# Patient Record
Sex: MALE | Race: WHITE | NOT HISPANIC OR LATINO | Employment: OTHER | URBAN - METROPOLITAN AREA
[De-identification: names, ages, dates, MRNs, and addresses within clinical notes are randomized per-mention and may not be internally consistent; named-entity substitution may affect disease eponyms.]

---

## 2017-01-24 DIAGNOSIS — E34.9 TESTOSTERONE DEFICIENCY: Primary | ICD-10-CM

## 2017-01-24 RX ORDER — TESTOSTERONE CYPIONATE 200 MG/ML
50 INJECTION, SOLUTION INTRAMUSCULAR
Qty: 3 ML | Refills: 3 | OUTPATIENT
Start: 2017-01-24 | End: 2017-02-02

## 2017-01-24 RX ORDER — NAPROXEN 250 MG/1
250 TABLET ORAL DAILY
Qty: 90 TABLET | Refills: 3 | Status: SHIPPED | OUTPATIENT
Start: 2017-01-24 | End: 2017-02-02

## 2017-01-24 RX ORDER — AMLODIPINE BESYLATE 10 MG/1
10 TABLET ORAL DAILY
Qty: 90 TABLET | Refills: 3 | Status: SHIPPED | OUTPATIENT
Start: 2017-01-24 | End: 2017-02-02

## 2017-01-24 RX ORDER — ALLOPURINOL 100 MG/1
100 TABLET ORAL DAILY
Qty: 90 TABLET | Refills: 3 | Status: SHIPPED | OUTPATIENT
Start: 2017-01-24 | End: 2017-02-02

## 2017-01-24 RX ORDER — TESTOSTERONE CYPIONATE 200 MG/ML
50 INJECTION, SOLUTION INTRAMUSCULAR
Qty: 3 ML | Refills: 3 | OUTPATIENT
Start: 2017-01-24 | End: 2017-01-24 | Stop reason: SDUPTHER

## 2017-01-24 RX ORDER — ATENOLOL AND CHLORTHALIDONE TABLET 50; 25 MG/1; MG/1
1 TABLET ORAL DAILY
Qty: 90 TABLET | Refills: 3 | Status: SHIPPED | OUTPATIENT
Start: 2017-01-24 | End: 2017-02-02

## 2017-01-24 NOTE — TELEPHONE ENCOUNTER
----- Message from Trista Pollack MA sent at 1/24/2017  2:02 PM EST -----  Dr Lai Pt    Pt calling for refills:    Atenolol  Amlodipine  Allopurinol  Naproxen  Breo Testosterone    [facility]

## 2017-02-02 ENCOUNTER — HOSPITAL ENCOUNTER (OUTPATIENT)
Dept: GENERAL RADIOLOGY | Facility: HOSPITAL | Age: 82
Discharge: HOME | End: 2017-02-02
Admitting: ORTHOPAEDIC SURGERY

## 2017-02-02 ENCOUNTER — APPOINTMENT (OUTPATIENT)
Dept: PREADMISSION TESTING | Facility: HOSPITAL | Age: 82
End: 2017-02-02

## 2017-02-02 VITALS
HEIGHT: 70 IN | OXYGEN SATURATION: 94 % | TEMPERATURE: 97 F | RESPIRATION RATE: 16 BRPM | SYSTOLIC BLOOD PRESSURE: 130 MMHG | WEIGHT: 224 LBS | DIASTOLIC BLOOD PRESSURE: 85 MMHG | BODY MASS INDEX: 32.07 KG/M2 | HEART RATE: 61 BPM

## 2017-02-02 LAB
ABO GROUP BLD: NORMAL
ANION GAP SERPL CALCULATED.3IONS-SCNC: 12.6 MMOL/L
BILIRUB UR QL STRIP: NEGATIVE
BLD GP AB SCN SERPL QL: NEGATIVE
BUN BLD-MCNC: 20 MG/DL (ref 8–23)
BUN/CREAT SERPL: 16.8 (ref 7–25)
CALCIUM SPEC-SCNC: 9.8 MG/DL (ref 8.6–10.5)
CHLORIDE SERPL-SCNC: 100 MMOL/L (ref 98–107)
CLARITY UR: CLEAR
CO2 SERPL-SCNC: 27.4 MMOL/L (ref 22–29)
COLOR UR: YELLOW
CREAT BLD-MCNC: 1.19 MG/DL (ref 0.76–1.27)
DEPRECATED RDW RBC AUTO: 47.1 FL (ref 37–54)
ERYTHROCYTE [DISTWIDTH] IN BLOOD BY AUTOMATED COUNT: 13.7 % (ref 11.5–14.5)
GFR SERPL CREATININE-BSD FRML MDRD: 59 ML/MIN/1.73
GLUCOSE BLD-MCNC: 111 MG/DL (ref 65–99)
GLUCOSE UR STRIP-MCNC: NEGATIVE MG/DL
HCT VFR BLD AUTO: 48.8 % (ref 40.4–52.2)
HGB BLD-MCNC: 16.1 G/DL (ref 13.7–17.6)
HGB UR QL STRIP.AUTO: NEGATIVE
KETONES UR QL STRIP: NEGATIVE
LEUKOCYTE ESTERASE UR QL STRIP.AUTO: NEGATIVE
MCH RBC QN AUTO: 31.1 PG (ref 27–32.7)
MCHC RBC AUTO-ENTMCNC: 33 G/DL (ref 32.6–36.4)
MCV RBC AUTO: 94.2 FL (ref 79.8–96.2)
NITRITE UR QL STRIP: NEGATIVE
PH UR STRIP.AUTO: 7.5 [PH] (ref 5–8)
PLATELET # BLD AUTO: 224 10*3/MM3 (ref 140–500)
PMV BLD AUTO: 9.8 FL (ref 6–12)
POTASSIUM BLD-SCNC: 4 MMOL/L (ref 3.5–5.2)
PROT UR QL STRIP: ABNORMAL
RBC # BLD AUTO: 5.18 10*6/MM3 (ref 4.6–6)
RH BLD: NEGATIVE
SODIUM BLD-SCNC: 140 MMOL/L (ref 136–145)
SP GR UR STRIP: 1.02 (ref 1–1.03)
UROBILINOGEN UR QL STRIP: ABNORMAL
WBC NRBC COR # BLD: 5.12 10*3/MM3 (ref 4.5–10.7)

## 2017-02-02 PROCEDURE — 81003 URINALYSIS AUTO W/O SCOPE: CPT | Performed by: ORTHOPAEDIC SURGERY

## 2017-02-02 PROCEDURE — 85027 COMPLETE CBC AUTOMATED: CPT | Performed by: ORTHOPAEDIC SURGERY

## 2017-02-02 PROCEDURE — 36415 COLL VENOUS BLD VENIPUNCTURE: CPT

## 2017-02-02 PROCEDURE — 86901 BLOOD TYPING SEROLOGIC RH(D): CPT

## 2017-02-02 PROCEDURE — 73030 X-RAY EXAM OF SHOULDER: CPT

## 2017-02-02 PROCEDURE — 86900 BLOOD TYPING SEROLOGIC ABO: CPT

## 2017-02-02 PROCEDURE — 86850 RBC ANTIBODY SCREEN: CPT

## 2017-02-02 PROCEDURE — 80048 BASIC METABOLIC PNL TOTAL CA: CPT | Performed by: ORTHOPAEDIC SURGERY

## 2017-02-02 RX ORDER — ALLOPURINOL 100 MG/1
100 TABLET ORAL DAILY
COMMUNITY
End: 2017-10-02 | Stop reason: SDUPTHER

## 2017-02-02 RX ORDER — ATENOLOL AND CHLORTHALIDONE TABLET 50; 25 MG/1; MG/1
1 TABLET ORAL EVERY MORNING
COMMUNITY
End: 2017-06-14 | Stop reason: SDUPTHER

## 2017-02-02 RX ORDER — NAPROXEN 250 MG/1
250 TABLET ORAL DAILY
COMMUNITY
End: 2017-10-02 | Stop reason: SDUPTHER

## 2017-02-02 RX ORDER — CHLORHEXIDINE GLUCONATE 500 MG/1
CLOTH TOPICAL
COMMUNITY
End: 2017-02-17 | Stop reason: HOSPADM

## 2017-02-02 RX ORDER — AMLODIPINE BESYLATE 10 MG/1
10 TABLET ORAL EVERY EVENING
COMMUNITY
End: 2017-06-14 | Stop reason: SDUPTHER

## 2017-02-02 NOTE — DISCHARGE INSTRUCTIONS
Take the following medications the morning of surgery with a small sip of water.    AMLODIPINE.    TIME OF ARRIVAL WILL BE CALLED TO YOU THE DAY BEFORE SURGERY    General Instructions:  • Do not eat or drink after midnight: includes water, mints, or gum. You may brush your teeth.  • Do not smoke, chew tobacco, or drink alcohol.  • Bring medications in original bottles, any inhalers and if applicable your C-PAP/ BI-PAP machine.  • Bring any papers given to you in the doctor’s office.  • Wear clean comfortable clothes and socks.  • Do not wear contact lenses or make-up.  Bring a case for your glasses if applicable.   • Bring crutches or walker if applicable.  • Leave all other valuables and jewelry at home.    If you were given a blood bank ID arm band remember to bring it with you the day of surgery.    Preventing a Surgical Site Infection:  Shower on the morning of surgery using a fresh bar of anti-bacterial soap (such as Dial) and clean washcloth.  Dry with a clean towel and dress in clean clothing.  For 2 to 3 days before surgery, avoid shaving with a razor near where you will have surgery because the razor can irritate skin and make it easier to develop an infection  Ask your surgeon if you will be receiving antibiotics prior to surgery  Make sure you, your family, and all healthcare providers clean their hands with soap and water or an alcohol based hand  before caring for you or your wound  If at all possible, quit smoking as many days before surgery as you can.    Day of surgery:  Upon arrival, a Pre-op nurse and Anesthesiologist will review your health history, obtain vital signs, and answer questions you may have.  The only belongings needed at this time will be your home medications and if applicable your C-PAP/BI-PAP machine.  If you are staying overnight your family can leave the rest of your belongings in the car and bring them to your room later.  A Pre-op nurse will start an IV and you may  receive medication in preparation for surgery, including something to help you relax.  Your family will be able to see you in the Pre-op area.  While you are in surgery your family should notify the waiting room  if they leave the waiting room area and provide a contact phone number.    Please be aware that surgery does come with discomfort.  We want to make every effort to control your discomfort so please discuss any uncontrolled symptoms with your nurse.   Your doctor will most likely have prescribed pain medications.      If you are going home after surgery you will receive individualized written care instructions before being discharged.  A responsible adult must drive you to and from the hospital on the day of your surgery and stay with you for 24 hours.    If you are staying overnight following surgery, you will be transported to your hospital room following the recovery period.  [facility] has all private rooms.    If you have any questions please call Pre-Admission Testing at [phone].  Deductibles and co-payments are collected on the day of service. Please be prepared to pay the required co-pay, deductible or deposit on the day of service as defined by your plan.    2% CHLORAHEXIDINE GLUCONATE* CLOTH  Preparing or “prepping” skin before surgery can reduce the risk of infection at the surgical site. To make the process easier, [facility] has chosen disposable cloths moistened with a rinse-free, 2% Chlorhexidine Gluconate (CHG) antiseptic solution. The steps below outline the prepping process and should be carefully followed.        Use the prep cloth on the area that is circled in the diagram             Directions Night before Surgery  1) Shower using a fresh bar of anti-bacterial soap (such as Dial) and clean washcloth.  Use a clean towel to completely dry your skin.  2) Do not use any lotions, oils or creams on your skin.  3) Open the package and remove 1 cloth,  wipe your skin for 30 seconds in a circular motion.  Allow to dry for 3 minutes.  4) Repeat #3 with second cloth.  5) Do not touch your eyes, ears, or mouth with the prep cloth.  6) Allow the wet prep solution to air dry.  7) Discard the prep cloth and wash your hands with soap and water.   8) Dress in clean bed clothes and sleep on fresh clean bed sheets.   9) You may experience some temporary itching after the prep.    Directions Day of Surgery  1) Repeat steps 1,2,3,4,5,6,7, and 9.   2) Dress in clean clothes before coming to the hospital.    BACTROBAN NASAL OINTMENT  There are many germs normally in your nose. Bactroban is an ointment that will help reduce these germs. Please follow these instructions for Bactroban use:    ____Two days before surgery in the evening Date________    ____The day before surgery in the morning  Date________    ____The day before surgery in the evening              Date________    ____The day of surgery in the morning    Date________    **Squirt ½ package of Bactroban Ointment onto a cotton applicator and apply to inside of 1st nostril.  Squirt the remaining Bactroban and apply to the inside of the other nostril.    PERIDEX- ORAL:  Use only if your surgeon has ordered  Use the night before and morning of surgery - Swish, gargle, and spit - do not swallow.

## 2017-02-15 ENCOUNTER — ANESTHESIA EVENT (OUTPATIENT)
Dept: PERIOP | Facility: HOSPITAL | Age: 82
End: 2017-02-15

## 2017-02-15 NOTE — ANESTHESIA PREPROCEDURE EVALUATION
Anesthesia Evaluation     Patient summary reviewed and Nursing notes reviewed   NPO Status: > 8 hours   Airway   Mallampati: III  TM distance: >3 FB  Neck ROM: limited  possible difficult intubation  Dental - normal exam   (+) poor dentation        Pulmonary - negative pulmonary ROS and normal exam   Cardiovascular - normal exam    ECG reviewed    (+) hypertension, past MI  >12 months, CAD,       Neuro/Psych- negative ROS  GI/Hepatic/Renal/Endo - negative ROS     Musculoskeletal (-) negative ROS    Abdominal  - normal exam   Substance History - negative use     OB/GYN negative ob/gyn ROS         Other                            Anesthesia Plan    ASA 3     general   (EKG: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   GA with Right ISB for POPC PSR)  intravenous induction   Anesthetic plan and risks discussed with patient.    Plan discussed with CRNA.

## 2017-02-16 ENCOUNTER — ANESTHESIA (OUTPATIENT)
Dept: PERIOP | Facility: HOSPITAL | Age: 82
End: 2017-02-16

## 2017-02-16 ENCOUNTER — APPOINTMENT (OUTPATIENT)
Dept: GENERAL RADIOLOGY | Facility: HOSPITAL | Age: 82
End: 2017-02-16

## 2017-02-16 PROBLEM — M19.011 OSTEOARTHRITIS OF RIGHT SHOULDER: Status: ACTIVE | Noted: 2017-02-16

## 2017-02-16 PROCEDURE — 25010000002 ROPIVACAINE PER 1 MG: Performed by: ANESTHESIOLOGY

## 2017-02-16 PROCEDURE — 25010000002 PHENYLEPHRINE PER 1 ML: Performed by: NURSE ANESTHETIST, CERTIFIED REGISTERED

## 2017-02-16 PROCEDURE — 73020 X-RAY EXAM OF SHOULDER: CPT

## 2017-02-16 PROCEDURE — 25010000002 PROPOFOL 10 MG/ML EMULSION: Performed by: NURSE ANESTHETIST, CERTIFIED REGISTERED

## 2017-02-16 PROCEDURE — 25010000002 ONDANSETRON PER 1 MG: Performed by: NURSE ANESTHETIST, CERTIFIED REGISTERED

## 2017-02-16 PROCEDURE — 25010000002 DEXAMETHASONE PER 1 MG: Performed by: ANESTHESIOLOGY

## 2017-02-16 PROCEDURE — 25010000002 NEOSTIGMINE 0.5 MG/ML SOLUTION: Performed by: NURSE ANESTHETIST, CERTIFIED REGISTERED

## 2017-02-16 PROCEDURE — 25010000003 CEFAZOLIN IN DEXTROSE 2-4 GM/100ML-% SOLUTION: Performed by: ORTHOPAEDIC SURGERY

## 2017-02-16 RX ORDER — DEXAMETHASONE SODIUM PHOSPHATE 4 MG/ML
INJECTION, SOLUTION INTRA-ARTICULAR; INTRALESIONAL; INTRAMUSCULAR; INTRAVENOUS; SOFT TISSUE AS NEEDED
Status: DISCONTINUED | OUTPATIENT
Start: 2017-02-16 | End: 2017-02-16 | Stop reason: SURG

## 2017-02-16 RX ORDER — ONDANSETRON 2 MG/ML
INJECTION INTRAMUSCULAR; INTRAVENOUS AS NEEDED
Status: DISCONTINUED | OUTPATIENT
Start: 2017-02-16 | End: 2017-02-16 | Stop reason: SURG

## 2017-02-16 RX ORDER — ROPIVACAINE HYDROCHLORIDE 5 MG/ML
INJECTION, SOLUTION EPIDURAL; INFILTRATION; PERINEURAL AS NEEDED
Status: DISCONTINUED | OUTPATIENT
Start: 2017-02-16 | End: 2017-02-16 | Stop reason: SURG

## 2017-02-16 RX ORDER — PROPOFOL 10 MG/ML
VIAL (ML) INTRAVENOUS AS NEEDED
Status: DISCONTINUED | OUTPATIENT
Start: 2017-02-16 | End: 2017-02-16 | Stop reason: SURG

## 2017-02-16 RX ORDER — ROCURONIUM BROMIDE 10 MG/ML
INJECTION, SOLUTION INTRAVENOUS AS NEEDED
Status: DISCONTINUED | OUTPATIENT
Start: 2017-02-16 | End: 2017-02-16 | Stop reason: SURG

## 2017-02-16 RX ORDER — GLYCOPYRROLATE 0.2 MG/ML
INJECTION INTRAMUSCULAR; INTRAVENOUS AS NEEDED
Status: DISCONTINUED | OUTPATIENT
Start: 2017-02-16 | End: 2017-02-16 | Stop reason: SURG

## 2017-02-16 RX ORDER — LIDOCAINE HYDROCHLORIDE 20 MG/ML
INJECTION, SOLUTION INFILTRATION; PERINEURAL AS NEEDED
Status: DISCONTINUED | OUTPATIENT
Start: 2017-02-16 | End: 2017-02-16 | Stop reason: SURG

## 2017-02-16 RX ADMIN — PHENYLEPHRINE HYDROCHLORIDE 100 MCG: 10 INJECTION INTRAVENOUS at 10:12

## 2017-02-16 RX ADMIN — ONDANSETRON 4 MG: 2 INJECTION INTRAMUSCULAR; INTRAVENOUS at 09:00

## 2017-02-16 RX ADMIN — NEOSTIGMINE METHYLSULFATE 1 MG: 5 INJECTION, SOLUTION INTRAMUSCULAR; INTRAVENOUS; SUBCUTANEOUS at 10:35

## 2017-02-16 RX ADMIN — EPHEDRINE SULFATE 15 MG: 50 INJECTION INTRAMUSCULAR; INTRAVENOUS; SUBCUTANEOUS at 09:07

## 2017-02-16 RX ADMIN — CEFAZOLIN SODIUM 2 G: 2 INJECTION, SOLUTION INTRAVENOUS at 08:52

## 2017-02-16 RX ADMIN — DEXAMETHASONE SODIUM PHOSPHATE 4 MG: 4 INJECTION, SOLUTION INTRAMUSCULAR; INTRAVENOUS at 08:13

## 2017-02-16 RX ADMIN — PROPOFOL 260 MG: 10 INJECTION, EMULSION INTRAVENOUS at 08:52

## 2017-02-16 RX ADMIN — GLYCOPYRROLATE 0.2 MG: 0.2 INJECTION INTRAMUSCULAR; INTRAVENOUS at 09:07

## 2017-02-16 RX ADMIN — ROPIVACAINE HYDROCHLORIDE 20 ML: 5 INJECTION, SOLUTION EPIDURAL; INFILTRATION; PERINEURAL at 08:13

## 2017-02-16 RX ADMIN — LIDOCAINE HYDROCHLORIDE 40 MG: 20 INJECTION, SOLUTION INFILTRATION; PERINEURAL at 08:52

## 2017-02-16 RX ADMIN — EPHEDRINE SULFATE 10 MG: 50 INJECTION INTRAMUSCULAR; INTRAVENOUS; SUBCUTANEOUS at 09:03

## 2017-02-16 RX ADMIN — GLYCOPYRROLATE 0.2 MG: 0.2 INJECTION INTRAMUSCULAR; INTRAVENOUS at 10:35

## 2017-02-16 RX ADMIN — PHENYLEPHRINE HYDROCHLORIDE 50 MCG: 10 INJECTION INTRAVENOUS at 10:28

## 2017-02-16 RX ADMIN — PHENYLEPHRINE HYDROCHLORIDE 100 MCG: 10 INJECTION INTRAVENOUS at 10:33

## 2017-02-16 RX ADMIN — SODIUM CHLORIDE, POTASSIUM CHLORIDE, SODIUM LACTATE AND CALCIUM CHLORIDE: 600; 310; 30; 20 INJECTION, SOLUTION INTRAVENOUS at 10:33

## 2017-02-16 RX ADMIN — ROCURONIUM BROMIDE 40 MG: 10 INJECTION INTRAVENOUS at 08:53

## 2017-02-16 NOTE — ANESTHESIA PROCEDURE NOTES
Peripheral Block    Patient location during procedure: holding area  Start time: 2/16/2017 8:04 AM  Stop time: 2/16/2017 8:14 AM  Reason for block: at surgeon's request and post-op pain management  Performed by  Anesthesiologist: RAFA AKERS  Preanesthetic Checklist  Completed: patient identified, site marked, surgical consent, pre-op evaluation, timeout performed, IV checked, risks and benefits discussed and monitors and equipment checked  Peripheral Block Prep:  Sterile barriers:cap, gloves, mask and sterile barriers  Prep: ChloraPrep  Patient monitoring: blood pressure monitoring, continuous pulse oximetry and EKG  Peripheral Procedure  Sedation:yes  Guidance:ultrasound guided  Images:still images obtained  Laterality:rightBlock Type:interscalene  Injection Technique:single-shotNeedle Type:echogenic  Needle Gauge:22 G  ULTRASOUND INTERPRETATION.  Using ultrasound guidance a 22 G gauge needle was placed in close proximity to the brachial plexus nerve, at which point, under ultrasound guidance anesthetic was injected in the area of the nerve and spread of the anesthesia was seen on ultrasound in close proximity thereto.  There were no abnormalities seen on ultrasound; a digital image was taken; and the patient tolerated the procedure with no complications.   Medications  Local Injected:ropivacaine 0.5% without epinephrine Local Amount Injected:30mL  Post Assessment  Patient Tolerance:comfortable throughout block  Complications:no  Additional Notes  rop 20cc / dex 4mg

## 2017-02-16 NOTE — ANESTHESIA POSTPROCEDURE EVALUATION
Patient: Guero Garay    Procedure Summary     Date Anesthesia Start Anesthesia Stop Room / Location    02/16/17 0846 [id number]  [facility]  /  [facility]       Procedure Diagnosis Surgeon Provider    RIGHT TOTAL SHOULDER ARTHROPLASTY (Right Shoulder) No diagnosis on file. MD Alfred Abel MD          Anesthesia Type: general  Last vitals  BP 99/60 (02/16/17 1200)    Temp      Pulse 75 (02/16/17 1200)   Resp 16 (02/16/17 1200)    SpO2 94 % (02/16/17 1200)      Post Anesthesia Care and Evaluation    Patient location during evaluation: PACU  Patient participation: complete - patient participated  Level of consciousness: awake and alert  Pain management: adequate  Airway patency: patent  Anesthetic complications: No anesthetic complications    Cardiovascular status: acceptable  Respiratory status: acceptable  Hydration status: acceptable

## 2017-04-17 ENCOUNTER — TELEPHONE (OUTPATIENT)
Dept: INTERNAL MEDICINE | Facility: CLINIC | Age: 82
End: 2017-04-17

## 2017-04-17 ENCOUNTER — OFFICE VISIT (OUTPATIENT)
Dept: INTERNAL MEDICINE | Facility: CLINIC | Age: 82
End: 2017-04-17

## 2017-04-17 VITALS
SYSTOLIC BLOOD PRESSURE: 130 MMHG | OXYGEN SATURATION: 94 % | HEIGHT: 70 IN | HEART RATE: 59 BPM | DIASTOLIC BLOOD PRESSURE: 98 MMHG | WEIGHT: 230 LBS | BODY MASS INDEX: 32.93 KG/M2

## 2017-04-17 DIAGNOSIS — I10 ESSENTIAL HYPERTENSION: Primary | ICD-10-CM

## 2017-04-17 DIAGNOSIS — E78.2 MIXED HYPERLIPIDEMIA: ICD-10-CM

## 2017-04-17 DIAGNOSIS — R07.9 CHEST PAIN, UNSPECIFIED TYPE: ICD-10-CM

## 2017-04-17 PROCEDURE — 93000 ELECTROCARDIOGRAM COMPLETE: CPT | Performed by: INTERNAL MEDICINE

## 2017-04-17 PROCEDURE — 99214 OFFICE O/P EST MOD 30 MIN: CPT | Performed by: INTERNAL MEDICINE

## 2017-04-17 NOTE — TELEPHONE ENCOUNTER
----- Message from Celsa Ramirez sent at 4/17/2017 10:21 AM EDT -----  Contact: pt - Dr Lai's pt - RE: return call  Pt calling and would like a return call regarding some strange happening to pt - teeth hurting at night, running out of air, etc. Could you please call pt to discuss? Does pt need to make appt to be seen? Please advise? Thanks      Pt # [phone]

## 2017-04-17 NOTE — PROGRESS NOTES
Subjective   Guero Garay is a 82 y.o. male.     Breathing Problem   He complains of difficulty breathing and shortness of breath (Has noticed increasing SOB for past couple months Used to be able to walk a mile W/O difficulty  Can only walk 1/4 mile now). Associated symptoms include chest pain (With SOB will notice pain in his jaw both sides Will subside after a few minutes ).   Jaw Pain   Associated symptoms include arthralgias ( Had Rt shoulder repair & has done well Still doing his exercises) and chest pain (With SOB will notice pain in his jaw both sides Will subside after a few minutes ).        The following portions of the patient's history were reviewed and updated as appropriate: allergies, current medications, past family history, past medical history, past social history, past surgical history and problem list.    Review of Systems   Constitutional:        Here for some new problems   HENT: Negative.    Respiratory: Positive for shortness of breath (Has noticed increasing SOB for past couple months Used to be able to walk a mile W/O difficulty  Can only walk 1/4 mile now).    Cardiovascular: Positive for chest pain (With SOB will notice pain in his jaw both sides Will subside after a few minutes ). Negative for palpitations.   Gastrointestinal: Negative.    Genitourinary: Negative.    Musculoskeletal: Positive for arthralgias ( Had Rt shoulder repair & has done well Still doing his exercises).       Objective   Physical Exam   Constitutional: He is oriented to person, place, and time. He appears well-developed.   HENT:   Head: Normocephalic.   Eyes: EOM are normal.   Neck: Neck supple.   Cardiovascular: Normal rate, regular rhythm and normal heart sounds.    Repeat 110/84   Pulmonary/Chest: Effort normal and breath sounds normal.   Musculoskeletal: Normal range of motion.   Neurological: He is alert and oriented to person, place, and time.   Vitals reviewed.      Assessment/Plan   Guero was seen today  for breathing problem and jaw pain.    Diagnoses and all orders for this visit:    Essential hypertension    Mixed hyperlipidemia    Chest pain, unspecified type        ECG 12 Lead  Date/Time: 4/17/2017 3:21 PM  Performed by: JAMES CARMONA  Authorized by: JAMES CARMONA   Rhythm: sinus rhythm  Conduction: conduction normal  ST Depression: V2, V3, V4 and V5  Clinical impression: abnormal ECG  Comments: Inferior MI Twave changes are new

## 2017-04-26 ENCOUNTER — LAB (OUTPATIENT)
Dept: LAB | Facility: HOSPITAL | Age: 82
End: 2017-04-26
Attending: INTERNAL MEDICINE

## 2017-04-26 ENCOUNTER — HOSPITAL ENCOUNTER (OUTPATIENT)
Dept: CARDIOLOGY | Facility: HOSPITAL | Age: 82
Discharge: HOME | End: 2017-04-26
Attending: INTERNAL MEDICINE

## 2017-04-26 ENCOUNTER — TRANSCRIBE ORDERS (OUTPATIENT)
Dept: ADMINISTRATIVE | Facility: HOSPITAL | Age: 82
End: 2017-04-26

## 2017-04-26 ENCOUNTER — HOSPITAL ENCOUNTER (OUTPATIENT)
Dept: CARDIOLOGY | Facility: HOSPITAL | Age: 82
Discharge: HOME | End: 2017-04-26
Attending: INTERNAL MEDICINE | Admitting: INTERNAL MEDICINE

## 2017-04-26 VITALS
DIASTOLIC BLOOD PRESSURE: 84 MMHG | SYSTOLIC BLOOD PRESSURE: 114 MMHG | BODY MASS INDEX: 32.93 KG/M2 | HEART RATE: 64 BPM | WEIGHT: 230 LBS | HEIGHT: 70 IN

## 2017-04-26 DIAGNOSIS — Z01.810 PRE-OPERATIVE CARDIOVASCULAR EXAMINATION: Primary | ICD-10-CM

## 2017-04-26 DIAGNOSIS — Z13.6 SCREENING FOR ISCHEMIC HEART DISEASE: ICD-10-CM

## 2017-04-26 DIAGNOSIS — Z01.810 PRE-OPERATIVE CARDIOVASCULAR EXAMINATION: ICD-10-CM

## 2017-04-26 DIAGNOSIS — R07.9 CHEST PAIN, UNSPECIFIED TYPE: ICD-10-CM

## 2017-04-26 LAB
ANION GAP SERPL CALCULATED.3IONS-SCNC: 15.7 MMOL/L
ASCENDING AORTA: 4.2 CM
BASOPHILS # BLD AUTO: 0.04 10*3/MM3 (ref 0–0.2)
BASOPHILS NFR BLD AUTO: 0.8 % (ref 0–1.5)
BH CV ECHO MEAS - ACS: 1.2 CM
BH CV ECHO MEAS - AO MAX PG (FULL): 20.3 MMHG
BH CV ECHO MEAS - AO MAX PG: 23 MMHG
BH CV ECHO MEAS - AO MEAN PG (FULL): 12.8 MMHG
BH CV ECHO MEAS - AO MEAN PG: 14.4 MMHG
BH CV ECHO MEAS - AO ROOT AREA (BSA CORRECTED): 1.9
BH CV ECHO MEAS - AO ROOT AREA: 10.5 CM^2
BH CV ECHO MEAS - AO ROOT DIAM: 3.7 CM
BH CV ECHO MEAS - AO V2 MAX: 240 CM/SEC
BH CV ECHO MEAS - AO V2 MEAN: 181.6 CM/SEC
BH CV ECHO MEAS - AO V2 VTI: 53.7 CM
BH CV ECHO MEAS - AVA(I,A): 1.1 CM^2
BH CV ECHO MEAS - AVA(I,D): 1.1 CM^2
BH CV ECHO MEAS - AVA(V,A): 1 CM^2
BH CV ECHO MEAS - AVA(V,D): 1 CM^2
BH CV ECHO MEAS - BSA(HAYCOCK): 2.3 M^2
BH CV ECHO MEAS - BSA: 2.2 M^2
BH CV ECHO MEAS - BZI_BMI: 33 KILOGRAMS/M^2
BH CV ECHO MEAS - BZI_METRIC_HEIGHT: 177.8 CM
BH CV ECHO MEAS - BZI_METRIC_WEIGHT: 104.3 KG
BH CV ECHO MEAS - CONTRAST EF (2CH): 55.2 ML/M^2
BH CV ECHO MEAS - CONTRAST EF 4CH: 54.3 ML/M^2
BH CV ECHO MEAS - EDV(CUBED): 148.5 ML
BH CV ECHO MEAS - EDV(MOD-SP2): 116 ML
BH CV ECHO MEAS - EDV(MOD-SP4): 116 ML
BH CV ECHO MEAS - EDV(TEICH): 135.1 ML
BH CV ECHO MEAS - EF(CUBED): 55.8 %
BH CV ECHO MEAS - EF(MOD-SP2): 55.2 %
BH CV ECHO MEAS - EF(MOD-SP4): 54.3 %
BH CV ECHO MEAS - EF(TEICH): 47.2 %
BH CV ECHO MEAS - ESV(CUBED): 65.6 ML
BH CV ECHO MEAS - ESV(MOD-SP2): 52 ML
BH CV ECHO MEAS - ESV(MOD-SP4): 53 ML
BH CV ECHO MEAS - ESV(TEICH): 71.3 ML
BH CV ECHO MEAS - FS: 23.9 %
BH CV ECHO MEAS - IVS/LVPW: 0.92
BH CV ECHO MEAS - IVSD: 1.1 CM
BH CV ECHO MEAS - LAT PEAK E' VEL: 9 CM/SEC
BH CV ECHO MEAS - LV DIASTOLIC VOL/BSA (35-75): 52.4 ML/M^2
BH CV ECHO MEAS - LV MASS(C)D: 232.3 GRAMS
BH CV ECHO MEAS - LV MASS(C)DI: 104.9 GRAMS/M^2
BH CV ECHO MEAS - LV MAX PG: 2.7 MMHG
BH CV ECHO MEAS - LV MEAN PG: 1.6 MMHG
BH CV ECHO MEAS - LV SYSTOLIC VOL/BSA (12-30): 23.9 ML/M^2
BH CV ECHO MEAS - LV V1 MAX: 82.5 CM/SEC
BH CV ECHO MEAS - LV V1 MEAN: 61.1 CM/SEC
BH CV ECHO MEAS - LV V1 VTI: 20.2 CM
BH CV ECHO MEAS - LVIDD: 5.3 CM
BH CV ECHO MEAS - LVIDS: 4 CM
BH CV ECHO MEAS - LVLD AP2: 7.4 CM
BH CV ECHO MEAS - LVLD AP4: 7.5 CM
BH CV ECHO MEAS - LVLS AP2: 7 CM
BH CV ECHO MEAS - LVLS AP4: 7 CM
BH CV ECHO MEAS - LVOT AREA (M): 3.1 CM^2
BH CV ECHO MEAS - LVOT AREA: 3 CM^2
BH CV ECHO MEAS - LVOT DIAM: 2 CM
BH CV ECHO MEAS - LVPWD: 1.2 CM
BH CV ECHO MEAS - MED PEAK E' VEL: 15 CM/SEC
BH CV ECHO MEAS - MR MAX PG: 73.5 MMHG
BH CV ECHO MEAS - MR MAX VEL: 428.6 CM/SEC
BH CV ECHO MEAS - MV A DUR: 0.11 SEC
BH CV ECHO MEAS - MV A MAX VEL: 108.2 CM/SEC
BH CV ECHO MEAS - MV DEC SLOPE: 240.3 CM/SEC^2
BH CV ECHO MEAS - MV DEC TIME: 0.3 SEC
BH CV ECHO MEAS - MV E MAX VEL: 69.8 CM/SEC
BH CV ECHO MEAS - MV E/A: 0.65
BH CV ECHO MEAS - MV MAX PG: 4.9 MMHG
BH CV ECHO MEAS - MV MEAN PG: 1.5 MMHG
BH CV ECHO MEAS - MV P1/2T MAX VEL: 72.3 CM/SEC
BH CV ECHO MEAS - MV P1/2T: 88.1 MSEC
BH CV ECHO MEAS - MV V2 MAX: 110.8 CM/SEC
BH CV ECHO MEAS - MV V2 MEAN: 56.3 CM/SEC
BH CV ECHO MEAS - MV V2 VTI: 36.1 CM
BH CV ECHO MEAS - MVA P1/2T LCG: 3 CM^2
BH CV ECHO MEAS - MVA(P1/2T): 2.5 CM^2
BH CV ECHO MEAS - MVA(VTI): 1.7 CM^2
BH CV ECHO MEAS - PA ACC TIME: 0.14 SEC
BH CV ECHO MEAS - PA MAX PG (FULL): 0.92 MMHG
BH CV ECHO MEAS - PA MAX PG: 2 MMHG
BH CV ECHO MEAS - PA PR(ACCEL): 17.2 MMHG
BH CV ECHO MEAS - PA V2 MAX: 70.6 CM/SEC
BH CV ECHO MEAS - PULM A REVS DUR: 0.1 SEC
BH CV ECHO MEAS - PULM A REVS VEL: 25.3 CM/SEC
BH CV ECHO MEAS - PULM DIAS VEL: 40.8 CM/SEC
BH CV ECHO MEAS - PULM S/D: 0.8
BH CV ECHO MEAS - PULM SYS VEL: 32.8 CM/SEC
BH CV ECHO MEAS - PVA(V,A): 2.4 CM^2
BH CV ECHO MEAS - PVA(V,D): 2.4 CM^2
BH CV ECHO MEAS - QP/QS: 0.74
BH CV ECHO MEAS - RAP SYSTOLE: 3 MMHG
BH CV ECHO MEAS - RV MAX PG: 1.1 MMHG
BH CV ECHO MEAS - RV MEAN PG: 0.64 MMHG
BH CV ECHO MEAS - RV V1 MAX: 51.9 CM/SEC
BH CV ECHO MEAS - RV V1 MEAN: 38 CM/SEC
BH CV ECHO MEAS - RV V1 VTI: 13.9 CM
BH CV ECHO MEAS - RVOT AREA: 3.3 CM^2
BH CV ECHO MEAS - RVOT DIAM: 2 CM
BH CV ECHO MEAS - RVSP: 16.2 MMHG
BH CV ECHO MEAS - SI(AO): 255.8 ML/M^2
BH CV ECHO MEAS - SI(CUBED): 37.4 ML/M^2
BH CV ECHO MEAS - SI(LVOT): 27.8 ML/M^2
BH CV ECHO MEAS - SI(MOD-SP2): 28.9 ML/M^2
BH CV ECHO MEAS - SI(MOD-SP4): 28.4 ML/M^2
BH CV ECHO MEAS - SI(TEICH): 28.8 ML/M^2
BH CV ECHO MEAS - SUP REN AO DIAM: 2.2 CM
BH CV ECHO MEAS - SV(AO): 566.6 ML
BH CV ECHO MEAS - SV(CUBED): 82.9 ML
BH CV ECHO MEAS - SV(LVOT): 61.7 ML
BH CV ECHO MEAS - SV(MOD-SP2): 64 ML
BH CV ECHO MEAS - SV(MOD-SP4): 63 ML
BH CV ECHO MEAS - SV(RVOT): 45.7 ML
BH CV ECHO MEAS - SV(TEICH): 63.7 ML
BH CV ECHO MEAS - TAPSE (>1.6): 1.9 CM2
BH CV ECHO MEAS - TR MAX VEL: 181.3 CM/SEC
BH CV NUCLEAR PRIOR STUDY: 3
BH CV STRESS BP STAGE 1: NORMAL
BH CV STRESS DURATION MIN STAGE 1: 3
BH CV STRESS DURATION MIN STAGE 2: 1
BH CV STRESS DURATION SEC STAGE 1: 0
BH CV STRESS DURATION SEC STAGE 2: 30
BH CV STRESS GRADE STAGE 1: 10
BH CV STRESS GRADE STAGE 2: 12
BH CV STRESS HR STAGE 1: 66
BH CV STRESS HR STAGE 2: 73
BH CV STRESS METS STAGE 1: 5
BH CV STRESS METS STAGE 2: 7.5
BH CV STRESS O2 STAGE 2: 96
BH CV STRESS PROTOCOL 1: NORMAL
BH CV STRESS PROTOCOL 2 BP STAGE 1: NORMAL
BH CV STRESS PROTOCOL 2 COMMENTS STAGE 1: NORMAL
BH CV STRESS PROTOCOL 2 DOSE REGADENOSON STAGE 1: 0.4
BH CV STRESS PROTOCOL 2 DURATION MIN STAGE 1: 0
BH CV STRESS PROTOCOL 2 DURATION SEC STAGE 1: 15
BH CV STRESS PROTOCOL 2 HR STAGE 1: 73
BH CV STRESS PROTOCOL 2 STAGE 1: 1
BH CV STRESS PROTOCOL 2: NORMAL
BH CV STRESS RECOVERY BP: NORMAL MMHG
BH CV STRESS RECOVERY HR: 68 BPM
BH CV STRESS SPEED STAGE 1: 1.7
BH CV STRESS SPEED STAGE 2: 2.5
BH CV STRESS STAGE 1: 1
BH CV STRESS STAGE 2: 2
BH CV XLRA - RV BASE: 133.2 CM
BH CV XLRA - TDI S': 13 CM/SEC
BUN BLD-MCNC: 24 MG/DL (ref 8–23)
BUN/CREAT SERPL: 22.2 (ref 7–25)
CALCIUM SPEC-SCNC: 9.6 MG/DL (ref 8.6–10.5)
CHLORIDE SERPL-SCNC: 99 MMOL/L (ref 98–107)
CO2 SERPL-SCNC: 25.3 MMOL/L (ref 22–29)
CREAT BLD-MCNC: 1.08 MG/DL (ref 0.76–1.27)
DEPRECATED RDW RBC AUTO: 46 FL (ref 37–54)
E/E' RATIO: 12
EOSINOPHIL # BLD AUTO: 0.1 10*3/MM3 (ref 0–0.7)
EOSINOPHIL NFR BLD AUTO: 1.9 % (ref 0.3–6.2)
ERYTHROCYTE [DISTWIDTH] IN BLOOD BY AUTOMATED COUNT: 13.4 % (ref 11.5–14.5)
GFR SERPL CREATININE-BSD FRML MDRD: 66 ML/MIN/1.73
GLUCOSE BLD-MCNC: 93 MG/DL (ref 65–99)
HCT VFR BLD AUTO: 47.3 % (ref 40.4–52.2)
HGB BLD-MCNC: 15.3 G/DL (ref 13.7–17.6)
IMM GRANULOCYTES # BLD: 0 10*3/MM3 (ref 0–0.03)
IMM GRANULOCYTES NFR BLD: 0 % (ref 0–0.5)
LEFT ATRIUM VOLUME INDEX: 26 ML/M2
LV EF NUC BP: 56 %
LYMPHOCYTES # BLD AUTO: 1.17 10*3/MM3 (ref 0.9–4.8)
LYMPHOCYTES NFR BLD AUTO: 22.6 % (ref 19.6–45.3)
MAXIMAL PREDICTED HEART RATE: 139 BPM
MCH RBC QN AUTO: 30.2 PG (ref 27–32.7)
MCHC RBC AUTO-ENTMCNC: 32.3 G/DL (ref 32.6–36.4)
MCV RBC AUTO: 93.5 FL (ref 79.8–96.2)
MONOCYTES # BLD AUTO: 0.47 10*3/MM3 (ref 0.2–1.2)
MONOCYTES NFR BLD AUTO: 9.1 % (ref 5–12)
NEUTROPHILS # BLD AUTO: 3.4 10*3/MM3 (ref 1.9–8.1)
NEUTROPHILS NFR BLD AUTO: 65.6 % (ref 42.7–76)
PERCENT MAX PREDICTED HR: 52.52 %
PLATELET # BLD AUTO: 185 10*3/MM3 (ref 140–500)
PMV BLD AUTO: 10.3 FL (ref 6–12)
POTASSIUM BLD-SCNC: 3.6 MMOL/L (ref 3.5–5.2)
RBC # BLD AUTO: 5.06 10*6/MM3 (ref 4.6–6)
SINUS: 3.4 CM
SODIUM BLD-SCNC: 140 MMOL/L (ref 136–145)
STJ: 2.6 CM
STRESS BASELINE BP: NORMAL MMHG
STRESS BASELINE HR: 56 BPM
STRESS PERCENT HR: 62 %
STRESS POST EXERCISE DUR SEC: 15 SEC
STRESS POST O2 SAT PEAK: 100 %
STRESS POST PEAK BP: NORMAL MMHG
STRESS POST PEAK HR: 73 BPM
STRESS TARGET HR: 118 BPM
WBC NRBC COR # BLD: 5.18 10*3/MM3 (ref 4.5–10.7)

## 2017-04-26 PROCEDURE — 80048 BASIC METABOLIC PNL TOTAL CA: CPT

## 2017-04-26 PROCEDURE — 78452 HT MUSCLE IMAGE SPECT MULT: CPT

## 2017-04-26 PROCEDURE — 93306 TTE W/DOPPLER COMPLETE: CPT | Performed by: INTERNAL MEDICINE

## 2017-04-26 PROCEDURE — 93017 CV STRESS TEST TRACING ONLY: CPT

## 2017-04-26 PROCEDURE — 85025 COMPLETE CBC W/AUTO DIFF WBC: CPT

## 2017-04-26 PROCEDURE — 25010000002 REGADENOSON 0.4 MG/5ML SOLUTION: Performed by: INTERNAL MEDICINE

## 2017-04-26 PROCEDURE — 36415 COLL VENOUS BLD VENIPUNCTURE: CPT

## 2017-04-26 PROCEDURE — A9502 TC99M TETROFOSMIN: HCPCS | Performed by: INTERNAL MEDICINE

## 2017-04-26 PROCEDURE — 93306 TTE W/DOPPLER COMPLETE: CPT

## 2017-04-26 PROCEDURE — 93016 CV STRESS TEST SUPVJ ONLY: CPT | Performed by: INTERNAL MEDICINE

## 2017-04-26 PROCEDURE — 78452 HT MUSCLE IMAGE SPECT MULT: CPT | Performed by: INTERNAL MEDICINE

## 2017-04-26 PROCEDURE — 0 TECHNETIUM TETROFOSMIN KIT: Performed by: INTERNAL MEDICINE

## 2017-04-26 PROCEDURE — 93018 CV STRESS TEST I&R ONLY: CPT | Performed by: INTERNAL MEDICINE

## 2017-04-26 RX ADMIN — TETROFOSMIN 1 DOSE: 1.38 INJECTION, POWDER, LYOPHILIZED, FOR SOLUTION INTRAVENOUS at 08:57

## 2017-04-26 RX ADMIN — TETROFOSMIN 1 DOSE: 1.38 INJECTION, POWDER, LYOPHILIZED, FOR SOLUTION INTRAVENOUS at 08:34

## 2017-04-26 RX ADMIN — REGADENOSON 0.4 MG: 0.08 INJECTION, SOLUTION INTRAVENOUS at 08:57

## 2017-04-27 ENCOUNTER — HOSPITAL ENCOUNTER (OUTPATIENT)
Facility: HOSPITAL | Age: 82
Discharge: HOME | End: 2017-04-28
Attending: INTERNAL MEDICINE | Admitting: INTERNAL MEDICINE

## 2017-04-27 DIAGNOSIS — I25.110 CORONARY ARTERY DISEASE INVOLVING NATIVE CORONARY ARTERY OF NATIVE HEART WITH UNSTABLE ANGINA PECTORIS (HCC): ICD-10-CM

## 2017-04-27 LAB
ACT BLD: 173 SECONDS (ref 82–152)
ACT BLD: 209 SECONDS (ref 82–152)

## 2017-04-27 PROCEDURE — 0 IOPAMIDOL PER 1 ML: Performed by: INTERNAL MEDICINE

## 2017-04-27 PROCEDURE — 85347 COAGULATION TIME ACTIVATED: CPT

## 2017-04-27 PROCEDURE — C1725 CATH, TRANSLUMIN NON-LASER: HCPCS | Performed by: INTERNAL MEDICINE

## 2017-04-27 PROCEDURE — C1887 CATHETER, GUIDING: HCPCS | Performed by: INTERNAL MEDICINE

## 2017-04-27 PROCEDURE — C1769 GUIDE WIRE: HCPCS | Performed by: INTERNAL MEDICINE

## 2017-04-27 PROCEDURE — C1894 INTRO/SHEATH, NON-LASER: HCPCS | Performed by: INTERNAL MEDICINE

## 2017-04-27 PROCEDURE — G0378 HOSPITAL OBSERVATION PER HR: HCPCS

## 2017-04-27 PROCEDURE — 93458 L HRT ARTERY/VENTRICLE ANGIO: CPT | Performed by: INTERNAL MEDICINE

## 2017-04-27 PROCEDURE — 93010 ELECTROCARDIOGRAM REPORT: CPT | Performed by: INTERNAL MEDICINE

## 2017-04-27 PROCEDURE — 99152 MOD SED SAME PHYS/QHP 5/>YRS: CPT | Performed by: INTERNAL MEDICINE

## 2017-04-27 PROCEDURE — C1874 STENT, COATED/COV W/DEL SYS: HCPCS | Performed by: INTERNAL MEDICINE

## 2017-04-27 PROCEDURE — C9600 PERC DRUG-EL COR STENT SING: HCPCS | Performed by: INTERNAL MEDICINE

## 2017-04-27 PROCEDURE — 25010000002 HEPARIN (PORCINE) PER 1000 UNITS: Performed by: INTERNAL MEDICINE

## 2017-04-27 PROCEDURE — 25010000002 FENTANYL CITRATE (PF) 100 MCG/2ML SOLUTION: Performed by: INTERNAL MEDICINE

## 2017-04-27 PROCEDURE — 25010000002 MIDAZOLAM PER 1 MG: Performed by: INTERNAL MEDICINE

## 2017-04-27 PROCEDURE — 93005 ELECTROCARDIOGRAM TRACING: CPT | Performed by: INTERNAL MEDICINE

## 2017-04-27 PROCEDURE — 99153 MOD SED SAME PHYS/QHP EA: CPT | Performed by: INTERNAL MEDICINE

## 2017-04-27 PROCEDURE — 92928 PRQ TCAT PLMT NTRAC ST 1 LES: CPT | Performed by: INTERNAL MEDICINE

## 2017-04-27 DEVICE — XIENCE ALPINE EVEROLIMUS ELUTING CORONARY STENT SYSTEM 4.00 MM X 15 MM / RAPID-EXCHANGE
Type: IMPLANTABLE DEVICE | Status: FUNCTIONAL
Brand: XIENCE ALPINE

## 2017-04-27 DEVICE — XIENCE ALPINE EVEROLIMUS ELUTING CORONARY STENT SYSTEM 4.00 MM X 28 MM / RAPID-EXCHANGE
Type: IMPLANTABLE DEVICE | Status: FUNCTIONAL
Brand: XIENCE ALPINE

## 2017-04-27 RX ORDER — ONDANSETRON 2 MG/ML
4 INJECTION INTRAMUSCULAR; INTRAVENOUS EVERY 6 HOURS PRN
Status: DISCONTINUED | OUTPATIENT
Start: 2017-04-27 | End: 2017-04-28 | Stop reason: HOSPADM

## 2017-04-27 RX ORDER — ACETAMINOPHEN 325 MG/1
650 TABLET ORAL EVERY 4 HOURS PRN
Status: DISCONTINUED | OUTPATIENT
Start: 2017-04-27 | End: 2017-04-28 | Stop reason: HOSPADM

## 2017-04-27 RX ORDER — AMLODIPINE BESYLATE 10 MG/1
10 TABLET ORAL EVERY EVENING
Status: DISCONTINUED | OUTPATIENT
Start: 2017-04-27 | End: 2017-04-28 | Stop reason: HOSPADM

## 2017-04-27 RX ORDER — MIDAZOLAM HYDROCHLORIDE 1 MG/ML
INJECTION INTRAMUSCULAR; INTRAVENOUS AS NEEDED
Status: DISCONTINUED | OUTPATIENT
Start: 2017-04-27 | End: 2017-04-27 | Stop reason: HOSPADM

## 2017-04-27 RX ORDER — NAPROXEN 250 MG/1
250 TABLET ORAL 2 TIMES DAILY PRN
Status: DISCONTINUED | OUTPATIENT
Start: 2017-04-27 | End: 2017-04-28 | Stop reason: HOSPADM

## 2017-04-27 RX ORDER — ATENOLOL AND CHLORTHALIDONE TABLET 50; 25 MG/1; MG/1
1 TABLET ORAL EVERY MORNING
Status: DISCONTINUED | OUTPATIENT
Start: 2017-04-28 | End: 2017-04-28 | Stop reason: HOSPADM

## 2017-04-27 RX ORDER — CLOPIDOGREL BISULFATE 75 MG/1
TABLET ORAL AS NEEDED
Status: DISCONTINUED | OUTPATIENT
Start: 2017-04-27 | End: 2017-04-27 | Stop reason: HOSPADM

## 2017-04-27 RX ORDER — ASPIRIN 325 MG
TABLET ORAL AS NEEDED
Status: DISCONTINUED | OUTPATIENT
Start: 2017-04-27 | End: 2017-04-27 | Stop reason: HOSPADM

## 2017-04-27 RX ORDER — ROSUVASTATIN CALCIUM 20 MG/1
20 TABLET, COATED ORAL NIGHTLY
Status: DISCONTINUED | OUTPATIENT
Start: 2017-04-27 | End: 2017-04-28 | Stop reason: HOSPADM

## 2017-04-27 RX ORDER — LIDOCAINE HYDROCHLORIDE 10 MG/ML
0.1 INJECTION, SOLUTION EPIDURAL; INFILTRATION; INTRACAUDAL; PERINEURAL ONCE AS NEEDED
Status: DISCONTINUED | OUTPATIENT
Start: 2017-04-27 | End: 2017-04-27 | Stop reason: HOSPADM

## 2017-04-27 RX ORDER — HYDROCODONE BITARTRATE AND ACETAMINOPHEN 5; 325 MG/1; MG/1
1 TABLET ORAL EVERY 4 HOURS PRN
Status: DISCONTINUED | OUTPATIENT
Start: 2017-04-27 | End: 2017-04-28 | Stop reason: HOSPADM

## 2017-04-27 RX ORDER — LIDOCAINE HYDROCHLORIDE 20 MG/ML
INJECTION, SOLUTION INFILTRATION; PERINEURAL AS NEEDED
Status: DISCONTINUED | OUTPATIENT
Start: 2017-04-27 | End: 2017-04-27 | Stop reason: HOSPADM

## 2017-04-27 RX ORDER — CLOPIDOGREL BISULFATE 75 MG/1
75 TABLET ORAL DAILY
Status: DISCONTINUED | OUTPATIENT
Start: 2017-04-28 | End: 2017-04-28 | Stop reason: HOSPADM

## 2017-04-27 RX ORDER — ATORVASTATIN CALCIUM 40 MG/1
40 TABLET, FILM COATED ORAL NIGHTLY
Status: DISCONTINUED | OUTPATIENT
Start: 2017-04-27 | End: 2017-04-27

## 2017-04-27 RX ORDER — SODIUM CHLORIDE 9 MG/ML
125 INJECTION, SOLUTION INTRAVENOUS CONTINUOUS
Status: ACTIVE | OUTPATIENT
Start: 2017-04-27 | End: 2017-04-28

## 2017-04-27 RX ORDER — SODIUM CHLORIDE 9 MG/ML
125 INJECTION, SOLUTION INTRAVENOUS CONTINUOUS
Status: DISCONTINUED | OUTPATIENT
Start: 2017-04-27 | End: 2017-04-28 | Stop reason: HOSPADM

## 2017-04-27 RX ORDER — ONDANSETRON 4 MG/1
4 TABLET, ORALLY DISINTEGRATING ORAL EVERY 6 HOURS PRN
Status: DISCONTINUED | OUTPATIENT
Start: 2017-04-27 | End: 2017-04-28 | Stop reason: HOSPADM

## 2017-04-27 RX ORDER — ASPIRIN 81 MG/1
81 TABLET, CHEWABLE ORAL DAILY
Status: DISCONTINUED | OUTPATIENT
Start: 2017-04-27 | End: 2017-04-28 | Stop reason: HOSPADM

## 2017-04-27 RX ORDER — FENTANYL CITRATE 50 UG/ML
INJECTION, SOLUTION INTRAMUSCULAR; INTRAVENOUS AS NEEDED
Status: DISCONTINUED | OUTPATIENT
Start: 2017-04-27 | End: 2017-04-27 | Stop reason: HOSPADM

## 2017-04-27 RX ORDER — ALLOPURINOL 100 MG/1
100 TABLET ORAL DAILY
Status: DISCONTINUED | OUTPATIENT
Start: 2017-04-27 | End: 2017-04-28 | Stop reason: HOSPADM

## 2017-04-27 RX ORDER — ONDANSETRON 4 MG/1
4 TABLET, FILM COATED ORAL EVERY 6 HOURS PRN
Status: DISCONTINUED | OUTPATIENT
Start: 2017-04-27 | End: 2017-04-28 | Stop reason: HOSPADM

## 2017-04-27 RX ORDER — HEPARIN SODIUM 1000 [USP'U]/ML
INJECTION, SOLUTION INTRAVENOUS; SUBCUTANEOUS AS NEEDED
Status: DISCONTINUED | OUTPATIENT
Start: 2017-04-27 | End: 2017-04-27 | Stop reason: HOSPADM

## 2017-04-27 RX ORDER — SODIUM CHLORIDE 0.9 % (FLUSH) 0.9 %
1-10 SYRINGE (ML) INJECTION AS NEEDED
Status: DISCONTINUED | OUTPATIENT
Start: 2017-04-27 | End: 2017-04-27 | Stop reason: HOSPADM

## 2017-04-27 RX ADMIN — AMLODIPINE BESYLATE 10 MG: 10 TABLET ORAL at 22:48

## 2017-04-27 RX ADMIN — SODIUM CHLORIDE 125 ML/HR: 9 INJECTION, SOLUTION INTRAVENOUS at 10:42

## 2017-04-27 RX ADMIN — SODIUM CHLORIDE 125 ML/HR: 9 INJECTION, SOLUTION INTRAVENOUS at 14:55

## 2017-04-28 VITALS
HEIGHT: 70 IN | DIASTOLIC BLOOD PRESSURE: 81 MMHG | OXYGEN SATURATION: 93 % | WEIGHT: 229.06 LBS | RESPIRATION RATE: 20 BRPM | TEMPERATURE: 97.6 F | BODY MASS INDEX: 32.79 KG/M2 | SYSTOLIC BLOOD PRESSURE: 129 MMHG | HEART RATE: 68 BPM

## 2017-04-28 LAB
ACT BLD: 265 SECONDS (ref 82–152)
ACT BLD: 301 SECONDS (ref 82–152)
ANION GAP SERPL CALCULATED.3IONS-SCNC: 13.8 MMOL/L
BUN BLD-MCNC: 15 MG/DL (ref 8–23)
BUN/CREAT SERPL: 14.7 (ref 7–25)
CALCIUM SPEC-SCNC: 8.7 MG/DL (ref 8.6–10.5)
CHLORIDE SERPL-SCNC: 101 MMOL/L (ref 98–107)
CHOLEST SERPL-MCNC: 165 MG/DL (ref 0–200)
CO2 SERPL-SCNC: 23.2 MMOL/L (ref 22–29)
CREAT BLD-MCNC: 1.02 MG/DL (ref 0.76–1.27)
DEPRECATED RDW RBC AUTO: 45.6 FL (ref 37–54)
ERYTHROCYTE [DISTWIDTH] IN BLOOD BY AUTOMATED COUNT: 13.3 % (ref 11.5–14.5)
GFR SERPL CREATININE-BSD FRML MDRD: 70 ML/MIN/1.73
GLUCOSE BLD-MCNC: 99 MG/DL (ref 65–99)
HBA1C MFR BLD: 5.71 % (ref 4.8–5.6)
HCT VFR BLD AUTO: 44.7 % (ref 40.4–52.2)
HDLC SERPL-MCNC: 30 MG/DL (ref 40–60)
HGB BLD-MCNC: 14.6 G/DL (ref 13.7–17.6)
LDLC SERPL CALC-MCNC: 103 MG/DL (ref 0–100)
LDLC/HDLC SERPL: 3.43 {RATIO}
MCH RBC QN AUTO: 30.7 PG (ref 27–32.7)
MCHC RBC AUTO-ENTMCNC: 32.7 G/DL (ref 32.6–36.4)
MCV RBC AUTO: 94.1 FL (ref 79.8–96.2)
PLATELET # BLD AUTO: 156 10*3/MM3 (ref 140–500)
PMV BLD AUTO: 10.4 FL (ref 6–12)
POTASSIUM BLD-SCNC: 3.3 MMOL/L (ref 3.5–5.2)
RBC # BLD AUTO: 4.75 10*6/MM3 (ref 4.6–6)
SODIUM BLD-SCNC: 138 MMOL/L (ref 136–145)
TRIGL SERPL-MCNC: 161 MG/DL (ref 0–150)
VLDLC SERPL-MCNC: 32.2 MG/DL (ref 5–40)
WBC NRBC COR # BLD: 6.22 10*3/MM3 (ref 4.5–10.7)

## 2017-04-28 PROCEDURE — 80061 LIPID PANEL: CPT | Performed by: INTERNAL MEDICINE

## 2017-04-28 PROCEDURE — 85027 COMPLETE CBC AUTOMATED: CPT | Performed by: INTERNAL MEDICINE

## 2017-04-28 PROCEDURE — 93010 ELECTROCARDIOGRAM REPORT: CPT | Performed by: INTERNAL MEDICINE

## 2017-04-28 PROCEDURE — G0378 HOSPITAL OBSERVATION PER HR: HCPCS

## 2017-04-28 PROCEDURE — 99217 PR OBSERVATION CARE DISCHARGE MANAGEMENT: CPT | Performed by: INTERNAL MEDICINE

## 2017-04-28 PROCEDURE — 93005 ELECTROCARDIOGRAM TRACING: CPT | Performed by: INTERNAL MEDICINE

## 2017-04-28 PROCEDURE — 83036 HEMOGLOBIN GLYCOSYLATED A1C: CPT | Performed by: INTERNAL MEDICINE

## 2017-04-28 PROCEDURE — 80048 BASIC METABOLIC PNL TOTAL CA: CPT | Performed by: INTERNAL MEDICINE

## 2017-04-28 RX ORDER — POTASSIUM CHLORIDE 750 MG/1
20 CAPSULE, EXTENDED RELEASE ORAL DAILY
Qty: 30 CAPSULE | Refills: 6 | Status: SHIPPED | OUTPATIENT
Start: 2017-04-28 | End: 2017-06-14 | Stop reason: SDUPTHER

## 2017-04-28 RX ORDER — ROSUVASTATIN CALCIUM 20 MG/1
20 TABLET, COATED ORAL NIGHTLY
Qty: 30 TABLET | Refills: 6 | Status: SHIPPED | OUTPATIENT
Start: 2017-04-28 | End: 2017-06-12 | Stop reason: SDUPTHER

## 2017-04-28 RX ORDER — CLOPIDOGREL BISULFATE 75 MG/1
75 TABLET ORAL DAILY
Qty: 30 TABLET | Refills: 6 | Status: SHIPPED | OUTPATIENT
Start: 2017-04-28 | End: 2017-06-14 | Stop reason: SDUPTHER

## 2017-04-28 RX ORDER — POTASSIUM CHLORIDE 750 MG/1
20 CAPSULE, EXTENDED RELEASE ORAL DAILY
Status: DISCONTINUED | OUTPATIENT
Start: 2017-04-28 | End: 2017-04-28 | Stop reason: HOSPADM

## 2017-05-04 ENCOUNTER — OFFICE VISIT (OUTPATIENT)
Dept: CARDIOLOGY | Facility: CLINIC | Age: 82
End: 2017-05-04

## 2017-05-04 VITALS
BODY MASS INDEX: 31.67 KG/M2 | HEART RATE: 60 BPM | WEIGHT: 221.2 LBS | SYSTOLIC BLOOD PRESSURE: 120 MMHG | DIASTOLIC BLOOD PRESSURE: 78 MMHG | HEIGHT: 70 IN

## 2017-05-04 DIAGNOSIS — E78.49 OTHER HYPERLIPIDEMIA: ICD-10-CM

## 2017-05-04 DIAGNOSIS — I10 ESSENTIAL HYPERTENSION: ICD-10-CM

## 2017-05-04 DIAGNOSIS — I25.110 CORONARY ARTERY DISEASE INVOLVING NATIVE CORONARY ARTERY OF NATIVE HEART WITH UNSTABLE ANGINA PECTORIS (HCC): Primary | ICD-10-CM

## 2017-05-04 PROCEDURE — 99214 OFFICE O/P EST MOD 30 MIN: CPT | Performed by: NURSE PRACTITIONER

## 2017-05-04 PROCEDURE — 93000 ELECTROCARDIOGRAM COMPLETE: CPT | Performed by: NURSE PRACTITIONER

## 2017-05-10 ENCOUNTER — TELEPHONE (OUTPATIENT)
Dept: CARDIOLOGY | Facility: CLINIC | Age: 82
End: 2017-05-10

## 2017-05-10 DIAGNOSIS — I21.4 NSTEMI (NON-ST ELEVATED MYOCARDIAL INFARCTION) (HCC): Primary | ICD-10-CM

## 2017-05-15 ENCOUNTER — HOSPITAL ENCOUNTER (OUTPATIENT)
Dept: CARDIOLOGY | Facility: HOSPITAL | Age: 82
Discharge: HOME | End: 2017-05-15
Attending: INTERNAL MEDICINE | Admitting: INTERNAL MEDICINE

## 2017-05-15 VITALS
BODY MASS INDEX: 31.64 KG/M2 | HEART RATE: 61 BPM | OXYGEN SATURATION: 96 % | SYSTOLIC BLOOD PRESSURE: 129 MMHG | WEIGHT: 221 LBS | HEIGHT: 70 IN | RESPIRATION RATE: 18 BRPM | DIASTOLIC BLOOD PRESSURE: 78 MMHG

## 2017-05-15 LAB
BH CV STRESS BP STAGE 1: NORMAL
BH CV STRESS BP STAGE 2: NORMAL
BH CV STRESS DURATION MIN STAGE 1: 3
BH CV STRESS DURATION MIN STAGE 2: 3
BH CV STRESS DURATION SEC STAGE 1: 0
BH CV STRESS DURATION SEC STAGE 2: 0
BH CV STRESS GRADE STAGE 1: 0
BH CV STRESS GRADE STAGE 2: 5
BH CV STRESS HR STAGE 1: 66
BH CV STRESS HR STAGE 2: 69
BH CV STRESS METS STAGE 1: 2.3
BH CV STRESS METS STAGE 2: 3.5
BH CV STRESS PROTOCOL 1: NORMAL
BH CV STRESS RECOVERY BP: NORMAL MMHG
BH CV STRESS RECOVERY HR: 61 BPM
BH CV STRESS SPEED STAGE 1: 1.7
BH CV STRESS SPEED STAGE 2: 1.7
BH CV STRESS STAGE 1: 1
BH CV STRESS STAGE 2: 2
MAXIMAL PREDICTED HEART RATE: 139 BPM
PERCENT MAX PREDICTED HR: 53.24 %
STRESS BASELINE BP: NORMAL MMHG
STRESS BASELINE HR: 61 BPM
STRESS O2 SAT REST: 96 %
STRESS PERCENT HR: 63 %
STRESS POST ESTIMATED WORKLOAD: 3.5 METS
STRESS POST EXERCISE DUR MIN: 6 MIN
STRESS POST EXERCISE DUR SEC: 0 SEC
STRESS POST PEAK BP: NORMAL MMHG
STRESS POST PEAK HR: 74 BPM
STRESS TARGET HR: 118 BPM

## 2017-05-15 PROCEDURE — 93017 CV STRESS TEST TRACING ONLY: CPT

## 2017-05-15 PROCEDURE — 93016 CV STRESS TEST SUPVJ ONLY: CPT | Performed by: INTERNAL MEDICINE

## 2017-05-15 PROCEDURE — 93018 CV STRESS TEST I&R ONLY: CPT | Performed by: INTERNAL MEDICINE

## 2017-05-19 ENCOUNTER — APPOINTMENT (OUTPATIENT)
Dept: CARDIAC REHAB | Facility: HOSPITAL | Age: 82
End: 2017-05-19

## 2017-05-22 ENCOUNTER — TREATMENT (OUTPATIENT)
Dept: CARDIAC REHAB | Facility: HOSPITAL | Age: 82
End: 2017-05-22

## 2017-05-22 DIAGNOSIS — Z95.5 S/P CORONARY ARTERY STENT PLACEMENT: Primary | ICD-10-CM

## 2017-05-22 PROCEDURE — 93798 PHYS/QHP OP CAR RHAB W/ECG: CPT

## 2017-05-22 PROCEDURE — 93797 PHYS/QHP OP CAR RHAB WO ECG: CPT

## 2017-05-24 ENCOUNTER — TREATMENT (OUTPATIENT)
Dept: CARDIAC REHAB | Facility: HOSPITAL | Age: 82
End: 2017-05-24

## 2017-05-24 DIAGNOSIS — Z95.5 S/P CORONARY ARTERY STENT PLACEMENT: Primary | ICD-10-CM

## 2017-05-24 PROCEDURE — 93798 PHYS/QHP OP CAR RHAB W/ECG: CPT

## 2017-05-26 ENCOUNTER — TREATMENT (OUTPATIENT)
Dept: CARDIAC REHAB | Facility: HOSPITAL | Age: 82
End: 2017-05-26

## 2017-05-26 DIAGNOSIS — Z95.5 S/P CORONARY ARTERY STENT PLACEMENT: Primary | ICD-10-CM

## 2017-05-26 PROCEDURE — 93798 PHYS/QHP OP CAR RHAB W/ECG: CPT

## 2017-05-31 ENCOUNTER — TREATMENT (OUTPATIENT)
Dept: CARDIAC REHAB | Facility: HOSPITAL | Age: 82
End: 2017-05-31

## 2017-05-31 DIAGNOSIS — Z95.5 S/P CORONARY ARTERY STENT PLACEMENT: Primary | ICD-10-CM

## 2017-05-31 PROCEDURE — 93798 PHYS/QHP OP CAR RHAB W/ECG: CPT

## 2017-06-02 ENCOUNTER — TREATMENT (OUTPATIENT)
Dept: CARDIAC REHAB | Facility: HOSPITAL | Age: 82
End: 2017-06-02

## 2017-06-02 DIAGNOSIS — Z95.5 S/P CORONARY ARTERY STENT PLACEMENT: Primary | ICD-10-CM

## 2017-06-02 PROCEDURE — 93798 PHYS/QHP OP CAR RHAB W/ECG: CPT

## 2017-06-05 ENCOUNTER — TREATMENT (OUTPATIENT)
Dept: CARDIAC REHAB | Facility: HOSPITAL | Age: 82
End: 2017-06-05

## 2017-06-05 DIAGNOSIS — Z95.5 S/P CORONARY ARTERY STENT PLACEMENT: Primary | ICD-10-CM

## 2017-06-05 PROCEDURE — 93798 PHYS/QHP OP CAR RHAB W/ECG: CPT

## 2017-06-10 ENCOUNTER — HOSPITAL ENCOUNTER (EMERGENCY)
Facility: HOSPITAL | Age: 82
Discharge: HOME | End: 2017-06-10
Attending: EMERGENCY MEDICINE | Admitting: EMERGENCY MEDICINE

## 2017-06-10 VITALS
BODY MASS INDEX: 32.78 KG/M2 | WEIGHT: 229 LBS | HEART RATE: 56 BPM | HEIGHT: 70 IN | SYSTOLIC BLOOD PRESSURE: 151 MMHG | OXYGEN SATURATION: 98 % | DIASTOLIC BLOOD PRESSURE: 87 MMHG | RESPIRATION RATE: 16 BRPM | TEMPERATURE: 98 F
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
## 2017-06-10 PROCEDURE — 99282 EMERGENCY DEPT VISIT SF MDM: CPT | Performed by: EMERGENCY MEDICINE

## 2017-06-10 PROCEDURE — 99283 EMERGENCY DEPT VISIT LOW MDM: CPT

## 2017-06-10 PROCEDURE — 86618 LYME DISEASE ANTIBODY: CPT | Performed by: EMERGENCY MEDICINE

## 2017-06-10 RX ORDER — DOXYCYCLINE HYCLATE 100 MG/1
100 TABLET, DELAYED RELEASE ORAL 2 TIMES DAILY
Qty: 20 TABLET | Refills: 0 | Status: SHIPPED | OUTPATIENT
Start: 2017-06-10 | End: 2017-06-12

## 2017-06-10 NOTE — ED PROVIDER NOTES
Subjective   History of Present Illness  History of Present Illness    Chief complaint: Reddened area associated with a tick bite    Location: Left heel    Quality/Severity:  Mild redness, mild pain    Timing/Onset/Duration: Gradual onset since removing a tick on Tuesday    Modifying Factors: Nothing makes it better or worse    Associated Symptoms: Headache.  No fever or chills.  No other complaints.    Narrative: His 82-year-old white male removed a tick from his left heel on Tuesday while fishing at "[employer].  The patient presents complaining of a reddened pustule on his left medial heel.  The patient denies any fever or chills.  There is no headache.  There is no other complaints.    PCP: Librado Lai      Review of Systems   Constitutional: Negative for chills and fever.   Skin:        Reddened area left heel        Medication List      ASK your doctor about these medications          allopurinol 100 MG tablet   Commonly known as:  ZYLOPRIM       amLODIPine 10 MG tablet   Commonly known as:  NORVASC       atenolol-chlorthalidone 50-25 MG per tablet   Commonly known as:  TENORETIC       BREO ELLIPTA 100-25 MCG/INH aerosol powder    Generic drug:  Fluticasone Furoate-Vilanterol       clopidogrel 75 MG tablet   Commonly known as:  PLAVIX   Take 1 tablet by mouth Daily.       MULTIVITAMIN PO       naproxen 250 MG tablet   Commonly known as:  NAPROSYN       NON FORMULARY       potassium chloride 10 MEQ CR capsule   Commonly known as:  MICRO-K   Take 2 capsules by mouth Daily.       rosuvastatin 20 MG tablet   Commonly known as:  CRESTOR   Take 1 tablet by mouth Every Night.       Testosterone Cypionate 200 MG/ML kit           Past Medical History:   Diagnosis Date   • Arthralgia    • CAD (coronary artery disease), native coronary artery    • Chest pain, unspecified    • Essential hypertension    • Gout    • History of prostate cancer    • Jaw pain     both sides   • Mixed hyperlipidemia    • Osteoarthrosis    •  Shoulder pain    • SOB (shortness of breath)    • Tinnitus        No Known Allergies    Past Surgical History:   Procedure Laterality Date   • CARDIAC CATHETERIZATION N/A 4/27/2017    Procedure: Coronary angiography;  Surgeon: Marvel Brito MD;  Location:  [facility];  Service:    • CARDIAC CATHETERIZATION N/A 4/27/2017    Procedure: Left Heart Cath;  Surgeon: Marvel Brito MD;  Location:  [facility];  Service:    • CARDIAC CATHETERIZATION N/A 4/27/2017    Procedure: Stent GIN coronary;  Surgeon: Marvel Brito MD;  [facility];  Service:    • COLONOSCOPY  2013   • INGUINAL HERNIA REPAIR Right    • NECK SURGERY      SPURS   • UT RECONSTR TOTAL SHOULDER IMPLANT Right 2/16/2017    Procedure: RIGHT TOTAL SHOULDER ARTHROPLASTY;  Surgeon: Marquez Galvan MD;  Location:  [facility];  Service: Orthopedics   • PROSTATECTOMY     • REPLACEMENT TOTAL KNEE Left    • TONSILLECTOMY     • TOTAL SHOULDER REPLACEMENT Bilateral        Family History   Problem Relation Age of Onset   • Cancer Mother      lung   • Cancer Father      lung       Social History     Social History   • Marital status: Unknown     Spouse name: N/A   • Number of children: N/A   • Years of education: N/A     Social History Main Topics   • Smoking status: Never Smoker   • Smokeless tobacco: Never Used   • Alcohol use 1.2 - 1.8 oz/week     2 - 3 Cans of beer per week      Comment: DAILY   • Drug use: No   • Sexual activity: Defer     Other Topics Concern   • None     Social History Narrative           Objective   Physical Exam   Constitutional: He appears well-developed and well-nourished. No distress.   ED Triage Vitals:  Temp: 98 °F (36.7 °C) (06/10/17 0926)  Heart Rate: 56 (06/10/17 0926)  Resp: 16 (06/10/17 0926)  BP: 151/87 (06/10/17 0926)  SpO2: 98 % (06/10/17 0926)  Temp src: n/a  Heart Rate Source: Monitor (06/10/17 0926)  Patient Position: Lying (06/10/17 0926)  BP Location: Right  arm (06/10/17 0926)  FiO2 (%): n/a    The patient's vitals were reviewed by me.  Unless otherwise noted they are within normal limits.  The patient is hypertensive with a blood pressure 151/87.  The patient has a history of hypertension.     Skin: Skin is warm and dry.   There is a small, 3 mm pustule surrounded by purpura noted on the medial aspect of the left heel.  The capillary refill is less than 2 seconds.  The sensation is intact.  There is a normal range of motion noted.  There is no joint laxity noted.    There is no other purpura or petechia.   Nursing note and vitals reviewed.      Procedures         ED Course  ED Course      10:55 AM, 06/10/17:  Patient's diagnosis of tick bite was discussed with him.  The area of purpura was highlighted by a skin pen.  The patient will be placed on an antibiotic.  Blood will be drawn for tickborne pathogens.  The patient should follow-up with Dr. Lai on Monday.  He has been encouraged to return to emergency department if there is increased area of purple on his heel, headache, fever, chills, worse in any way at all.  All the patient's questions were answered he will be discharged in good condition.            MDM  No orders to display     Labs Reviewed - No data to display  No results found.    Final diagnoses:   None         ED Medications:  Medications - No data to display    New Medications:     Medication List      ASK your doctor about these medications          allopurinol 100 MG tablet   Commonly known as:  ZYLOPRIM       amLODIPine 10 MG tablet   Commonly known as:  NORVASC       atenolol-chlorthalidone 50-25 MG per tablet   Commonly known as:  TENORETIC       BREO ELLIPTA 100-25 MCG/INH aerosol powder    Generic drug:  Fluticasone Furoate-Vilanterol       clopidogrel 75 MG tablet   Commonly known as:  PLAVIX   Take 1 tablet by mouth Daily.       MULTIVITAMIN PO       naproxen 250 MG tablet   Commonly known as:  NAPROSYN       NON FORMULARY       potassium  chloride 10 MEQ CR capsule   Commonly known as:  MICRO-K   Take 2 capsules by mouth Daily.       rosuvastatin 20 MG tablet   Commonly known as:  CRESTOR   Take 1 tablet by mouth Every Night.       Testosterone Cypionate 200 MG/ML kit           Stopped Medications:     Medication List      ASK your doctor about these medications          allopurinol 100 MG tablet   Commonly known as:  ZYLOPRIM       amLODIPine 10 MG tablet   Commonly known as:  NORVASC       atenolol-chlorthalidone 50-25 MG per tablet   Commonly known as:  TENORETIC       BREO ELLIPTA 100-25 MCG/INH aerosol powder    Generic drug:  Fluticasone Furoate-Vilanterol       clopidogrel 75 MG tablet   Commonly known as:  PLAVIX   Take 1 tablet by mouth Daily.       MULTIVITAMIN PO       naproxen 250 MG tablet   Commonly known as:  NAPROSYN       NON FORMULARY       potassium chloride 10 MEQ CR capsule   Commonly known as:  MICRO-K   Take 2 capsules by mouth Daily.       rosuvastatin 20 MG tablet   Commonly known as:  CRESTOR   Take 1 tablet by mouth Every Night.       Testosterone Cypionate 200 MG/ML kit             Final diagnoses:   Tick bite, initial encounter            Javier Gonzalez MD  06/10/17 [id number]

## 2017-06-10 NOTE — DISCHARGE INSTRUCTIONS
Follow-up with Dr. Lai on Monday.  Return to emergency department if you have increased discoloration, fever, chills, headaches, worse in any way at all.

## 2017-06-12 ENCOUNTER — TREATMENT (OUTPATIENT)
Dept: CARDIAC REHAB | Facility: HOSPITAL | Age: 82
End: 2017-06-12

## 2017-06-12 ENCOUNTER — OFFICE VISIT (OUTPATIENT)
Dept: INTERNAL MEDICINE | Facility: CLINIC | Age: 82
End: 2017-06-12

## 2017-06-12 VITALS
DIASTOLIC BLOOD PRESSURE: 60 MMHG | HEART RATE: 78 BPM | OXYGEN SATURATION: 92 % | HEIGHT: 70 IN | SYSTOLIC BLOOD PRESSURE: 100 MMHG | WEIGHT: 224 LBS | BODY MASS INDEX: 32.07 KG/M2

## 2017-06-12 DIAGNOSIS — Z95.5 S/P CORONARY ARTERY STENT PLACEMENT: Primary | ICD-10-CM
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
## 2017-06-12 PROCEDURE — 99213 OFFICE O/P EST LOW 20 MIN: CPT | Performed by: INTERNAL MEDICINE

## 2017-06-12 PROCEDURE — 93798 PHYS/QHP OP CAR RHAB W/ECG: CPT

## 2017-06-12 RX ORDER — DOXYCYCLINE HYCLATE 100 MG
100 TABLET ORAL 2 TIMES DAILY
Qty: 10 TABLET | Refills: 0 | Status: SHIPPED | OUTPATIENT
Start: 2017-06-12 | End: 2017-06-17

## 2017-06-12 NOTE — PROGRESS NOTES
Subjective   Guero Garay is a 82 y.o. male.     Insect Bite   This is a new problem. The current episode started in the past 7 days. Pertinent negatives include no chest pain.        The following portions of the patient's history were reviewed and updated as appropriate: allergies, current medications, past family history, past medical history, past social history, past surgical history and problem list.    Review of Systems   Constitutional:        Doing well   HENT: Negative.    Respiratory: Negative.    Cardiovascular: Negative for chest pain and palpitations.   Gastrointestinal: Negative.    Genitourinary: Negative.    Skin:        Insect bite       Objective   Physical Exam   Cardiovascular: Normal rate and regular rhythm.    Repeat 100/70   Pulmonary/Chest: Effort normal and breath sounds normal.   Skin:   Has 1.5 CM round lesion medial aspect Rt heel        Assessment/Plan   Guero was seen today for insect bite.    Diagnoses and all orders for this visit:    Insect bite, initial encounter  -     doxycycline (VIBRAMYICN) 100 MG tablet; Take 1 tablet by mouth 2 (Two) Times a Day for 5 days.

## 2017-06-13 LAB
B BURGDOR IGG+IGM SER-ACNC: <0.91 ISR (ref 0–0.9)
B BURGDOR IGM SER-ACNC: <0.8 INDEX (ref 0–0.79)
R RICKETTSI IGG SER QL IA: ABNORMAL
REF LAB TEST METHOD: NORMAL
RICK SF IGG TITR SER IF: POSITIVE {TITER}

## 2017-06-13 RX ORDER — ROSUVASTATIN CALCIUM 20 MG/1
TABLET, COATED ORAL
Qty: 90 TABLET | Refills: 2 | Status: SHIPPED | OUTPATIENT
Start: 2017-06-13 | End: 2017-06-14 | Stop reason: SDUPTHER

## 2017-06-14 ENCOUNTER — HOSPITAL ENCOUNTER (EMERGENCY)
Facility: HOSPITAL | Age: 82
Discharge: OTHER INSTITUTION | End: 2017-06-14
Attending: EMERGENCY MEDICINE | Admitting: EMERGENCY MEDICINE

## 2017-06-14 ENCOUNTER — APPOINTMENT (OUTPATIENT)
Dept: GENERAL RADIOLOGY | Facility: HOSPITAL | Age: 82
End: 2017-06-14

## 2017-06-14 ENCOUNTER — OFFICE VISIT (OUTPATIENT)
Dept: CARDIOLOGY | Facility: CLINIC | Age: 82
End: 2017-06-14

## 2017-06-14 ENCOUNTER — HOSPITAL ENCOUNTER (INPATIENT)
Facility: HOSPITAL | Age: 82
LOS: 2 days | Discharge: HOME | End: 2017-06-16
Attending: INTERNAL MEDICINE | Admitting: INTERNAL MEDICINE

## 2017-06-14 VITALS
DIASTOLIC BLOOD PRESSURE: 90 MMHG | RESPIRATION RATE: 14 BRPM | HEART RATE: 65 BPM | BODY MASS INDEX: 31.78 KG/M2 | HEIGHT: 70 IN | WEIGHT: 222 LBS | SYSTOLIC BLOOD PRESSURE: 147 MMHG | OXYGEN SATURATION: 98 % | TEMPERATURE: 98.7 F

## 2017-06-14 VITALS
RESPIRATION RATE: 20 BRPM | HEIGHT: 70 IN | HEART RATE: 64 BPM | BODY MASS INDEX: 31.5 KG/M2 | WEIGHT: 220 LBS | SYSTOLIC BLOOD PRESSURE: 116 MMHG | DIASTOLIC BLOOD PRESSURE: 72 MMHG

## 2017-06-14 DIAGNOSIS — I10 ESSENTIAL HYPERTENSION: ICD-10-CM

## 2017-06-14 DIAGNOSIS — E78.49 OTHER HYPERLIPIDEMIA: ICD-10-CM

## 2017-06-14 DIAGNOSIS — R55 SYNCOPE, UNSPECIFIED SYNCOPE TYPE: Primary | ICD-10-CM

## 2017-06-14 DIAGNOSIS — I25.10 CORONARY ARTERY DISEASE INVOLVING NATIVE CORONARY ARTERY OF NATIVE HEART WITHOUT ANGINA PECTORIS: Primary | ICD-10-CM

## 2017-06-14 DIAGNOSIS — R42 DIZZINESS: ICD-10-CM

## 2017-06-14 DIAGNOSIS — R00.1 BRADYCARDIA: ICD-10-CM

## 2017-06-14 LAB
ALBUMIN SERPL-MCNC: 4.2 G/DL (ref 3.5–5.2)
ALBUMIN/GLOB SERPL: 1.4 G/DL
ALP SERPL-CCNC: 73 U/L (ref 40–129)
ALT SERPL W P-5'-P-CCNC: 22 U/L (ref 5–41)
ANION GAP SERPL CALCULATED.3IONS-SCNC: 12.8 MMOL/L
ANION GAP SERPL CALCULATED.3IONS-SCNC: 15.8 MMOL/L
APTT PPP: 31.7 SECONDS (ref 24.3–38.1)
AST SERPL-CCNC: 27 U/L (ref 5–40)
BASOPHILS # BLD AUTO: 0.03 10*3/MM3 (ref 0–0.2)
BASOPHILS NFR BLD AUTO: 0.4 % (ref 0–2)
BILIRUB SERPL-MCNC: 0.6 MG/DL (ref 0.2–1.2)
BUN BLD-MCNC: 24 MG/DL (ref 8–23)
BUN BLD-MCNC: 30 MG/DL (ref 8–23)
BUN/CREAT SERPL: 23.8 (ref 7–25)
BUN/CREAT SERPL: 29.4 (ref 7–25)
CALCIUM SPEC-SCNC: 9.2 MG/DL (ref 8.6–10.5)
CALCIUM SPEC-SCNC: 9.3 MG/DL (ref 8.8–10.5)
CHLORIDE SERPL-SCNC: 98 MMOL/L (ref 98–107)
CHLORIDE SERPL-SCNC: 99 MMOL/L (ref 98–107)
CO2 SERPL-SCNC: 24.2 MMOL/L (ref 22–29)
CO2 SERPL-SCNC: 25.2 MMOL/L (ref 22–29)
CREAT BLD-MCNC: 1.01 MG/DL (ref 0.76–1.27)
CREAT BLD-MCNC: 1.02 MG/DL (ref 0.76–1.27)
DEPRECATED RDW RBC AUTO: 44 FL (ref 37–54)
DEPRECATED RDW RBC AUTO: 46.9 FL (ref 37–54)
EOSINOPHIL # BLD AUTO: 0.04 10*3/MM3 (ref 0.1–0.3)
EOSINOPHIL NFR BLD AUTO: 0.5 % (ref 0–4)
ERYTHROCYTE [DISTWIDTH] IN BLOOD BY AUTOMATED COUNT: 13.8 % (ref 11.5–14.5)
ERYTHROCYTE [DISTWIDTH] IN BLOOD BY AUTOMATED COUNT: 13.8 % (ref 11.5–14.5)
GFR SERPL CREATININE-BSD FRML MDRD: 70 ML/MIN/1.73
GFR SERPL CREATININE-BSD FRML MDRD: 71 ML/MIN/1.73
GLOBULIN UR ELPH-MCNC: 3.1 GM/DL
GLUCOSE BLD-MCNC: 105 MG/DL (ref 65–99)
GLUCOSE BLD-MCNC: 99 MG/DL (ref 65–99)
HCT VFR BLD AUTO: 44.6 % (ref 40.4–52.2)
HCT VFR BLD AUTO: 44.8 % (ref 42–52)
HGB BLD-MCNC: 14.8 G/DL (ref 13.7–17.6)
HGB BLD-MCNC: 15.1 G/DL (ref 14–18)
IMM GRANULOCYTES # BLD: 0.01 10*3/MM3 (ref 0–0.03)
IMM GRANULOCYTES NFR BLD: 0.1 % (ref 0–0.5)
INR PPP: 1.05 (ref 0.9–1.1)
LYMPHOCYTES # BLD AUTO: 1.2 10*3/MM3 (ref 0.6–4.8)
LYMPHOCYTES NFR BLD AUTO: 15.5 % (ref 20–45)
MAGNESIUM SERPL-MCNC: 2.2 MG/DL (ref 1.7–2.5)
MCH RBC QN AUTO: 29.8 PG (ref 27–31)
MCH RBC QN AUTO: 31 PG (ref 27–32.7)
MCHC RBC AUTO-ENTMCNC: 33.2 G/DL (ref 32.6–36.4)
MCHC RBC AUTO-ENTMCNC: 33.7 G/DL (ref 31–37)
MCV RBC AUTO: 88.5 FL (ref 80–94)
MCV RBC AUTO: 93.3 FL (ref 79.8–96.2)
MONOCYTES # BLD AUTO: 1.02 10*3/MM3 (ref 0–1)
MONOCYTES NFR BLD AUTO: 13.2 % (ref 3–8)
NEUTROPHILS # BLD AUTO: 5.44 10*3/MM3 (ref 1.5–8.3)
NEUTROPHILS NFR BLD AUTO: 70.3 % (ref 45–70)
NRBC BLD MANUAL-RTO: 0 /100 WBC (ref 0–0)
PLATELET # BLD AUTO: 150 10*3/MM3 (ref 140–500)
PLATELET # BLD AUTO: 195 10*3/MM3 (ref 140–500)
PMV BLD AUTO: 10 FL (ref 6–12)
PMV BLD AUTO: 9.8 FL (ref 7.4–10.4)
POTASSIUM BLD-SCNC: 3.9 MMOL/L (ref 3.5–5.2)
POTASSIUM BLD-SCNC: 4 MMOL/L (ref 3.5–5.2)
PROT SERPL-MCNC: 7.3 G/DL (ref 6–8.5)
PROTHROMBIN TIME: 13.7 SECONDS (ref 12.1–15)
RBC # BLD AUTO: 4.78 10*6/MM3 (ref 4.6–6)
RBC # BLD AUTO: 5.06 10*6/MM3 (ref 4.7–6.1)
SODIUM BLD-SCNC: 136 MMOL/L (ref 136–145)
SODIUM BLD-SCNC: 139 MMOL/L (ref 136–145)
TROPONIN T SERPL-MCNC: <0.01 NG/ML (ref 0–0.03)
WBC NRBC COR # BLD: 6.63 10*3/MM3 (ref 4.5–10.7)
WBC NRBC COR # BLD: 7.74 10*3/MM3 (ref 4.8–10.8)

## 2017-06-14 PROCEDURE — 93000 ELECTROCARDIOGRAM COMPLETE: CPT | Performed by: INTERNAL MEDICINE

## 2017-06-14 PROCEDURE — 96360 HYDRATION IV INFUSION INIT: CPT

## 2017-06-14 PROCEDURE — 99284 EMERGENCY DEPT VISIT MOD MDM: CPT

## 2017-06-14 PROCEDURE — 93010 ELECTROCARDIOGRAM REPORT: CPT | Performed by: INTERNAL MEDICINE

## 2017-06-14 PROCEDURE — 99214 OFFICE O/P EST MOD 30 MIN: CPT | Performed by: INTERNAL MEDICINE

## 2017-06-14 PROCEDURE — 85730 THROMBOPLASTIN TIME PARTIAL: CPT | Performed by: EMERGENCY MEDICINE

## 2017-06-14 PROCEDURE — 80053 COMPREHEN METABOLIC PANEL: CPT | Performed by: EMERGENCY MEDICINE

## 2017-06-14 PROCEDURE — 93005 ELECTROCARDIOGRAM TRACING: CPT | Performed by: EMERGENCY MEDICINE

## 2017-06-14 PROCEDURE — 85610 PROTHROMBIN TIME: CPT | Performed by: EMERGENCY MEDICINE

## 2017-06-14 PROCEDURE — 84484 ASSAY OF TROPONIN QUANT: CPT | Performed by: EMERGENCY MEDICINE

## 2017-06-14 PROCEDURE — 83735 ASSAY OF MAGNESIUM: CPT | Performed by: EMERGENCY MEDICINE

## 2017-06-14 PROCEDURE — 71010 HC CHEST PA OR AP: CPT

## 2017-06-14 PROCEDURE — 85025 COMPLETE CBC W/AUTO DIFF WBC: CPT | Performed by: EMERGENCY MEDICINE

## 2017-06-14 PROCEDURE — 94640 AIRWAY INHALATION TREATMENT: CPT

## 2017-06-14 PROCEDURE — 85027 COMPLETE CBC AUTOMATED: CPT | Performed by: INTERNAL MEDICINE

## 2017-06-14 PROCEDURE — 99285 EMERGENCY DEPT VISIT HI MDM: CPT | Performed by: EMERGENCY MEDICINE

## 2017-06-14 RX ORDER — POTASSIUM CHLORIDE 750 MG/1
20 CAPSULE, EXTENDED RELEASE ORAL DAILY
Status: DISCONTINUED | OUTPATIENT
Start: 2017-06-14 | End: 2017-06-15

## 2017-06-14 RX ORDER — AMLODIPINE BESYLATE 10 MG/1
10 TABLET ORAL NIGHTLY
Status: DISCONTINUED | OUTPATIENT
Start: 2017-06-14 | End: 2017-06-16 | Stop reason: HOSPADM

## 2017-06-14 RX ORDER — ATENOLOL AND CHLORTHALIDONE TABLET 50; 25 MG/1; MG/1
1 TABLET ORAL EVERY MORNING
Qty: 30 TABLET | Refills: 6 | Status: SHIPPED | OUTPATIENT
Start: 2017-06-14 | End: 2017-06-14 | Stop reason: SDUPTHER

## 2017-06-14 RX ORDER — MULTIPLE VITAMINS W/ MINERALS TAB 9MG-400MCG
1 TAB ORAL DAILY
Status: DISCONTINUED | OUTPATIENT
Start: 2017-06-14 | End: 2017-06-16 | Stop reason: HOSPADM

## 2017-06-14 RX ORDER — POTASSIUM CHLORIDE 750 MG/1
10 CAPSULE, EXTENDED RELEASE ORAL 2 TIMES DAILY
Qty: 60 CAPSULE | Refills: 6 | Status: SHIPPED | OUTPATIENT
Start: 2017-06-14 | End: 2017-06-14 | Stop reason: SDUPTHER

## 2017-06-14 RX ORDER — POTASSIUM CHLORIDE 750 MG/1
CAPSULE, EXTENDED RELEASE ORAL
Qty: 180 CAPSULE | Refills: 3 | Status: SHIPPED | OUTPATIENT
Start: 2017-06-14 | End: 2017-06-16 | Stop reason: HOSPADM

## 2017-06-14 RX ORDER — ROSUVASTATIN CALCIUM 20 MG/1
20 TABLET, COATED ORAL DAILY
Qty: 90 TABLET | Refills: 3 | Status: SHIPPED | OUTPATIENT
Start: 2017-06-14 | End: 2017-10-02 | Stop reason: SDUPTHER

## 2017-06-14 RX ORDER — ONDANSETRON 2 MG/ML
4 INJECTION INTRAMUSCULAR; INTRAVENOUS EVERY 6 HOURS PRN
Status: DISCONTINUED | OUTPATIENT
Start: 2017-06-14 | End: 2017-06-16 | Stop reason: HOSPADM

## 2017-06-14 RX ORDER — ROSUVASTATIN CALCIUM 20 MG/1
20 TABLET, COATED ORAL DAILY
Qty: 30 TABLET | Refills: 3 | Status: SHIPPED | OUTPATIENT
Start: 2017-06-14 | End: 2017-06-14 | Stop reason: SDUPTHER

## 2017-06-14 RX ORDER — NITROGLYCERIN 0.4 MG/1
0.4 TABLET SUBLINGUAL
Status: DISCONTINUED | OUTPATIENT
Start: 2017-06-14 | End: 2017-06-16 | Stop reason: HOSPADM

## 2017-06-14 RX ORDER — ATENOLOL AND CHLORTHALIDONE TABLET 50; 25 MG/1; MG/1
1 TABLET ORAL DAILY
Status: DISCONTINUED | OUTPATIENT
Start: 2017-06-14 | End: 2017-06-14

## 2017-06-14 RX ORDER — CHOLECALCIFEROL (VITAMIN D3) 125 MCG
5 CAPSULE ORAL NIGHTLY PRN
COMMUNITY

## 2017-06-14 RX ORDER — ALLOPURINOL 100 MG/1
100 TABLET ORAL DAILY
Status: DISCONTINUED | OUTPATIENT
Start: 2017-06-15 | End: 2017-06-16 | Stop reason: HOSPADM

## 2017-06-14 RX ORDER — CLOPIDOGREL BISULFATE 75 MG/1
75 TABLET ORAL DAILY
Qty: 90 TABLET | Refills: 3 | Status: SHIPPED | OUTPATIENT
Start: 2017-06-14 | End: 2017-10-02 | Stop reason: SDUPTHER

## 2017-06-14 RX ORDER — ROSUVASTATIN CALCIUM 20 MG/1
20 TABLET, COATED ORAL DAILY
Status: DISCONTINUED | OUTPATIENT
Start: 2017-06-14 | End: 2017-06-16 | Stop reason: HOSPADM

## 2017-06-14 RX ORDER — SODIUM CHLORIDE 0.9 % (FLUSH) 0.9 %
10 SYRINGE (ML) INJECTION AS NEEDED
Status: DISCONTINUED | OUTPATIENT
Start: 2017-06-14 | End: 2017-06-14 | Stop reason: HOSPADM

## 2017-06-14 RX ORDER — ACETAMINOPHEN 325 MG/1
650 TABLET ORAL EVERY 4 HOURS PRN
Status: DISCONTINUED | OUTPATIENT
Start: 2017-06-14 | End: 2017-06-16 | Stop reason: HOSPADM

## 2017-06-14 RX ORDER — ASPIRIN 81 MG/1
81 TABLET, CHEWABLE ORAL DAILY
COMMUNITY
End: 2021-12-09

## 2017-06-14 RX ORDER — POTASSIUM CHLORIDE 750 MG/1
10 CAPSULE, EXTENDED RELEASE ORAL 2 TIMES DAILY
Qty: 180 CAPSULE | Refills: 3 | Status: SHIPPED | OUTPATIENT
Start: 2017-06-14 | End: 2017-10-02 | Stop reason: SDUPTHER

## 2017-06-14 RX ORDER — ASPIRIN 81 MG/1
81 TABLET, CHEWABLE ORAL DAILY
Status: DISCONTINUED | OUTPATIENT
Start: 2017-06-14 | End: 2017-06-16 | Stop reason: HOSPADM

## 2017-06-14 RX ORDER — POTASSIUM CHLORIDE 750 MG/1
10 CAPSULE, EXTENDED RELEASE ORAL 2 TIMES DAILY
Status: DISCONTINUED | OUTPATIENT
Start: 2017-06-14 | End: 2017-06-16 | Stop reason: HOSPADM

## 2017-06-14 RX ORDER — CHOLECALCIFEROL (VITAMIN D3) 125 MCG
5 CAPSULE ORAL NIGHTLY
Status: DISCONTINUED | OUTPATIENT
Start: 2017-06-14 | End: 2017-06-16 | Stop reason: HOSPADM

## 2017-06-14 RX ORDER — SODIUM CHLORIDE 0.9 % (FLUSH) 0.9 %
1-10 SYRINGE (ML) INJECTION AS NEEDED
Status: DISCONTINUED | OUTPATIENT
Start: 2017-06-14 | End: 2017-06-16 | Stop reason: HOSPADM

## 2017-06-14 RX ORDER — LORAZEPAM 1 MG/1
1 TABLET ORAL EVERY 6 HOURS PRN
Status: DISCONTINUED | OUTPATIENT
Start: 2017-06-14 | End: 2017-06-16 | Stop reason: HOSPADM

## 2017-06-14 RX ORDER — CLOPIDOGREL BISULFATE 75 MG/1
75 TABLET ORAL DAILY
Qty: 30 TABLET | Refills: 6 | Status: SHIPPED | OUTPATIENT
Start: 2017-06-14 | End: 2017-06-14 | Stop reason: SDUPTHER

## 2017-06-14 RX ORDER — CLOPIDOGREL BISULFATE 75 MG/1
75 TABLET ORAL DAILY
Status: DISCONTINUED | OUTPATIENT
Start: 2017-06-14 | End: 2017-06-16 | Stop reason: HOSPADM

## 2017-06-14 RX ORDER — NAPROXEN 250 MG/1
250 TABLET ORAL DAILY
Status: DISCONTINUED | OUTPATIENT
Start: 2017-06-14 | End: 2017-06-16 | Stop reason: HOSPADM

## 2017-06-14 RX ORDER — AMLODIPINE BESYLATE 10 MG/1
10 TABLET ORAL EVERY EVENING
Qty: 90 TABLET | Refills: 3 | Status: SHIPPED | OUTPATIENT
Start: 2017-06-14 | End: 2017-12-14 | Stop reason: SDUPTHER

## 2017-06-14 RX ORDER — ATENOLOL AND CHLORTHALIDONE TABLET 50; 25 MG/1; MG/1
1 TABLET ORAL EVERY MORNING
Qty: 90 TABLET | Refills: 3 | Status: SHIPPED | OUTPATIENT
Start: 2017-06-14 | End: 2017-12-14 | Stop reason: SDUPTHER

## 2017-06-14 RX ORDER — AMLODIPINE BESYLATE 10 MG/1
10 TABLET ORAL EVERY EVENING
Qty: 30 TABLET | Refills: 3 | Status: SHIPPED | OUTPATIENT
Start: 2017-06-14 | End: 2017-06-14 | Stop reason: SDUPTHER

## 2017-06-14 RX ORDER — BUDESONIDE AND FORMOTEROL FUMARATE DIHYDRATE 80; 4.5 UG/1; UG/1
2 AEROSOL RESPIRATORY (INHALATION)
Status: DISCONTINUED | OUTPATIENT
Start: 2017-06-14 | End: 2017-06-16 | Stop reason: HOSPADM

## 2017-06-14 RX ORDER — DOXYCYCLINE HYCLATE 100 MG/1
100 CAPSULE ORAL EVERY 12 HOURS SCHEDULED
Status: DISCONTINUED | OUTPATIENT
Start: 2017-06-14 | End: 2017-06-16 | Stop reason: HOSPADM

## 2017-06-14 RX ADMIN — NAPROXEN 250 MG: 250 TABLET ORAL at 20:51

## 2017-06-14 RX ADMIN — DOXYCYCLINE HYCLATE 100 MG: 100 CAPSULE, GELATIN COATED ORAL at 20:51

## 2017-06-14 RX ADMIN — POTASSIUM CHLORIDE 10 MEQ: 750 CAPSULE, EXTENDED RELEASE ORAL at 20:51

## 2017-06-14 RX ADMIN — AMLODIPINE BESYLATE 10 MG: 10 TABLET ORAL at 20:51

## 2017-06-14 RX ADMIN — Medication 5 MG: at 20:51

## 2017-06-14 RX ADMIN — SODIUM CHLORIDE 1000 ML: 9 INJECTION, SOLUTION INTRAVENOUS at 11:41

## 2017-06-14 RX ADMIN — BUDESONIDE AND FORMOTEROL FUMARATE DIHYDRATE 2 PUFF: 80; 4.5 AEROSOL RESPIRATORY (INHALATION) at 21:16

## 2017-06-14 NOTE — ED PROVIDER NOTES
"Subjective   History of Present Illness  History of Present Illness    Chief complaint: Syncope    Location: None    Quality/Severity:  Moderate to severe    Timing/Duration: Worsening over the past couple days    Modifying Factors: None    Narrative: This patient presents for evaluation of multiple syncopal episodes.  Apparently in recent days he has had repeated episodes of syncope or near syncopal symptoms.  This morning he actually saw his cardiologist and began wearing a Holter monitor for further investigation.  Shortly after that he went to [place] to do some shopping and unfortunately he had a syncopal episode there.  He collapsed \"gracefully\" to the ground and did not get hurt but did lose consciousness for a brief period of time.  The patient says that before these episodes occur, he feels very nauseated and weak with some dizziness.  He denies any chest pain.  He denies any shortness of breathing.  He denies any headache or vision changes.  He denies any abdominal pain.  He is currently taking doxycycline because a recent diagnosis for ag Mountain spotted fever.  There are no other recent medication changes, however.    Associated Symptoms: Nausea    Review of Systems   Constitutional: Positive for fatigue. Negative for activity change, appetite change, chills and fever.   HENT: Negative.    Eyes: Negative for pain and visual disturbance.   Respiratory: Negative for cough and shortness of breath.    Cardiovascular: Negative for chest pain.   Gastrointestinal: Positive for nausea. Negative for abdominal pain, blood in stool and vomiting.   Genitourinary: Negative for dysuria, flank pain and frequency.   Musculoskeletal: Negative for back pain.   Skin: Positive for rash. Negative for color change.   Neurological: Positive for dizziness, syncope and light-headedness. Negative for seizures, speech difficulty and headaches.   Psychiatric/Behavioral: Negative for agitation and confusion.   All other systems " reviewed and are negative.      Past Medical History:   Diagnosis Date   • Arthralgia    • CAD (coronary artery disease), native coronary artery    • Chest pain, unspecified    • Essential hypertension    • Gout    • History of prostate cancer    • Jaw pain     both sides   • Mixed hyperlipidemia    • Osteoarthrosis    • Shoulder pain    • SOB (shortness of breath)    • Tinnitus        No Known Allergies    Past Surgical History:   Procedure Laterality Date   • CARDIAC CATHETERIZATION N/A 4/27/2017    Procedure: Coronary angiography;  Surgeon: Marvel Brito MD;  Location: [facility] CATH INVASIVE LOCATION;  Service:    • CARDIAC CATHETERIZATION N/A 4/27/2017    Procedure: Left Heart Cath;  Surgeon: Marvel Brito MD;  Location: [facility] CATH INVASIVE LOCATION;  Service:    • CARDIAC CATHETERIZATION N/A 4/27/2017    Procedure: Stent GIN coronary;  Surgeon: Marvel Brito MD;  Location: [facility] CATH INVASIVE LOCATION;  Service:    • COLONOSCOPY  2013   • INGUINAL HERNIA REPAIR Right    • NECK SURGERY      SPURS   • OR RECONSTR TOTAL SHOULDER IMPLANT Right 2/16/2017    Procedure: RIGHT TOTAL SHOULDER ARTHROPLASTY;  Surgeon: Marquez Galvan MD;  Location: [facility] OR [facility];  Service: Orthopedics   • PROSTATECTOMY     • REPLACEMENT TOTAL KNEE Left    • TONSILLECTOMY     • TOTAL SHOULDER REPLACEMENT Bilateral        Family History   Problem Relation Age of Onset   • Cancer Mother      lung   • Cancer Father      lung       Social History     Social History   • Marital status: Unknown     Spouse name: N/A   • Number of children: N/A   • Years of education: N/A     Social History Main Topics   • Smoking status: Never Smoker   • Smokeless tobacco: Never Used   • Alcohol use 1.2 - 1.8 oz/week     2 - 3 Cans of beer per week      Comment: DAILY   • Drug use: No   • Sexual activity: Defer     Other Topics Concern   • None     Social History Narrative       ED Triage Vitals   Temp Heart Rate Resp BP SpO2   06/14/17 1126 06/14/17 1122  06/14/17 1122 06/14/17 1122 06/14/17 1122   98.7 °F (37.1 °C) 63 14 140/95 93 %      Temp src Heart Rate Source Patient Position BP Location FiO2 (%)   06/14/17 1122 06/14/17 1122 06/14/17 1122 06/14/17 1122 --   Oral Monitor Lying Right arm          Objective   Physical Exam   Constitutional: He is oriented to person, place, and time. He appears well-developed and well-nourished. No distress.   Calm, pleasant, smiling   HENT:   Head: Normocephalic and atraumatic.   Eyes: EOM are normal. Pupils are equal, round, and reactive to light. Right eye exhibits no discharge. Left eye exhibits no discharge.   Neck: Normal range of motion. Neck supple. No tracheal deviation present.   Cardiovascular: Normal rate, regular rhythm, normal heart sounds and intact distal pulses.  Exam reveals no gallop and no friction rub.    No murmur heard.  At the time of my exam, HR is regular in the 60's.   Pulmonary/Chest: Effort normal. No respiratory distress. He has no wheezes. He has no rales. He exhibits no tenderness.   Abdominal: Soft. He exhibits no mass. There is no tenderness. There is no rebound and no guarding. No hernia.   Musculoskeletal: Normal range of motion. He exhibits no edema, tenderness or deformity.   Neurological: He is alert and oriented to person, place, and time. No cranial nerve deficit. He exhibits normal muscle tone.   Skin: Skin is warm and dry. Rash noted. He is not diaphoretic. No erythema. No pallor.   Mild rash on the lower extremities, particularly at the medial aspect of the ankle where there is a purplish raised nontender macule   Psychiatric: He has a normal mood and affect. His behavior is normal. Judgment and thought content normal.   Nursing note and vitals reviewed.    EKG           EKG time/Interp time: 1122/1124  Rhythm/Rate: Junctional escape bradycardia, 33 bpm  P waves and AL: P waves not detected  QRS, axis: 98 ms, normal axis   ST and T waves: No acute ischemic changes evident      Independently interpreted by me contemporaneously with treatment    Results for orders placed or performed during the hospital encounter of 06/14/17   Comprehensive Metabolic Panel   Result Value Ref Range    Glucose 99 65 - 99 mg/dL    BUN 30 (H) 8 - 23 mg/dL    Creatinine 1.02 0.76 - 1.27 mg/dL    Sodium 139 136 - 145 mmol/L    Potassium 3.9 3.5 - 5.2 mmol/L    Chloride 99 98 - 107 mmol/L    CO2 24.2 22.0 - 29.0 mmol/L    Calcium 9.3 8.8 - 10.5 mg/dL    Total Protein 7.3 6.0 - 8.5 g/dL    Albumin 4.20 3.50 - 5.20 g/dL    ALT (SGPT) 22 5 - 41 U/L    AST (SGOT) 27 5 - 40 U/L    Alkaline Phosphatase 73 40 - 129 U/L    Total Bilirubin 0.6 0.2 - 1.2 mg/dL    eGFR Non African Amer 70 >60 mL/min/1.73    Globulin 3.1 gm/dL    A/G Ratio 1.4 g/dL    BUN/Creatinine Ratio 29.4 (H) 7.0 - 25.0    Anion Gap 15.8 mmol/L   Protime-INR   Result Value Ref Range    Protime 13.7 12.1 - 15.0 Seconds    INR 1.05 0.90 - 1.10   aPTT   Result Value Ref Range    PTT 31.7 24.3 - 38.1 seconds   Troponin   Result Value Ref Range    Troponin T <0.010 0.000 - 0.030 ng/mL   Magnesium   Result Value Ref Range    Magnesium 2.2 1.7 - 2.5 mg/dL   CBC Auto Differential   Result Value Ref Range    WBC 7.74 4.80 - 10.80 10*3/mm3    RBC 5.06 4.70 - 6.10 10*6/mm3    Hemoglobin 15.1 14.0 - 18.0 g/dL    Hematocrit 44.8 42.0 - 52.0 %    MCV 88.5 80.0 - 94.0 fL    MCH 29.8 27.0 - 31.0 pg    MCHC 33.7 31.0 - 37.0 g/dL    RDW 13.8 11.5 - 14.5 %    RDW-SD 44.0 37.0 - 54.0 fl    MPV 9.8 7.4 - 10.4 fL    Platelets 195 140 - 500 10*3/mm3    Neutrophil % 70.3 (H) 45.0 - 70.0 %    Lymphocyte % 15.5 (L) 20.0 - 45.0 %    Monocyte % 13.2 (H) 3.0 - 8.0 %    Eosinophil % 0.5 0.0 - 4.0 %    Basophil % 0.4 0.0 - 2.0 %    Immature Grans % 0.1 0.0 - 0.5 %    Neutrophils, Absolute 5.44 1.50 - 8.30 10*3/mm3    Lymphocytes, Absolute 1.20 0.60 - 4.80 10*3/mm3    Monocytes, Absolute 1.02 (H) 0.00 - 1.00 10*3/mm3    Eosinophils, Absolute 0.04 (L) 0.10 - 0.30 10*3/mm3     Basophils, Absolute 0.03 0.00 - 0.20 10*3/mm3    Immature Grans, Absolute 0.01 0.00 - 0.03 10*3/mm3    nRBC 0.0 0.0 - 0.0 /100 WBC         Procedures         ED Course  ED Course   Comment By Time   I have reviewed the EKG and labs.  Labs look reassuring right now.  Patient is known to have a recent ag Mountain spotted fever diagnosis.  I wonder if that is causing his symptomatic bradycardia episodes here.  We were fortunate in the fact that we were able to capture one of his bradycardia moments on an EKG.  He resolved spontaneously without the need for any medication so far.  Will now planned to transfer him to [facility] for further cardiac care after speaking to Dr. Galan.  I explained the plan of care to the patient and his wife and they are in agreement with everything as outlined. Simone Banerjee MD 06/14 1256                  MDM  Number of Diagnoses or Management Options  Bradycardia:   Syncope, unspecified syncope type:      Amount and/or Complexity of Data Reviewed  Clinical lab tests: reviewed and ordered  Decide to obtain previous medical records or to obtain history from someone other than the patient: yes  Obtain history from someone other than the patient: yes (Patient's wife contributed to history)  Review and summarize past medical records: yes  Discuss the patient with other providers: yes (Dr. Galan)  Independent visualization of images, tracings, or specimens: yes    Risk of Complications, Morbidity, and/or Mortality  Presenting problems: high  Diagnostic procedures: moderate  Management options: high        Final diagnoses:   Syncope, unspecified syncope type   Bradycardia            Simone Banerjee MD  06/14/17 1304

## 2017-06-14 NOTE — PROGRESS NOTES
Date of Office Visit: 17  Encounter Provider: Avery Galan MD  Place of Service: [facility] CARDIOLOGY  Patient Name: Guero Garay  :1935      Chief Complaint   Patient presents with   • Coronary Artery Disease     History of Present Illness  HPI Comments: Mr Garay is a pleasant 81 yo gentleman with a history of aortic stenosis, hypertension, hyperlipidemia, and coronary artery disease.  He presented to our office in 2017 with progressive angina.  He then had a perfusion stress test that showed a large area of anterior and septal ischemia echocardiogram at that time showed mild aortic stenosis he then underwent cardiac catheterization 2017 he was found have preserved left ventricular ejection fraction severe disease of the mid left anterior descending and right coronary artery.  Had a 4.0 x 15 mm drug-eluting stent placed in the left anterior descending and a 4.0 x 28 mm drug-eluting stent dilated to 4.5 mm in the right coronary artery.  He was hypokalemic on admission started on potassium, he was also started on on rosuvastatin.  He now comes in for follow-up.  From a heart standpoint he's had no further chest pain or pressure.  No real shortness of breath.  [place] became near syncopal.  Since then he's had these intermittent episodes of dizziness that occur 3-4 times a day.  But his wife also noted this spot on his left inner ankle it turned out it was a tic he suddenly developed a rash to that he went to the urgent care and unfortunately his titers for Adriel Mountain spotted fever heart positive those for Lyme disease are negative.  He is continued have episodes of dizziness but no syncope.  No orthopnea or PND.  No palpitations.    Coronary Artery Disease   Symptoms include dizziness. Pertinent negatives include no muscle weakness or weight gain. There is  no history of past myocardial infarction.         Past Medical History:   Diagnosis Date   • Arthralgia    • CAD (coronary artery disease), native coronary artery    • Chest pain, unspecified    • Essential hypertension    • Gout    • History of prostate cancer    • Jaw pain     both sides   • Mixed hyperlipidemia    • Osteoarthrosis    • Shoulder pain    • SOB (shortness of breath)    • Tinnitus          Past Surgical History:   Procedure Laterality Date   • CARDIAC CATHETERIZATION N/A 4/27/2017    Procedure: Coronary angiography;  Surgeon: Marvel Brito MD;  Location: [facility] CATH INVASIVE LOCATION;  Service:    • CARDIAC CATHETERIZATION N/A 4/27/2017    Procedure: Left Heart Cath;  Surgeon: Marvel Brito MD;  Location: [facility] CATH INVASIVE LOCATION;  Service:    • CARDIAC CATHETERIZATION N/A 4/27/2017    Procedure: Stent GIN coronary;  Surgeon: Marvel Brito MD;  Location: [facility] CATH INVASIVE LOCATION;  Service:    • COLONOSCOPY  2013   • INGUINAL HERNIA REPAIR Right    • NECK SURGERY      SPURS   • MO RECONSTR TOTAL SHOULDER IMPLANT Right 2/16/2017    Procedure: RIGHT TOTAL SHOULDER ARTHROPLASTY;  Surgeon: Marquez Galvan MD;  Location: [facility] OR [facility];  Service: Orthopedics   • PROSTATECTOMY     • REPLACEMENT TOTAL KNEE Left    • TONSILLECTOMY     • TOTAL SHOULDER REPLACEMENT Bilateral          Current Outpatient Prescriptions on File Prior to Visit   Medication Sig Dispense Refill   • allopurinol (ZYLOPRIM) 100 MG tablet Take 100 mg by mouth Daily.     • amLODIPine (NORVASC) 10 MG tablet Take 10 mg by mouth Every Evening.     • atenolol-chlorthalidone (TENORETIC) 50-25 MG per tablet Take 1 tablet by mouth Every Morning.     • clopidogrel (PLAVIX) 75 MG tablet Take 1 tablet by mouth Daily. 30 tablet 6   • doxycycline (VIBRAMYICN) 100 MG tablet Take 1 tablet by mouth 2 (Two) Times a Day for 5 days. 10 tablet 0   • Fluticasone Furoate-Vilanterol (BREO ELLIPTA) 100-25 MCG/INH aerosol powder  Inhale 1 puff  Every Morning.     • Multiple Vitamins-Minerals (MULTIVITAMIN PO) Take 1 tablet/day by mouth Daily.     • naproxen (NAPROSYN) 250 MG tablet Take 250 mg by mouth Daily. TO STOP 1 WEEK BEFORE SURGERY      • NON FORMULARY Apply 1 tablet to teeth. OTC Cosco allergy medication     • potassium chloride (MICRO-K) 10 MEQ CR capsule Take 2 capsules by mouth Daily. (Patient taking differently: Take 20 mEq by mouth 2 (Two) Times a Day.) 30 capsule 6   • rosuvastatin (CRESTOR) 20 MG tablet TAKE 1 TABLET BY MOUTH AT BEDTIME 90 tablet 2   • Testosterone Cypionate 200 MG/ML kit Inject 1 tablet/day into the shoulder, thigh, or buttocks 1 (One) Time Per Week. SUNDAY       No current facility-administered medications on file prior to visit.          Social History     Social History   • Marital status: Unknown     Spouse name: N/A   • Number of children: N/A   • Years of education: N/A     Occupational History   • Not on file.     Social History Main Topics   • Smoking status: Never Smoker   • Smokeless tobacco: Never Used   • Alcohol use 1.2 - 1.8 oz/week     2 - 3 Cans of beer per week      Comment: DAILY   • Drug use: No   • Sexual activity: Defer     Other Topics Concern   • Not on file     Social History Narrative       Family History   Problem Relation Age of Onset   • Cancer Mother      lung   • Cancer Father      lung         Review of Systems   Constitution: Negative for decreased appetite, diaphoresis, fever, weakness, malaise/fatigue, weight gain and weight loss.   HENT: Negative for congestion, headaches, hearing loss, nosebleeds and tinnitus.    Eyes: Negative for blurred vision, double vision, vision loss in left eye, vision loss in right eye and visual disturbance.   Cardiovascular:        As noted in HPI   Respiratory:        As noted HPI   Endocrine: Negative for cold intolerance and heat intolerance.   Hematologic/Lymphatic: Negative for bleeding problem. Does not bruise/bleed easily.   Skin: Negative for color  "change, flushing, itching and rash.   Musculoskeletal: Negative for arthritis, back pain, joint pain, joint swelling, muscle weakness and myalgias.   Gastrointestinal: Negative for bloating, abdominal pain, constipation, diarrhea, dysphagia, heartburn, hematemesis, hematochezia, melena, nausea and vomiting.   Genitourinary: Negative for bladder incontinence, dysuria, frequency, nocturia and urgency.   Neurological: Positive for dizziness and light-headedness. Negative for focal weakness, loss of balance, numbness, paresthesias and vertigo.   Psychiatric/Behavioral: Negative for depression, memory loss and substance abuse.       Procedures      ECG 12 Lead  Date/Time: 6/14/2017 9:43 AM  Performed by: MARCELL VELARDE  Authorized by: MARCELL VELARDE   Comparison: compared with previous ECG   Similar to previous ECG  Rhythm: sinus rhythm  Rate: normal  Conduction: 1st degree  QRS axis: normal                 Objective:    /72 (BP Location: Left arm)  Pulse 64  Resp 20  Ht 70\" (177.8 cm)  Wt 220 lb (99.8 kg)  BMI 31.57 kg/m2       Physical Exam  Physical Exam   Constitutional: He is oriented to person, place, and time. He appears well-developed and well-nourished. No distress.   HENT:   Head: Normocephalic.   Eyes: Conjunctivae are normal. Pupils are equal, round, and reactive to light. No scleral icterus.   Neck: Normal carotid pulses, no hepatojugular reflux and no JVD present. Carotid bruit is not present. No tracheal deviation, no edema and no erythema present. No thyromegaly present.   Cardiovascular: Normal rate, regular rhythm, S1 normal, S2 normal and intact distal pulses.   No extrasystoles are present. PMI is not displaced.  Exam reveals no gallop, no distant heart sounds and no friction rub.    Murmur heard.   Systolic murmur is present with a grade of 2/6  at the upper right sternal border  Pulses:       Carotid pulses are 2+ on the right side, and 2+ on the left side.       Radial pulses are " 2+ on the right side, and 2+ on the left side.        Femoral pulses are 2+ on the right side, and 2+ on the left side.       Dorsalis pedis pulses are 2+ on the right side, and 2+ on the left side.        Posterior tibial pulses are 2+ on the right side, and 2+ on the left side.   Pulmonary/Chest: Effort normal and breath sounds normal. No respiratory distress. He has no decreased breath sounds. He has no wheezes. He has no rhonchi. He has no rales. He exhibits no tenderness.   Abdominal: Soft. Bowel sounds are normal. He exhibits no distension and no mass. There is no hepatosplenomegaly. There is no tenderness. There is no rebound and no guarding.   Musculoskeletal: He exhibits no edema, tenderness or deformity.   Neurological: He is alert and oriented to person, place, and time.   Skin: Skin is warm and dry. No rash noted. He is not diaphoretic. No cyanosis or erythema. No pallor. Nails show no clubbing.   Circular rash around site of tick bite left inner ankle.   Psychiatric: He has a normal mood and affect. His speech is normal and behavior is normal. Judgment and thought content normal.           Assessment:   1.  82-year-old gentleman with history of severe coronary artery disease preserved left ventricular systolic function.  Status post drug-eluting stents placed to the right coronary artery and the left anterior descending.  Coronary Artery Disease  Assessment  • The patient has no angina  • There is a new diagnosis of stable angina in the past 12 months  • The patient is having symptoms consistent with unstable angina     Plan  • Lifestyle modifications discussed include adhering to a heart healthy diet, avoidance of tobacco products, maintenance of a healthy weight, medication compliance, regular exercise and regular monitoring of cholesterol and blood pressure    Subjective - Objective  • There has been a previous stent procedure using GIN  • Current antiplatelet therapy includes aspirin 81 mg and  clopidogrel 75 mg  • The patient qualifies for cardiac rehabilitation, and has been referred to cardiac rehab      He seems to be doing well from the standpoint we'll continue the same see me in follow-up in 6 months.    2.  Hyperlipidemia now on target dose rosuvastatin continue the same.  3.  Hypertension blood pressure adequately controlled if anything on the low side.  4.  History of prostate cancer.  5.  Dizziness certainly concerned he could be bradycardic he gets these daily he's toward 24-hour Holter monitor.  If that's unremarkable at could be due to his blood pressure may need to cut the amlodipine in half.  I do not believe it's related to this tick borne disease.  6.  Probable Adriel Mountain spotted fever.  On doxycycline he is to touch his with his PCP.          Plan:

## 2017-06-15 LAB — RPR SER QL: NORMAL

## 2017-06-15 PROCEDURE — C1785 PMKR, DUAL, RATE-RESP: HCPCS | Performed by: INTERNAL MEDICINE

## 2017-06-15 PROCEDURE — 99233 SBSQ HOSP IP/OBS HIGH 50: CPT | Performed by: INTERNAL MEDICINE

## 2017-06-15 PROCEDURE — 94799 UNLISTED PULMONARY SVC/PX: CPT

## 2017-06-15 PROCEDURE — 99223 1ST HOSP IP/OBS HIGH 75: CPT | Performed by: INTERNAL MEDICINE

## 2017-06-15 PROCEDURE — 02HK3JZ INSERTION OF PACEMAKER LEAD INTO RIGHT VENTRICLE, PERCUTANEOUS APPROACH: ICD-10-PCS | Performed by: INTERNAL MEDICINE

## 2017-06-15 PROCEDURE — 25010000002 MIDAZOLAM PER 1 MG: Performed by: INTERNAL MEDICINE

## 2017-06-15 PROCEDURE — 87040 BLOOD CULTURE FOR BACTERIA: CPT | Performed by: INTERNAL MEDICINE

## 2017-06-15 PROCEDURE — 25010000002 FENTANYL CITRATE (PF) 100 MCG/2ML SOLUTION: Performed by: INTERNAL MEDICINE

## 2017-06-15 PROCEDURE — 33208 INSRT HEART PM ATRIAL & VENT: CPT | Performed by: INTERNAL MEDICINE

## 2017-06-15 PROCEDURE — 86592 SYPHILIS TEST NON-TREP QUAL: CPT | Performed by: INTERNAL MEDICINE

## 2017-06-15 PROCEDURE — C1898 LEAD, PMKR, OTHER THAN TRANS: HCPCS | Performed by: INTERNAL MEDICINE

## 2017-06-15 PROCEDURE — 87798 DETECT AGENT NOS DNA AMP: CPT | Performed by: INTERNAL MEDICINE

## 2017-06-15 PROCEDURE — C1898 LEAD, PMKR, OTHER THAN TRANS: HCPCS

## 2017-06-15 PROCEDURE — 0JH606Z INSERTION OF PACEMAKER, DUAL CHAMBER INTO CHEST SUBCUTANEOUS TISSUE AND FASCIA, OPEN APPROACH: ICD-10-PCS | Performed by: INTERNAL MEDICINE

## 2017-06-15 PROCEDURE — 02H63JZ INSERTION OF PACEMAKER LEAD INTO RIGHT ATRIUM, PERCUTANEOUS APPROACH: ICD-10-PCS | Performed by: INTERNAL MEDICINE

## 2017-06-15 DEVICE — IMPLANTABLE DEVICE: Type: IMPLANTABLE DEVICE | Status: FUNCTIONAL

## 2017-06-15 DEVICE — GEN PM ADVISA SURESCAN DR MRI: Type: IMPLANTABLE DEVICE | Status: FUNCTIONAL

## 2017-06-15 DEVICE — LD PM CAPSURE SENSE VENT 58CM 407458: Type: IMPLANTABLE DEVICE | Status: FUNCTIONAL

## 2017-06-15 RX ORDER — SODIUM CHLORIDE 0.9 % (FLUSH) 0.9 %
1-10 SYRINGE (ML) INJECTION AS NEEDED
Status: DISCONTINUED | OUTPATIENT
Start: 2017-06-15 | End: 2017-06-16 | Stop reason: HOSPADM

## 2017-06-15 RX ORDER — SODIUM CHLORIDE 9 MG/ML
INJECTION, SOLUTION INTRAVENOUS CONTINUOUS PRN
Status: DISCONTINUED | OUTPATIENT
Start: 2017-06-15 | End: 2017-06-15 | Stop reason: HOSPADM

## 2017-06-15 RX ORDER — LIDOCAINE HYDROCHLORIDE 10 MG/ML
INJECTION, SOLUTION INFILTRATION; PERINEURAL AS NEEDED
Status: DISCONTINUED | OUTPATIENT
Start: 2017-06-15 | End: 2017-06-15 | Stop reason: HOSPADM

## 2017-06-15 RX ORDER — FENTANYL CITRATE 50 UG/ML
INJECTION, SOLUTION INTRAMUSCULAR; INTRAVENOUS AS NEEDED
Status: DISCONTINUED | OUTPATIENT
Start: 2017-06-15 | End: 2017-06-15 | Stop reason: HOSPADM

## 2017-06-15 RX ORDER — OXYCODONE HYDROCHLORIDE AND ACETAMINOPHEN 5; 325 MG/1; MG/1
1 TABLET ORAL EVERY 4 HOURS PRN
Status: DISCONTINUED | OUTPATIENT
Start: 2017-06-15 | End: 2017-06-16 | Stop reason: HOSPADM

## 2017-06-15 RX ORDER — MIDAZOLAM HYDROCHLORIDE 1 MG/ML
INJECTION INTRAMUSCULAR; INTRAVENOUS AS NEEDED
Status: DISCONTINUED | OUTPATIENT
Start: 2017-06-15 | End: 2017-06-15 | Stop reason: HOSPADM

## 2017-06-15 RX ADMIN — POTASSIUM CHLORIDE 10 MEQ: 750 CAPSULE, EXTENDED RELEASE ORAL at 09:34

## 2017-06-15 RX ADMIN — BUDESONIDE AND FORMOTEROL FUMARATE DIHYDRATE 2 PUFF: 80; 4.5 AEROSOL RESPIRATORY (INHALATION) at 20:55

## 2017-06-15 RX ADMIN — DOXYCYCLINE HYCLATE 100 MG: 100 CAPSULE, GELATIN COATED ORAL at 09:34

## 2017-06-15 RX ADMIN — NAPROXEN 250 MG: 250 TABLET ORAL at 09:35

## 2017-06-15 RX ADMIN — BUDESONIDE AND FORMOTEROL FUMARATE DIHYDRATE 2 PUFF: 80; 4.5 AEROSOL RESPIRATORY (INHALATION) at 07:43

## 2017-06-15 RX ADMIN — ALLOPURINOL 100 MG: 100 TABLET ORAL at 09:34

## 2017-06-15 RX ADMIN — MULTIPLE VITAMINS W/ MINERALS TAB 1 TABLET: TAB at 09:35

## 2017-06-15 RX ADMIN — AMLODIPINE BESYLATE 10 MG: 10 TABLET ORAL at 21:19

## 2017-06-15 RX ADMIN — ROSUVASTATIN CALCIUM 20 MG: 20 TABLET, FILM COATED ORAL at 09:34

## 2017-06-15 RX ADMIN — DOXYCYCLINE HYCLATE 100 MG: 100 CAPSULE, GELATIN COATED ORAL at 21:19

## 2017-06-15 RX ADMIN — Medication 5 MG: at 21:19

## 2017-06-15 RX ADMIN — CLOPIDOGREL 75 MG: 75 TABLET, FILM COATED ORAL at 09:34

## 2017-06-15 RX ADMIN — POTASSIUM CHLORIDE 10 MEQ: 750 CAPSULE, EXTENDED RELEASE ORAL at 18:21

## 2017-06-15 RX ADMIN — ASPIRIN 81 MG: 81 TABLET, CHEWABLE ORAL at 09:34

## 2017-06-16 ENCOUNTER — TELEPHONE (OUTPATIENT)
Dept: CARDIOLOGY | Facility: CLINIC | Age: 82
End: 2017-06-16

## 2017-06-16 VITALS
HEART RATE: 73 BPM | DIASTOLIC BLOOD PRESSURE: 88 MMHG | TEMPERATURE: 98 F | SYSTOLIC BLOOD PRESSURE: 107 MMHG | WEIGHT: 220 LBS | HEIGHT: 70 IN | RESPIRATION RATE: 18 BRPM | OXYGEN SATURATION: 98 % | BODY MASS INDEX: 31.5 KG/M2

## 2017-06-16 LAB
ALBUMIN SERPL-MCNC: 3.9 G/DL (ref 3.5–5.2)
ALBUMIN/GLOB SERPL: 1.5 G/DL
ALP SERPL-CCNC: 65 U/L (ref 39–117)
ALT SERPL W P-5'-P-CCNC: 19 U/L (ref 1–41)
ANION GAP SERPL CALCULATED.3IONS-SCNC: 12.5 MMOL/L
AST SERPL-CCNC: 20 U/L (ref 1–40)
BILIRUB SERPL-MCNC: 0.4 MG/DL (ref 0.1–1.2)
BUN BLD-MCNC: 27 MG/DL (ref 8–23)
BUN/CREAT SERPL: 22.3 (ref 7–25)
CALCIUM SPEC-SCNC: 9.4 MG/DL (ref 8.6–10.5)
CHLORIDE SERPL-SCNC: 101 MMOL/L (ref 98–107)
CO2 SERPL-SCNC: 26.5 MMOL/L (ref 22–29)
CREAT BLD-MCNC: 1.21 MG/DL (ref 0.76–1.27)
DEPRECATED RDW RBC AUTO: 47.9 FL (ref 37–54)
ERYTHROCYTE [DISTWIDTH] IN BLOOD BY AUTOMATED COUNT: 14 % (ref 11.5–14.5)
GFR SERPL CREATININE-BSD FRML MDRD: 58 ML/MIN/1.73
GLOBULIN UR ELPH-MCNC: 2.6 GM/DL
GLUCOSE BLD-MCNC: 107 MG/DL (ref 65–99)
HCT VFR BLD AUTO: 45.1 % (ref 40.4–52.2)
HGB BLD-MCNC: 14.6 G/DL (ref 13.7–17.6)
INR PPP: 1.04 (ref 0.9–1.1)
MCH RBC QN AUTO: 30.6 PG (ref 27–32.7)
MCHC RBC AUTO-ENTMCNC: 32.4 G/DL (ref 32.6–36.4)
MCV RBC AUTO: 94.5 FL (ref 79.8–96.2)
PLATELET # BLD AUTO: 143 10*3/MM3 (ref 140–500)
PMV BLD AUTO: 10.3 FL (ref 6–12)
POTASSIUM BLD-SCNC: 3.6 MMOL/L (ref 3.5–5.2)
PROT SERPL-MCNC: 6.5 G/DL (ref 6–8.5)
PROTHROMBIN TIME: 13.2 SECONDS (ref 11.7–14.2)
RBC # BLD AUTO: 4.77 10*6/MM3 (ref 4.6–6)
SODIUM BLD-SCNC: 140 MMOL/L (ref 136–145)
WBC NRBC COR # BLD: 6.45 10*3/MM3 (ref 4.5–10.7)

## 2017-06-16 PROCEDURE — 99238 HOSP IP/OBS DSCHRG MGMT 30/<: CPT | Performed by: INTERNAL MEDICINE

## 2017-06-16 PROCEDURE — 80053 COMPREHEN METABOLIC PANEL: CPT | Performed by: INTERNAL MEDICINE

## 2017-06-16 PROCEDURE — 85610 PROTHROMBIN TIME: CPT | Performed by: INTERNAL MEDICINE

## 2017-06-16 PROCEDURE — 99232 SBSQ HOSP IP/OBS MODERATE 35: CPT | Performed by: INTERNAL MEDICINE

## 2017-06-16 PROCEDURE — 85027 COMPLETE CBC AUTOMATED: CPT | Performed by: INTERNAL MEDICINE

## 2017-06-16 RX ADMIN — ASPIRIN 81 MG: 81 TABLET, CHEWABLE ORAL at 08:52

## 2017-06-16 RX ADMIN — POTASSIUM CHLORIDE 10 MEQ: 750 CAPSULE, EXTENDED RELEASE ORAL at 08:52

## 2017-06-16 RX ADMIN — MULTIPLE VITAMINS W/ MINERALS TAB 1 TABLET: TAB at 08:52

## 2017-06-16 RX ADMIN — BUDESONIDE AND FORMOTEROL FUMARATE DIHYDRATE 2 PUFF: 80; 4.5 AEROSOL RESPIRATORY (INHALATION) at 07:54

## 2017-06-16 RX ADMIN — ALLOPURINOL 100 MG: 100 TABLET ORAL at 08:52

## 2017-06-16 RX ADMIN — NAPROXEN 250 MG: 250 TABLET ORAL at 08:52

## 2017-06-16 RX ADMIN — ROSUVASTATIN CALCIUM 20 MG: 20 TABLET, FILM COATED ORAL at 08:52

## 2017-06-16 RX ADMIN — CLOPIDOGREL 75 MG: 75 TABLET, FILM COATED ORAL at 08:52

## 2017-06-16 RX ADMIN — DOXYCYCLINE HYCLATE 100 MG: 100 CAPSULE, GELATIN COATED ORAL at 08:52

## 2017-06-16 NOTE — TELEPHONE ENCOUNTER
Left message for patient.  Dr. Valladares would like him to wait until his pacer follow up appt before starting cardiac rehab. / KIANNA

## 2017-06-16 NOTE — TELEPHONE ENCOUNTER
MI out of office.  Patient called to inquire rather he should start cardiac rehab on Monday 6/19.  He had his pacemaker implanted yesterday and was informed of restrictions with his left arm for 4 -6 weeks.  Patient states he will be going to [place] 7/6/17 thru 7/23/17.  He asks if he should wait and start rehab when he returns.  Please advise. / KIANNA    Patient's phone number is [phone]

## 2017-06-18 LAB — E CHAFFEENSIS DNA BLD QL NAA+PROBE: NEGATIVE

## 2017-06-19 LAB
R RICKETTSI IGG SER QL IA: ABNORMAL
R RICKETTSI IGM TITR SER: 0.36 INDEX (ref 0–0.89)
RICK SF IGG TITR SER IF: POSITIVE {TITER}

## 2017-06-19 NOTE — TELEPHONE ENCOUNTER
Patient called.  I informed him he has a incision check on Friday at 11:00 with the pacemaker dept.  Patient states he has some questions.  He asks if he is supposed to cover the area with Plastic when showers?  He states after going for a walk his heart beat became very irregular.  HR was 90.  He went for a walk today and his BP was 98/74 and half an hour later it was 106/69.  Usually runs 121/80.  Patient states his is light headed.  Please contact patient with answers to his questions.  Thank you/ KIANNA

## 2017-06-20 LAB
BACTERIA SPEC AEROBE CULT: NORMAL
BACTERIA SPEC AEROBE CULT: NORMAL

## 2017-06-20 NOTE — TELEPHONE ENCOUNTER
Pt called and I answered his questions about incision care and to keep open to air.   He states there had been drainage, but none now.  I instructed him on s/s infection and to call if he had any or if there was any further drainage.  He asked about hr and bp and I let him know that he can still have a rapid and/or irregular hr with a pacemaker.  He states he is driving and that he had not been told to wait.  I told him it is usually ok to drive 2 weeks after implant but that he should at least wait until he has his pacer checked as scheduled on 6/23/17 due to the fact that he has been dizzy at times.  I told him if he has further dizzy spells or drainage to call us, so we can move his apt up.  He verbalizes understanding.

## 2017-06-22 RX ORDER — CLOPIDOGREL BISULFATE 75 MG/1
TABLET ORAL
Qty: 90 TABLET | Refills: 6 | OUTPATIENT
Start: 2017-06-22

## 2017-06-23 ENCOUNTER — CLINICAL SUPPORT NO REQUIREMENTS (OUTPATIENT)
Dept: CARDIOLOGY | Facility: CLINIC | Age: 82
End: 2017-06-23

## 2017-06-23 DIAGNOSIS — R00.1 BRADYCARDIA, UNSPECIFIED: Primary | ICD-10-CM

## 2017-06-23 DIAGNOSIS — R55 SYNCOPE, UNSPECIFIED SYNCOPE TYPE: ICD-10-CM

## 2017-06-23 PROCEDURE — 93280 PM DEVICE PROGR EVAL DUAL: CPT | Performed by: INTERNAL MEDICINE

## 2017-06-28 ENCOUNTER — TREATMENT (OUTPATIENT)
Dept: CARDIAC REHAB | Facility: HOSPITAL | Age: 82
End: 2017-06-28

## 2017-06-28 DIAGNOSIS — Z95.5 S/P CORONARY ARTERY STENT PLACEMENT: Primary | ICD-10-CM

## 2017-06-28 PROCEDURE — 93798 PHYS/QHP OP CAR RHAB W/ECG: CPT

## 2017-06-30 ENCOUNTER — TREATMENT (OUTPATIENT)
Dept: CARDIAC REHAB | Facility: HOSPITAL | Age: 82
End: 2017-06-30

## 2017-06-30 DIAGNOSIS — Z95.5 S/P CORONARY ARTERY STENT PLACEMENT: Primary | ICD-10-CM

## 2017-06-30 PROCEDURE — 93798 PHYS/QHP OP CAR RHAB W/ECG: CPT

## 2017-07-25 ENCOUNTER — CLINICAL SUPPORT NO REQUIREMENTS (OUTPATIENT)
Dept: CARDIOLOGY | Facility: CLINIC | Age: 82
End: 2017-07-25

## 2017-07-25 ENCOUNTER — OFFICE VISIT (OUTPATIENT)
Dept: CARDIOLOGY | Facility: CLINIC | Age: 82
End: 2017-07-25

## 2017-07-25 VITALS
DIASTOLIC BLOOD PRESSURE: 72 MMHG | SYSTOLIC BLOOD PRESSURE: 118 MMHG | WEIGHT: 222 LBS | BODY MASS INDEX: 33.65 KG/M2 | RESPIRATION RATE: 20 BRPM | HEIGHT: 68 IN | HEART RATE: 63 BPM

## 2017-07-25 DIAGNOSIS — R00.1 BRADYCARDIA, UNSPECIFIED: ICD-10-CM

## 2017-07-25 DIAGNOSIS — E78.49 OTHER HYPERLIPIDEMIA: ICD-10-CM

## 2017-07-25 DIAGNOSIS — R00.1 BRADYCARDIA: Primary | ICD-10-CM

## 2017-07-25 DIAGNOSIS — Z95.0 PACEMAKER: ICD-10-CM

## 2017-07-25 DIAGNOSIS — I25.10 CORONARY ARTERY DISEASE INVOLVING NATIVE CORONARY ARTERY OF NATIVE HEART WITHOUT ANGINA PECTORIS: Primary | ICD-10-CM

## 2017-07-25 DIAGNOSIS — I10 ESSENTIAL HYPERTENSION: ICD-10-CM

## 2017-07-25 PROCEDURE — 93000 ELECTROCARDIOGRAM COMPLETE: CPT | Performed by: INTERNAL MEDICINE

## 2017-07-25 PROCEDURE — 99213 OFFICE O/P EST LOW 20 MIN: CPT | Performed by: INTERNAL MEDICINE

## 2017-07-25 NOTE — PROGRESS NOTES
Date of Office Visit: 17  Encounter Provider: Avery Galan MD  Place of Service: [facility] CARDIOLOGY  Patient Name: Guero Garay  :1935      Chief Complaint   Patient presents with   • Coronary Artery Disease     History of Present Illness  HPI Comments: Mr Garay is a pleasant 83 yo gentleman with a history of aortic stenosis, hypertension, hyperlipidemia, and coronary artery disease.  He presented to our office in 2017 with progressive angina.  He then had a perfusion stress test that showed a large area of anterior and septal ischemia echocardiogram at that time showed mild aortic stenosis he then underwent cardiac catheterization 2017 he was found have preserved left ventricular ejection fraction severe disease of the mid left anterior descending and right coronary artery.  Had a 4.0 x 15 mm drug-eluting stent placed in the left anterior descending and a 4.0 x 28 mm drug-eluting stent dilated to 4.5 mm in the right coronary artery.  He was hypokalemic on admission started on potassium, he was also started on on rosuvastatin.    He then presented in 2017 with dizziness and syncope and bradycardia.  He ended up having a permanent pacemaker implanted.  He also had a tick bite but was negative for [place] spotted fever.  He now comes in for follow-up he's been doing much better.  He's had no further dizziness or lightheadedness no near syncope or syncope.  No chest pain or pressure.  He exercises in cardiac rehabilitation.  When he was in [place] he would walk at least a mile a day.  He denies orthopnea or PND overall he feels like he's doing very well.    Coronary Artery Disease   Pertinent negatives include no dizziness, muscle weakness or weight gain. There is no history of past myocardial infarction.         Past Medical History:   Diagnosis Date   • Arthralgia    • CAD (coronary artery disease), native coronary artery    • Chest  pain, unspecified    • Essential hypertension    • Gout    • History of prostate cancer    • Jaw pain     both sides   • Mixed hyperlipidemia    • Osteoarthrosis    • Shoulder pain    • SOB (shortness of breath)    • Tinnitus          Past Surgical History:   Procedure Laterality Date   • CARDIAC CATHETERIZATION N/A 4/27/2017    Procedure: Coronary angiography;  Surgeon: Marvel Brito MD;  Location: [facility] CATH INVASIVE LOCATION;  Service:    • CARDIAC CATHETERIZATION N/A 4/27/2017    Procedure: Left Heart Cath;  Surgeon: Marvel Brito MD;  Location[facility] C[facility] INVASIVE LOCATION;  Service:    • CARDIAC CATHETERIZATION N/A 4/27/2017    Procedure: Stent GIN coronary;  Surgeon: Marvel Brito MD;  Location: [facility] CATH INVASIVE LOCATION;  Service:    • CARDIAC ELECTROPHYSIOLOGY PROCEDURE N/A 6/15/2017    Procedure: Pacemaker DC new   [employer];  Surgeon: Gustavo Valladares MD;  Location: [facility] CATH INVASIVE LOCATION;  Service:    • COLONOSCOPY  2013   • INGUINAL HERNIA REPAIR Right    • NECK SURGERY      SPURS   • [place] RECONSTR TOTAL SHOULDER IMPLANT Right 2/16/2017    Procedure: RIGHT TOTAL SHOULDER ARTHROPLASTY;  Surgeon: Marquez Galvan MD;  Location: [facility] OR [facility];  Service: Orthopedics   • PROSTATECTOMY     • REPLACEMENT TOTAL KNEE Left    • TONSILLECTOMY     • TOTAL SHOULDER REPLACEMENT Bilateral          Current Outpatient Prescriptions on File Prior to Visit   Medication Sig Dispense Refill   • allopurinol (ZYLOPRIM) 100 MG tablet Take 100 mg by mouth Daily.     • amLODIPine (NORVASC) 10 MG tablet Take 1 tablet by mouth Every Evening. 90 tablet 3   • aspirin 81 MG chewable tablet Chew 81 mg Daily.     • atenolol-chlorthalidone (TENORETIC) 50-25 MG per tablet Take 1 tablet by mouth Every Morning. 90 tablet 3   • clopidogrel (PLAVIX) 75 MG tablet Take 1 tablet by mouth Daily. 90 tablet 3   • Fluticasone Furoate-Vilanterol (BREO ELLIPTA) 100-25 MCG/INH aerosol powder  Inhale 1 puff Every Morning.     •  melatonin 5 MG tablet tablet Take 5 mg by mouth Every Night.     • Multiple Vitamins-Minerals (MULTIVITAMIN PO) Take 1 tablet/day by mouth Daily.     • naproxen (NAPROSYN) 250 MG tablet Take 250 mg by mouth Daily. TO STOP 1 WEEK BEFORE SURGERY      • potassium chloride (MICRO-K) 10 MEQ CR capsule Take 1 capsule by mouth 2 (Two) Times a Day. 180 capsule 3   • rosuvastatin (CRESTOR) 20 MG tablet Take 1 tablet by mouth Daily. 90 tablet 3     No current facility-administered medications on file prior to visit.          Social History     Social History   • Marital status: Unknown     Spouse name: N/A   • Number of children: N/A   • Years of education: N/A     Occupational History   • Not on file.     Social History Main Topics   • Smoking status: Never Smoker   • Smokeless tobacco: Never Used      Comment: caffeine - 1 every 2-3 days   • Alcohol use 12.6 oz/week     21 Cans of beer per week      Comment: 2-3 daily   • Drug use: No   • Sexual activity: Defer     Other Topics Concern   • Not on file     Social History Narrative       Family History   Problem Relation Age of Onset   • Cancer Mother      lung   • Cancer Father      lung         Review of Systems   Constitution: Negative for decreased appetite, diaphoresis, fever, weakness, malaise/fatigue, weight gain and weight loss.   HENT: Negative for congestion, headaches, hearing loss, nosebleeds and tinnitus.    Eyes: Negative for blurred vision, double vision, vision loss in left eye, vision loss in right eye and visual disturbance.   Cardiovascular:        As noted in HPI   Respiratory:        As noted HPI   Endocrine: Negative for cold intolerance and heat intolerance.   Hematologic/Lymphatic: Negative for bleeding problem. Does not bruise/bleed easily.   Skin: Negative for color change, flushing, itching and rash.   Musculoskeletal: Negative for arthritis, back pain, joint pain, joint swelling, muscle weakness and myalgias.   Gastrointestinal: Negative for  "bloating, abdominal pain, constipation, diarrhea, dysphagia, heartburn, hematemesis, hematochezia, melena, nausea and vomiting.   Genitourinary: Positive for frequency. Negative for bladder incontinence, dysuria, nocturia and urgency.   Neurological: Negative for dizziness, focal weakness, light-headedness, loss of balance, numbness, paresthesias and vertigo.   Psychiatric/Behavioral: Negative for depression, memory loss and substance abuse.       Procedures      ECG 12 Lead  Date/Time: 7/25/2017 10:17 AM  Performed by: MARCELL VELARDE  Authorized by: MARCELL VELARDE   Comparison: compared with previous ECG   Similar to previous ECG  Rhythm: sinus rhythm  Rate: normal  QRS axis: left  Clinical impression: low voltage  Clinical impression comment: Diffuse T wave flattening                 Objective:    /72  Pulse 63  Resp 20  Ht 68\" (172.7 cm)  Wt 222 lb (101 kg)  BMI 33.75 kg/m2       Physical Exam  Physical Exam   Constitutional: He is oriented to person, place, and time. He appears well-developed and well-nourished. No distress.   HENT:   Head: Normocephalic.   Eyes: Conjunctivae are normal. Pupils are equal, round, and reactive to light. No scleral icterus.   Neck: Normal carotid pulses, no hepatojugular reflux and no JVD present. Carotid bruit is not present. No tracheal deviation, no edema and no erythema present. No thyromegaly present.   Cardiovascular: Normal rate, regular rhythm, S1 normal, S2 normal and intact distal pulses.   No extrasystoles are present. PMI is not displaced.  Exam reveals no gallop, no distant heart sounds and no friction rub.    Murmur heard.   Systolic murmur is present with a grade of 2/6  at the upper right sternal border  Pulses:       Carotid pulses are 2+ on the right side, and 2+ on the left side.       Radial pulses are 2+ on the right side, and 2+ on the left side.        Femoral pulses are 2+ on the right side, and 2+ on the left side.       Dorsalis pedis " pulses are 2+ on the right side, and 2+ on the left side.        Posterior tibial pulses are 2+ on the right side, and 2+ on the left side.   Pulmonary/Chest: Effort normal and breath sounds normal. No respiratory distress. He has no decreased breath sounds. He has no wheezes. He has no rhonchi. He has no rales. He exhibits no tenderness.   Abdominal: Soft. Bowel sounds are normal. He exhibits no distension and no mass. There is no hepatosplenomegaly. There is no tenderness. There is no rebound and no guarding.   Musculoskeletal: He exhibits no edema, tenderness or deformity.   Neurological: He is alert and oriented to person, place, and time.   Skin: Skin is warm and dry. No rash noted. He is not diaphoretic. No cyanosis or erythema. No pallor. Nails show no clubbing.   Psychiatric: He has a normal mood and affect. His speech is normal and behavior is normal. Judgment and thought content normal.           Assessment:   1.  82-year-old gentleman with history of severe coronary artery disease preserved left ventricular systolic function.  Status post drug-eluting stents placed to the right coronary artery and the left anterior descending.  Coronary Artery Disease  Assessment  • The patient has no angina  • There is a new diagnosis of stable angina in the past 12 months  • The patient is having symptoms consistent with unstable angina     Plan  • Lifestyle modifications discussed include adhering to a heart healthy diet, avoidance of tobacco products, maintenance of a healthy weight, medication compliance, regular exercise and regular monitoring of cholesterol and blood pressure    Subjective - Objective  • There has been a previous stent procedure using GIN  • Current antiplatelet therapy includes aspirin 81 mg and clopidogrel 75 mg  • The patient qualifies for cardiac rehabilitation, and has been referred to cardiac rehab      He seems to be doing well from the standpoint we'll continue the same see me in follow-up  in 4 months.     2.  Hyperlipidemia now on target dose rosuvastatin continue the same.  3.  Hypertension blood pressure adequately controlled if anything on the low side.  4.  History of prostate cancer.  5.   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         Plan:

## 2017-07-26 ENCOUNTER — TREATMENT (OUTPATIENT)
Dept: CARDIAC REHAB | Facility: HOSPITAL | Age: 82
End: 2017-07-26

## 2017-07-26 DIAGNOSIS — Z95.5 S/P CORONARY ARTERY STENT PLACEMENT: Primary | ICD-10-CM

## 2017-07-26 PROCEDURE — 93798 PHYS/QHP OP CAR RHAB W/ECG: CPT

## 2017-07-28 ENCOUNTER — TREATMENT (OUTPATIENT)
Dept: CARDIAC REHAB | Facility: HOSPITAL | Age: 82
End: 2017-07-28

## 2017-07-28 DIAGNOSIS — Z95.5 S/P CORONARY ARTERY STENT PLACEMENT: Primary | ICD-10-CM

## 2017-07-28 PROCEDURE — 93798 PHYS/QHP OP CAR RHAB W/ECG: CPT

## 2017-07-31 ENCOUNTER — TREATMENT (OUTPATIENT)
Dept: CARDIAC REHAB | Facility: HOSPITAL | Age: 82
End: 2017-07-31

## 2017-07-31 DIAGNOSIS — Z95.5 S/P CORONARY ARTERY STENT PLACEMENT: Primary | ICD-10-CM

## 2017-07-31 PROCEDURE — 93798 PHYS/QHP OP CAR RHAB W/ECG: CPT

## 2017-08-02 ENCOUNTER — OFFICE VISIT (OUTPATIENT)
Dept: CARDIAC REHAB | Facility: HOSPITAL | Age: 82
End: 2017-08-02

## 2017-08-02 ENCOUNTER — TREATMENT (OUTPATIENT)
Dept: CARDIAC REHAB | Facility: HOSPITAL | Age: 82
End: 2017-08-02

## 2017-08-02 DIAGNOSIS — Z95.5 S/P CORONARY ARTERY STENT PLACEMENT: Primary | ICD-10-CM

## 2017-08-02 PROCEDURE — 93797 PHYS/QHP OP CAR RHAB WO ECG: CPT

## 2017-08-02 PROCEDURE — 93798 PHYS/QHP OP CAR RHAB W/ECG: CPT

## 2017-08-04 ENCOUNTER — TREATMENT (OUTPATIENT)
Dept: CARDIAC REHAB | Facility: HOSPITAL | Age: 82
End: 2017-08-04

## 2017-08-04 DIAGNOSIS — Z95.5 S/P CORONARY ARTERY STENT PLACEMENT: Primary | ICD-10-CM

## 2017-08-04 PROCEDURE — 93798 PHYS/QHP OP CAR RHAB W/ECG: CPT

## 2017-08-07 ENCOUNTER — TREATMENT (OUTPATIENT)
Dept: CARDIAC REHAB | Facility: HOSPITAL | Age: 82
End: 2017-08-07

## 2017-08-07 DIAGNOSIS — Z95.5 S/P CORONARY ARTERY STENT PLACEMENT: Primary | ICD-10-CM

## 2017-08-07 PROCEDURE — 93798 PHYS/QHP OP CAR RHAB W/ECG: CPT

## 2017-08-09 ENCOUNTER — TREATMENT (OUTPATIENT)
Dept: CARDIAC REHAB | Facility: HOSPITAL | Age: 82
End: 2017-08-09

## 2017-08-09 DIAGNOSIS — Z95.5 S/P CORONARY ARTERY STENT PLACEMENT: Primary | ICD-10-CM

## 2017-08-09 PROCEDURE — 93798 PHYS/QHP OP CAR RHAB W/ECG: CPT

## 2017-08-11 ENCOUNTER — TREATMENT (OUTPATIENT)
Dept: CARDIAC REHAB | Facility: HOSPITAL | Age: 82
End: 2017-08-11

## 2017-08-11 DIAGNOSIS — Z95.5 S/P CORONARY ARTERY STENT PLACEMENT: Primary | ICD-10-CM

## 2017-08-11 PROCEDURE — 93798 PHYS/QHP OP CAR RHAB W/ECG: CPT

## 2017-08-14 ENCOUNTER — TREATMENT (OUTPATIENT)
Dept: CARDIAC REHAB | Facility: HOSPITAL | Age: 82
End: 2017-08-14

## 2017-08-14 DIAGNOSIS — Z95.5 S/P CORONARY ARTERY STENT PLACEMENT: Primary | ICD-10-CM

## 2017-08-14 PROCEDURE — 93798 PHYS/QHP OP CAR RHAB W/ECG: CPT

## 2017-08-16 ENCOUNTER — TREATMENT (OUTPATIENT)
Dept: CARDIAC REHAB | Facility: HOSPITAL | Age: 82
End: 2017-08-16

## 2017-08-16 DIAGNOSIS — Z95.5 S/P CORONARY ARTERY STENT PLACEMENT: Primary | ICD-10-CM

## 2017-08-16 PROCEDURE — 93798 PHYS/QHP OP CAR RHAB W/ECG: CPT

## 2017-08-18 ENCOUNTER — TREATMENT (OUTPATIENT)
Dept: CARDIAC REHAB | Facility: HOSPITAL | Age: 82
End: 2017-08-18

## 2017-08-18 DIAGNOSIS — Z95.5 S/P CORONARY ARTERY STENT PLACEMENT: Primary | ICD-10-CM

## 2017-08-18 PROCEDURE — 93798 PHYS/QHP OP CAR RHAB W/ECG: CPT

## 2017-08-21 ENCOUNTER — TREATMENT (OUTPATIENT)
Dept: CARDIAC REHAB | Facility: HOSPITAL | Age: 82
End: 2017-08-21

## 2017-08-21 DIAGNOSIS — Z95.5 S/P CORONARY ARTERY STENT PLACEMENT: Primary | ICD-10-CM

## 2017-08-21 PROCEDURE — 93798 PHYS/QHP OP CAR RHAB W/ECG: CPT

## 2017-08-23 ENCOUNTER — TREATMENT (OUTPATIENT)
Dept: CARDIAC REHAB | Facility: HOSPITAL | Age: 82
End: 2017-08-23

## 2017-08-23 DIAGNOSIS — Z95.5 S/P CORONARY ARTERY STENT PLACEMENT: Primary | ICD-10-CM

## 2017-08-23 PROCEDURE — 93798 PHYS/QHP OP CAR RHAB W/ECG: CPT

## 2017-08-25 ENCOUNTER — TREATMENT (OUTPATIENT)
Dept: CARDIAC REHAB | Facility: HOSPITAL | Age: 82
End: 2017-08-25

## 2017-08-25 DIAGNOSIS — Z95.5 S/P CORONARY ARTERY STENT PLACEMENT: Primary | ICD-10-CM

## 2017-08-25 PROCEDURE — 93798 PHYS/QHP OP CAR RHAB W/ECG: CPT

## 2017-08-28 ENCOUNTER — TREATMENT (OUTPATIENT)
Dept: CARDIAC REHAB | Facility: HOSPITAL | Age: 82
End: 2017-08-28

## 2017-08-28 DIAGNOSIS — Z95.5 S/P CORONARY ARTERY STENT PLACEMENT: Primary | ICD-10-CM

## 2017-08-28 PROCEDURE — 93798 PHYS/QHP OP CAR RHAB W/ECG: CPT

## 2017-08-30 ENCOUNTER — TREATMENT (OUTPATIENT)
Dept: CARDIAC REHAB | Facility: HOSPITAL | Age: 82
End: 2017-08-30

## 2017-08-30 DIAGNOSIS — Z95.5 S/P CORONARY ARTERY STENT PLACEMENT: Primary | ICD-10-CM

## 2017-08-30 PROCEDURE — 93798 PHYS/QHP OP CAR RHAB W/ECG: CPT

## 2017-09-01 ENCOUNTER — TREATMENT (OUTPATIENT)
Dept: CARDIAC REHAB | Facility: HOSPITAL | Age: 82
End: 2017-09-01

## 2017-09-01 DIAGNOSIS — Z95.5 S/P CORONARY ARTERY STENT PLACEMENT: Primary | ICD-10-CM

## 2017-09-01 PROCEDURE — 93798 PHYS/QHP OP CAR RHAB W/ECG: CPT

## 2017-09-06 ENCOUNTER — OFFICE VISIT (OUTPATIENT)
Dept: CARDIAC REHAB | Facility: HOSPITAL | Age: 82
End: 2017-09-06

## 2017-09-06 ENCOUNTER — TREATMENT (OUTPATIENT)
Dept: CARDIAC REHAB | Facility: HOSPITAL | Age: 82
End: 2017-09-06

## 2017-09-06 DIAGNOSIS — Z95.5 S/P CORONARY ARTERY STENT PLACEMENT: Primary | ICD-10-CM

## 2017-09-06 PROCEDURE — 93798 PHYS/QHP OP CAR RHAB W/ECG: CPT

## 2017-09-06 PROCEDURE — 93797 PHYS/QHP OP CAR RHAB WO ECG: CPT

## 2017-09-08 ENCOUNTER — APPOINTMENT (OUTPATIENT)
Dept: CARDIAC REHAB | Facility: HOSPITAL | Age: 82
End: 2017-09-08

## 2017-09-11 ENCOUNTER — APPOINTMENT (OUTPATIENT)
Dept: CARDIAC REHAB | Facility: HOSPITAL | Age: 82
End: 2017-09-11

## 2017-09-13 ENCOUNTER — APPOINTMENT (OUTPATIENT)
Dept: CARDIAC REHAB | Facility: HOSPITAL | Age: 82
End: 2017-09-13

## 2017-09-15 ENCOUNTER — APPOINTMENT (OUTPATIENT)
Dept: CARDIAC REHAB | Facility: HOSPITAL | Age: 82
End: 2017-09-15

## 2017-09-18 ENCOUNTER — APPOINTMENT (OUTPATIENT)
Dept: CARDIAC REHAB | Facility: HOSPITAL | Age: 82
End: 2017-09-18

## 2017-09-20 ENCOUNTER — APPOINTMENT (OUTPATIENT)
Dept: CARDIAC REHAB | Facility: HOSPITAL | Age: 82
End: 2017-09-20

## 2017-09-22 ENCOUNTER — APPOINTMENT (OUTPATIENT)
Dept: CARDIAC REHAB | Facility: HOSPITAL | Age: 82
End: 2017-09-22

## 2017-10-02 ENCOUNTER — OFFICE VISIT (OUTPATIENT)
Dept: INTERNAL MEDICINE | Facility: CLINIC | Age: 82
End: 2017-10-02

## 2017-10-02 VITALS
BODY MASS INDEX: 33.65 KG/M2 | OXYGEN SATURATION: 95 % | SYSTOLIC BLOOD PRESSURE: 140 MMHG | DIASTOLIC BLOOD PRESSURE: 80 MMHG | HEIGHT: 68 IN | WEIGHT: 222 LBS | HEART RATE: 64 BPM

## 2017-10-02 DIAGNOSIS — E78.2 MIXED HYPERLIPIDEMIA: ICD-10-CM

## 2017-10-02 DIAGNOSIS — I25.110 CORONARY ARTERY DISEASE INVOLVING NATIVE CORONARY ARTERY OF NATIVE HEART WITH UNSTABLE ANGINA PECTORIS (HCC): ICD-10-CM

## 2017-10-02 DIAGNOSIS — Z85.46 HISTORY OF PROSTATE CANCER: ICD-10-CM

## 2017-10-02 DIAGNOSIS — Z00.00 ANNUAL PHYSICAL EXAM: Primary | ICD-10-CM

## 2017-10-02 DIAGNOSIS — M15.9 PRIMARY OSTEOARTHRITIS INVOLVING MULTIPLE JOINTS: ICD-10-CM

## 2017-10-02 DIAGNOSIS — M10.00 IDIOPATHIC GOUT, UNSPECIFIED CHRONICITY, UNSPECIFIED SITE: ICD-10-CM

## 2017-10-02 DIAGNOSIS — I10 ESSENTIAL HYPERTENSION: ICD-10-CM

## 2017-10-02 LAB
ALBUMIN SERPL-MCNC: 4.6 G/DL (ref 3.5–5.2)
ALBUMIN/GLOB SERPL: 2.1 G/DL
ALP SERPL-CCNC: 67 U/L (ref 39–117)
ALT SERPL-CCNC: 22 U/L (ref 1–41)
AST SERPL-CCNC: 29 U/L (ref 1–40)
BASOPHILS # BLD AUTO: 0.03 10*3/MM3 (ref 0–0.2)
BASOPHILS NFR BLD AUTO: 0.7 % (ref 0–1.5)
BILIRUB SERPL-MCNC: 0.5 MG/DL (ref 0.1–1.2)
BUN SERPL-MCNC: 23 MG/DL (ref 8–23)
BUN/CREAT SERPL: 20.7 (ref 7–25)
CALCIUM SERPL-MCNC: 9.7 MG/DL (ref 8.6–10.5)
CHLORIDE SERPL-SCNC: 101 MMOL/L (ref 98–107)
CHOLEST SERPL-MCNC: 109 MG/DL (ref 0–200)
CO2 SERPL-SCNC: 27.5 MMOL/L (ref 22–29)
CREAT SERPL-MCNC: 1.11 MG/DL (ref 0.76–1.27)
EOSINOPHIL # BLD AUTO: 0.18 10*3/MM3 (ref 0–0.7)
EOSINOPHIL # BLD AUTO: 4 % (ref 0.3–6.2)
ERYTHROCYTE [DISTWIDTH] IN BLOOD BY AUTOMATED COUNT: 14.2 % (ref 11.5–14.5)
GLOBULIN SER CALC-MCNC: 2.2 GM/DL
GLUCOSE SERPL-MCNC: 99 MG/DL (ref 65–99)
HCT VFR BLD AUTO: 49.7 % (ref 40.4–52.2)
HDLC SERPL-MCNC: 43 MG/DL (ref 40–60)
HGB BLD-MCNC: 15.7 G/DL (ref 13.7–17.6)
IMM GRANULOCYTES # BLD: 0.02 10*3/MM3 (ref 0–0.03)
IMM GRANULOCYTES NFR BLD: 0.4 % (ref 0–0.5)
LDLC SERPL CALC-MCNC: 52 MG/DL (ref 0–100)
LYMPHOCYTES # BLD AUTO: 0.96 10*3/MM3 (ref 0.9–4.8)
LYMPHOCYTES NFR BLD AUTO: 21.1 % (ref 19.6–45.3)
MCH RBC QN AUTO: 30.4 PG (ref 27–32.7)
MCHC RBC AUTO-ENTMCNC: 31.6 G/DL (ref 32.6–36.4)
MCV RBC AUTO: 96.1 FL (ref 79.8–96.2)
MONOCYTES # BLD AUTO: 0.76 10*3/MM3 (ref 0.2–1.2)
MONOCYTES NFR BLD AUTO: 16.7 % (ref 5–12)
NEUTROPHILS # BLD AUTO: 2.6 10*3/MM3 (ref 1.9–8.1)
NEUTROPHILS NFR BLD AUTO: 57.1 % (ref 42.7–76)
PLATELET # BLD AUTO: 174 10*3/MM3 (ref 140–500)
POTASSIUM SERPL-SCNC: 4.1 MMOL/L (ref 3.5–5.2)
PROT SERPL-MCNC: 6.8 G/DL (ref 6–8.5)
RBC # BLD AUTO: 5.17 10*6/MM3 (ref 4.6–6)
SODIUM SERPL-SCNC: 144 MMOL/L (ref 136–145)
TRIGL SERPL-MCNC: 69 MG/DL (ref 0–150)
VLDLC SERPL-MCNC: 13.8 MG/DL (ref 5–40)
WBC # BLD AUTO: 4.55 10*3/MM3 (ref 4.5–10.7)

## 2017-10-02 PROCEDURE — G0439 PPPS, SUBSEQ VISIT: HCPCS | Performed by: INTERNAL MEDICINE

## 2017-10-02 PROCEDURE — 99214 OFFICE O/P EST MOD 30 MIN: CPT | Performed by: INTERNAL MEDICINE

## 2017-10-02 RX ORDER — ALLOPURINOL 100 MG/1
100 TABLET ORAL DAILY
Qty: 90 TABLET | Refills: 3 | Status: SHIPPED | OUTPATIENT
Start: 2017-10-02 | End: 2018-09-21 | Stop reason: SDUPTHER

## 2017-10-02 RX ORDER — ROSUVASTATIN CALCIUM 20 MG/1
20 TABLET, COATED ORAL DAILY
Qty: 90 TABLET | Refills: 3 | Status: SHIPPED | OUTPATIENT
Start: 2017-10-02 | End: 2018-10-09 | Stop reason: SDUPTHER

## 2017-10-02 RX ORDER — POTASSIUM CHLORIDE 750 MG/1
10 CAPSULE, EXTENDED RELEASE ORAL 2 TIMES DAILY
Qty: 180 CAPSULE | Refills: 3 | Status: SHIPPED | OUTPATIENT
Start: 2017-10-02 | End: 2017-12-14 | Stop reason: SDUPTHER

## 2017-10-02 RX ORDER — INFLUENZA A VIRUS A/MICHIGAN/45/2015 X-275 (H1N1) ANTIGEN (FORMALDEHYDE INACTIVATED), INFLUENZA A VIRUS A/SINGAPORE/INFIMH-16-0019/2016 IVR-186 (H3N2) ANTIGEN (FORMALDEHYDE INACTIVATED), AND INFLUENZA B VIRUS B/MARYLAND/15/2016 BX-69A (A B/COLORADO/6/2017-LIKE VIRUS) ANTIGEN (FORMALDEHYDE INACTIVATED) 60; 60; 60 UG/.5ML; UG/.5ML; UG/.5ML
INJECTION, SUSPENSION INTRAMUSCULAR
Refills: 0 | COMMUNITY
Start: 2017-09-05 | End: 2017-10-02

## 2017-10-02 RX ORDER — NAPROXEN 250 MG/1
250 TABLET ORAL DAILY
Qty: 90 TABLET | Refills: 3 | Status: SHIPPED | OUTPATIENT
Start: 2017-10-02 | End: 2018-07-31 | Stop reason: HOSPADM

## 2017-10-02 RX ORDER — CLOPIDOGREL BISULFATE 75 MG/1
75 TABLET ORAL DAILY
Qty: 90 TABLET | Refills: 3 | Status: SHIPPED | OUTPATIENT
Start: 2017-10-02 | End: 2017-12-14 | Stop reason: SDUPTHER

## 2017-10-02 NOTE — PROGRESS NOTES
Subjective   Guero Garay is a 82 y.o. male.     Hypertension   This is a chronic problem. The current episode started more than 1 year ago. Associated symptoms include shortness of breath (Taking Breo).        The following portions of the patient's history were reviewed and updated as appropriate: allergies, current medications, past family history, past medical history, past social history, past surgical history and problem list.    Review of Systems   Constitutional:        Doing fairly well   HENT: Positive for hearing loss.    Eyes: Positive for visual disturbance (Wears glasses).   Respiratory: Positive for shortness of breath (Taking Breo).    Cardiovascular:        Sees Dr Galan Has had2 stents & a pacermaker earlier this summer Is doing well    Gastrointestinal: Negative.    Endocrine: Negative.    Genitourinary: Negative.         Incontinence post prostatectomy    Musculoskeletal:        Rt Shoulder surgery Dr Galvan   Neurological: Negative.        Objective   Physical Exam   Constitutional: He is oriented to person, place, and time. He appears well-developed.   HENT:   Head: Normocephalic.   Right Ear: External ear normal.   Left Ear: External ear normal.   Mouth/Throat: Oropharynx is clear and moist.   Eyes: Conjunctivae and EOM are normal. Pupils are equal, round, and reactive to light.   Neck: Normal range of motion. Neck supple.   Cardiovascular: Normal rate, regular rhythm and intact distal pulses.    Repeat 120/80 2/6 Syst M URSB Pacer under L clavicle   Pulmonary/Chest: Effort normal and breath sounds normal.   Abdominal: Soft. Bowel sounds are normal.   Genitourinary: Rectum normal and penis normal.   Musculoskeletal: Normal range of motion.   Rt shoulder scar   Neurological: He is alert and oriented to person, place, and time.   Skin: Skin is warm and dry.   Psychiatric: He has a normal mood and affect. His behavior is normal.   Vitals reviewed.      Assessment/Plan   Guero was seen today  for awv.    Diagnoses and all orders for this visit:    Annual physical exam    Coronary artery disease involving native coronary artery of native heart with unstable angina pectoris  -     clopidogrel (PLAVIX) 75 MG tablet; Take 1 tablet by mouth Daily.    Essential hypertension  -     potassium chloride (MICRO-K) 10 MEQ CR capsule; Take 1 capsule by mouth 2 (Two) Times a Day.  -     CBC Auto Differential; Future  -     Comprehensive Metabolic Panel; Future  -     CBC Auto Differential  -     Comprehensive Metabolic Panel    Mixed hyperlipidemia  -     rosuvastatin (CRESTOR) 20 MG tablet; Take 1 tablet by mouth Daily.  -     Lipid Panel; Future  -     Lipid Panel    Primary osteoarthritis involving multiple joints  -     naproxen (NAPROSYN) 250 MG tablet; Take 1 tablet by mouth Daily. TO STOP 1 WEEK BEFORE SURGERY    History of prostate cancer    Idiopathic gout, unspecified chronicity, unspecified site  -     allopurinol (ZYLOPRIM) 100 MG tablet; Take 1 tablet by mouth Daily.

## 2017-10-02 NOTE — PROGRESS NOTES
QUICK REFERENCE INFORMATION:  The ABCs of the Annual Wellness Visit    Subsequent Medicare Wellness Visit    HEALTH RISK ASSESSMENT    1935    Recent Hospitalizations:  No hospitalization(s) within the last year..        Current Medical Providers:  Patient Care Team:  Librado Lai MD as PCP - General (Internal Medicine)        Smoking Status:  History   Smoking Status   • Never Smoker   Smokeless Tobacco   • Never Used     Comment: caffeine - 1 every 2-3 days       Alcohol Consumption:  History   Alcohol Use   • 12.6 oz/week   • 21 Cans of beer per week     Comment: 2-3 daily       Depression Screen:   PHQ-2/PHQ-9 Depression Screening 10/2/2017   Little interest or pleasure in doing things 0   Feeling down, depressed, or hopeless 0   Trouble falling or staying asleep, or sleeping too much 2   Feeling tired or having little energy 1   Poor appetite or overeating 0   Feeling bad about yourself - or that you are a failure or have let yourself or your family down 0   Trouble concentrating on things, such as reading the newspaper or watching television 0   Moving or speaking so slowly that other people could have noticed. Or the opposite - being so fidgety or restless that you have been moving around a lot more than usual 0   Thoughts that you would be better off dead, or of hurting yourself in some way 0   Total Score 3   Some recent data might be hidden       Health Habits and Functional and Cognitive Screening:  Functional & Cognitive Status 10/2/2017   Do you have difficulty preparing food and eating? No   Do you have difficulty bathing yourself? No   Do you have difficulty getting dressed? No   Do you have difficulty using the toilet? No   Do you have difficulty moving around from place to place? No   In the past year have you fallen or experienced a near fall? No   Do you need help using the phone?  No   Are you deaf or do you have serious difficulty hearing?  Yes   Do you need help with transportation? No    Do you need help shopping? No   Do you need help preparing meals?  No   Do you need help with housework?  No   Do you need help with laundry? No   Do you need help taking your medications? No   Do you need help managing money? No   Some recent data might be hidden       Health Habits  Current Diet: Well Balanced Diet  Dental Exam: Up to date  Eye Exam: Up to date  Exercise (times per week): 2 times per week  Current Exercise Activities Include: Walking      Does the patient have evidence of cognitive impairment? No    Aspirin use counseling: Taking ASA appropriately as indicated      Recent Lab Results:  CMP:  Lab Results   Component Value Date    BUN 27 (H) 06/16/2017    CREATININE 1.21 06/16/2017    EGFRIFNONA 58 (L) 06/16/2017    BCR 22.3 06/16/2017     06/16/2017    K 3.6 06/16/2017    CO2 26.5 06/16/2017    CALCIUM 9.4 06/16/2017    ALBUMIN 3.90 06/16/2017    LABIL2 1.5 06/16/2017    BILITOT 0.4 06/16/2017    ALKPHOS 65 06/16/2017    AST 20 06/16/2017    ALT 19 06/16/2017     Lipid Panel:  Lab Results   Component Value Date    CHOL 165 04/28/2017    TRIG 161 (H) 04/28/2017    HDL 30 (L) 04/28/2017    VLDL 32.2 04/28/2017    LDLCALC 103 (H) 04/28/2017    LDLHDL 3.43 04/28/2017     HbA1c:  Lab Results   Component Value Date    HGBA1C 5.71 (H) 04/28/2017       Visual Acuity:  No exam data present    Age-appropriate Screening Schedule:  Refer to the list below for future screening recommendations based on patient's age, sex and/or medical conditions. Orders for these recommended tests are listed in the plan section. The patient has been provided with a written plan.    Health Maintenance   Topic Date Due   • TDAP/TD VACCINES (1 - Tdap) 02/10/1954   • PNEUMOCOCCAL VACCINES (65+ LOW/MEDIUM RISK) (1 of 2 - PCV13) 02/10/2001   • INFLUENZA VACCINE  08/01/2017   • LIPID PANEL  04/28/2018   • ZOSTER VACCINE  Completed        Subjective   History of Present Illness    Guero Garay is a 82 y.o. male who presents  for an Subsequent Wellness Visit.    The following portions of the patient's history were reviewed and updated as appropriate: allergies, current medications, past family history, past medical history, past social history, past surgical history and problem list.    Outpatient Medications Prior to Visit   Medication Sig Dispense Refill   • allopurinol (ZYLOPRIM) 100 MG tablet Take 100 mg by mouth Daily.     • amLODIPine (NORVASC) 10 MG tablet Take 1 tablet by mouth Every Evening. 90 tablet 3   • aspirin 81 MG chewable tablet Chew 81 mg Daily.     • atenolol-chlorthalidone (TENORETIC) 50-25 MG per tablet Take 1 tablet by mouth Every Morning. 90 tablet 3   • clopidogrel (PLAVIX) 75 MG tablet Take 1 tablet by mouth Daily. 90 tablet 3   • Fluticasone Furoate-Vilanterol (BREO ELLIPTA) 100-25 MCG/INH aerosol powder  Inhale 1 puff Every Morning.     • melatonin 5 MG tablet tablet Take 5 mg by mouth Every Night.     • Multiple Vitamins-Minerals (MULTIVITAMIN PO) Take 1 tablet/day by mouth Daily.     • naproxen (NAPROSYN) 250 MG tablet Take 250 mg by mouth Daily. TO STOP 1 WEEK BEFORE SURGERY      • potassium chloride (MICRO-K) 10 MEQ CR capsule Take 1 capsule by mouth 2 (Two) Times a Day. 180 capsule 3   • rosuvastatin (CRESTOR) 20 MG tablet Take 1 tablet by mouth Daily. 90 tablet 3     No facility-administered medications prior to visit.        Patient Active Problem List   Diagnosis   • Gout   • Hypertension   • Hyperlipidemia   • Osteoarthrosis   • History of prostate cancer   • Tinnitus   • Benign neoplasm of colon   • Osteoarthritis of right shoulder   • Coronary artery disease involving native coronary artery of native heart with unstable angina pectoris   • Syncopal episodes       Advance Care Planning:  has an advance directive - a copy HAS NOT been provided    Identification of Risk Factors:  Risk factors include: cardiovascular risk.    Review of Systems    Compared to one year ago, the patient feels his physical  "health is the same.  Compared to one year ago, the patient feels his mental health is the same.    Objective     Physical Exam    Vitals:    10/02/17 0807   BP: 140/80   BP Location: Left arm   Pulse: 64   SpO2: 95%   Weight: 222 lb (101 kg)   Height: 68\" (172.7 cm)   PainSc:   2   PainLoc: Knee       Body mass index is 33.75 kg/(m^2).  Discussed the patient's BMI with him. The BMI is in the acceptable range.    Assessment/Plan   Patient Self-Management and Personalized Health Advice  The patient has been provided with information about: diet and preventive services including:   · Advance directive.    Visit Diagnoses:  No diagnosis found.    No orders of the defined types were placed in this encounter.      Outpatient Encounter Prescriptions as of 10/2/2017   Medication Sig Dispense Refill   • allopurinol (ZYLOPRIM) 100 MG tablet Take 100 mg by mouth Daily.     • amLODIPine (NORVASC) 10 MG tablet Take 1 tablet by mouth Every Evening. 90 tablet 3   • aspirin 81 MG chewable tablet Chew 81 mg Daily.     • atenolol-chlorthalidone (TENORETIC) 50-25 MG per tablet Take 1 tablet by mouth Every Morning. 90 tablet 3   • clopidogrel (PLAVIX) 75 MG tablet Take 1 tablet by mouth Daily. 90 tablet 3   • Fluticasone Furoate-Vilanterol (BREO ELLIPTA) 100-25 MCG/INH aerosol powder  Inhale 1 puff Every Morning.     • melatonin 5 MG tablet tablet Take 5 mg by mouth Every Night.     • Multiple Vitamins-Minerals (MULTIVITAMIN PO) Take 1 tablet/day by mouth Daily.     • naproxen (NAPROSYN) 250 MG tablet Take 250 mg by mouth Daily. TO STOP 1 WEEK BEFORE SURGERY      • potassium chloride (MICRO-K) 10 MEQ CR capsule Take 1 capsule by mouth 2 (Two) Times a Day. 180 capsule 3   • rosuvastatin (CRESTOR) 20 MG tablet Take 1 tablet by mouth Daily. 90 tablet 3   • [DISCONTINUED] FLUZONE HIGH-DOSE 0.5 ML suspension prefilled syringe injection ADM 0.5ML IM UTD  0     No facility-administered encounter medications on file as of 10/2/2017.  "       Reviewed use of high risk medication in the elderly: not applicable  Reviewed for potential of harmful drug interactions in the elderly: not applicable    Follow Up:  No Follow-up on file.     An After Visit Summary and PPPS with all of these plans were given to the patient.

## 2017-10-03 ENCOUNTER — TELEPHONE (OUTPATIENT)
Dept: CARDIOLOGY | Facility: CLINIC | Age: 82
End: 2017-10-03

## 2017-10-03 ENCOUNTER — CLINICAL SUPPORT NO REQUIREMENTS (OUTPATIENT)
Dept: CARDIOLOGY | Facility: CLINIC | Age: 82
End: 2017-10-03

## 2017-10-03 DIAGNOSIS — R00.1 BRADYCARDIA: Primary | ICD-10-CM

## 2017-10-03 DIAGNOSIS — R55 SYNCOPE AND COLLAPSE: ICD-10-CM

## 2017-10-03 PROCEDURE — 93280 PM DEVICE PROGR EVAL DUAL: CPT | Performed by: INTERNAL MEDICINE

## 2017-10-03 NOTE — TELEPHONE ENCOUNTER
Dr Galan,     We saw Mr Garay for his 90d pacemaker check today. Device testing WNL. I wanted to let you know about an AF episode that was recorded. This was true AF from 8/27/17 at 0530am lasting 3min/39 sec with avg Vrate of 62bpm. No other AF episodes. [place] is <0.1%. He did not have any symptoms. No ventricular episodes. Just wanted to let you now about the AF since this is the first time we have recorded any and  I could not find AF in his hx. He verifies he is taking ASA and Plavix. He is sched to see you in Dec.  ~ Jyoti

## 2017-10-08 ENCOUNTER — CLINICAL SUPPORT NO REQUIREMENTS (OUTPATIENT)
Dept: CARDIOLOGY | Facility: CLINIC | Age: 82
End: 2017-10-08

## 2017-10-08 DIAGNOSIS — R00.1 BRADYCARDIA: Primary | ICD-10-CM

## 2017-10-08 DIAGNOSIS — R55 SYNCOPE AND COLLAPSE: ICD-10-CM

## 2017-12-14 ENCOUNTER — OFFICE VISIT (OUTPATIENT)
Dept: CARDIOLOGY | Facility: CLINIC | Age: 82
End: 2017-12-14

## 2017-12-14 VITALS
DIASTOLIC BLOOD PRESSURE: 74 MMHG | SYSTOLIC BLOOD PRESSURE: 124 MMHG | HEIGHT: 68 IN | HEART RATE: 64 BPM | BODY MASS INDEX: 33.65 KG/M2 | WEIGHT: 222 LBS

## 2017-12-14 DIAGNOSIS — I25.10 CORONARY ARTERY DISEASE INVOLVING NATIVE CORONARY ARTERY OF NATIVE HEART WITHOUT ANGINA PECTORIS: ICD-10-CM

## 2017-12-14 DIAGNOSIS — E78.2 MIXED HYPERLIPIDEMIA: Primary | ICD-10-CM

## 2017-12-14 DIAGNOSIS — I10 ESSENTIAL HYPERTENSION: ICD-10-CM

## 2017-12-14 PROCEDURE — 99214 OFFICE O/P EST MOD 30 MIN: CPT | Performed by: INTERNAL MEDICINE

## 2017-12-14 PROCEDURE — 93000 ELECTROCARDIOGRAM COMPLETE: CPT | Performed by: INTERNAL MEDICINE

## 2017-12-14 RX ORDER — AMLODIPINE BESYLATE 10 MG/1
10 TABLET ORAL EVERY EVENING
Qty: 90 TABLET | Refills: 3 | Status: SHIPPED | OUTPATIENT
Start: 2017-12-14 | End: 2018-07-31 | Stop reason: HOSPADM

## 2017-12-14 RX ORDER — ATENOLOL AND CHLORTHALIDONE TABLET 50; 25 MG/1; MG/1
1 TABLET ORAL EVERY MORNING
Qty: 90 TABLET | Refills: 3 | Status: SHIPPED | OUTPATIENT
Start: 2017-12-14 | End: 2018-08-06

## 2017-12-14 RX ORDER — POTASSIUM CHLORIDE 750 MG/1
10 CAPSULE, EXTENDED RELEASE ORAL 2 TIMES DAILY
Qty: 180 CAPSULE | Refills: 3 | Status: SHIPPED | OUTPATIENT
Start: 2017-12-14 | End: 2019-01-26 | Stop reason: SDUPTHER

## 2017-12-14 RX ORDER — CLOPIDOGREL BISULFATE 75 MG/1
75 TABLET ORAL DAILY
Qty: 90 TABLET | Refills: 3 | Status: SHIPPED | OUTPATIENT
Start: 2017-12-14 | End: 2018-07-26

## 2017-12-14 NOTE — PROGRESS NOTES
Date of Office Visit: 17  Encounter Provider: Avery Galan MD  Place of Service: [facility] CARDIOLOGY  Patient Name: Guero Garay  :1935      Chief Complaint   Patient presents with   • Coronary Artery Disease     History of Present Illness  HPI Comments: Mr Garay is a pleasant 83 yo gentleman with a history of aortic stenosis, hypertension, hyperlipidemia, and coronary artery disease.  He presented to our office in 2017 with progressive angina.  He then had a perfusion stress test that showed a large area of anterior and septal ischemia echocardiogram at that time showed mild aortic stenosis he then underwent cardiac catheterization 2017 he was found have preserved left ventricular ejection fraction severe disease of the mid left anterior descending and right coronary artery.  Had a 4.0 x 15 mm drug-eluting stent placed in the left anterior descending and a 4.0 x 28 mm drug-eluting stent dilated to 4.5 mm in the right coronary artery.  He was hypokalemic on admission started on potassium, he was also started on on rosuvastatin.    He then presented in 2017 with dizziness and syncope and bradycardia.  He ended up having a permanent pacemaker implanted.  He also had a tick bite but was negative for Adriel Mountain spotted fever.  He now comes in for follow-up. The patient denies chest pain, pressure and heaviness. No shortness of breath, othopnea or PND. No palpitations, near syncope or syncope. No stroke type symptoms like paralysis, paresthesia, abrupt vision loss and dysarthria. No bleeding like blood in the stool or dark stools.   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  Overall he feels like he's doing great.  He does get occasional darker stools but is seeing his PCP and is probably need another colonoscopy.    Coronary Artery Disease   Pertinent negatives include no dizziness, muscle weakness or  weight gain. There is no history of past myocardial infarction.         Past Medical History:   Diagnosis Date   • Arthralgia    • CAD (coronary artery disease), native coronary artery    • Chest pain, unspecified    • Essential hypertension    • Gout    • History of prostate cancer    • Jaw pain     both sides   • Mixed hyperlipidemia    • Osteoarthrosis    • Shoulder pain    • SOB (shortness of breath)    • Tinnitus          Past Surgical History:   Procedure Laterality Date   • CARDIAC CATHETERIZATION N/A 4/27/2017    Procedure: Coronary angiography;  Surgeon: Marvel Brito MD;  Location: [facility] CATH INVASIVE LOCATION;  Service:    • CARDIAC CATHETERIZATION N/A 4/27/2017    Procedure: Left Heart Cath;  Surgeon: Marvel Brito MD;  Location: [facility] CATH INVASIVE LOCATION;  Service:    • CARDIAC CATHETERIZATION N/A 4/27/2017    Procedure: Stent GIN coronary;  Surgeon: Marvel Brito MD;  Location: [facility] CATH INVASIVE LOCATION;  Service:    • CARDIAC ELECTROPHYSIOLOGY PROCEDURE N/A 6/15/2017    Procedure: Pacemaker DC new   MEDTRONIC;  Surgeon: Gustavo Valladares MD;  Location: [facility] CATH INVASIVE LOCATION;  Service:    • COLONOSCOPY  2013   • INGUINAL HERNIA REPAIR Right    • NECK SURGERY      SPURS   • ME RECONSTR TOTAL SHOULDER IMPLANT Right 2/16/2017    Procedure: RIGHT TOTAL SHOULDER ARTHROPLASTY;  Surgeon: Marquez Galvan MD;  [place] [facility] [place] [facility] [place]: [place]opedics   • PROSTATECTOMY     • REPLACEMENT TOTAL KNEE Left    • TONSILLECTOMY     • TOTAL SHOULDER REPLACEMENT Bilateral          Current Outpatient Prescriptions on File Prior to Visit   Medication Sig Dispense Refill   • allopurinol (ZYLOPRIM) 100 MG tablet Take 1 tablet by mouth Daily. 90 tablet 3   • aspirin 81 MG chewable tablet Chew 81 mg Daily.     • Fluticasone Furoate-Vilanterol (BREO ELLIPTA) 100-25 MCG/INH aerosol powder  Inhale 1 puff Every Morning.     • melatonin 5 MG tablet tablet Take 5 mg by mouth Every Night.     • Multiple  Vitamins-Minerals (MULTIVITAMIN PO) Take 1 tablet/day by mouth Daily.     • naproxen (NAPROSYN) 250 MG tablet Take 1 tablet by mouth Daily. TO STOP 1 WEEK BEFORE SURGERY 90 tablet 3   • rosuvastatin (CRESTOR) 20 MG tablet Take 1 tablet by mouth Daily. 90 tablet 3   • [DISCONTINUED] amLODIPine (NORVASC) 10 MG tablet Take 1 tablet by mouth Every Evening. 90 tablet 3   • [DISCONTINUED] atenolol-chlorthalidone (TENORETIC) 50-25 MG per tablet Take 1 tablet by mouth Every Morning. 90 tablet 3   • [DISCONTINUED] clopidogrel (PLAVIX) 75 MG tablet Take 1 tablet by mouth Daily. 90 tablet 3   • [DISCONTINUED] potassium chloride (MICRO-K) 10 MEQ CR capsule Take 1 capsule by mouth 2 (Two) Times a Day. 180 capsule 3     No current facility-administered medications on file prior to visit.          Social History     Social History   • Marital status: Unknown     Spouse name: N/A   • Number of children: N/A   • Years of education: N/A     Occupational History   • Not on file.     Social History Main Topics   • Smoking status: Never Smoker   • Smokeless tobacco: Never Used      Comment: caffeine - 1 every 2-3 days   • Alcohol use 12.6 oz/week     21 Cans of beer per week      Comment: 2-3 daily   • Drug use: No   • Sexual activity: Defer     Other Topics Concern   • Not on file     Social History Narrative       Family History   Problem Relation Age of Onset   • Cancer Mother      lung   • Cancer Father      lung         Review of Systems   Constitution: Negative for decreased appetite, diaphoresis, fever, weakness, malaise/fatigue, weight gain and weight loss.   HENT: Positive for hearing loss. Negative for congestion, nosebleeds and tinnitus.    Eyes: Negative for blurred vision, double vision, vision loss in left eye, vision loss in right eye and visual disturbance.   Cardiovascular:        As noted in HPI   Respiratory:        As noted HPI   Endocrine: Negative for cold intolerance and heat intolerance.   Hematologic/Lymphatic:  "Negative for bleeding problem. Does not bruise/bleed easily.   Skin: Negative for color change, flushing, itching and rash.   Musculoskeletal: Positive for myalgias. Negative for arthritis, back pain, joint pain, joint swelling and muscle weakness.   Gastrointestinal: Negative for bloating, abdominal pain, constipation, diarrhea, dysphagia, heartburn, hematemesis, hematochezia, melena, nausea and vomiting.   Genitourinary: Negative for bladder incontinence, dysuria, frequency, nocturia and urgency.   Neurological: Negative for dizziness, focal weakness, headaches, light-headedness, loss of balance, numbness, paresthesias and vertigo.   Psychiatric/Behavioral: Negative for depression, memory loss and substance abuse.       Procedures      ECG 12 Lead  Date/Time: 12/14/2017 12:27 PM  Performed by: MARCELL VELARDE  Authorized by: MARCELL VELARDE   Comparison: compared with previous ECG   Similar to previous ECG  Rhythm: sinus rhythm  Rhythm comments: one paced beat.  Rate: normal  Conduction: LAFB and 1st degree  QRS axis: left                 Objective:    /74 (BP Location: Left arm)  Pulse 64  Ht 172.7 cm (68\")  Wt 101 kg (222 lb)  BMI 33.75 kg/m2       Physical Exam  Physical Exam   Constitutional: He is oriented to person, place, and time. He appears well-developed and well-nourished. No distress.   HENT:   Head: Normocephalic.   Eyes: Conjunctivae are normal. Pupils are equal, round, and reactive to light. No scleral icterus.   Neck: Normal carotid pulses, no hepatojugular reflux and no JVD present. Carotid bruit is not present. No tracheal deviation, no edema and no erythema present. No thyromegaly present.   Cardiovascular: Normal rate, regular rhythm, S1 normal, S2 normal and intact distal pulses.   No extrasystoles are present. PMI is not displaced.  Exam reveals no gallop, no distant heart sounds and no friction rub.    Murmur heard.   Systolic murmur is present with a grade of 2/6  at the upper " right sternal border  Pulses:       Carotid pulses are 2+ on the right side, and 2+ on the left side.       Radial pulses are 2+ on the right side, and 2+ on the left side.        Femoral pulses are 2+ on the right side, and 2+ on the left side.       Dorsalis pedis pulses are 2+ on the right side, and 2+ on the left side.        Posterior tibial pulses are 2+ on the right side, and 2+ on the left side.   Pulmonary/Chest: Effort normal and breath sounds normal. No respiratory distress. He has no decreased breath sounds. He has no wheezes. He has no rhonchi. He has no rales. He exhibits no tenderness.   Abdominal: Soft. Bowel sounds are normal. He exhibits no distension and no mass. There is no hepatosplenomegaly. There is no tenderness. There is no rebound and no guarding.   Musculoskeletal: He exhibits no edema, tenderness or deformity.   Neurological: He is alert and oriented to person, place, and time.   Skin: Skin is warm and dry. No rash noted. He is not diaphoretic. No cyanosis or erythema. No pallor. Nails show no clubbing.   Psychiatric: He has a normal mood and affect. His speech is normal and behavior is normal. Judgment and thought content normal.           Assessment:   1.  82-year-old gentleman with history of severe coronary artery disease preserved left ventricular systolic function.  Status post drug-eluting stents placed to the right coronary artery and the left anterior descending.  Coronary Artery Disease  Assessment  • The patient has no angina  • There is a new diagnosis of stable angina in the past 12 months  • The patient is having symptoms consistent with unstable angina     Plan  • Lifestyle modifications discussed include adhering to a heart healthy diet, avoidance of tobacco products, maintenance of a healthy weight, medication compliance, regular exercise and regular monitoring of cholesterol and blood pressure    Subjective - Objective  • There has been a previous stent procedure using  GIN  • Current antiplatelet therapy includes aspirin 81 mg and clopidogrel 75 mg  • The patient qualifies for cardiac rehabilitation, and has been referred to cardiac rehab      He seems to be doing well from the standpoint we'll continue the same see me in follow-up in one year.      2.  Hyperlipidemia now on target dose rosuvastatin continue the same.  3.  Hypertension blood pressure adequately controlled if anything on the low side.  4.  History of prostate cancer.  5.   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          Plan:

## 2018-01-05 ENCOUNTER — CLINICAL SUPPORT NO REQUIREMENTS (OUTPATIENT)
Dept: CARDIOLOGY | Facility: CLINIC | Age: 83
End: 2018-01-05

## 2018-01-05 DIAGNOSIS — R55 SYNCOPE AND COLLAPSE: Primary | ICD-10-CM

## 2018-01-05 DIAGNOSIS — R00.1 BRADYCARDIA: ICD-10-CM

## 2018-01-05 PROCEDURE — 93294 REM INTERROG EVL PM/LDLS PM: CPT | Performed by: INTERNAL MEDICINE

## 2018-01-05 PROCEDURE — 93296 REM INTERROG EVL PM/IDS: CPT | Performed by: INTERNAL MEDICINE

## 2018-04-24 ENCOUNTER — CLINICAL SUPPORT NO REQUIREMENTS (OUTPATIENT)
Dept: CARDIOLOGY | Facility: CLINIC | Age: 83
End: 2018-04-24

## 2018-04-24 DIAGNOSIS — R00.1 BRADYCARDIA: Primary | ICD-10-CM

## 2018-04-24 PROCEDURE — 93280 PM DEVICE PROGR EVAL DUAL: CPT | Performed by: INTERNAL MEDICINE

## 2018-06-20 ENCOUNTER — TELEPHONE (OUTPATIENT)
Dept: GASTROENTEROLOGY | Facility: CLINIC | Age: 83
End: 2018-06-20

## 2018-06-26 ENCOUNTER — PREP FOR SURGERY (OUTPATIENT)
Dept: OTHER | Facility: HOSPITAL | Age: 83
End: 2018-06-26

## 2018-06-26 DIAGNOSIS — Z12.11 SCREENING FOR COLON CANCER: Primary | ICD-10-CM

## 2018-06-27 ENCOUNTER — HOSPITAL ENCOUNTER (OUTPATIENT)
Facility: HOSPITAL | Age: 83
Setting detail: HOSPITAL OUTPATIENT SURGERY
End: 2018-06-27
Attending: INTERNAL MEDICINE | Admitting: INTERNAL MEDICINE

## 2018-06-27 ENCOUNTER — TELEPHONE (OUTPATIENT)
Dept: GASTROENTEROLOGY | Facility: CLINIC | Age: 83
End: 2018-06-27

## 2018-06-27 ENCOUNTER — TELEPHONE (OUTPATIENT)
Dept: CARDIOLOGY | Facility: CLINIC | Age: 83
End: 2018-06-27

## 2018-06-27 PROBLEM — Z12.11 SCREENING FOR COLON CANCER: Status: ACTIVE | Noted: 2018-06-27

## 2018-06-27 NOTE — TELEPHONE ENCOUNTER
Pt ntb scheduled for a colonoscopy w/Dr Kwadwo Powers. He is requesting the pt hold his Plavix 7 ASA x3 days. Please advise/william [place]/scheduling [phone]  Fax# [phone]      lov 1214/17, next ov-not scheduled

## 2018-07-23 ENCOUNTER — TELEPHONE (OUTPATIENT)
Dept: GASTROENTEROLOGY | Facility: CLINIC | Age: 83
End: 2018-07-23

## 2018-07-23 NOTE — TELEPHONE ENCOUNTER
COLONOSCOPY SCHEDULED FOR 08/01/2018 AND HAVE NOT RECEIVED PREP INSTRUCTIONS.  EXPLAINED WAS MAILED 06/27/2018, VERIFY ADDRESS, WHICH IS CORRECT.  SHE WANTS THEM EMAIL:  [email or web address].

## 2018-07-24 ENCOUNTER — TELEPHONE (OUTPATIENT)
Dept: CARDIOLOGY | Facility: CLINIC | Age: 83
End: 2018-07-24

## 2018-07-24 ENCOUNTER — CLINICAL SUPPORT NO REQUIREMENTS (OUTPATIENT)
Dept: CARDIOLOGY | Facility: CLINIC | Age: 83
End: 2018-07-24

## 2018-07-24 DIAGNOSIS — R00.1 BRADYCARDIA: Primary | ICD-10-CM

## 2018-07-24 PROCEDURE — 93294 REM INTERROG EVL PM/LDLS PM: CPT | Performed by: INTERNAL MEDICINE

## 2018-07-24 PROCEDURE — 93296 REM INTERROG EVL PM/IDS: CPT | Performed by: INTERNAL MEDICINE

## 2018-07-24 NOTE — TELEPHONE ENCOUNTER
Remote pacemaker check received. Presenting strip shows that patient is in Afib. Per graph appears that it started about 10 days ago. Current AF burden reported as 10.6%. This appears to be new for him.

## 2018-07-26 ENCOUNTER — OFFICE VISIT (OUTPATIENT)
Dept: CARDIOLOGY | Facility: CLINIC | Age: 83
End: 2018-07-26

## 2018-07-26 VITALS
WEIGHT: 225 LBS | HEART RATE: 75 BPM | DIASTOLIC BLOOD PRESSURE: 82 MMHG | BODY MASS INDEX: 34.1 KG/M2 | SYSTOLIC BLOOD PRESSURE: 124 MMHG | HEIGHT: 68 IN

## 2018-07-26 DIAGNOSIS — R00.1 BRADYCARDIA: ICD-10-CM

## 2018-07-26 DIAGNOSIS — I25.110 CORONARY ARTERY DISEASE INVOLVING NATIVE CORONARY ARTERY OF NATIVE HEART WITH UNSTABLE ANGINA PECTORIS (HCC): Primary | ICD-10-CM

## 2018-07-26 DIAGNOSIS — E78.2 MIXED HYPERLIPIDEMIA: ICD-10-CM

## 2018-07-26 DIAGNOSIS — R94.31 ABNORMAL ECG: ICD-10-CM

## 2018-07-26 DIAGNOSIS — Z95.0 PACEMAKER: ICD-10-CM

## 2018-07-26 PROCEDURE — 99214 OFFICE O/P EST MOD 30 MIN: CPT | Performed by: INTERNAL MEDICINE

## 2018-07-26 PROCEDURE — 93000 ELECTROCARDIOGRAM COMPLETE: CPT | Performed by: INTERNAL MEDICINE

## 2018-07-26 NOTE — PROGRESS NOTES
Date of Office Visit: 18  Encounter Provider: Avery Galan MD  Place of Service: [facility] CARDIOLOGY  Patient Name: Guero Garay  :1935  Referral Provider:No ref. provider found      No chief complaint on file.    History of Present Illness  Mr Garay is a pleasant 82 yo gentleman with a history of aortic stenosis, hypertension, hyperlipidemia, and coronary artery disease.  He presented to our office in 2017 with progressive angina.  He then had a perfusion stress test that showed a large area of anterior and septal ischemia echocardiogram at that time showed mild aortic stenosis he then underwent cardiac catheterization 2017 he was found have preserved left ventricular ejection fraction severe disease of the mid left anterior descending and right coronary artery.  Had a 4.0 x 15 mm drug-eluting stent placed in the left anterior descending and a 4.0 x 28 mm drug-eluting stent dilated to 4.5 mm in the right coronary artery.  He was hypokalemic on admission started on potassium, he was also started on on rosuvastatin.    He then presented in 2017 with dizziness and syncope and bradycardia.  He ended up having a permanent pacemaker implanted.  He also had a tick bite but was negative for Adirel Mountain spotted fever.  He now comes in for follow-up.  He's coming in early because on his pacemaker interrogation he was found to have episodes of atrial fibrillation fairly prolonged and now occupying 10.6% burden.  He does note that he's been more short of breath and now getting out of breath with any kind of activity before was more extreme activity but he denies palpitations, near-syncope or syncope denies orthopnea or PND.  Denies any ortega chest pain or pressure.  No paralysis or paresthesias no abrupt loss of vision.  No blood in his stool or black tarry stool.      Coronary Artery Disease   Pertinent negatives include no dizziness, muscle weakness  or weight gain. There is no history of past myocardial infarction.         Past Medical History:   Diagnosis Date   • Arthralgia    • CAD (coronary artery disease), native coronary artery    • Chest pain, unspecified    • Essential hypertension    • Gout    • History of prostate cancer    • Jaw pain     both sides   • Mixed hyperlipidemia    • Osteoarthrosis    • Shoulder pain    • SOB (shortness of breath)    • Tinnitus          Past Surgical History:   Procedure Laterality Date   • CARDIAC CATHETERIZATION N/A 4/27/2017    Procedure: Coronary angiography;  Surgeon: Marvel Brito MD;  Location: [facility] CATH INVASIVE LOCATION;  Service:    • CARDIAC CATHETERIZATION N/A 4/27/2017    Procedure: Left Heart Cath;  Surgeon: Marvel Brito MD;  Location: [facility] CATH INVASIVE LOCATION;  Service:    • CARDIAC CATHETERIZATION N/A 4/27/2017    Procedure: Stent GIN coronary;  Surgeon: Marvel Brito MD;  Location: [facility] CATH INVASIVE LOCATION;  Service:    • CARDIAC ELECTROPHYSIOLOGY PROCEDURE N/A 6/15/2017    Procedure: Pacemaker DC new   [employer];  Surgeon: Gustavo Valladares MD;  Location: [facility] CATH INVASIVE LOCATION;  Service:    • COLONOSCOPY  2013   • INGUINAL HERNIA REPAIR Right    • NECK SURGERY      SPURS   • CO RECONSTR TOTAL SHOULDER IMPLANT Right 2/16/2017    Procedure: RIGHT TOTAL SHOULDER ARTHROPLASTY;  Surgeon: Marquez Galvan MD;  [place] [facility] [place] [facility] [place]  Service: Orthopedics   • PROSTATECTOMY     • REPLACEMENT TOTAL KNEE Left    • TONSILLECTOMY     • TOTAL SHOULDER REPLACEMENT Bilateral          Current Outpatient Prescriptions on File Prior to Visit   Medication Sig Dispense Refill   • allopurinol (ZYLOPRIM) 100 MG tablet Take 1 tablet by mouth Daily. 90 tablet 3   • amLODIPine (NORVASC) 10 MG tablet Take 1 tablet by mouth Every Evening. 90 tablet 3   • aspirin 81 MG chewable tablet Chew 81 mg Daily.     • atenolol-chlorthalidone (TENORETIC) 50-25 MG per tablet Take 1 tablet by mouth Every Morning. 90  tablet 3   • clopidogrel (PLAVIX) 75 MG tablet Take 1 tablet by mouth Daily. 90 tablet 3   • Fluticasone Furoate-Vilanterol (BREO ELLIPTA) 100-25 MCG/INH aerosol powder  Inhale 1 puff Every Morning.     • melatonin 5 MG tablet tablet Take 5 mg by mouth Every Night.     • Multiple Vitamins-Minerals (MULTIVITAMIN PO) Take 1 tablet/day by mouth Daily.     • naproxen (NAPROSYN) 250 MG tablet Take 1 tablet by mouth Daily. TO STOP 1 WEEK BEFORE SURGERY 90 tablet 3   • potassium chloride (MICRO-K) 10 MEQ CR capsule Take 1 capsule by mouth 2 (Two) Times a Day. 180 capsule 3   • rosuvastatin (CRESTOR) 20 MG tablet Take 1 tablet by mouth Daily. 90 tablet 3     No current facility-administered medications on file prior to visit.          Social History     Social History   • Marital status: Unknown     Spouse name: N/A   • Number of children: N/A   • Years of education: N/A     Occupational History   • Not on file.     Social History Main Topics   • Smoking status: Never Smoker   • Smokeless tobacco: Never Used      Comment: caffeine - 1 every 2-3 days   • Alcohol use 12.6 oz/week     21 Cans of beer per week      Comment: 2-3 daily   • Drug use: No   • Sexual activity: Defer     Other Topics Concern   • Not on file     Social History Narrative   • No narrative on file       Family History   Problem Relation Age of Onset   • Cancer Mother         lung   • Cancer Father         lung   • Colon cancer Neg Hx    • Colon polyps Neg Hx          Review of Systems   Constitution: Negative for decreased appetite, diaphoresis, fever, weakness, malaise/fatigue, weight gain and weight loss.   HENT: Negative for congestion, hearing loss, nosebleeds and tinnitus.    Eyes: Negative for blurred vision, double vision, vision loss in left eye, vision loss in right eye and visual disturbance.   Cardiovascular:        As noted in HPI   Respiratory:        As noted HPI   Endocrine: Negative for cold intolerance and heat intolerance.    Hematologic/Lymphatic: Negative for bleeding problem. Does not bruise/bleed easily.   Skin: Negative for color change, flushing, itching and rash.   Musculoskeletal: Negative for arthritis, back pain, joint pain, joint swelling, muscle weakness and myalgias.   Gastrointestinal: Negative for bloating, abdominal pain, constipation, diarrhea, dysphagia, heartburn, hematemesis, hematochezia, melena, nausea and vomiting.   Genitourinary: Positive for frequency and hematuria. Negative for bladder incontinence, dysuria, nocturia and urgency.   Neurological: Negative for dizziness, focal weakness, headaches, light-headedness, loss of balance, numbness, paresthesias and vertigo.   Psychiatric/Behavioral: Negative for depression, memory loss and substance abuse.       Procedures      ECG 12 Lead  Date/Time: 7/26/2018 8:46 AM  Performed by: MARCELL VELARDE  Authorized by: MARCELL VELARDE   Comparison: compared with previous ECG   Comparison to previous ECG: T wave inversion is new  Rhythm: sinus rhythm  Rate: normal  Conduction: 1st degree  T depression: II, III, aVF, V2, V3, V4, V5 and V6  QRS axis: left                  Objective:    There were no vitals taken for this visit.       Physical Exam  Physical Exam   Constitutional: He is oriented to person, place, and time. He appears well-developed and well-nourished. No distress.   HENT:   Head: Normocephalic.   Eyes: Pupils are equal, round, and reactive to light. Conjunctivae are normal. No scleral icterus.   Neck: Normal carotid pulses, no hepatojugular reflux and no JVD present. Carotid bruit is not present. No tracheal deviation, no edema and no erythema present. No thyromegaly present.   Cardiovascular: Normal rate, regular rhythm, S1 normal, S2 normal and intact distal pulses.   No extrasystoles are present. PMI is not displaced.  Exam reveals no gallop, no distant heart sounds and no friction rub.    Murmur heard.   Systolic murmur is present with a grade of 2/6   at the upper right sternal border  Pulses:       Carotid pulses are 2+ on the right side, and 2+ on the left side.       Radial pulses are 2+ on the right side, and 2+ on the left side.        Femoral pulses are 2+ on the right side, and 2+ on the left side.       Dorsalis pedis pulses are 2+ on the right side, and 2+ on the left side.        Posterior tibial pulses are 2+ on the right side, and 2+ on the left side.   Pulmonary/Chest: Effort normal and breath sounds normal. No respiratory distress. He has no decreased breath sounds. He has no wheezes. He has no rhonchi. He has no rales. He exhibits no tenderness.   Abdominal: Soft. Bowel sounds are normal. He exhibits no distension and no mass. There is no hepatosplenomegaly. There is no tenderness. There is no rebound and no guarding.   Musculoskeletal: He exhibits no edema, tenderness or deformity.   Neurological: He is alert and oriented to person, place, and time.   Skin: Skin is warm and dry. No rash noted. He is not diaphoretic. No cyanosis or erythema. No pallor. Nails show no clubbing.   Psychiatric: He has a normal mood and affect. His speech is normal and behavior is normal. Judgment and thought content normal.           Assessment:   1. 83-year-old gentleman with history of severe coronary artery disease preserved left ventricular systolic function.  Status post drug-eluting stents placed to the right coronary artery and the left anterior descending.  Coronary Artery Disease  Assessment  • The patient has no angina  • There is a new diagnosis of stable angina in the past 12 months  • The patient is having symptoms consistent with unstable angina     Plan  • Lifestyle modifications discussed include adhering to a heart healthy diet, avoidance of tobacco products, maintenance of a healthy weight, medication compliance, regular exercise and regular monitoring of cholesterol and blood pressure    Subjective - Objective  • There has been a previous stent  procedure using GIN  • Current antiplatelet therapy includes aspirin 81 mg and clopidogrel 75 mg  • The patient qualifies for cardiac rehabilitation, and has been referred to cardiac rehab      Very concerned about this dyspnea that it could be an anginal equivalent in addition he has fairly marked ECG changes he's to return for perfusion stress test as a one-day non-walking protocol non-walking because of atrial fibrillation and pacemaker.  Make recommendations pending that.      2.  Hyperlipidemia now on target dose rosuvastatin continue the same.  3.  Hypertension blood pressure adequately controlled if anything on the low side.  4.  History of prostate cancer.  5.   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   6.  Paroxysmal atrial fibrillation, asymptomatic, noted on his pacemaker.  But a large percent of his rhythm is in atrial fibrillation.  Atrial Fibrillation and Atrial Flutter  Assessment  • The patient has paroxysmal atrial fibrillation  • This is non-valvular in etiology  • The patient's CHADS2-VASc score is 4  • A [id number] score of 2 or more is considered a high risk for a thromboembolic event  • Aspirin prescribed    Plan  • Attempt to maintain sinus rhythm  • Add apixaban for antithrombotic therapy  • Continue beta blocker for rhythm control  We are starting him on eliquis.  They clopidogrel continue the aspirin.  Is possible that his dyspnea could be related to this and may need to consider further therapy if the stress test is unremarkable.  He'll see her nurse practitioner in one month will see me at his regular appointment in December.             Plan:

## 2018-07-27 ENCOUNTER — HOSPITAL ENCOUNTER (INPATIENT)
Facility: HOSPITAL | Age: 83
LOS: 4 days | Discharge: HOME | End: 2018-07-31
Attending: EMERGENCY MEDICINE | Admitting: INTERNAL MEDICINE

## 2018-07-27 ENCOUNTER — APPOINTMENT (OUTPATIENT)
Dept: CT IMAGING | Facility: HOSPITAL | Age: 83
End: 2018-07-27

## 2018-07-27 ENCOUNTER — APPOINTMENT (OUTPATIENT)
Dept: GENERAL RADIOLOGY | Facility: HOSPITAL | Age: 83
End: 2018-07-27

## 2018-07-27 DIAGNOSIS — R06.83 SNORING: ICD-10-CM

## 2018-07-27 DIAGNOSIS — G47.10 HYPERSOMNOLENCE: ICD-10-CM

## 2018-07-27 DIAGNOSIS — G47.33 OSA (OBSTRUCTIVE SLEEP APNEA): ICD-10-CM

## 2018-07-27 DIAGNOSIS — A41.9 SEPSIS, DUE TO UNSPECIFIED ORGANISM: ICD-10-CM

## 2018-07-27 DIAGNOSIS — N17.9 ACUTE KIDNEY INJURY (HCC): ICD-10-CM

## 2018-07-27 DIAGNOSIS — N10 PYELONEPHRITIS, ACUTE: Primary | ICD-10-CM

## 2018-07-27 LAB
ALBUMIN SERPL-MCNC: 3.5 G/DL (ref 3.5–5.2)
ALBUMIN/GLOB SERPL: 1.1 G/DL
ALP SERPL-CCNC: 79 U/L (ref 39–117)
ALT SERPL W P-5'-P-CCNC: 16 U/L (ref 1–41)
ANION GAP SERPL CALCULATED.3IONS-SCNC: 13.1 MMOL/L
ANION GAP SERPL CALCULATED.3IONS-SCNC: 17.8 MMOL/L
AST SERPL-CCNC: 28 U/L (ref 1–40)
BACTERIA UR QL AUTO: ABNORMAL /HPF
BASOPHILS # BLD AUTO: 0.01 10*3/MM3 (ref 0–0.2)
BASOPHILS NFR BLD AUTO: 0.1 % (ref 0–1.5)
BILIRUB SERPL-MCNC: 1.6 MG/DL (ref 0.1–1.2)
BILIRUB UR QL STRIP: NEGATIVE
BUN BLD-MCNC: 35 MG/DL (ref 8–23)
BUN BLD-MCNC: 37 MG/DL (ref 8–23)
BUN/CREAT SERPL: 16.7 (ref 7–25)
BUN/CREAT SERPL: 18.6 (ref 7–25)
CALCIUM SPEC-SCNC: 8.2 MG/DL (ref 8.6–10.5)
CALCIUM SPEC-SCNC: 9 MG/DL (ref 8.6–10.5)
CHLORIDE SERPL-SCNC: 101 MMOL/L (ref 98–107)
CHLORIDE SERPL-SCNC: 102 MMOL/L (ref 98–107)
CLARITY UR: ABNORMAL
CO2 SERPL-SCNC: 21.2 MMOL/L (ref 22–29)
CO2 SERPL-SCNC: 24.9 MMOL/L (ref 22–29)
COLOR UR: ABNORMAL
CREAT BLD-MCNC: 1.99 MG/DL (ref 0.76–1.27)
CREAT BLD-MCNC: 2.09 MG/DL (ref 0.76–1.27)
D-LACTATE SERPL-SCNC: 1.8 MMOL/L (ref 0.5–2)
D-LACTATE SERPL-SCNC: 2.4 MMOL/L (ref 0.5–2)
D-LACTATE SERPL-SCNC: 3.1 MMOL/L (ref 0.5–2)
DEPRECATED RDW RBC AUTO: 50.7 FL (ref 37–54)
EOSINOPHIL # BLD AUTO: 0 10*3/MM3 (ref 0–0.7)
EOSINOPHIL NFR BLD AUTO: 0 % (ref 0.3–6.2)
ERYTHROCYTE [DISTWIDTH] IN BLOOD BY AUTOMATED COUNT: 14.4 % (ref 11.5–14.5)
GFR SERPL CREATININE-BSD FRML MDRD: 31 ML/MIN/1.73
GFR SERPL CREATININE-BSD FRML MDRD: 32 ML/MIN/1.73
GLOBULIN UR ELPH-MCNC: 3.1 GM/DL
GLUCOSE BLD-MCNC: 94 MG/DL (ref 65–99)
GLUCOSE BLD-MCNC: 94 MG/DL (ref 65–99)
GLUCOSE BLDC GLUCOMTR-MCNC: 124 MG/DL (ref 70–130)
GLUCOSE BLDC GLUCOMTR-MCNC: 92 MG/DL (ref 70–130)
GLUCOSE UR STRIP-MCNC: NEGATIVE MG/DL
HCT VFR BLD AUTO: 48.7 % (ref 40.4–52.2)
HGB BLD-MCNC: 15.5 G/DL (ref 13.7–17.6)
HGB UR QL STRIP.AUTO: ABNORMAL
HOLD SPECIMEN: NORMAL
HYALINE CASTS UR QL AUTO: ABNORMAL /LPF
IMM GRANULOCYTES # BLD: 0.05 10*3/MM3 (ref 0–0.03)
IMM GRANULOCYTES NFR BLD: 0.5 % (ref 0–0.5)
INR PPP: 1.52 (ref 0.9–1.1)
KETONES UR QL STRIP: ABNORMAL
LEUKOCYTE ESTERASE UR QL STRIP.AUTO: ABNORMAL
LYMPHOCYTES # BLD AUTO: 0.38 10*3/MM3 (ref 0.9–4.8)
LYMPHOCYTES NFR BLD AUTO: 3.9 % (ref 19.6–45.3)
MCH RBC QN AUTO: 30.5 PG (ref 27–32.7)
MCHC RBC AUTO-ENTMCNC: 31.8 G/DL (ref 32.6–36.4)
MCV RBC AUTO: 95.9 FL (ref 79.8–96.2)
MONOCYTES # BLD AUTO: 1 10*3/MM3 (ref 0.2–1.2)
MONOCYTES NFR BLD AUTO: 10.4 % (ref 5–12)
NEUTROPHILS # BLD AUTO: 8.2 10*3/MM3 (ref 1.9–8.1)
NEUTROPHILS NFR BLD AUTO: 85.1 % (ref 42.7–76)
NITRITE UR QL STRIP: POSITIVE
PH UR STRIP.AUTO: <=5 [PH] (ref 5–8)
PLATELET # BLD AUTO: 117 10*3/MM3 (ref 140–500)
PMV BLD AUTO: 10.2 FL (ref 6–12)
POTASSIUM BLD-SCNC: 3.6 MMOL/L (ref 3.5–5.2)
POTASSIUM BLD-SCNC: 4.1 MMOL/L (ref 3.5–5.2)
PROT SERPL-MCNC: 6.6 G/DL (ref 6–8.5)
PROT UR QL STRIP: ABNORMAL
PROTHROMBIN TIME: 18 SECONDS (ref 11.7–14.2)
RBC # BLD AUTO: 5.08 10*6/MM3 (ref 4.6–6)
RBC # UR: ABNORMAL /HPF
REF LAB TEST METHOD: ABNORMAL
SODIUM BLD-SCNC: 140 MMOL/L (ref 136–145)
SODIUM BLD-SCNC: 140 MMOL/L (ref 136–145)
SP GR UR STRIP: 1.03 (ref 1–1.03)
SQUAMOUS #/AREA URNS HPF: ABNORMAL /HPF
TROPONIN T SERPL-MCNC: <0.01 NG/ML (ref 0–0.03)
UROBILINOGEN UR QL STRIP: ABNORMAL
WBC NRBC COR # BLD: 9.64 10*3/MM3 (ref 4.5–10.7)
WBC UR QL AUTO: ABNORMAL /HPF

## 2018-07-27 PROCEDURE — 71046 X-RAY EXAM CHEST 2 VIEWS: CPT

## 2018-07-27 PROCEDURE — P9612 CATHETERIZE FOR URINE SPEC: HCPCS

## 2018-07-27 PROCEDURE — 85025 COMPLETE CBC W/AUTO DIFF WBC: CPT | Performed by: EMERGENCY MEDICINE

## 2018-07-27 PROCEDURE — 93010 ELECTROCARDIOGRAM REPORT: CPT | Performed by: INTERNAL MEDICINE

## 2018-07-27 PROCEDURE — 80053 COMPREHEN METABOLIC PANEL: CPT | Performed by: EMERGENCY MEDICINE

## 2018-07-27 PROCEDURE — 85610 PROTHROMBIN TIME: CPT | Performed by: EMERGENCY MEDICINE

## 2018-07-27 PROCEDURE — 87040 BLOOD CULTURE FOR BACTERIA: CPT | Performed by: EMERGENCY MEDICINE

## 2018-07-27 PROCEDURE — 84484 ASSAY OF TROPONIN QUANT: CPT | Performed by: EMERGENCY MEDICINE

## 2018-07-27 PROCEDURE — 83605 ASSAY OF LACTIC ACID: CPT | Performed by: INTERNAL MEDICINE

## 2018-07-27 PROCEDURE — 83605 ASSAY OF LACTIC ACID: CPT | Performed by: EMERGENCY MEDICINE

## 2018-07-27 PROCEDURE — 93005 ELECTROCARDIOGRAM TRACING: CPT | Performed by: EMERGENCY MEDICINE

## 2018-07-27 PROCEDURE — 82962 GLUCOSE BLOOD TEST: CPT

## 2018-07-27 PROCEDURE — 25010000003 CEFTRIAXONE PER 250 MG: Performed by: EMERGENCY MEDICINE

## 2018-07-27 PROCEDURE — 99285 EMERGENCY DEPT VISIT HI MDM: CPT

## 2018-07-27 PROCEDURE — 81001 URINALYSIS AUTO W/SCOPE: CPT | Performed by: EMERGENCY MEDICINE

## 2018-07-27 PROCEDURE — 93005 ELECTROCARDIOGRAM TRACING: CPT | Performed by: INTERNAL MEDICINE

## 2018-07-27 PROCEDURE — 74176 CT ABD & PELVIS W/O CONTRAST: CPT

## 2018-07-27 RX ORDER — CEFTRIAXONE SODIUM 2 G/50ML
2 INJECTION, SOLUTION INTRAVENOUS ONCE
Status: COMPLETED | OUTPATIENT
Start: 2018-07-27 | End: 2018-07-27

## 2018-07-27 RX ORDER — ACETAMINOPHEN 500 MG
1000 TABLET ORAL ONCE
Status: COMPLETED | OUTPATIENT
Start: 2018-07-27 | End: 2018-07-27

## 2018-07-27 RX ORDER — ZOLPIDEM TARTRATE 5 MG/1
TABLET ORAL
Qty: 2 TABLET | Refills: 0 | Status: SHIPPED | OUTPATIENT
Start: 2018-07-27 | End: 2018-07-31 | Stop reason: HOSPADM

## 2018-07-27 RX ORDER — BUDESONIDE AND FORMOTEROL FUMARATE DIHYDRATE 160; 4.5 UG/1; UG/1
2 AEROSOL RESPIRATORY (INHALATION)
Status: DISCONTINUED | OUTPATIENT
Start: 2018-07-27 | End: 2018-07-31 | Stop reason: HOSPADM

## 2018-07-27 RX ORDER — LEVOFLOXACIN 500 MG/1
500 TABLET, FILM COATED ORAL DAILY
COMMUNITY
Start: 2018-07-26 | End: 2018-07-31 | Stop reason: HOSPADM

## 2018-07-27 RX ORDER — CLOPIDOGREL BISULFATE 75 MG/1
75 TABLET ORAL DAILY
COMMUNITY
End: 2018-07-31 | Stop reason: HOSPADM

## 2018-07-27 RX ORDER — SODIUM CHLORIDE 9 MG/ML
100 INJECTION, SOLUTION INTRAVENOUS CONTINUOUS
Status: DISCONTINUED | OUTPATIENT
Start: 2018-07-27 | End: 2018-07-29

## 2018-07-27 RX ORDER — ACETAMINOPHEN 325 MG/1
650 TABLET ORAL EVERY 6 HOURS PRN
Status: DISCONTINUED | OUTPATIENT
Start: 2018-07-27 | End: 2018-07-31 | Stop reason: HOSPADM

## 2018-07-27 RX ORDER — CEFTRIAXONE SODIUM 2 G/50ML
2 INJECTION, SOLUTION INTRAVENOUS EVERY 24 HOURS
Status: DISCONTINUED | OUTPATIENT
Start: 2018-07-28 | End: 2018-07-31 | Stop reason: HOSPADM

## 2018-07-27 RX ORDER — SODIUM CHLORIDE 0.9 % (FLUSH) 0.9 %
10 SYRINGE (ML) INJECTION AS NEEDED
Status: DISCONTINUED | OUTPATIENT
Start: 2018-07-27 | End: 2018-07-31 | Stop reason: HOSPADM

## 2018-07-27 RX ADMIN — SODIUM CHLORIDE 1000 ML: 9 INJECTION, SOLUTION INTRAVENOUS at 10:31

## 2018-07-27 RX ADMIN — ACETAMINOPHEN 650 MG: 325 TABLET, FILM COATED ORAL at 23:39

## 2018-07-27 RX ADMIN — ACETAMINOPHEN 1000 MG: 500 TABLET, FILM COATED ORAL at 08:50

## 2018-07-27 RX ADMIN — SODIUM CHLORIDE 1000 ML: 9 INJECTION, SOLUTION INTRAVENOUS at 08:42

## 2018-07-27 RX ADMIN — SODIUM CHLORIDE 190 ML: 9 INJECTION, SOLUTION INTRAVENOUS at 10:42

## 2018-07-27 RX ADMIN — SODIUM CHLORIDE 150 ML/HR: 9 INJECTION, SOLUTION INTRAVENOUS at 13:22

## 2018-07-27 RX ADMIN — SODIUM CHLORIDE 150 ML/HR: 9 INJECTION, SOLUTION INTRAVENOUS at 19:29

## 2018-07-27 RX ADMIN — CEFTRIAXONE SODIUM 2 G: 2 INJECTION, SOLUTION INTRAVENOUS at 10:41

## 2018-07-28 LAB
ANION GAP SERPL CALCULATED.3IONS-SCNC: 14.2 MMOL/L
BUN BLD-MCNC: 35 MG/DL (ref 8–23)
BUN/CREAT SERPL: 18.1 (ref 7–25)
CALCIUM SPEC-SCNC: 7.9 MG/DL (ref 8.6–10.5)
CHLORIDE SERPL-SCNC: 102 MMOL/L (ref 98–107)
CO2 SERPL-SCNC: 22.8 MMOL/L (ref 22–29)
CREAT BLD-MCNC: 1.93 MG/DL (ref 0.76–1.27)
DEPRECATED RDW RBC AUTO: 51.4 FL (ref 37–54)
ERYTHROCYTE [DISTWIDTH] IN BLOOD BY AUTOMATED COUNT: 14.6 % (ref 11.5–14.5)
GFR SERPL CREATININE-BSD FRML MDRD: 33 ML/MIN/1.73
GLUCOSE BLD-MCNC: 101 MG/DL (ref 65–99)
GLUCOSE BLDC GLUCOMTR-MCNC: 105 MG/DL (ref 70–130)
GLUCOSE BLDC GLUCOMTR-MCNC: 108 MG/DL (ref 70–130)
GLUCOSE BLDC GLUCOMTR-MCNC: 124 MG/DL (ref 70–130)
HCT VFR BLD AUTO: 44.5 % (ref 40.4–52.2)
HGB BLD-MCNC: 13.8 G/DL (ref 13.7–17.6)
MAGNESIUM SERPL-MCNC: 2.2 MG/DL (ref 1.6–2.4)
MCH RBC QN AUTO: 29.7 PG (ref 27–32.7)
MCHC RBC AUTO-ENTMCNC: 31 G/DL (ref 32.6–36.4)
MCV RBC AUTO: 95.9 FL (ref 79.8–96.2)
PLATELET # BLD AUTO: 86 10*3/MM3 (ref 140–500)
PMV BLD AUTO: 10.8 FL (ref 6–12)
POTASSIUM BLD-SCNC: 3.3 MMOL/L (ref 3.5–5.2)
RBC # BLD AUTO: 4.64 10*6/MM3 (ref 4.6–6)
SODIUM BLD-SCNC: 139 MMOL/L (ref 136–145)
WBC NRBC COR # BLD: 5.59 10*3/MM3 (ref 4.5–10.7)

## 2018-07-28 PROCEDURE — 94640 AIRWAY INHALATION TREATMENT: CPT

## 2018-07-28 PROCEDURE — 82962 GLUCOSE BLOOD TEST: CPT

## 2018-07-28 PROCEDURE — 94799 UNLISTED PULMONARY SVC/PX: CPT

## 2018-07-28 PROCEDURE — 25010000003 CEFTRIAXONE PER 250 MG: Performed by: INTERNAL MEDICINE

## 2018-07-28 PROCEDURE — 85027 COMPLETE CBC AUTOMATED: CPT | Performed by: INTERNAL MEDICINE

## 2018-07-28 PROCEDURE — 93010 ELECTROCARDIOGRAM REPORT: CPT | Performed by: INTERNAL MEDICINE

## 2018-07-28 PROCEDURE — 80048 BASIC METABOLIC PNL TOTAL CA: CPT | Performed by: INTERNAL MEDICINE

## 2018-07-28 PROCEDURE — 93005 ELECTROCARDIOGRAM TRACING: CPT | Performed by: INTERNAL MEDICINE

## 2018-07-28 PROCEDURE — 83735 ASSAY OF MAGNESIUM: CPT | Performed by: INTERNAL MEDICINE

## 2018-07-28 PROCEDURE — 99222 1ST HOSP IP/OBS MODERATE 55: CPT | Performed by: INTERNAL MEDICINE

## 2018-07-28 RX ORDER — ECHINACEA PURPUREA EXTRACT 125 MG
1 TABLET ORAL AS NEEDED
Status: DISCONTINUED | OUTPATIENT
Start: 2018-07-28 | End: 2018-07-31 | Stop reason: HOSPADM

## 2018-07-28 RX ORDER — ROSUVASTATIN CALCIUM 20 MG/1
20 TABLET, COATED ORAL NIGHTLY
Status: DISCONTINUED | OUTPATIENT
Start: 2018-07-28 | End: 2018-07-31 | Stop reason: HOSPADM

## 2018-07-28 RX ORDER — ATENOLOL 25 MG/1
25 TABLET ORAL
Status: DISCONTINUED | OUTPATIENT
Start: 2018-07-28 | End: 2018-07-29

## 2018-07-28 RX ORDER — POTASSIUM CHLORIDE 750 MG/1
40 CAPSULE, EXTENDED RELEASE ORAL ONCE
Status: COMPLETED | OUTPATIENT
Start: 2018-07-28 | End: 2018-07-28

## 2018-07-28 RX ADMIN — ATENOLOL 25 MG: 25 TABLET ORAL at 10:06

## 2018-07-28 RX ADMIN — SODIUM CHLORIDE 100 ML/HR: 9 INJECTION, SOLUTION INTRAVENOUS at 08:42

## 2018-07-28 RX ADMIN — BUDESONIDE AND FORMOTEROL FUMARATE DIHYDRATE 2 PUFF: 160; 4.5 AEROSOL RESPIRATORY (INHALATION) at 08:00

## 2018-07-28 RX ADMIN — SODIUM CHLORIDE 150 ML/HR: 9 INJECTION, SOLUTION INTRAVENOUS at 01:39

## 2018-07-28 RX ADMIN — BUDESONIDE AND FORMOTEROL FUMARATE DIHYDRATE 2 PUFF: 160; 4.5 AEROSOL RESPIRATORY (INHALATION) at 20:06

## 2018-07-28 RX ADMIN — ROSUVASTATIN CALCIUM 20 MG: 20 TABLET, FILM COATED ORAL at 23:05

## 2018-07-28 RX ADMIN — POTASSIUM CHLORIDE 40 MEQ: 750 CAPSULE, EXTENDED RELEASE ORAL at 08:43

## 2018-07-28 RX ADMIN — CEFTRIAXONE SODIUM 2 G: 2 INJECTION, SOLUTION INTRAVENOUS at 10:06

## 2018-07-29 LAB
ANION GAP SERPL CALCULATED.3IONS-SCNC: 13.7 MMOL/L
BUN BLD-MCNC: 24 MG/DL (ref 8–23)
BUN/CREAT SERPL: 17.1 (ref 7–25)
CALCIUM SPEC-SCNC: 8.3 MG/DL (ref 8.6–10.5)
CHLORIDE SERPL-SCNC: 101 MMOL/L (ref 98–107)
CO2 SERPL-SCNC: 21.3 MMOL/L (ref 22–29)
CREAT BLD-MCNC: 1.4 MG/DL (ref 0.76–1.27)
DEPRECATED RDW RBC AUTO: 48.7 FL (ref 37–54)
ERYTHROCYTE [DISTWIDTH] IN BLOOD BY AUTOMATED COUNT: 14.4 % (ref 11.5–14.5)
GFR SERPL CREATININE-BSD FRML MDRD: 49 ML/MIN/1.73
GLUCOSE BLD-MCNC: 105 MG/DL (ref 65–99)
HCT VFR BLD AUTO: 42.7 % (ref 40.4–52.2)
HGB BLD-MCNC: 14 G/DL (ref 13.7–17.6)
MCH RBC QN AUTO: 30 PG (ref 27–32.7)
MCHC RBC AUTO-ENTMCNC: 32.8 G/DL (ref 32.6–36.4)
MCV RBC AUTO: 91.6 FL (ref 79.8–96.2)
PLATELET # BLD AUTO: 84 10*3/MM3 (ref 140–500)
PMV BLD AUTO: 10.3 FL (ref 6–12)
POTASSIUM BLD-SCNC: 3.5 MMOL/L (ref 3.5–5.2)
RBC # BLD AUTO: 4.66 10*6/MM3 (ref 4.6–6)
SODIUM BLD-SCNC: 136 MMOL/L (ref 136–145)
WBC NRBC COR # BLD: 4.38 10*3/MM3 (ref 4.5–10.7)

## 2018-07-29 PROCEDURE — 94799 UNLISTED PULMONARY SVC/PX: CPT

## 2018-07-29 PROCEDURE — 99233 SBSQ HOSP IP/OBS HIGH 50: CPT | Performed by: INTERNAL MEDICINE

## 2018-07-29 PROCEDURE — 85027 COMPLETE CBC AUTOMATED: CPT | Performed by: INTERNAL MEDICINE

## 2018-07-29 PROCEDURE — 25010000003 CEFTRIAXONE PER 250 MG: Performed by: INTERNAL MEDICINE

## 2018-07-29 PROCEDURE — 80048 BASIC METABOLIC PNL TOTAL CA: CPT | Performed by: INTERNAL MEDICINE

## 2018-07-29 RX ORDER — ALLOPURINOL 100 MG/1
100 TABLET ORAL DAILY
Status: DISCONTINUED | OUTPATIENT
Start: 2018-07-29 | End: 2018-07-31 | Stop reason: HOSPADM

## 2018-07-29 RX ORDER — CLOPIDOGREL BISULFATE 75 MG/1
75 TABLET ORAL DAILY
Status: DISCONTINUED | OUTPATIENT
Start: 2018-07-29 | End: 2018-07-30

## 2018-07-29 RX ORDER — ASPIRIN 81 MG/1
81 TABLET, CHEWABLE ORAL DAILY
Status: DISCONTINUED | OUTPATIENT
Start: 2018-07-29 | End: 2018-07-31 | Stop reason: HOSPADM

## 2018-07-29 RX ORDER — ATENOLOL AND CHLORTHALIDONE 50; 25 MG/1; MG/1
TABLET ORAL
Status: DISCONTINUED | OUTPATIENT
Start: 2018-07-29 | End: 2018-07-31 | Stop reason: HOSPADM

## 2018-07-29 RX ADMIN — ALLOPURINOL 100 MG: 100 TABLET ORAL at 17:07

## 2018-07-29 RX ADMIN — ROSUVASTATIN CALCIUM 20 MG: 20 TABLET, FILM COATED ORAL at 21:34

## 2018-07-29 RX ADMIN — CLOPIDOGREL 75 MG: 75 TABLET, FILM COATED ORAL at 10:59

## 2018-07-29 RX ADMIN — CEFTRIAXONE SODIUM 2 G: 2 INJECTION, SOLUTION INTRAVENOUS at 09:12

## 2018-07-29 RX ADMIN — ATENOLOL 25 MG: 25 TABLET ORAL at 09:12

## 2018-07-29 RX ADMIN — BUDESONIDE AND FORMOTEROL FUMARATE DIHYDRATE 2 PUFF: 160; 4.5 AEROSOL RESPIRATORY (INHALATION) at 19:36

## 2018-07-29 RX ADMIN — APIXABAN 5 MG: 5 TABLET, FILM COATED ORAL at 10:59

## 2018-07-29 RX ADMIN — SODIUM CHLORIDE 100 ML/HR: 9 INJECTION, SOLUTION INTRAVENOUS at 06:28

## 2018-07-29 RX ADMIN — APIXABAN 5 MG: 5 TABLET, FILM COATED ORAL at 21:34

## 2018-07-29 RX ADMIN — BUDESONIDE AND FORMOTEROL FUMARATE DIHYDRATE 2 PUFF: 160; 4.5 AEROSOL RESPIRATORY (INHALATION) at 07:42

## 2018-07-29 RX ADMIN — ASPIRIN 81 MG: 81 TABLET, CHEWABLE ORAL at 17:07

## 2018-07-30 ENCOUNTER — TELEPHONE (OUTPATIENT)
Dept: GASTROENTEROLOGY | Facility: CLINIC | Age: 83
End: 2018-07-30

## 2018-07-30 LAB
ANION GAP SERPL CALCULATED.3IONS-SCNC: 15.6 MMOL/L
BUN BLD-MCNC: 21 MG/DL (ref 8–23)
BUN/CREAT SERPL: 16.2 (ref 7–25)
CALCIUM SPEC-SCNC: 8.6 MG/DL (ref 8.6–10.5)
CHLORIDE SERPL-SCNC: 96 MMOL/L (ref 98–107)
CO2 SERPL-SCNC: 24.4 MMOL/L (ref 22–29)
CREAT BLD-MCNC: 1.3 MG/DL (ref 0.76–1.27)
DEPRECATED RDW RBC AUTO: 47.3 FL (ref 37–54)
ERYTHROCYTE [DISTWIDTH] IN BLOOD BY AUTOMATED COUNT: 14 % (ref 11.5–14.5)
GFR SERPL CREATININE-BSD FRML MDRD: 53 ML/MIN/1.73
GLUCOSE BLD-MCNC: 102 MG/DL (ref 65–99)
HCT VFR BLD AUTO: 44 % (ref 40.4–52.2)
HGB BLD-MCNC: 14.1 G/DL (ref 13.7–17.6)
MCH RBC QN AUTO: 29.6 PG (ref 27–32.7)
MCHC RBC AUTO-ENTMCNC: 32 G/DL (ref 32.6–36.4)
MCV RBC AUTO: 92.2 FL (ref 79.8–96.2)
PLATELET # BLD AUTO: 96 10*3/MM3 (ref 140–500)
PMV BLD AUTO: 10.8 FL (ref 6–12)
POTASSIUM BLD-SCNC: 3.1 MMOL/L (ref 3.5–5.2)
RBC # BLD AUTO: 4.77 10*6/MM3 (ref 4.6–6)
SODIUM BLD-SCNC: 136 MMOL/L (ref 136–145)
WBC NRBC COR # BLD: 4.74 10*3/MM3 (ref 4.5–10.7)

## 2018-07-30 PROCEDURE — 94799 UNLISTED PULMONARY SVC/PX: CPT

## 2018-07-30 PROCEDURE — 25010000003 CEFTRIAXONE PER 250 MG: Performed by: INTERNAL MEDICINE

## 2018-07-30 PROCEDURE — 99232 SBSQ HOSP IP/OBS MODERATE 35: CPT | Performed by: INTERNAL MEDICINE

## 2018-07-30 PROCEDURE — 85027 COMPLETE CBC AUTOMATED: CPT | Performed by: INTERNAL MEDICINE

## 2018-07-30 PROCEDURE — 80048 BASIC METABOLIC PNL TOTAL CA: CPT | Performed by: INTERNAL MEDICINE

## 2018-07-30 RX ORDER — POTASSIUM CHLORIDE 750 MG/1
40 CAPSULE, EXTENDED RELEASE ORAL ONCE
Status: COMPLETED | OUTPATIENT
Start: 2018-07-31 | End: 2018-07-31

## 2018-07-30 RX ADMIN — ASPIRIN 81 MG: 81 TABLET, CHEWABLE ORAL at 09:13

## 2018-07-30 RX ADMIN — CHLORTHALIDONE: 25 TABLET ORAL at 09:13

## 2018-07-30 RX ADMIN — BUDESONIDE AND FORMOTEROL FUMARATE DIHYDRATE 2 PUFF: 160; 4.5 AEROSOL RESPIRATORY (INHALATION) at 06:39

## 2018-07-30 RX ADMIN — ALLOPURINOL 100 MG: 100 TABLET ORAL at 09:13

## 2018-07-30 RX ADMIN — APIXABAN 5 MG: 5 TABLET, FILM COATED ORAL at 09:13

## 2018-07-30 RX ADMIN — CEFTRIAXONE SODIUM 2 G: 2 INJECTION, SOLUTION INTRAVENOUS at 10:56

## 2018-07-30 RX ADMIN — APIXABAN 5 MG: 5 TABLET, FILM COATED ORAL at 21:50

## 2018-07-30 RX ADMIN — ROSUVASTATIN CALCIUM 20 MG: 20 TABLET, FILM COATED ORAL at 21:50

## 2018-07-30 RX ADMIN — BUDESONIDE AND FORMOTEROL FUMARATE DIHYDRATE 2 PUFF: 160; 4.5 AEROSOL RESPIRATORY (INHALATION) at 20:33

## 2018-07-30 NOTE — TELEPHONE ENCOUNTER
LEFT MESSAGE ON MY VOICE MAIL ON 07/27/2018.  NEEDS TO CANCEL COLONOSCOPY, HAS BEEN ADMITTED INTO HOSPITAL, ICU, FOR UTI.  CANCEL PROCEDURE.  NOTIFY DAIN AT [place].

## 2018-07-31 ENCOUNTER — EPISODE CHANGES (OUTPATIENT)
Dept: CASE MANAGEMENT | Facility: OTHER | Age: 83
End: 2018-07-31

## 2018-07-31 VITALS
DIASTOLIC BLOOD PRESSURE: 74 MMHG | BODY MASS INDEX: 32.13 KG/M2 | HEIGHT: 70 IN | HEART RATE: 80 BPM | RESPIRATION RATE: 16 BRPM | OXYGEN SATURATION: 93 % | TEMPERATURE: 97.4 F | WEIGHT: 224.43 LBS | SYSTOLIC BLOOD PRESSURE: 105 MMHG

## 2018-07-31 LAB — POTASSIUM BLD-SCNC: 3.6 MMOL/L (ref 3.5–5.2)

## 2018-07-31 PROCEDURE — 25010000003 CEFTRIAXONE PER 250 MG: Performed by: INTERNAL MEDICINE

## 2018-07-31 PROCEDURE — 99232 SBSQ HOSP IP/OBS MODERATE 35: CPT | Performed by: INTERNAL MEDICINE

## 2018-07-31 PROCEDURE — 84132 ASSAY OF SERUM POTASSIUM: CPT | Performed by: NURSE PRACTITIONER

## 2018-07-31 PROCEDURE — 94799 UNLISTED PULMONARY SVC/PX: CPT

## 2018-07-31 RX ORDER — SULFAMETHOXAZOLE AND TRIMETHOPRIM 800; 160 MG/1; MG/1
1 TABLET ORAL 2 TIMES DAILY
Qty: 28 TABLET | Refills: 0 | Status: SHIPPED | OUTPATIENT
Start: 2018-07-31 | End: 2018-08-06

## 2018-07-31 RX ADMIN — APIXABAN 5 MG: 5 TABLET, FILM COATED ORAL at 08:55

## 2018-07-31 RX ADMIN — ALLOPURINOL 100 MG: 100 TABLET ORAL at 08:55

## 2018-07-31 RX ADMIN — BUDESONIDE AND FORMOTEROL FUMARATE DIHYDRATE 2 PUFF: 160; 4.5 AEROSOL RESPIRATORY (INHALATION) at 07:34

## 2018-07-31 RX ADMIN — ASPIRIN 81 MG: 81 TABLET, CHEWABLE ORAL at 08:55

## 2018-07-31 RX ADMIN — POTASSIUM CHLORIDE 40 MEQ: 750 CAPSULE, EXTENDED RELEASE ORAL at 00:03

## 2018-07-31 RX ADMIN — CEFTRIAXONE SODIUM 2 G: 2 INJECTION, SOLUTION INTRAVENOUS at 10:09

## 2018-08-01 ENCOUNTER — READMISSION MANAGEMENT (OUTPATIENT)
Dept: CALL CENTER | Facility: HOSPITAL | Age: 83
End: 2018-08-01

## 2018-08-01 LAB
BACTERIA SPEC AEROBE CULT: NORMAL
BACTERIA SPEC AEROBE CULT: NORMAL

## 2018-08-01 NOTE — OUTREACH NOTE
Prep Survey      Responses   Facility patient discharged from?  [place]   Is patient eligible?  Yes   Discharge diagnosis   septic shock and acute pyelonephritis with acute kidney injury gross hematuria    Does the patient have one of the following disease processes/diagnoses(primary or secondary)?  Sepsis   Does the patient have Home health ordered?  No   Is there a DME ordered?  No   Prep survey completed?  Yes          Teena Gardiner RN

## 2018-08-01 NOTE — PROGRESS NOTES
Case Management Discharge Note    Final Note: Discharged home with spouse.     Destination     No service has been selected for the patient.      Durable Medical Equipment     No service has been selected for the patient.      Dialysis/Infusion     No service has been selected for the patient.      Home Medical Care     No service has been selected for the patient.      Social Care     No service has been selected for the patient.        Other:  (private auto)    Final Discharge Disposition Code: 01 - home or self-care

## 2018-08-02 ENCOUNTER — READMISSION MANAGEMENT (OUTPATIENT)
Dept: CALL CENTER | Facility: HOSPITAL | Age: 83
End: 2018-08-02

## 2018-08-02 NOTE — OUTREACH NOTE
Sepsis Week 1 Survey      Responses   Facility patient discharged from?  [facility]   Does the patient have one of the following disease processes/diagnoses(primary or secondary)?  Sepsis   Is there a successful TCM telephone encounter documented?  No   Week 1 attempt successful?  Yes   Call start time  1508   Call end time  1515   Discharge diagnosis   septic shock and acute pyelonephritis with acute kidney injury gross hematuria    Meds reviewed with patient/caregiver?  Yes   Is the patient having any side effects they believe may be caused by any medication additions or changes?  No   Does the patient have all medications related to this admission filled (includes all antibiotics, inhalers, nebulizers,steroids,etc.)  Yes   Is the patient taking all medications as directed (includes completed medication regime)?  Yes   Has home health visited the patient within 72 hours of discharge?  N/A   Psychosocial issues?  No   Comments  Doing well. No issues.   Did the patient receive a copy of their discharge instructions?  Yes   Nursing interventions  Reviewed instructions with patient   What is the patient's perception of their health status since discharge?  Improving   Nursing interventions  Nurse provided patient education   Is the patient/caregiver able to teach back Sepsis?  S - Shivering,fever or very cold   Nursing interventions  Nurse provided patient education   Is the patient/caregiver able to teach back signs and symptoms of worsening condition:  Fever   Is the patient/caregiver able to teach back the hierarchy of who to call/visit for symptoms/problems? PCP, Specialist, Home health nurse, Urgent Care, ED, 911  Yes   Additional teach back comments  Spouse states that she knows how to recognize if he is getting sick again.   Week 1 call completed?  Yes          Rashawn Pack RN

## 2018-08-06 ENCOUNTER — TELEPHONE (OUTPATIENT)
Dept: INTERNAL MEDICINE | Facility: CLINIC | Age: 83
End: 2018-08-06

## 2018-08-06 ENCOUNTER — TELEPHONE (OUTPATIENT)
Dept: CARDIOLOGY | Facility: CLINIC | Age: 83
End: 2018-08-06

## 2018-08-06 ENCOUNTER — OFFICE VISIT (OUTPATIENT)
Dept: INTERNAL MEDICINE | Facility: CLINIC | Age: 83
End: 2018-08-06

## 2018-08-06 VITALS
HEIGHT: 68 IN | OXYGEN SATURATION: 98 % | HEART RATE: 70 BPM | WEIGHT: 215 LBS | SYSTOLIC BLOOD PRESSURE: 100 MMHG | BODY MASS INDEX: 32.58 KG/M2 | DIASTOLIC BLOOD PRESSURE: 74 MMHG

## 2018-08-06 DIAGNOSIS — A41.9 SEPSIS ASSOCIATED HYPOTENSION (HCC): ICD-10-CM

## 2018-08-06 DIAGNOSIS — R53.1 WEAKNESS: Primary | ICD-10-CM

## 2018-08-06 DIAGNOSIS — I95.9 SEPSIS ASSOCIATED HYPOTENSION (HCC): ICD-10-CM

## 2018-08-06 DIAGNOSIS — I95.0 IDIOPATHIC HYPOTENSION: ICD-10-CM

## 2018-08-06 DIAGNOSIS — I25.110 CORONARY ARTERY DISEASE INVOLVING NATIVE CORONARY ARTERY OF NATIVE HEART WITH UNSTABLE ANGINA PECTORIS (HCC): ICD-10-CM

## 2018-08-06 DIAGNOSIS — R82.90 ABNORMAL URINE FINDINGS: ICD-10-CM

## 2018-08-06 LAB
ALBUMIN SERPL-MCNC: 4.1 G/DL (ref 3.5–5.2)
ALBUMIN/GLOB SERPL: 1.4 G/DL
ALP SERPL-CCNC: 599 U/L (ref 39–117)
ALT SERPL-CCNC: 279 U/L (ref 1–41)
AST SERPL-CCNC: 172 U/L (ref 1–40)
BACTERIA UR QL AUTO: ABNORMAL /HPF
BASOPHILS # BLD AUTO: 0.04 10*3/MM3 (ref 0–0.2)
BASOPHILS NFR BLD AUTO: 0.6 % (ref 0–1.5)
BILIRUB SERPL-MCNC: 0.5 MG/DL (ref 0.1–1.2)
BILIRUB UR QL STRIP: NEGATIVE
BUN SERPL-MCNC: 26 MG/DL (ref 8–23)
BUN/CREAT SERPL: 13.6 (ref 7–25)
CALCIUM SERPL-MCNC: 9.2 MG/DL (ref 8.6–10.5)
CHLORIDE SERPL-SCNC: 97 MMOL/L (ref 98–107)
CLARITY UR: ABNORMAL
CO2 SERPL-SCNC: 24.6 MMOL/L (ref 22–29)
COLOR UR: ABNORMAL
CREAT SERPL-MCNC: 1.91 MG/DL (ref 0.76–1.27)
DEPRECATED RDW RBC AUTO: 51 FL (ref 37–54)
EOSINOPHIL # BLD AUTO: 0.36 10*3/MM3 (ref 0–0.7)
EOSINOPHIL NFR BLD AUTO: 5.1 % (ref 0.3–6.2)
ERYTHROCYTE [DISTWIDTH] IN BLOOD BY AUTOMATED COUNT: 15 % (ref 11.5–14.5)
GLOBULIN SER CALC-MCNC: 3 GM/DL
GLUCOSE SERPL-MCNC: 90 MG/DL (ref 65–99)
GLUCOSE UR STRIP-MCNC: NEGATIVE MG/DL
HCT VFR BLD AUTO: 45.9 % (ref 40.4–52.2)
HGB BLD-MCNC: 14.8 G/DL (ref 13.7–17.6)
HGB UR QL STRIP.AUTO: ABNORMAL
HYALINE CASTS UR QL AUTO: ABNORMAL /LPF
IMM GRANULOCYTES # BLD: 0.05 10*3/MM3 (ref 0–0.03)
IMM GRANULOCYTES NFR BLD: 0.7 % (ref 0–0.5)
KETONES UR QL STRIP: NEGATIVE
LEUKOCYTE ESTERASE UR QL STRIP.AUTO: ABNORMAL
LYMPHOCYTES # BLD AUTO: 1.27 10*3/MM3 (ref 0.9–4.8)
LYMPHOCYTES NFR BLD AUTO: 18.1 % (ref 19.6–45.3)
MCH RBC QN AUTO: 29.9 PG (ref 27–32.7)
MCHC RBC AUTO-ENTMCNC: 32.2 G/DL (ref 32.6–36.4)
MCV RBC AUTO: 92.7 FL (ref 79.8–96.2)
MONOCYTES # BLD AUTO: 0.37 10*3/MM3 (ref 0.2–1.2)
MONOCYTES NFR BLD AUTO: 5.3 % (ref 5–12)
MUCOUS THREADS URNS QL MICRO: ABNORMAL /HPF
NEUTROPHILS # BLD AUTO: 4.98 10*3/MM3 (ref 1.9–8.1)
NEUTROPHILS NFR BLD AUTO: 70.9 % (ref 42.7–76)
NITRITE UR QL STRIP: NEGATIVE
NRBC BLD MANUAL-RTO: 0 /100 WBC (ref 0–0)
PH UR STRIP.AUTO: 6.5 [PH] (ref 5–8)
PLATELET # BLD AUTO: 307 10*3/MM3 (ref 140–500)
PMV BLD AUTO: 9.8 FL (ref 6–12)
POTASSIUM SERPL-SCNC: 5 MMOL/L (ref 3.5–5.2)
PROT SERPL-MCNC: 7.1 G/DL (ref 6–8.5)
PROT UR QL STRIP: NEGATIVE
RBC # BLD AUTO: 4.95 10*6/MM3 (ref 4.6–6)
RBC # UR: ABNORMAL /HPF
REF LAB TEST METHOD: ABNORMAL
SODIUM SERPL-SCNC: 137 MMOL/L (ref 136–145)
SP GR UR STRIP: 1.02 (ref 1–1.03)
SQUAMOUS #/AREA URNS HPF: ABNORMAL /HPF
UROBILINOGEN UR QL STRIP: ABNORMAL
WBC NRBC COR # BLD: 7.02 10*3/MM3 (ref 4.5–10.7)
WBC UR QL AUTO: ABNORMAL /HPF

## 2018-08-06 PROCEDURE — 85025 COMPLETE CBC W/AUTO DIFF WBC: CPT | Performed by: INTERNAL MEDICINE

## 2018-08-06 PROCEDURE — 99214 OFFICE O/P EST MOD 30 MIN: CPT | Performed by: INTERNAL MEDICINE

## 2018-08-06 PROCEDURE — 81001 URINALYSIS AUTO W/SCOPE: CPT | Performed by: INTERNAL MEDICINE

## 2018-08-06 NOTE — TELEPHONE ENCOUNTER
----- Message from Jami Love sent at 8/6/2018  9:01 AM EDT -----  Contact: Patient  Patient would like a call back from Dr. Lai regarding his legs being weak. Please advise    Patient:[phone]    Pharmacy:[facility] [address] [place] [address] 53 AT [facility] [address] - [phone]  - [phone] FX

## 2018-08-06 NOTE — TELEPHONE ENCOUNTER
[phone]    Pt called stating he was at his PCP due to his legs feeling really wobbly and weak.  Pt states they are running blood work and a UA to make sure his prior infection is gone.  His PCP does not think he can do a treadmill tomorrow, due to the weakness.  Can you advise?    Thanks, [facility] RMA

## 2018-08-06 NOTE — PROGRESS NOTES
Subjective   Guero Garay is a 83 y.o. male.     Weakness - Generalized   This is a new problem. The current episode started in the past 7 days. Associated symptoms include fatigue. Pertinent negatives include no chest pain.        The following portions of the patient's history were reviewed and updated as appropriate: allergies, current medications, past family history, past medical history, past social history, past surgical history and problem list.    Review of Systems   Constitutional: Positive for fatigue.        [facility]  Last week Since coming home has been weaker & unsteady on his feet Hands & feet feel numb Has difficulty walking    HENT: Negative.    Eyes: Negative.    Respiratory: Positive for shortness of breath (All the timeWorse W/ activity).    Cardiovascular: Negative for chest pain and palpitations.        Is in atrial fibrillation Has pacer in place Sees Dr Galan   Gastrointestinal: Positive for constipation (Had dark stool today).   Genitourinary:        Was in hospital last week W/ UTI & sepsis       Objective   Physical Exam   Constitutional: He is oriented to person, place, and time. He appears well-developed.   HENT:   Head: Normocephalic.   Eyes: EOM are normal.   Neck: Neck supple.   Cardiovascular: Normal rate, regular rhythm and normal heart sounds.    Repeat 100/70 seated 80/50 erect   Pulmonary/Chest: Effort normal and breath sounds normal.   Musculoskeletal: Normal range of motion.   Neurological: He is alert and oriented to person, place, and time.       Assessment/Plan   Guero was seen today for extremity weakness.    Diagnoses and all orders for this visit:    Weakness    Idiopathic hypotension    Sepsis associated hypotension (CMS/HCC)    Coronary artery disease involving native coronary artery of native heart with unstable angina pectoris (CMS/HCC)        Stop Tenoretic Call & cancel stresstest for tomorrow

## 2018-08-07 ENCOUNTER — APPOINTMENT (OUTPATIENT)
Dept: CARDIOLOGY | Facility: HOSPITAL | Age: 83
End: 2018-08-07
Attending: INTERNAL MEDICINE

## 2018-08-07 DIAGNOSIS — R79.89 ELEVATED LFTS: Primary | ICD-10-CM

## 2018-08-08 ENCOUNTER — TELEPHONE (OUTPATIENT)
Dept: CARDIOLOGY | Facility: CLINIC | Age: 83
End: 2018-08-08

## 2018-08-08 LAB
BACTERIA UR CULT: NORMAL
Lab: NO GROWTH

## 2018-08-08 NOTE — TELEPHONE ENCOUNTER
[phone]    Pt called stating his BP is all over the place.  He states his BP on 8/7/18 was 84/69 P 93 and today 94/57 P122.  Pt is c/o some SOA, lightheaded and tingling in toes and fingers.    Pt denies CP, face, neck or jaw pain, fatigue or swelling.      He is taking all meds as rx'd on his list, please advise.      [facility]

## 2018-08-08 NOTE — TELEPHONE ENCOUNTER
He is not on any BP meds needs to make sure he is hydratd and compression stockings may help.CHRIS

## 2018-08-09 ENCOUNTER — READMISSION MANAGEMENT (OUTPATIENT)
Dept: CALL CENTER | Facility: HOSPITAL | Age: 83
End: 2018-08-09

## 2018-08-09 NOTE — OUTREACH NOTE
"Sepsis Week 1 Survey      Responses   Facility patient discharged from?  [place]   Does the patient have one of the following disease processes/diagnoses(primary or secondary)?  Sepsis   Call start time  1054   Call end time  1113   Discharge diagnosis   septic shock and acute pyelonephritis with acute kidney injury gross hematuria    Meds reviewed with patient/caregiver?  Yes   Is the patient having any side effects they believe may be caused by any medication additions or changes?  No   Does the patient have all medications related to this admission filled (includes all antibiotics, inhalers, nebulizers,steroids,etc.)  Yes   Is the patient taking all medications as directed (includes completed medication regime)?  Yes   Medication comments  PCP stopped Tenoretic. Pt is also off of ABX.   Does the patient have a primary care provider?   Yes   Does the patient have an appointment with their PCP within 7 days of discharge?  Yes   Has the patient kept scheduled appointments due by today?  Yes   Has home health visited the patient within 72 hours of discharge?  N/A   Psychosocial issues?  No   Comments  He complains that since yesterday his HR goes up into the 125s after his Tenoretic was DCd. SBP fluctuates between 90s-110s. Complains that he is still weak,  states that his feet are \"like Jello.\" Waiting for a call back from cardiology. He doesnt attribute his sx to infection,  thinks it is due to afib.   Did the patient receive a copy of their discharge instructions?  Yes   Nursing interventions  Reviewed instructions with patient   What is the patient's perception of their health status since discharge?  Improving   Nursing interventions  Nurse provided patient education, Advised patient to call provider   Is the patient/caregiver able to teach back Sepsis?  S - Shivering,fever or very cold   Nursing interventions  Nurse provided patient education, Advised patient to seek immediate care, Advised patient to call " provider   Is patient/caregiver able to teach back steps to recovery at home?  Rest and regain strength, Set small, achievable goals for return to baseline health, Eat a balanced diet, Exercise as tolerated   Is the patient/caregiver able to teach back signs and symptoms of worsening condition:  Fever, Hyperthermia, Rapid heart rate (>90)   Is the patient/caregiver able to teach back the hierarchy of who to call/visit for symptoms/problems? PCP, Specialist, Home health nurse, Urgent Care, ED, 911  Yes   Additional teach back comments  Waiting on callback from [facility]. Advised pt to consider going to [facility] if his HR is in the 125 range and he is having significant weakness. He states that he is very close to going and will go if anything gets worse.          Rsahawn Pack RN

## 2018-08-09 NOTE — TELEPHONE ENCOUNTER
Pt called back stating his lasts 4 BPs have been as follows    86/65   P 125  98/69   P 120  100/69   P 133    Pt is concerned and would like to know what to do. He states he gets dizzy upon standing and a tinge of CP.  Please advise.      [facility] RMA

## 2018-08-10 ENCOUNTER — HOSPITAL ENCOUNTER (INPATIENT)
Facility: HOSPITAL | Age: 83
LOS: 5 days | Discharge: HOME | End: 2018-08-15
Attending: EMERGENCY MEDICINE | Admitting: INTERNAL MEDICINE

## 2018-08-10 ENCOUNTER — OFFICE VISIT (OUTPATIENT)
Dept: CARDIOLOGY | Facility: CLINIC | Age: 83
End: 2018-08-10

## 2018-08-10 ENCOUNTER — HOSPITAL ENCOUNTER (OUTPATIENT)
Dept: CARDIOLOGY | Facility: HOSPITAL | Age: 83
Discharge: HOME | End: 2018-08-10
Admitting: INTERNAL MEDICINE

## 2018-08-10 ENCOUNTER — APPOINTMENT (OUTPATIENT)
Dept: CT IMAGING | Facility: HOSPITAL | Age: 83
End: 2018-08-10

## 2018-08-10 ENCOUNTER — TELEPHONE (OUTPATIENT)
Dept: INTERNAL MEDICINE | Facility: CLINIC | Age: 83
End: 2018-08-10

## 2018-08-10 ENCOUNTER — HOSPITAL ENCOUNTER (OUTPATIENT)
Dept: CARDIOLOGY | Facility: HOSPITAL | Age: 83
Discharge: HOME | End: 2018-08-10
Attending: INTERNAL MEDICINE

## 2018-08-10 ENCOUNTER — APPOINTMENT (OUTPATIENT)
Dept: GENERAL RADIOLOGY | Facility: HOSPITAL | Age: 83
End: 2018-08-10

## 2018-08-10 VITALS
SYSTOLIC BLOOD PRESSURE: 84 MMHG | DIASTOLIC BLOOD PRESSURE: 60 MMHG | HEART RATE: 100 BPM | HEIGHT: 68 IN | WEIGHT: 215 LBS | BODY MASS INDEX: 32.58 KG/M2

## 2018-08-10 VITALS
SYSTOLIC BLOOD PRESSURE: 84 MMHG | WEIGHT: 215 LBS | HEART RATE: 101 BPM | BODY MASS INDEX: 32.58 KG/M2 | HEIGHT: 68 IN | DIASTOLIC BLOOD PRESSURE: 60 MMHG

## 2018-08-10 DIAGNOSIS — I48.91 ATRIAL FIBRILLATION WITH RVR (HCC): ICD-10-CM

## 2018-08-10 DIAGNOSIS — Z74.09 DECREASED FUNCTIONAL MOBILITY AND ENDURANCE: ICD-10-CM

## 2018-08-10 DIAGNOSIS — R06.02 SHORTNESS OF BREATH: ICD-10-CM

## 2018-08-10 DIAGNOSIS — N17.9 AKI (ACUTE KIDNEY INJURY) (HCC): Primary | ICD-10-CM

## 2018-08-10 DIAGNOSIS — I48.0 PAROXYSMAL ATRIAL FIBRILLATION (HCC): ICD-10-CM

## 2018-08-10 DIAGNOSIS — I10 ESSENTIAL HYPERTENSION: ICD-10-CM

## 2018-08-10 DIAGNOSIS — I25.110 CORONARY ARTERY DISEASE INVOLVING NATIVE CORONARY ARTERY OF NATIVE HEART WITH UNSTABLE ANGINA PECTORIS (HCC): Primary | ICD-10-CM

## 2018-08-10 DIAGNOSIS — Z95.0 PACEMAKER: ICD-10-CM

## 2018-08-10 DIAGNOSIS — E78.2 MIXED HYPERLIPIDEMIA: ICD-10-CM

## 2018-08-10 DIAGNOSIS — R33.9 URINARY RETENTION: ICD-10-CM

## 2018-08-10 DIAGNOSIS — I25.110 CORONARY ARTERY DISEASE INVOLVING NATIVE CORONARY ARTERY OF NATIVE HEART WITH UNSTABLE ANGINA PECTORIS (HCC): ICD-10-CM

## 2018-08-10 PROBLEM — J45.30 MILD PERSISTENT ASTHMA WITHOUT COMPLICATION: Status: ACTIVE | Noted: 2018-08-10

## 2018-08-10 PROBLEM — R79.89 ELEVATED D-DIMER: Status: ACTIVE | Noted: 2018-08-10

## 2018-08-10 PROBLEM — E86.0 DEHYDRATION: Status: ACTIVE | Noted: 2018-08-10

## 2018-08-10 PROBLEM — R19.5 HEME POSITIVE STOOL: Status: ACTIVE | Noted: 2018-08-10

## 2018-08-10 PROBLEM — R06.09 DYSPNEA ON EXERTION: Status: ACTIVE | Noted: 2018-08-10

## 2018-08-10 PROBLEM — N18.9 ACUTE KIDNEY INJURY SUPERIMPOSED ON CHRONIC KIDNEY DISEASE (HCC): Status: ACTIVE | Noted: 2018-08-10

## 2018-08-10 PROBLEM — Z78.9 ALCOHOL USE: Status: ACTIVE | Noted: 2018-08-10

## 2018-08-10 PROBLEM — R53.1 GENERALIZED WEAKNESS: Status: ACTIVE | Noted: 2018-08-10

## 2018-08-10 PROBLEM — F10.90 ALCOHOL USE: Status: ACTIVE | Noted: 2018-08-10

## 2018-08-10 PROBLEM — R79.89 ELEVATED LFTS: Status: ACTIVE | Noted: 2018-08-10

## 2018-08-10 PROBLEM — E87.5 HYPERKALEMIA: Status: ACTIVE | Noted: 2018-08-10

## 2018-08-10 LAB
ABO GROUP BLD: NORMAL
ALBUMIN SERPL-MCNC: 3.5 G/DL (ref 3.5–5.2)
ALBUMIN/GLOB SERPL: 0.9 G/DL
ALP SERPL-CCNC: 478 U/L (ref 39–117)
ALT SERPL W P-5'-P-CCNC: 104 U/L (ref 1–41)
ANION GAP SERPL CALCULATED.3IONS-SCNC: 18.4 MMOL/L
ASCENDING AORTA: 3.1 CM
AST SERPL-CCNC: 48 U/L (ref 1–40)
BASOPHILS # BLD AUTO: 0.04 10*3/MM3 (ref 0–0.2)
BASOPHILS NFR BLD AUTO: 0.7 % (ref 0–1.5)
BH CV ECHO MEAS - ACS: 1.3 CM
BH CV ECHO MEAS - AO MAX PG (FULL): 17.6 MMHG
BH CV ECHO MEAS - AO MAX PG: 20.6 MMHG
BH CV ECHO MEAS - AO MEAN PG (FULL): 12 MMHG
BH CV ECHO MEAS - AO MEAN PG: 14 MMHG
BH CV ECHO MEAS - AO ROOT AREA (BSA CORRECTED): 1.7
BH CV ECHO MEAS - AO ROOT AREA: 10.3 CM^2
BH CV ECHO MEAS - AO ROOT DIAM: 3.6 CM
BH CV ECHO MEAS - AO V2 MAX: 227 CM/SEC
BH CV ECHO MEAS - AO V2 MEAN: 178.1 CM/SEC
BH CV ECHO MEAS - AO V2 VTI: 37.6 CM
BH CV ECHO MEAS - AVA(I,A): 1.3 CM^2
BH CV ECHO MEAS - AVA(I,D): 1.3 CM^2
BH CV ECHO MEAS - AVA(V,A): 1.2 CM^2
BH CV ECHO MEAS - AVA(V,D): 1.2 CM^2
BH CV ECHO MEAS - BSA(HAYCOCK): 2.2 M^2
BH CV ECHO MEAS - BSA: 2.1 M^2
BH CV ECHO MEAS - BZI_BMI: 32.7 KILOGRAMS/M^2
BH CV ECHO MEAS - BZI_METRIC_HEIGHT: 172.7 CM
BH CV ECHO MEAS - BZI_METRIC_WEIGHT: 97.5 KG
BH CV ECHO MEAS - EDV(MOD-SP2): 51 ML
BH CV ECHO MEAS - EDV(MOD-SP4): 37 ML
BH CV ECHO MEAS - EDV(TEICH): 155.1 ML
BH CV ECHO MEAS - EF(CUBED): 65 %
BH CV ECHO MEAS - EF(MOD-BP): 55 %
BH CV ECHO MEAS - EF(TEICH): 55.8 %
BH CV ECHO MEAS - ESV(MOD-SP2): 24 ML
BH CV ECHO MEAS - ESV(MOD-SP4): 15 ML
BH CV ECHO MEAS - ESV(TEICH): 68.5 ML
BH CV ECHO MEAS - FS: 29.5 %
BH CV ECHO MEAS - IVS/LVPW: 1.2
BH CV ECHO MEAS - IVSD: 1.4 CM
BH CV ECHO MEAS - LAT PEAK E' VEL: 11 CM/SEC
BH CV ECHO MEAS - LV DIASTOLIC VOL/BSA (35-75): 17.6 ML/M^2
BH CV ECHO MEAS - LV MASS(C)D: 312.2 GRAMS
BH CV ECHO MEAS - LV MASS(C)DI: 148.1 GRAMS/M^2
BH CV ECHO MEAS - LV MAX PG: 3 MMHG
BH CV ECHO MEAS - LV MEAN PG: 2 MMHG
BH CV ECHO MEAS - LV SYSTOLIC VOL/BSA (12-30): 7.1 ML/M^2
BH CV ECHO MEAS - LV V1 MAX: 86.8 CM/SEC
BH CV ECHO MEAS - LV V1 MEAN: 66.7 CM/SEC
BH CV ECHO MEAS - LV V1 VTI: 15.3 CM
BH CV ECHO MEAS - LVIDD: 5.6 CM
BH CV ECHO MEAS - LVIDS: 4 CM
BH CV ECHO MEAS - LVLD AP2: 5.7 CM
BH CV ECHO MEAS - LVLD AP4: 6 CM
BH CV ECHO MEAS - LVLS AP2: 5.3 CM
BH CV ECHO MEAS - LVLS AP4: 5.2 CM
BH CV ECHO MEAS - LVOT AREA (M): 3.1 CM^2
BH CV ECHO MEAS - LVOT AREA: 3.2 CM^2
BH CV ECHO MEAS - LVOT DIAM: 2 CM
BH CV ECHO MEAS - LVPWD: 1.2 CM
BH CV ECHO MEAS - MED PEAK E' VEL: 8 CM/SEC
BH CV ECHO MEAS - MV A DUR: 0.15 SEC
BH CV ECHO MEAS - MV A MAX VEL: 100.6 CM/SEC
BH CV ECHO MEAS - MV DEC SLOPE: 362.5 CM/SEC^2
BH CV ECHO MEAS - MV DEC TIME: 0.18 SEC
BH CV ECHO MEAS - MV E MAX VEL: 62.9 CM/SEC
BH CV ECHO MEAS - MV E/A: 0.62
BH CV ECHO MEAS - MV MAX PG: 4.2 MMHG
BH CV ECHO MEAS - MV MEAN PG: 1.9 MMHG
BH CV ECHO MEAS - MV P1/2T MAX VEL: 65.6 CM/SEC
BH CV ECHO MEAS - MV P1/2T: 53 MSEC
BH CV ECHO MEAS - MV V2 MAX: 102.1 CM/SEC
BH CV ECHO MEAS - MV V2 MEAN: 61.7 CM/SEC
BH CV ECHO MEAS - MV V2 VTI: 16.3 CM
BH CV ECHO MEAS - MVA P1/2T LCG: 3.4 CM^2
BH CV ECHO MEAS - MVA(P1/2T): 4.1 CM^2
BH CV ECHO MEAS - MVA(VTI): 3 CM^2
BH CV ECHO MEAS - PA MAX PG (FULL): 0.85 MMHG
BH CV ECHO MEAS - PA MAX PG: 2.8 MMHG
BH CV ECHO MEAS - PA V2 MAX: 84.2 CM/SEC
BH CV ECHO MEAS - PULM A REVS DUR: 0.12 SEC
BH CV ECHO MEAS - PULM A REVS VEL: 34.8 CM/SEC
BH CV ECHO MEAS - PULM DIAS VEL: 30 CM/SEC
BH CV ECHO MEAS - PULM S/D: 2.3
BH CV ECHO MEAS - PULM SYS VEL: 69 CM/SEC
BH CV ECHO MEAS - RAP SYSTOLE: 3 MMHG
BH CV ECHO MEAS - RV MAX PG: 2 MMHG
BH CV ECHO MEAS - RV MEAN PG: 1.2 MMHG
BH CV ECHO MEAS - RV V1 MAX: 70.3 CM/SEC
BH CV ECHO MEAS - RV V1 MEAN: 49.1 CM/SEC
BH CV ECHO MEAS - RV V1 VTI: 10.4 CM
BH CV ECHO MEAS - RVSP: 18.8 MMHG
BH CV ECHO MEAS - SI(AO): 183.5 ML/M^2
BH CV ECHO MEAS - SI(CUBED): 54.8 ML/M^2
BH CV ECHO MEAS - SI(LVOT): 23.2 ML/M^2
BH CV ECHO MEAS - SI(MOD-SP2): 12.8 ML/M^2
BH CV ECHO MEAS - SI(MOD-SP4): 10.4 ML/M^2
BH CV ECHO MEAS - SI(TEICH): 41.1 ML/M^2
BH CV ECHO MEAS - SUP REN AO DIAM: 2.3 CM
BH CV ECHO MEAS - SV(AO): 386.8 ML
BH CV ECHO MEAS - SV(CUBED): 115.5 ML
BH CV ECHO MEAS - SV(LVOT): 49 ML
BH CV ECHO MEAS - SV(MOD-SP2): 27 ML
BH CV ECHO MEAS - SV(MOD-SP4): 22 ML
BH CV ECHO MEAS - SV(TEICH): 86.6 ML
BH CV ECHO MEAS - TR MAX VEL: 198.7 CM/SEC
BH CV ECHO MEASUREMENTS AVERAGE E/E' RATIO: 6.62
BILIRUB SERPL-MCNC: 0.5 MG/DL (ref 0.1–1.2)
BILIRUB UR QL STRIP: NEGATIVE
BLD GP AB SCN SERPL QL: NEGATIVE
BUN BLD-MCNC: 29 MG/DL (ref 8–23)
BUN/CREAT SERPL: 13.9 (ref 7–25)
CALCIUM SPEC-SCNC: 9.8 MG/DL (ref 8.6–10.5)
CHLORIDE SERPL-SCNC: 99 MMOL/L (ref 98–107)
CLARITY UR: CLEAR
CO2 SERPL-SCNC: 19.6 MMOL/L (ref 22–29)
COLOR UR: YELLOW
CREAT BLD-MCNC: 2.08 MG/DL (ref 0.76–1.27)
D DIMER PPP FEU-MCNC: 1.13 MCGFEU/ML (ref 0–0.49)
D-LACTATE SERPL-SCNC: 2 MMOL/L (ref 0.5–2)
D-LACTATE SERPL-SCNC: 2.2 MMOL/L (ref 0.5–2)
DEPRECATED RDW RBC AUTO: 49.7 FL (ref 37–54)
EOSINOPHIL # BLD AUTO: 0.23 10*3/MM3 (ref 0–0.7)
EOSINOPHIL NFR BLD AUTO: 4.2 % (ref 0.3–6.2)
ERYTHROCYTE [DISTWIDTH] IN BLOOD BY AUTOMATED COUNT: 14.7 % (ref 11.5–14.5)
ETHANOL BLD-MCNC: <10 MG/DL (ref 0–10)
ETHANOL UR QL: <0.01 %
GFR SERPL CREATININE-BSD FRML MDRD: 31 ML/MIN/1.73
GLOBULIN UR ELPH-MCNC: 4 GM/DL
GLUCOSE BLD-MCNC: 93 MG/DL (ref 65–99)
GLUCOSE UR STRIP-MCNC: NEGATIVE MG/DL
HCT VFR BLD AUTO: 48.2 % (ref 40.4–52.2)
HGB BLD-MCNC: 15.2 G/DL (ref 13.7–17.6)
HGB UR QL STRIP.AUTO: NEGATIVE
HOLD SPECIMEN: NORMAL
IMM GRANULOCYTES # BLD: 0 10*3/MM3 (ref 0–0.03)
IMM GRANULOCYTES NFR BLD: 0 % (ref 0–0.5)
INR PPP: 1.29 (ref 0.9–1.1)
KETONES UR QL STRIP: NEGATIVE
LEFT ATRIUM VOLUME INDEX: 22 ML/M2
LEUKOCYTE ESTERASE UR QL STRIP.AUTO: NEGATIVE
LYMPHOCYTES # BLD AUTO: 1.07 10*3/MM3 (ref 0.9–4.8)
LYMPHOCYTES NFR BLD AUTO: 19.3 % (ref 19.6–45.3)
MCH RBC QN AUTO: 29.5 PG (ref 27–32.7)
MCHC RBC AUTO-ENTMCNC: 31.5 G/DL (ref 32.6–36.4)
MCV RBC AUTO: 93.6 FL (ref 79.8–96.2)
MONOCYTES # BLD AUTO: 0.4 10*3/MM3 (ref 0.2–1.2)
MONOCYTES NFR BLD AUTO: 7.2 % (ref 5–12)
NEUTROPHILS # BLD AUTO: 3.8 10*3/MM3 (ref 1.9–8.1)
NEUTROPHILS NFR BLD AUTO: 68.6 % (ref 42.7–76)
NITRITE UR QL STRIP: NEGATIVE
NT-PROBNP SERPL-MCNC: 1113 PG/ML (ref 0–1800)
PH UR STRIP.AUTO: 6 [PH] (ref 5–8)
PLATELET # BLD AUTO: 261 10*3/MM3 (ref 140–500)
PMV BLD AUTO: 9.6 FL (ref 6–12)
POTASSIUM BLD-SCNC: 5.3 MMOL/L (ref 3.5–5.2)
PROT SERPL-MCNC: 7.5 G/DL (ref 6–8.5)
PROT UR QL STRIP: NEGATIVE
PROTHROMBIN TIME: 15.9 SECONDS (ref 11.7–14.2)
RBC # BLD AUTO: 5.15 10*6/MM3 (ref 4.6–6)
RH BLD: NEGATIVE
SINUS: 2.9 CM
SODIUM BLD-SCNC: 137 MMOL/L (ref 136–145)
SP GR UR STRIP: 1.02 (ref 1–1.03)
STJ: 3.3 CM
T&S EXPIRATION DATE: NORMAL
TROPONIN T SERPL-MCNC: <0.01 NG/ML (ref 0–0.03)
TROPONIN T SERPL-MCNC: <0.01 NG/ML (ref 0–0.03)
TSH SERPL DL<=0.05 MIU/L-ACNC: 1.63 MIU/ML (ref 0.27–4.2)
UROBILINOGEN UR QL STRIP: NORMAL
WBC NRBC COR # BLD: 5.54 10*3/MM3 (ref 4.5–10.7)

## 2018-08-10 PROCEDURE — 86901 BLOOD TYPING SEROLOGIC RH(D): CPT | Performed by: EMERGENCY MEDICINE

## 2018-08-10 PROCEDURE — 74176 CT ABD & PELVIS W/O CONTRAST: CPT

## 2018-08-10 PROCEDURE — 84443 ASSAY THYROID STIM HORMONE: CPT | Performed by: INTERNAL MEDICINE

## 2018-08-10 PROCEDURE — 51702 INSERT TEMP BLADDER CATH: CPT

## 2018-08-10 PROCEDURE — 93005 ELECTROCARDIOGRAM TRACING: CPT | Performed by: EMERGENCY MEDICINE

## 2018-08-10 PROCEDURE — 84484 ASSAY OF TROPONIN QUANT: CPT | Performed by: EMERGENCY MEDICINE

## 2018-08-10 PROCEDURE — 85610 PROTHROMBIN TIME: CPT | Performed by: EMERGENCY MEDICINE

## 2018-08-10 PROCEDURE — 36415 COLL VENOUS BLD VENIPUNCTURE: CPT | Performed by: INTERNAL MEDICINE

## 2018-08-10 PROCEDURE — 25010000002 THIAMINE PER 100 MG: Performed by: INTERNAL MEDICINE

## 2018-08-10 PROCEDURE — 81003 URINALYSIS AUTO W/O SCOPE: CPT | Performed by: EMERGENCY MEDICINE

## 2018-08-10 PROCEDURE — 85018 HEMOGLOBIN: CPT | Performed by: INTERNAL MEDICINE

## 2018-08-10 PROCEDURE — 85014 HEMATOCRIT: CPT | Performed by: INTERNAL MEDICINE

## 2018-08-10 PROCEDURE — 84484 ASSAY OF TROPONIN QUANT: CPT | Performed by: INTERNAL MEDICINE

## 2018-08-10 PROCEDURE — 86900 BLOOD TYPING SEROLOGIC ABO: CPT | Performed by: EMERGENCY MEDICINE

## 2018-08-10 PROCEDURE — 86850 RBC ANTIBODY SCREEN: CPT | Performed by: EMERGENCY MEDICINE

## 2018-08-10 PROCEDURE — 80307 DRUG TEST PRSMV CHEM ANLYZR: CPT | Performed by: EMERGENCY MEDICINE

## 2018-08-10 PROCEDURE — 85379 FIBRIN DEGRADATION QUANT: CPT | Performed by: EMERGENCY MEDICINE

## 2018-08-10 PROCEDURE — 93306 TTE W/DOPPLER COMPLETE: CPT | Performed by: INTERNAL MEDICINE

## 2018-08-10 PROCEDURE — 83605 ASSAY OF LACTIC ACID: CPT | Performed by: EMERGENCY MEDICINE

## 2018-08-10 PROCEDURE — 83880 ASSAY OF NATRIURETIC PEPTIDE: CPT | Performed by: INTERNAL MEDICINE

## 2018-08-10 PROCEDURE — 93306 TTE W/DOPPLER COMPLETE: CPT

## 2018-08-10 PROCEDURE — 93010 ELECTROCARDIOGRAM REPORT: CPT | Performed by: INTERNAL MEDICINE

## 2018-08-10 PROCEDURE — 82533 TOTAL CORTISOL: CPT | Performed by: INTERNAL MEDICINE

## 2018-08-10 PROCEDURE — 99285 EMERGENCY DEPT VISIT HI MDM: CPT

## 2018-08-10 PROCEDURE — 71046 X-RAY EXAM CHEST 2 VIEWS: CPT

## 2018-08-10 PROCEDURE — 36415 COLL VENOUS BLD VENIPUNCTURE: CPT

## 2018-08-10 PROCEDURE — 80053 COMPREHEN METABOLIC PANEL: CPT | Performed by: EMERGENCY MEDICINE

## 2018-08-10 PROCEDURE — 94640 AIRWAY INHALATION TREATMENT: CPT

## 2018-08-10 PROCEDURE — 99214 OFFICE O/P EST MOD 30 MIN: CPT | Performed by: INTERNAL MEDICINE

## 2018-08-10 PROCEDURE — 94799 UNLISTED PULMONARY SVC/PX: CPT

## 2018-08-10 PROCEDURE — 85025 COMPLETE CBC W/AUTO DIFF WBC: CPT | Performed by: EMERGENCY MEDICINE

## 2018-08-10 RX ORDER — DOCUSATE SODIUM 100 MG/1
100 CAPSULE, LIQUID FILLED ORAL 2 TIMES DAILY
Status: DISCONTINUED | OUTPATIENT
Start: 2018-08-10 | End: 2018-08-15 | Stop reason: HOSPADM

## 2018-08-10 RX ORDER — ONDANSETRON 4 MG/1
4 TABLET, FILM COATED ORAL EVERY 6 HOURS PRN
Status: DISCONTINUED | OUTPATIENT
Start: 2018-08-10 | End: 2018-08-15 | Stop reason: HOSPADM

## 2018-08-10 RX ORDER — BUDESONIDE AND FORMOTEROL FUMARATE DIHYDRATE 160; 4.5 UG/1; UG/1
2 AEROSOL RESPIRATORY (INHALATION)
Status: DISCONTINUED | OUTPATIENT
Start: 2018-08-11 | End: 2018-08-15 | Stop reason: HOSPADM

## 2018-08-10 RX ORDER — CHOLECALCIFEROL (VITAMIN D3) 125 MCG
5 CAPSULE ORAL NIGHTLY PRN
Status: DISCONTINUED | OUTPATIENT
Start: 2018-08-10 | End: 2018-08-15 | Stop reason: HOSPADM

## 2018-08-10 RX ORDER — LORAZEPAM 2 MG/ML
1 INJECTION INTRAMUSCULAR
Status: DISCONTINUED | OUTPATIENT
Start: 2018-08-10 | End: 2018-08-15 | Stop reason: HOSPADM

## 2018-08-10 RX ORDER — THIAMINE MONONITRATE (VIT B1) 100 MG
100 TABLET ORAL DAILY
Status: COMPLETED | OUTPATIENT
Start: 2018-08-11 | End: 2018-08-13

## 2018-08-10 RX ORDER — ALBUTEROL SULFATE 2.5 MG/3ML
2.5 SOLUTION RESPIRATORY (INHALATION)
Status: DISCONTINUED | OUTPATIENT
Start: 2018-08-11 | End: 2018-08-15 | Stop reason: HOSPADM

## 2018-08-10 RX ORDER — CALCIUM CARBONATE 200(500)MG
2 TABLET,CHEWABLE ORAL 2 TIMES DAILY PRN
Status: DISCONTINUED | OUTPATIENT
Start: 2018-08-10 | End: 2018-08-15 | Stop reason: HOSPADM

## 2018-08-10 RX ORDER — DIPHENOXYLATE HYDROCHLORIDE AND ATROPINE SULFATE 2.5; .025 MG/1; MG/1
1 TABLET ORAL DAILY
Status: COMPLETED | OUTPATIENT
Start: 2018-08-11 | End: 2018-08-13

## 2018-08-10 RX ORDER — HEPARIN SODIUM 5000 [USP'U]/ML
5000 INJECTION, SOLUTION INTRAVENOUS; SUBCUTANEOUS EVERY 12 HOURS SCHEDULED
Status: DISCONTINUED | OUTPATIENT
Start: 2018-08-10 | End: 2018-08-10

## 2018-08-10 RX ORDER — THIAMINE MONONITRATE (VIT B1) 100 MG
100 TABLET ORAL ONCE
Status: COMPLETED | OUTPATIENT
Start: 2018-08-10 | End: 2018-08-10

## 2018-08-10 RX ORDER — SODIUM CHLORIDE 0.9 % (FLUSH) 0.9 %
10 SYRINGE (ML) INJECTION AS NEEDED
Status: DISCONTINUED | OUTPATIENT
Start: 2018-08-10 | End: 2018-08-15 | Stop reason: HOSPADM

## 2018-08-10 RX ORDER — ONDANSETRON 4 MG/1
4 TABLET, ORALLY DISINTEGRATING ORAL EVERY 6 HOURS PRN
Status: DISCONTINUED | OUTPATIENT
Start: 2018-08-10 | End: 2018-08-15 | Stop reason: HOSPADM

## 2018-08-10 RX ORDER — ROSUVASTATIN CALCIUM 20 MG/1
20 TABLET, COATED ORAL DAILY
Status: DISCONTINUED | OUTPATIENT
Start: 2018-08-11 | End: 2018-08-15 | Stop reason: HOSPADM

## 2018-08-10 RX ORDER — ASPIRIN 81 MG/1
81 TABLET, CHEWABLE ORAL DAILY
Status: DISCONTINUED | OUTPATIENT
Start: 2018-08-11 | End: 2018-08-15 | Stop reason: HOSPADM

## 2018-08-10 RX ORDER — LORAZEPAM 2 MG/ML
2 INJECTION INTRAMUSCULAR
Status: DISCONTINUED | OUTPATIENT
Start: 2018-08-10 | End: 2018-08-15 | Stop reason: HOSPADM

## 2018-08-10 RX ORDER — NITROGLYCERIN 0.4 MG/1
0.4 TABLET SUBLINGUAL
Status: DISCONTINUED | OUTPATIENT
Start: 2018-08-10 | End: 2018-08-15 | Stop reason: HOSPADM

## 2018-08-10 RX ORDER — METOPROLOL TARTRATE 5 MG/5ML
2.5 INJECTION INTRAVENOUS ONCE
Status: DISCONTINUED | OUTPATIENT
Start: 2018-08-10 | End: 2018-08-14

## 2018-08-10 RX ORDER — LORAZEPAM 1 MG/1
2 TABLET ORAL
Status: DISCONTINUED | OUTPATIENT
Start: 2018-08-10 | End: 2018-08-15 | Stop reason: HOSPADM

## 2018-08-10 RX ORDER — CRANBERRY FRUIT EXTRACT 250 MG
250 CAPSULE ORAL DAILY
COMMUNITY

## 2018-08-10 RX ORDER — ACETAMINOPHEN 325 MG/1
650 TABLET ORAL EVERY 4 HOURS PRN
Status: DISCONTINUED | OUTPATIENT
Start: 2018-08-10 | End: 2018-08-15 | Stop reason: HOSPADM

## 2018-08-10 RX ORDER — MULTIPLE VITAMINS W/ MINERALS TAB 9MG-400MCG
1 TAB ORAL DAILY
COMMUNITY

## 2018-08-10 RX ORDER — SODIUM CHLORIDE 0.9 % (FLUSH) 0.9 %
1-10 SYRINGE (ML) INJECTION AS NEEDED
Status: DISCONTINUED | OUTPATIENT
Start: 2018-08-10 | End: 2018-08-15 | Stop reason: HOSPADM

## 2018-08-10 RX ORDER — SULFAMETHOXAZOLE AND TRIMETHOPRIM 800; 160 MG/1; MG/1
1 TABLET ORAL EVERY 12 HOURS SCHEDULED
Status: DISCONTINUED | OUTPATIENT
Start: 2018-08-11 | End: 2018-08-11

## 2018-08-10 RX ORDER — FOLIC ACID 1 MG/1
1 TABLET ORAL DAILY
Status: COMPLETED | OUTPATIENT
Start: 2018-08-11 | End: 2018-08-13

## 2018-08-10 RX ORDER — ONDANSETRON 2 MG/ML
4 INJECTION INTRAMUSCULAR; INTRAVENOUS EVERY 6 HOURS PRN
Status: DISCONTINUED | OUTPATIENT
Start: 2018-08-10 | End: 2018-08-15 | Stop reason: HOSPADM

## 2018-08-10 RX ORDER — SODIUM CHLORIDE 9 MG/ML
9 INJECTION, SOLUTION INTRAVENOUS CONTINUOUS
Status: DISCONTINUED | OUTPATIENT
Start: 2018-08-10 | End: 2018-08-15 | Stop reason: HOSPADM

## 2018-08-10 RX ORDER — LORAZEPAM 1 MG/1
1 TABLET ORAL
Status: DISCONTINUED | OUTPATIENT
Start: 2018-08-10 | End: 2018-08-15 | Stop reason: HOSPADM

## 2018-08-10 RX ORDER — ALLOPURINOL 100 MG/1
100 TABLET ORAL DAILY
Status: DISCONTINUED | OUTPATIENT
Start: 2018-08-11 | End: 2018-08-15 | Stop reason: HOSPADM

## 2018-08-10 RX ORDER — SULFAMETHOXAZOLE AND TRIMETHOPRIM 800; 160 MG/1; MG/1
1 TABLET ORAL 2 TIMES DAILY
COMMUNITY
End: 2018-08-15 | Stop reason: HOSPADM

## 2018-08-10 RX ADMIN — THIAMINE HYDROCHLORIDE 100 MG: 100 INJECTION, SOLUTION INTRAMUSCULAR; INTRAVENOUS at 23:18

## 2018-08-10 RX ADMIN — SODIUM CHLORIDE 500 ML: 9 INJECTION, SOLUTION INTRAVENOUS at 16:32

## 2018-08-10 RX ADMIN — DOCUSATE SODIUM 100 MG: 100 CAPSULE, LIQUID FILLED ORAL at 22:25

## 2018-08-10 RX ADMIN — SODIUM CHLORIDE 1000 ML: 9 INJECTION, SOLUTION INTRAVENOUS at 17:41

## 2018-08-10 RX ADMIN — ALBUTEROL SULFATE 2.5 MG: 2.5 SOLUTION RESPIRATORY (INHALATION) at 23:42

## 2018-08-10 RX ADMIN — SODIUM CHLORIDE 100 ML/HR: 9 INJECTION, SOLUTION INTRAVENOUS at 22:25

## 2018-08-10 NOTE — PROGRESS NOTES
Date of Office Visit: 08/10/18  Encounter Provider: Avery Galan MD  Place of Service: [facility] CARDIOLOGY  Patient Name: Guero Garay  :1935  Referral Provider:Avery Galan MD      No chief complaint on file.    History of Present Illness  Mr Garay is a pleasant 82 yo gentleman with a history of aortic stenosis, hypertension, hyperlipidemia, and coronary artery disease.  He presented to our office in 2017 with progressive angina.  He then had a perfusion stress test that showed a large area of anterior and septal ischemia echocardiogram at that time showed mild aortic stenosis he then underwent cardiac catheterization 2017 he was found have preserved left ventricular ejection fraction severe disease of the mid left anterior descending and right coronary artery.  Had a 4.0 x 15 mm drug-eluting stent placed in the left anterior descending and a 4.0 x 28 mm drug-eluting stent dilated to 4.5 mm in the right coronary artery.  He was hypokalemic on admission started on potassium, he was also started on on rosuvastatin.    He then presented in 2017 with dizziness and syncope and bradycardia.  He ended up having a permanent pacemaker implanted.  He also had a tick bite but was negative for [place] spotted fever. He's coming in early because on his pacemaker interrogation he was found to have episodes of atrial fibrillation fairly prolonged and now occupying 10.6% burden.  He was having some shortness of breath he was scheduled for an outpatient perfusion stress test.      Coronary Artery Disease   Pertinent negatives include no dizziness, muscle weakness or weight gain. There is no history of past myocardial infarction.         Past Medical History:   Diagnosis Date   • Arthralgia    • CAD (coronary artery disease), native coronary artery    • Chest pain, unspecified    • Essential hypertension    • Gout    • History of prostate cancer    •  Jaw pain     both sides   • Mixed hyperlipidemia    • Osteoarthrosis    • Shoulder pain    • SOB (shortness of breath)    • Tinnitus          Past Surgical History:   Procedure Laterality Date   • CARDIAC CATHETERIZATION N/A 4/27/2017    Procedure: Coronary angiography;  Surgeon: Marvel Brito MD;  Location: [facility] CATH INVASIVE LOCATION;  Service:    • CARDIAC CATHETERIZATION N/A 4/27/2017    Procedure: Left Heart Cath;  Surgeon: Marvel Brito MD;  [facility];  Service:    • CARDIAC CATHETERIZATION N/A 4/27/2017    Procedure: Stent GIN coronary;  Surgeon: Marvel Brito MD;  [facility];  Service:    • CARDIAC ELECTROPHYSIOLOGY PROCEDURE N/A 6/15/2017    Procedure: Pacemaker DC new   [employer];  Surgeon: Gustavo Valladares MD;  [facility];  Service:    • COLONOSCOPY  2013   • INGUINAL HERNIA REPAIR Right    • NECK SURGERY      SPURS   • NJ RECONSTR TOTAL SHOULDER IMPLANT Right 2/16/2017    Procedure: RIGHT TOTAL SHOULDER ARTHROPLASTY;  Surgeon: Marquez Galvan MD;  Location: [facility] OR [facility];  Service: Orthopedics   • PROSTATECTOMY     • REPLACEMENT TOTAL KNEE Left    • TONSILLECTOMY     • TOTAL SHOULDER REPLACEMENT Bilateral          Current Outpatient Prescriptions on File Prior to Visit   Medication Sig Dispense Refill   • allopurinol (ZYLOPRIM) 100 MG tablet Take 1 tablet by mouth Daily. 90 tablet 3   • apixaban (ELIQUIS) 5 MG tablet tablet Take 1 tablet by mouth 2 (Two) Times a Day. 60 tablet 3   • aspirin 81 MG chewable tablet Chew 81 mg Daily.     • Fluticasone Furoate-Vilanterol (BREO ELLIPTA) 100-25 MCG/INH aerosol powder  Inhale 1 puff Every Morning.     • melatonin 5 MG tablet tablet Take 5 mg by mouth At Night As Needed.     • Multiple Vitamins-Minerals (MULTIVITAMIN PO) Take 1 tablet/day by mouth Daily.     • potassium chloride (MICRO-K) 10 MEQ CR capsule Take 1 capsule by mouth 2 (Two) Times a Day. 180 capsule 3    • rosuvastatin (CRESTOR) 20 MG tablet Take 1 tablet by mouth Daily. 90 tablet 3     No current facility-administered medications on file prior to visit.          Social History     Social History   • Marital status: Unknown     Spouse name: N/A   • Number of children: N/A   • Years of education: N/A     Occupational History   • Not on file.     Social History Main Topics   • Smoking status: Never Smoker   • Smokeless tobacco: Never Used      Comment: caffeine - 1 every 2-3 days   • Alcohol use 12.6 oz/week     21 Cans of beer per week      Comment: 2-3 beers daily   • Drug use: No   • Sexual activity: Defer     Other Topics Concern   • Not on file     Social History Narrative   • No narrative on file       Family History   Problem Relation Age of Onset   • Cancer Mother         lung   • Cancer Father         lung   • Colon cancer Neg Hx    • Colon polyps Neg Hx          Review of Systems   Constitution: Negative for decreased appetite, diaphoresis, fever, weakness, malaise/fatigue, weight gain and weight loss.   HENT: Negative for congestion, hearing loss, nosebleeds and tinnitus.    Eyes: Negative for blurred vision, double vision, vision loss in left eye, vision loss in right eye and visual disturbance.   Cardiovascular:        As noted in HPI   Respiratory:        As noted HPI   Endocrine: Negative for cold intolerance and heat intolerance.   Hematologic/Lymphatic: Negative for bleeding problem. Does not bruise/bleed easily.   Skin: Negative for color change, flushing, itching and rash.   Musculoskeletal: Negative for arthritis, back pain, joint pain, joint swelling, muscle weakness and myalgias.   Gastrointestinal: Negative for bloating, abdominal pain, constipation, diarrhea, dysphagia, heartburn, hematemesis, hematochezia, melena, nausea and vomiting.   Genitourinary: Negative for bladder incontinence, dysuria, frequency, nocturia and urgency.   Neurological: Negative for dizziness, focal weakness,  headaches, light-headedness, loss of balance, numbness, paresthesias and vertigo.   Psychiatric/Behavioral: Negative for depression, memory loss and substance abuse.       Procedures    Procedures        Objective:    There were no vitals taken for this visit.       Physical Exam  Physical Exam   Constitutional: He is oriented to person, place, and time. He appears well-developed and well-nourished. No distress.   HENT:   Head: Normocephalic.   Eyes: Pupils are equal, round, and reactive to light. Conjunctivae are normal. No scleral icterus.   Neck: Normal carotid pulses, no hepatojugular reflux and no JVD present. Carotid bruit is not present. No tracheal deviation, no edema and no erythema present. No thyromegaly present.   Cardiovascular: Normal rate, regular rhythm, S1 normal, S2 normal and intact distal pulses.   No extrasystoles are present. PMI is not displaced.  Exam reveals no gallop, no distant heart sounds and no friction rub.    Murmur heard.   Systolic murmur is present with a grade of 2/6  at the upper right sternal border  Pulses:       Carotid pulses are 2+ on the right side, and 2+ on the left side.       Radial pulses are 2+ on the right side, and 2+ on the left side.        Femoral pulses are 2+ on the right side, and 2+ on the left side.       Dorsalis pedis pulses are 2+ on the right side, and 2+ on the left side.        Posterior tibial pulses are 2+ on the right side, and 2+ on the left side.   Pulmonary/Chest: Effort normal and breath sounds normal. No respiratory distress. He has no decreased breath sounds. He has no wheezes. He has no rhonchi. He has no rales. He exhibits no tenderness.   Abdominal: Soft. Bowel sounds are normal. He exhibits no distension and no mass. There is no hepatosplenomegaly. There is no tenderness. There is no rebound and no guarding.   Musculoskeletal: He exhibits no edema, tenderness or deformity.   Neurological: He is alert and oriented to person, place, and  time.   Skin: Skin is warm and dry. No rash noted. He is not diaphoretic. No cyanosis or erythema. No pallor. Nails show no clubbing.   Psychiatric: He has a normal mood and affect. His speech is normal and behavior is normal. Judgment and thought content normal.           Assessment:   1. 83-year-old gentleman with history of severe coronary artery disease preserved left ventricular systolic function.  Status post drug-eluting stents placed to the right coronary artery and the left anterior descending.  Coronary Artery Disease  Assessment  • The patient has no angina  • There is a new diagnosis of stable angina in the past 12 months  • The patient is having symptoms consistent with unstable angina      Plan  • Lifestyle modifications discussed include adhering to a heart healthy diet, avoidance of tobacco products, maintenance of a healthy weight, medication compliance, regular exercise and regular monitoring of cholesterol and blood pressure     Subjective - Objective  • There has been a previous stent procedure using GIN  • Current antiplatelet therapy includes aspirin 81 mg and clopidogrel 75 mg  • The patient qualifies for cardiac rehabilitation, and has been referred to cardiac rehab        2.  Hyperlipidemia now on target dose rosuvastatin continue the same.  3.  Hypertension blood pressure adequately controlled if anything on the low side.  4.  History of prostate cancer.  5.   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   6.  Paroxysmal atrial fibrillation, asymptomatic, noted on his pacemaker.  But a large percent of his rhythm is in atrial fibrillation.  Atrial Fibrillation and Atrial Flutter  Assessment  • The patient has paroxysmal atrial fibrillation  • This is non-valvular in etiology  • The patient's CHADS2-VASc score is 4  • A [id number] score of 2 or more is considered a high risk for a thromboembolic event  •  Aspirin prescribed     Plan  • Attempt to maintain sinus rhythm  • Add apixaban for antithrombotic therapy   off beta blocker due to his blood pressure.    7.  This weakness fatigue low blood pressure really unclear the etiology to me don't believe it's cardiac.  His echocardiogram today shows again preserved left ventricular systolic function is mild aortic stenosis.  But of note 4 days ago his renal function was worse and he had fairly elevated liver function studies 5 times normal with a normal bilirubin.         Plan:

## 2018-08-10 NOTE — ED TRIAGE NOTES
"Pt to ED from Dr. Galan's office for abnormal labs, states had labs drawn this morning. Pt unable to recall which labs are abnormal and states \"he just wants me to get checked out.\" pt c/o SOA x2 weeks. Pt hypotensive 84/60 in office  "

## 2018-08-10 NOTE — PROGRESS NOTES
Clinical Pharmacy Services: Medication History    Guero Garay is a 83 y.o. male presenting to [facility] for   Chief Complaint   Patient presents with   • Shortness of Breath   • Abnormal Lab       He  has a past medical history of Arthralgia; CAD (coronary artery disease), native coronary artery; Chest pain, unspecified; Essential hypertension; Gout; History of prostate cancer; Jaw pain; Mixed hyperlipidemia; Osteoarthrosis; Shoulder pain; SOB (shortness of breath); and Tinnitus.    Allergies as of 08/10/2018   • (No Known Allergies)       Medication information was obtained from: Patient  Pharmacy and Phone Number: [facility] [phone]    Prior to Admission Medications     Prescriptions Last Dose Informant Patient Reported? Taking?    allopurinol (ZYLOPRIM) 100 MG tablet 8/10/2018 Self No Yes    Take 1 tablet by mouth Daily.    apixaban (ELIQUIS) 5 MG tablet tablet 8/10/2018 Self No Yes    Take 1 tablet by mouth 2 (Two) Times a Day.    aspirin 81 MG chewable tablet 8/10/2018 Self Yes Yes    Chew 81 mg Daily.    Cranberry 250 MG capsule 8/10/2018 Self Yes Yes    Take 250 mg by mouth Daily.    Fluticasone Furoate-Vilanterol (BREO ELLIPTA) 100-25 MCG/INH aerosol powder  8/10/2018 Self Yes Yes    Inhale 1 puff Every Morning.    melatonin 5 MG tablet tablet  Self Yes Yes    Take 5 mg by mouth At Night As Needed.    Multiple Vitamins-Minerals (MULTIVITAMIN WITH MINERALS) tablet tablet 8/10/2018 Self Yes Yes    Take 1 tablet by mouth Daily.    potassium chloride (MICRO-K) 10 MEQ CR capsule 8/10/2018 Self No Yes    Take 1 capsule by mouth 2 (Two) Times a Day.    rosuvastatin (CRESTOR) 20 MG tablet 8/9/2018 Self No Yes    Take 1 tablet by mouth Daily.    sulfamethoxazole-trimethoprim (BACTRIM DS,SEPTRA DS) 800-160 MG per tablet 8/10/2018 Self Yes Yes    Take 1 tablet by mouth 2 (Two) Times a Day.            Medication notes: None    This medication list is complete to the best of my knowledge  as of 8/10/2018    Please call if questions.    Rashida Darden, Medication History Technician  8/10/2018 4:46 PM

## 2018-08-10 NOTE — ED PROVIDER NOTES
EMERGENCY DEPARTMENT ENCOUNTER    CHIEF COMPLAINT  Chief Complaint: SOA  History given by: Patient  History limited by: None   Room Number: 17/17  PMD: Librado Lai MD      HPI:  Pt is a 83 y.o. male who presents complaining of worsening SOA which worsens with walking since has been on going since July 27th but recently worsened 6 days ago. Pt also notes dizziness, weakness and tingling of his lower extremities for the past 5 days, sinus drainage, dark stool for the past 5 days, and lightheadedness but denies n/v/d andfurther sx at this time. Pt is currently on antibiotics to treat a bladder infection after experiencing hematuria (Dr. Purvis, urologist), has a pacemaker, and a hx of afib.    Duration:  6 days  Onset: Gradual  Timing: Constant  Intensity/Severity: Moderate  Progression: Worsening  Associated Symptoms:  Dizziness, weakness and tingling of his lower extremities for the past 5 days, sinus drainage, dark stool for the past 5 days, and lightheadedness   Aggravating Factors: Walking  Alleviating Factors: None specified  Previous Episodes: Pt has hx of afib  Treatment before arrival: Pt is currently on antibiotics to treat a bladder infection after experiencing hematuria (Dr. Purvis, urologist)    PAST MEDICAL HISTORY  Active Ambulatory Problems     Diagnosis Date Noted   • Gout    • Hypertension    • Hyperlipidemia    • Osteoarthrosis    • History of prostate cancer    • Tinnitus    • Benign neoplasm of colon 08/21/2015   • Osteoarthritis of right shoulder 02/16/2017   • Coronary artery disease involving native coronary artery of native heart with unstable angina pectoris (CMS/[facility] [place]) 04/27/2017   • Syncopal episodes 06/14/2017   • Screening for colon cancer 06/27/2018   • Pyelonephritis, acute 07/27/2018     Resolved Ambulatory Problems     Diagnosis Date Noted   • No Resolved Ambulatory Problems     Past Medical History:   Diagnosis Date   • Arthralgia    • CAD (coronary artery disease), native  coronary artery    • Chest pain, unspecified    • Essential hypertension    • Gout    • History of prostate cancer    • Jaw pain    • Mixed hyperlipidemia    • Osteoarthrosis    • Shoulder pain    • SOB (shortness of breath)    • Tinnitus        PAST SURGICAL HISTORY  Past Surgical History:   Procedure Laterality Date   • CARDIAC CATHETERIZATION N/A 4/27/2017    Procedure: Coronary angiography;  Surgeon: Marvel Birto MD;  [place] [facility] [place];  Service:    • CARDIAC CATHETERIZATION N/A 4/27/2017    Procedure: Left Heart Cath;  Surgeon: Marvel Brito MD;  [place] [facility] [place];  Service:    • CARDIAC CATHETERIZATION N/A 4/27/2017    Procedure: Stent GIN coronary;  Surgeon: Marvel Brito MD;  [place] [facility] [place];  Service:    • CARDIAC ELECTROPHYSIOLOGY PROCEDURE N/A 6/15/2017    Procedure: Pacemaker DC new   [employer];  Surgeon: Gustavo Valladares MD;  [place] [facility] [place];  Service:    • COLONOSCOPY  2013   • INGUINAL HERNIA REPAIR Right    • NECK SURGERY      SPURS   • IL RECONSTR TOTAL SHOULDER IMPLANT Right 2/16/2017    Procedure: RIGHT TOTAL SHOULDER ARTHROPLASTY;  Surgeon: Marquez Galvan MD;  [place] [facility] [place] [facility] [place]  Service: Orthopedics   • PROSTATECTOMY     • REPLACEMENT TOTAL KNEE Left    • TONSILLECTOMY     • TOTAL SHOULDER REPLACEMENT Bilateral        FAMILY HISTORY  Family History   Problem Relation Age of Onset   • Cancer Mother         lung   • Cancer Father         lung   • Colon cancer Neg Hx    • Colon polyps Neg Hx        SOCIAL HISTORY  Social History     Social History   • Marital status: Unknown     Spouse name: N/A   • Number of children: N/A   • Years of education: N/A     Occupational History   • Not on file.     Social History Main Topics   • Smoking status: Never Smoker   • Smokeless tobacco: Never Used      Comment: caffeine - 1 every 2-3 days   • Alcohol use 12.6 oz/week     21 Cans of beer per week       Comment: 2-3 beers daily   • Drug use: No   • Sexual activity: Defer     Other Topics Concern   • Not on file     Social History Narrative   • No narrative on file       ALLERGIES  Patient has no known allergies.    REVIEW OF SYSTEMS  Review of Systems   Constitutional: Negative for activity change, appetite change and fever.   HENT: Negative for congestion and sore throat.         Sinus drainage   Eyes: Negative.    Respiratory: Positive for shortness of breath. Negative for cough.    Cardiovascular: Negative for chest pain and leg swelling.   Gastrointestinal: Negative for abdominal pain, diarrhea, nausea and vomiting.   Endocrine: Negative.    Genitourinary: Negative for decreased urine volume and dysuria.        Dark stool   Musculoskeletal: Negative for neck pain.   Skin: Negative for rash and wound.   Allergic/Immunologic: Negative.    Neurological: Positive for dizziness, weakness (lower extremities), light-headedness and numbness (lower extremities). Negative for headaches.   Hematological: Negative.    Psychiatric/Behavioral: Negative.    All other systems reviewed and are negative.      PHYSICAL EXAM  ED Triage Vitals   Temp Heart Rate Resp BP SpO2   08/10/18 1406 08/10/18 1406 08/10/18 1406 08/10/18 1438 08/10/18 1406   97.3 °F (36.3 °C) 101 20 122/71 94 %      Temp src Heart Rate Source Patient Position BP Location FiO2 (%)   08/10/18 1406 08/10/18 1406 -- 08/10/18 1633 --   Tympanic Monitor  Right arm          Physical Exam   Constitutional: He is oriented to person, place, and time. No distress.   HENT:   Head: Normocephalic and atraumatic.   Eyes: Pupils are equal, round, and reactive to light. EOM are normal.   Conjunctiva not pale   Neck: Normal range of motion. Neck supple.   Supple with no lymphadenopathy   Cardiovascular: Normal heart sounds.  An irregularly irregular rhythm present. Tachycardia present.    No murmur heard.  Pulmonary/Chest: Effort normal and breath sounds normal. No respiratory  distress.   Abdominal: Soft. There is no tenderness. There is no rebound and no guarding.   Genitourinary:   Genitourinary Comments: On rectal exam, positive tone brown stool which is heme positive.    Musculoskeletal: Normal range of motion. He exhibits no edema.   There is trace edema of the bilateral lower extremities with capillary refill of 2-3 seconds but is not pale   Neurological: He is alert and oriented to person, place, and time. He has normal sensation and normal strength.   Skin: Skin is warm and dry.   Psychiatric: Mood and affect normal.   Nursing note and vitals reviewed.      LAB RESULTS  Lab Results (last 24 hours)     Procedure Component Value Units Date/Time    BNP [[id number]]  (Normal) Collected:  08/10/18 1301    Specimen:  Blood Updated:  08/10/18 1455     proBNP 1,113.0 pg/mL     Narrative:       Among patients with dyspnea, NT-proBNP is highly sensitive for the detection of acute congestive heart failure. In addition NT-proBNP of <300 pg/ml effectively rules out acute congestive heart failure with 99% negative predictive value.    Troponin [[id number]]  (Normal) Collected:  08/10/18 1301    Specimen:  Blood Updated:  08/10/18 1455     Troponin T <0.010 ng/mL     Narrative:       Troponin T Reference Ranges:  Less than 0.03 ng/mL:    Negative for AMI  0.03 to 0.09 ng/mL:      Indeterminant for AMI  Greater than 0.09 ng/mL: Positive for AMI    Comprehensive Metabolic Panel [[id number]]  (Abnormal) Collected:  08/10/18 1301    Specimen:  Blood Updated:  08/10/18 1554     Glucose 93 mg/dL      BUN 29 (H) mg/dL      Creatinine 2.08 (H) mg/dL      Sodium 137 mmol/L      Potassium 5.3 (H) mmol/L      Chloride 99 mmol/L      CO2 19.6 (L) mmol/L      Calcium 9.8 mg/dL      Total Protein 7.5 g/dL      Albumin 3.50 g/dL      ALT (SGPT) 104 (H) U/L      AST (SGOT) 48 (H) U/L      Alkaline Phosphatase 478 (H) U/L      Total Bilirubin 0.5 mg/dL      eGFR Non African Amer 31 (L) mL/min/1.73      Globulin 4.0  gm/dL      A/G Ratio 0.9 g/dL      BUN/Creatinine Ratio 13.9     Anion Gap 18.4 mmol/L     Narrative:       The MDRD GFR formula is only valid for adults with stable renal function between ages 18 and 70.    CBC & Differential [[id number]] Collected:  08/10/18 1506    Specimen:  Blood Updated:  08/10/18 1528    Narrative:       The following orders were created for panel order CBC & Differential.  Procedure                               Abnormality         Status                     ---------                               -----------         ------                     CBC Auto Differential[[id number]]        Abnormal            Final result                 Please view results for these tests on the individual orders.    Protime-INR [[id number]]  (Abnormal) Collected:  08/10/18 1506    Specimen:  Blood Updated:  08/10/18 1536     Protime 15.9 (H) Seconds      INR 1.29 (H)    Troponin [[id number]]  (Normal) Collected:  08/10/18 1506    Specimen:  Blood Updated:  08/10/18 1547     Troponin T <0.010 ng/mL     Narrative:       Troponin T Reference Ranges:  Less than 0.03 ng/mL:    Negative for AMI  0.03 to 0.09 ng/mL:      Indeterminant for AMI  Greater than 0.09 ng/mL: Positive for AMI    Lactic Acid, Plasma [[id number]]  (Abnormal) Collected:  08/10/18 1506    Specimen:  Blood Updated:  08/10/18 1536     Lactate 2.2 (C) mmol/L     D-dimer, Quantitative [[id number]]  (Abnormal) Collected:  08/10/18 1506    Specimen:  Blood Updated:  08/10/18 1536     D-Dimer, Quantitative 1.13 (H) MCGFEU/mL     Narrative:       The Stago D-Dimer test used in conjunction with a clinical pretest probability (PTP) assessment model, has been approved by the FDA to rule out the presence of venous thromboembolism (VTE) in outpatients suspected of deep venous thrombosis (DVT) or pulmonary embolism (PE).     CBC Auto Differential [[id number]]  (Abnormal) Collected:  08/10/18 1506    Specimen:  Blood Updated:  08/10/18 1528     WBC 5.54 10*3/mm3       RBC 5.15 10*6/mm3      Hemoglobin 15.2 g/dL      Hematocrit 48.2 %      MCV 93.6 fL      MCH 29.5 pg      MCHC 31.5 (L) g/dL      RDW 14.7 (H) %      RDW-SD 49.7 fl      MPV 9.6 fL      Platelets 261 10*3/mm3      Neutrophil % 68.6 %      Lymphocyte % 19.3 (L) %      Monocyte % 7.2 %      Eosinophil % 4.2 %      Basophil % 0.7 %      Immature Grans % 0.0 %      Neutrophils, Absolute 3.80 10*3/mm3      Lymphocytes, Absolute 1.07 10*3/mm3      Monocytes, Absolute 0.40 10*3/mm3      Eosinophils, Absolute 0.23 10*3/mm3      Basophils, Absolute 0.04 10*3/mm3      Immature Grans, Absolute 0.00 10*3/mm3     Lactic Acid, Reflex Timer (This will reflex a repeat order 3-3:15 hours after ordered.) [[id number]] Collected:  08/10/18 1506    Specimen:  Blood Updated:  08/10/18 1846     Extra Tube Hold for add-ons.     Comment: Auto resulted.       Urinalysis With Culture If Indicated - Urine, Clean Catch [[id number]]  (Normal) Collected:  08/10/18 1807    Specimen:  Urine from Urine, Catheter Updated:  08/10/18 1830     Color, UA Yellow     Appearance, UA Clear     pH, UA 6.0     Specific Gravity, UA 1.018     Glucose, UA Negative     Ketones, UA Negative     Bilirubin, UA Negative     Blood, UA Negative     Protein, UA Negative     Leuk Esterase, UA Negative     Nitrite, UA Negative     Urobilinogen, UA 1.0 E.U./dL    Narrative:       Urine microscopic not indicated.          I ordered the above labs and reviewed the results    RADIOLOGY  CT Abdomen Pelvis Without Contrast         XR Chest 2 View            I ordered the above noted radiological studies. Interpreted by radiologist. Reviewed by me in PACS.     PROCEDURES  Procedures   EKG           EKG time: 1421  Rhythm/Rate: Afib 126  P waves and SD: NML  QRS, axis: Inferior Q waves and LAD  ST and T waves: NML    Interpreted Contemporaneously by me, independently viewed  Rate is new compared to prior 7/28/18    PROGRESS AND CONSULTS   1417-Ordered EKG     1427-Checked patient  and discussed plan to order medication to slow heart rate and blood work/imaging for further evaluation. Pt understands and agrees with the plan, all questions answered.    1439-Ordered IVF for hydration, CXR, blood work, and lopressor    1629-Ordered CT abd/pel    1633-Rechecked patient and discussed results of CXR and blood work which showed elevated lactate which suggests dehydration and plan to order CT abd for further evaluation. Pt understands and agrees with the plan, all questions answered.    1750-Rechecked patient and discussed plan to admit the patient. Pt understands and agrees with the plan, all questions answered.    1925 Discussed the patient's case with Dr. Carranza who agrees to admit the patient.    MEDICAL DECISION MAKING  Results were reviewed/discussed with the patient and they were also made aware of online access. Pt also made aware that some labs, such as cultures, will not be resulted during ER visit and follow up with PMD is necessary.     MDM  Number of Diagnoses or Management Options  HEENA (acute kidney injury) (CMS/[facility]):   Atrial fibrillation with RVR (CMS/[facility]):   Urinary retention:      Amount and/or Complexity of Data Reviewed  Clinical lab tests: ordered and reviewed (ProBNP=1,113, Troponin is <0.010, protime=15.9, INR=1.29, lactate=2.2, d-dimer=1.13)  Tests in the radiology section of CPT®: reviewed and ordered (CXR shows no acute findings. CT abd/pel shows right perinephric stranding which has improved since previous CT. There are no stones or bowel blockage.)  Tests in the medicine section of CPT®: ordered and reviewed (See EKG procedure note.)  Decide to obtain previous medical records or to obtain history from someone other than the patient: yes  Review and summarize past medical records: yes (Pt was sent from Dr. Galan's office for weakness, fatigue, and hypotension. Pt had echo today which showed preserved LV function. Angelia notes that 4 days ago pt had worsening renal  function and had fairly elevated LFT'S 5x greater than normal.)  Discuss the patient with other providers: yes (Dr. Gallego (radiology))           DIAGNOSIS  Final diagnoses:   HEENA (acute kidney injury) (CMS/HCC)   Urinary retention   Atrial fibrillation with RVR (CMS/HCC)       DISPOSITION  ADMISSION    Discussed treatment plan and reason for admission with pt/family and admitting physician.  Pt/family voiced understanding of the plan for admission for further testing/treatment as needed.     Latest Documented Vital Signs:  As of 7:00 PM  BP- 123/77 HR- 97 Temp- 97.3 °F (36.3 °C) (Tympanic) O2 sat- 95%    --  Documentation assistance provided by tom Cruz for Dr. Sarmiento. Information recorded by the tom.                   Della Cruz  08/10/18 1909       Jarrett Sarmiento MD  08/10/18 1932

## 2018-08-11 ENCOUNTER — APPOINTMENT (OUTPATIENT)
Dept: ULTRASOUND IMAGING | Facility: HOSPITAL | Age: 83
End: 2018-08-11

## 2018-08-11 ENCOUNTER — APPOINTMENT (OUTPATIENT)
Dept: NUCLEAR MEDICINE | Facility: HOSPITAL | Age: 83
End: 2018-08-11

## 2018-08-11 PROBLEM — E87.5 HYPERKALEMIA: Status: RESOLVED | Noted: 2018-08-10 | Resolved: 2018-08-11

## 2018-08-11 LAB
ALBUMIN SERPL-MCNC: 3 G/DL (ref 3.5–5.2)
ALP SERPL-CCNC: 405 U/L (ref 39–117)
ALT SERPL W P-5'-P-CCNC: 77 U/L (ref 1–41)
ANION GAP SERPL CALCULATED.3IONS-SCNC: 10.2 MMOL/L
AST SERPL-CCNC: 35 U/L (ref 1–40)
BASOPHILS # BLD AUTO: 0.04 10*3/MM3 (ref 0–0.2)
BASOPHILS NFR BLD AUTO: 0.8 % (ref 0–1.5)
BILIRUB CONJ SERPL-MCNC: 0.2 MG/DL (ref 0–0.3)
BILIRUB INDIRECT SERPL-MCNC: 0.3 MG/DL
BILIRUB SERPL-MCNC: 0.5 MG/DL (ref 0.1–1.2)
BUN BLD-MCNC: 22 MG/DL (ref 8–23)
BUN/CREAT SERPL: 13.5 (ref 7–25)
CALCIUM SPEC-SCNC: 9.3 MG/DL (ref 8.6–10.5)
CHLORIDE SERPL-SCNC: 104 MMOL/L (ref 98–107)
CO2 SERPL-SCNC: 21.8 MMOL/L (ref 22–29)
CORTIS SERPL-MCNC: 10.51 MCG/DL
CORTIS SERPL-MCNC: 9.24 MCG/DL
CREAT BLD-MCNC: 1.63 MG/DL (ref 0.76–1.27)
DEPRECATED RDW RBC AUTO: 50.4 FL (ref 37–54)
EOSINOPHIL # BLD AUTO: 0.22 10*3/MM3 (ref 0–0.7)
EOSINOPHIL NFR BLD AUTO: 4.7 % (ref 0.3–6.2)
ERYTHROCYTE [DISTWIDTH] IN BLOOD BY AUTOMATED COUNT: 14.8 % (ref 11.5–14.5)
GFR SERPL CREATININE-BSD FRML MDRD: 41 ML/MIN/1.73
GLUCOSE BLD-MCNC: 98 MG/DL (ref 65–99)
HCT VFR BLD AUTO: 40.9 % (ref 40.4–52.2)
HCT VFR BLD AUTO: 41.3 % (ref 40.4–52.2)
HCT VFR BLD AUTO: 42.2 % (ref 40.4–52.2)
HCT VFR BLD AUTO: 42.4 % (ref 40.4–52.2)
HGB BLD-MCNC: 13.1 G/DL (ref 13.7–17.6)
HGB BLD-MCNC: 13.1 G/DL (ref 13.7–17.6)
HGB BLD-MCNC: 13.2 G/DL (ref 13.7–17.6)
HGB BLD-MCNC: 13.3 G/DL (ref 13.7–17.6)
IMM GRANULOCYTES # BLD: 0.02 10*3/MM3 (ref 0–0.03)
IMM GRANULOCYTES NFR BLD: 0.4 % (ref 0–0.5)
LYMPHOCYTES # BLD AUTO: 0.96 10*3/MM3 (ref 0.9–4.8)
LYMPHOCYTES NFR BLD AUTO: 20.3 % (ref 19.6–45.3)
MCH RBC QN AUTO: 29.3 PG (ref 27–32.7)
MCHC RBC AUTO-ENTMCNC: 31.3 G/DL (ref 32.6–36.4)
MCV RBC AUTO: 93.8 FL (ref 79.8–96.2)
MONOCYTES # BLD AUTO: 0.46 10*3/MM3 (ref 0.2–1.2)
MONOCYTES NFR BLD AUTO: 9.7 % (ref 5–12)
NEUTROPHILS # BLD AUTO: 3.02 10*3/MM3 (ref 1.9–8.1)
NEUTROPHILS NFR BLD AUTO: 64.1 % (ref 42.7–76)
PLATELET # BLD AUTO: 243 10*3/MM3 (ref 140–500)
PMV BLD AUTO: 9.5 FL (ref 6–12)
POTASSIUM BLD-SCNC: 4.9 MMOL/L (ref 3.5–5.2)
PROT SERPL-MCNC: 6.8 G/DL (ref 6–8.5)
RBC # BLD AUTO: 4.5 10*6/MM3 (ref 4.6–6)
SODIUM BLD-SCNC: 136 MMOL/L (ref 136–145)
WBC NRBC COR # BLD: 4.72 10*3/MM3 (ref 4.5–10.7)

## 2018-08-11 PROCEDURE — 0 TECHNETIUM ALBUMIN AGGREGATED: Performed by: INTERNAL MEDICINE

## 2018-08-11 PROCEDURE — 36415 COLL VENOUS BLD VENIPUNCTURE: CPT | Performed by: INTERNAL MEDICINE

## 2018-08-11 PROCEDURE — 99222 1ST HOSP IP/OBS MODERATE 55: CPT | Performed by: INTERNAL MEDICINE

## 2018-08-11 PROCEDURE — A9540 TC99M MAA: HCPCS | Performed by: INTERNAL MEDICINE

## 2018-08-11 PROCEDURE — 82533 TOTAL CORTISOL: CPT | Performed by: INTERNAL MEDICINE

## 2018-08-11 PROCEDURE — 94799 UNLISTED PULMONARY SVC/PX: CPT

## 2018-08-11 PROCEDURE — 94640 AIRWAY INHALATION TREATMENT: CPT

## 2018-08-11 PROCEDURE — 25010000002 DIGOXIN PER 500 MCG: Performed by: NURSE PRACTITIONER

## 2018-08-11 PROCEDURE — 85014 HEMATOCRIT: CPT | Performed by: INTERNAL MEDICINE

## 2018-08-11 PROCEDURE — 76705 ECHO EXAM OF ABDOMEN: CPT

## 2018-08-11 PROCEDURE — 80076 HEPATIC FUNCTION PANEL: CPT | Performed by: INTERNAL MEDICINE

## 2018-08-11 PROCEDURE — 93005 ELECTROCARDIOGRAM TRACING: CPT | Performed by: INTERNAL MEDICINE

## 2018-08-11 PROCEDURE — A9558 XE133 XENON 10MCI: HCPCS | Performed by: INTERNAL MEDICINE

## 2018-08-11 PROCEDURE — 78582 LUNG VENTILAT&PERFUS IMAGING: CPT

## 2018-08-11 PROCEDURE — 85025 COMPLETE CBC W/AUTO DIFF WBC: CPT | Performed by: INTERNAL MEDICINE

## 2018-08-11 PROCEDURE — 93010 ELECTROCARDIOGRAM REPORT: CPT | Performed by: INTERNAL MEDICINE

## 2018-08-11 PROCEDURE — 0 XENON XE 133: Performed by: INTERNAL MEDICINE

## 2018-08-11 PROCEDURE — 85018 HEMOGLOBIN: CPT | Performed by: INTERNAL MEDICINE

## 2018-08-11 PROCEDURE — 80048 BASIC METABOLIC PNL TOTAL CA: CPT | Performed by: INTERNAL MEDICINE

## 2018-08-11 PROCEDURE — 99221 1ST HOSP IP/OBS SF/LOW 40: CPT | Performed by: INTERNAL MEDICINE

## 2018-08-11 RX ORDER — TAMSULOSIN HYDROCHLORIDE 0.4 MG/1
0.4 CAPSULE ORAL DAILY
Status: DISCONTINUED | OUTPATIENT
Start: 2018-08-11 | End: 2018-08-15 | Stop reason: HOSPADM

## 2018-08-11 RX ORDER — DIGOXIN 0.25 MG/ML
125 INJECTION INTRAMUSCULAR; INTRAVENOUS ONCE
Status: COMPLETED | OUTPATIENT
Start: 2018-08-11 | End: 2018-08-11

## 2018-08-11 RX ORDER — FAMOTIDINE 20 MG/1
20 TABLET, FILM COATED ORAL DAILY
Status: DISCONTINUED | OUTPATIENT
Start: 2018-08-11 | End: 2018-08-15 | Stop reason: HOSPADM

## 2018-08-11 RX ADMIN — ALBUTEROL SULFATE 2.5 MG: 2.5 SOLUTION RESPIRATORY (INHALATION) at 19:42

## 2018-08-11 RX ADMIN — ROSUVASTATIN CALCIUM 20 MG: 20 TABLET, FILM COATED ORAL at 08:29

## 2018-08-11 RX ADMIN — SODIUM CHLORIDE 100 ML/HR: 9 INJECTION, SOLUTION INTRAVENOUS at 08:29

## 2018-08-11 RX ADMIN — ALBUTEROL SULFATE 2.5 MG: 2.5 SOLUTION RESPIRATORY (INHALATION) at 06:54

## 2018-08-11 RX ADMIN — Medication 1 TABLET: at 08:29

## 2018-08-11 RX ADMIN — Medication 100 MG: at 08:29

## 2018-08-11 RX ADMIN — ASPIRIN 81 MG: 81 TABLET, CHEWABLE ORAL at 08:29

## 2018-08-11 RX ADMIN — DOCUSATE SODIUM 100 MG: 100 CAPSULE, LIQUID FILLED ORAL at 08:29

## 2018-08-11 RX ADMIN — SULFAMETHOXAZOLE AND TRIMETHOPRIM 160 MG: 800; 160 TABLET ORAL at 02:30

## 2018-08-11 RX ADMIN — BUDESONIDE AND FORMOTEROL FUMARATE DIHYDRATE 2 PUFF: 160; 4.5 AEROSOL RESPIRATORY (INHALATION) at 19:42

## 2018-08-11 RX ADMIN — SULFAMETHOXAZOLE AND TRIMETHOPRIM 160 MG: 800; 160 TABLET ORAL at 08:29

## 2018-08-11 RX ADMIN — ALBUTEROL SULFATE 2.5 MG: 2.5 SOLUTION RESPIRATORY (INHALATION) at 23:57

## 2018-08-11 RX ADMIN — DIGOXIN 125 MCG: 0.25 INJECTION INTRAMUSCULAR; INTRAVENOUS at 18:36

## 2018-08-11 RX ADMIN — DOCUSATE SODIUM 100 MG: 100 CAPSULE, LIQUID FILLED ORAL at 20:27

## 2018-08-11 RX ADMIN — TAMSULOSIN HYDROCHLORIDE 0.4 MG: 0.4 CAPSULE ORAL at 15:12

## 2018-08-11 RX ADMIN — FAMOTIDINE 20 MG: 20 TABLET, FILM COATED ORAL at 15:12

## 2018-08-11 RX ADMIN — FOLIC ACID 1 MG: 1 TABLET ORAL at 08:29

## 2018-08-11 RX ADMIN — XENON XE-133 5.8 MILLICURIE: 10 GAS RESPIRATORY (INHALATION) at 12:31

## 2018-08-11 RX ADMIN — Medication 1 DOSE: at 12:31

## 2018-08-11 RX ADMIN — ALLOPURINOL 100 MG: 100 TABLET ORAL at 08:29

## 2018-08-11 RX ADMIN — BUDESONIDE AND FORMOTEROL FUMARATE DIHYDRATE 2 PUFF: 160; 4.5 AEROSOL RESPIRATORY (INHALATION) at 06:54

## 2018-08-11 NOTE — NURSING NOTE
Pt HR up to 140-150's at 0735. /74, O2 92% on RA. Pt states he feels the same as he has felt the last few weeks. EKG showed Junc Tachy. Page to MD at 0805, 0840. MD returned phone call at 0920, consult in for cardiology to see. Will continue to monitor

## 2018-08-11 NOTE — PROGRESS NOTES
SALVATORE BARNARD  discussed with wife     Retention  Altered status  Change   Ct recent nondistended bladder u/a  Clear  Plan  flomax  Voiding trial once ambulatory  Status changes

## 2018-08-11 NOTE — PLAN OF CARE
Problem: Patient Care Overview  Goal: Plan of Care Review  Outcome: Ongoing (interventions implemented as appropriate)   08/11/18 7080   Coping/Psychosocial   Plan of Care Reviewed With patient;spouse   Plan of Care Review   Progress improving   OTHER   Outcome Summary Meds changed per MD. US liver and VQ scan completed today. Cardio consulted this AM for HR, will see tomorrow. IV Dig x1 for HR control. Will continue to monitor

## 2018-08-11 NOTE — CONSULTS
"[facility]  Initial Inpatient Consult Note    Referring Provider: Dr Carranza    Reason for Consultation: dark stools, prior plans for outpt colonoscopy    Subjective     History of present illness:  83-year-old man with coronary artery disease status post stents in 2017 along with aortic stenosis who was sent to the ER following a visit in his cardiologist's office for atrial fibrillation with rapid ventricular response as well as hypotension.  Gastroenterology has been consulted for a weeklong history of dark stools.  Additionally, he had been scheduled to have an outpatient colonoscopy in August with Dr. Powers which had been canceled due to recent hospitalizations.  He reports that he has been feeling poorly for about a week.  He describes this as significant weakness and feeling that his legs are like \"jelly.\"  He has not had any falls.  He does report that he has had black stools for at least the past 5-6 days.  There is not any diarrhea stools.  The stools are not painful to pass.  He has a black bowel movement about every other day.  He is not on any iron.  He denies that he is seeing any bright red blood per rectum.  He does take a daily aspirin.  He denies that his been taking any additional NSAIDs.  He denies any significant issues with abdominal pain or eating and drinking.  He has not had a recent EGD.  He says that his last colonoscopy was about 8 years ago.  He reports a prior history of polyps.  He denies a family history of colon cancer.    His hemoglobin is about 2 g lower than his baseline this morning.  He has a new HEENA.  He additionally has new findings of elevations in his alkaline phosphatase, ALT and AST which are new since the end of July.    Currently he appears mildly uncomfortable.  He says he is eaten breakfast.  His heart rate is currently in the 130s.    Past Medical History:  Past Medical History:   Diagnosis Date   • Arthralgia    • CAD (coronary artery " disease), native coronary artery    • Chest pain, unspecified    • Essential hypertension    • Gout    • History of prostate cancer    • Jaw pain     both sides   • Mixed hyperlipidemia    • Osteoarthrosis    • Shoulder pain    • SOB (shortness of breath)    • Tinnitus        Past Surgical History:  Past Surgical History:   Procedure Laterality Date   • CARDIAC CATHETERIZATION N/A 4/27/2017    Procedure: Coronary angiography;  Surgeon: Marvel Brito MD;  Location: [facility] CATH INVASIVE LOCATION;  Service:    • CARDIAC CATHETERIZATION N/A 4/27/2017    Procedure: Left Heart Cath;  Surgeon: Marvel Brito MD;  Location: [facility] CATH [facility]VASIVE LOC[facility];  Service:    • CARDIAC CATHETERIZATION N/A 4/27/2017    Procedure: Stent GIN coronary;  Surgeon: Marvel Brito MD;  Location[facility] LOC[facility];  Service:    • CARDIAC ELECTROPHYSIOLOGY PROCEDURE N/A 6/15/2017    Procedure: Pacemaker DC new   [employer];  Surgeon: Gustavo Valladares MD;  Location[facility] C[facility]ASIVE LOC[facility];  Service:    • COLONOSCOPY  2013   • INGUINAL HERNIA REPAIR Right    • NECK SURGERY      SPURS   • IN RECONSTR TOTAL SHOULDER IMPLANT Right 2/16/2017    Procedure: RIGHT TOTAL SHOULDER ARTHROPLASTY;  Surgeon: Marquez Galvan MD;  Location:  [facility];  Service: Orthopedics   • PROSTATECTOMY     • REPLACEMENT TOTAL KNEE Left    • TONSILLECTOMY     • TOTAL SHOULDER REPLACEMENT Bilateral         Social History:   Social History   Substance Use Topics   • Smoking status: Never Smoker   • Smokeless tobacco: Never Used      Comment: caffeine - 1 every 2-3 days   • Alcohol use 12.6 oz/week     21 Cans of beer per week      Comment: 2-3 beers daily        Family History:  Family History   Problem Relation Age of Onset   • Cancer Mother         lung   • Cancer Father         lung   • Colon cancer Neg Hx    • Colon polyps Neg Hx        Home Meds:  Prescriptions Prior to Admission   Medication Sig Dispense Refill Last Dose   • allopurinol  (ZYLOPRIM) 100 MG tablet Take 1 tablet by mouth Daily. 90 tablet 3 8/10/2018 at Unknown time   • apixaban (ELIQUIS) 5 MG tablet tablet Take 1 tablet by mouth 2 (Two) Times a Day. 60 tablet 3 8/10/2018 at am   • aspirin 81 MG chewable tablet Chew 81 mg Daily.   8/10/2018 at Unknown time   • Cranberry 250 MG capsule Take 250 mg by mouth Daily.   8/10/2018 at Unknown time   • Fluticasone Furoate-Vilanterol (BREO ELLIPTA) 100-25 MCG/INH aerosol powder  Inhale 1 puff Every Morning.   8/10/2018 at Unknown time   • melatonin 5 MG tablet tablet Take 5 mg by mouth At Night As Needed.   Taking   • Multiple Vitamins-Minerals (MULTIVITAMIN WITH MINERALS) tablet tablet Take 1 tablet by mouth Daily.   8/10/2018 at Unknown time   • potassium chloride (MICRO-K) 10 MEQ CR capsule Take 1 capsule by mouth 2 (Two) Times a Day. 180 capsule 3 8/10/2018 at am   • rosuvastatin (CRESTOR) 20 MG tablet Take 1 tablet by mouth Daily. 90 tablet 3 8/9/2018 at Unknown time   • sulfamethoxazole-trimethoprim (BACTRIM DS,SEPTRA DS) 800-160 MG per tablet Take 1 tablet by mouth 2 (Two) Times a Day.   8/10/2018 at am       Current Meds:     albuterol 2.5 mg Nebulization Q6H - RT   allopurinol 100 mg Oral Daily   aspirin 81 mg Oral Daily   budesonide-formoterol 2 puff Inhalation BID - RT   docusate sodium 100 mg Oral BID   vitamin B-1 100 mg Oral Daily   And      multivitamin 1 tablet Oral Daily   And      folic acid 1 mg Oral Daily   metoprolol tartrate 2.5 mg Intravenous Once   rosuvastatin 20 mg Oral Daily   sulfamethoxazole-trimethoprim 1 tablet Oral Q12H       Allergies:  No Known Allergies    Review of Systems  All systems were reviewed and negative except for:  Constitution:  positive for fatigue  Gastrointestinal: postitive for  dark stool     Objective     Vital Signs  Temp:  [97.3 °F (36.3 °C)-97.8 °F (36.6 °C)] 97.3 °F (36.3 °C)  Heart Rate:  [] 139  Resp:  [17-20] 18  BP: ()/() 104/74    Physical Exam:  Constitutional:     Alert, cooperative, appears uncomfortable, appears stated age   Eyes:            Lids and lashes normal, conjunctivae and sclerae normal, no   icterus   Ears, nose, mouth and throat:   Normal appearance of external ears and nose, no oral lesions, no thrush, oral mucosa moist   Respiratory:     Clear to auscultation, respirations regular, even and                   unlabored    Cardiovascular:    Regular rhythm and normal rate, normal S1 and S2, no            murmur, no gallop, palpable distal pulses, no lower extremity edema   Gastrointestinal:    Soft and obese, non-distended, non-tender to palpation, no guarding, no rebound tenderness, normal bowel sounds, no palpable masses or organomegaly  Rectal exam: deferred   Musculoskeletal:   Normal station, no atrophy, no tenderness to palpation, normal digits and nails   Skin:   Normal color, no bleeding, bruising, rashes or lesions   Lymphatics:   No palpable cervical or supraclavicular adenopathy   Psychiatric:  Judgement and insight: normal   Orientation to person, place and time: normal   Mood and affect: normal       Results Review:   I reviewed the patient's new clinical results.      Results from last 7 days  Lab Units 08/11/18  0804 08/11/18  0439 08/10/18  2359 08/10/18  1506 08/06/18  1435   WBC 10*3/mm3  --  4.72  --  5.54 7.02   HEMOGLOBIN g/dL 13.3* 13.2* 13.1* 15.2 14.8   HEMATOCRIT % 41.3 42.2 42.4 48.2 45.9   PLATELETS 10*3/mm3  --  243  --  261 307         Results from last 7 days  Lab Units 08/11/18  0439 08/10/18  1301 08/06/18  1435   SODIUM mmol/L 136 137 137   POTASSIUM mmol/L 4.9 5.3* 5.0   CHLORIDE mmol/L 104 99 97*   CO2 mmol/L 21.8* 19.6*  --    TOTAL CO2, ARTERIAL mmol/L  --   --  24.6   BUN mg/dL 22 29* 26*   CREATININE mg/dL 1.63* 2.08* 1.91*   CALCIUM mg/dL 9.3 9.8 9.2   BILIRUBIN mg/dL 0.5 0.5 0.5   ALK PHOS U/L 405* 478* 599*   ALT (SGPT) U/L 77* 104* 279*   AST (SGOT) U/L 35 48* 172*   GLUCOSE mg/dL 98 93  --          Results from last 7  days  Lab Units 08/10/18  1506   INR  1.29*       No results found for: LIPASE    Radiology:  Imaging Results (last 72 hours)     Procedure Component Value Units Date/Time    CT Abdomen Pelvis Without Contrast [[id number]] Collected:  08/10/18 1821     Updated:  08/10/18 1918    Narrative:       CT SCAN OF THE ABDOMEN AND PELVIS WITHOUT CONTRAST     HISTORY: Urinary tract infection. Hematuria. Sepsis.     TECHNIQUE: The CT scan was performed as an emergency procedure through  the abdomen and pelvis without contrast and compared to the recent  noncontrast CT scan of the abdomen and pelvis dated 07/27/2018.      FINDINGS: The following findings are present:  1. There has been improvement in the previous perinephric stranding  surrounding both kidneys that was greater on the right than left on the  previous exam. The findings may therefore reflect improvement in changes  of pyelonephritis. There appear to be several very small and somewhat  faint nonobstructive stones in both kidneys which are unchanged. There  are several left renal cysts also unchanged. There is no evidence of  ureteral stone or obstruction.  2. The urinary bladder has a smooth contour. No wall thickening or  intraluminal thrombus is seen by CT scan.  3. Again noted are several noncalcified nodules at both lung bases  measuring up to 8 mm on the left and 9 mm on the right and raising the  concern of metastatic disease. These are similar to the recent abdominal  CT scan. A complete chest CT scan is recommended when possible.  4. The liver, spleen, pancreas, and both adrenal glands were  unremarkable without intravenous contrast. The gallbladder shows no  gallstones or wall thickening. There is borderline aneurysmal  enlargement of the infrarenal aorta measuring 2.4 cm. There is no  retroperitoneal lymphadenopathy. The remainder of the CT scan is  unremarkable. Again noted is a left inguinal ring hernia containing  mesenteric fat.           Radiation  dose reduction techniques were utilized, including automated  exposure control and exposure modulation based on body size.     This report was finalized on 8/10/2018 7:15 PM by Dr. Yong Gallego M.D.       XR Chest 2 View [[id number]] Collected:  08/10/18 1632     Updated:  08/10/18 1918    Narrative:       TWO-VIEW CHEST     HISTORY: Shortness of breath.     The lungs are well-expanded and clear. There is mild cardiomegaly with a  pacemaker in place and there is no acute disease or change from  07/27/2018.     This report was finalized on 8/10/2018 7:14 PM by Dr. Yong Gallego M.D.             Assessment/Plan     Active Problems:    Hypertension    Acute kidney injury superimposed on chronic kidney disease (CMS/HCC)    Dyspnea on exertion    Generalized weakness    Elevated d-dimer    Hyperkalemia    Dehydration    Mild persistent asthma without complication    Heme positive stool    Elevated LFTs    Alcohol use    Urine retention      Impression  1. Dark stools: heme positive in ER  2. Elevated liver enzymes: acute, ? Related to recent antibiotics vs other  3. CAD, Afib: ? On plavix (noted to be on this per cardiology note but not listed as a home med) and was to start eliquis  4. Weakness, fatigue: ? Due to afib    Plan  Daily CBC  Monitor stools  Liver US  Recommend EGD and colonoscopy when he has been stabilized and cleared from a cardiology standpoint and off anticoagulants and plavix  Will follow    I discussed the patients findings and my recommendations with patient and nursing staff    Nancy Aguilar MD  [facility]      Dictated utilizing Dragon dictation

## 2018-08-11 NOTE — PROGRESS NOTES
Name: Guero Garay ADMIT: 8/10/2018   : 1935  PCP: Librado Lai MD    MRN: [id number] LOS: 1 days   AGE/SEX: 83 y.o. male    ROOM: [facility]/1   Subjective   Still complains of shortness of breath  Patient still has atrial fibrillation with rapid ventricular rate    Brief hospital course since admission:  A. Fib  Recent history of urinary tract infection was being treated with Bactrim and he has completed antibiotics  Heme-positive stool  History of asthma  HEENA/CKD, snoring of creatinine most likely secondary to Bactrim  Elevated liver enzymes most likely to related to antibiotics      I have reviewed past medical history, social history, family history, allergies.  No changes from admission note.      Review of Systems   Constitutional: Positive for fatigue. Negative for fever.   Respiratory: Negative for shortness of breath.    Cardiovascular: Positive for palpitations. Negative for chest pain and leg swelling.   Gastrointestinal: Negative for nausea and vomiting.          Objective   Vital Signs  Temp:  [97.3 °F (36.3 °C)-97.8 °F (36.6 °C)] 97.3 °F (36.3 °C)  Heart Rate:  [] 139  Resp:  [17-20] 18  BP: ()/() 104/74  SpO2:  [92 %-98 %] 92 %  on   ;   Device (Oxygen Therapy): room air  Body mass index is 32.9 kg/m².    Intake/Output Summary (Last 24 hours) at 18 1024  Last data filed at 18 0528   Gross per 24 hour   Intake             2590 ml   Output             1900 ml   Net              690 ml       Physical Exam   Constitutional: He is oriented to person, place, and time. He appears well-developed and well-nourished. Nasal cannula in place.   HENT:   Head: Atraumatic.   Eyes: Pupils are equal, round, and reactive to light. No scleral icterus.   Cardiovascular: Normal rate.  An irregular rhythm present.   Pulmonary/Chest: No accessory muscle usage. No respiratory distress. He has no decreased breath sounds. He has no wheezes.   Abdominal: Soft. Normal appearance and  bowel sounds are normal. He exhibits no ascites. There is no hepatosplenomegaly.   Neurological: He is alert and oriented to person, place, and time.   Skin: No laceration and no petechiae noted. No cyanosis. Nails show no clubbing.   Psychiatric: He has a normal mood and affect. His behavior is normal.   Vitals reviewed.      Results Review:      Results from last 7 days  Lab Units 08/11/18  0804 08/11/18  0439 08/10/18  2359 08/10/18  1506 08/06/18  1435   WBC 10*3/mm3  --  4.72  --  5.54 7.02   HEMOGLOBIN g/dL 13.3* 13.2* 13.1* 15.2 14.8   HEMATOCRIT % 41.3 42.2 42.4 48.2 45.9   PLATELETS 10*3/mm3  --  243  --  261 307       Results from last 7 days  Lab Units 08/11/18  0439 08/10/18  1301  08/06/18  1435   SODIUM mmol/L 136 137  --  137   POTASSIUM mmol/L 4.9 5.3*  --  5.0   CHLORIDE mmol/L 104 99  --  97*   CO2 mmol/L 21.8* 19.6*  --   --    TOTAL CO2, ARTERIAL mmol/L  --   --   --  24.6   BUN mg/dL 22 29*  --  26*   CREATININE mg/dL 1.63* 2.08*  --  1.91*   GLUCOSE mg/dL 98 93  < >  --    CALCIUM mg/dL 9.3 9.8  --  9.2   < > = values in this interval not displayed.    Results from last 7 days  Lab Units 08/10/18  1506   INR  1.29*     Hemoglobin A1C:  Lab Results   Component Value Date    HGBA1C 5.71 (H) 04/28/2017     Glucose Range:No results found for: POCGLU      albuterol 2.5 mg Nebulization Q6H - RT   allopurinol 100 mg Oral Daily   aspirin 81 mg Oral Daily   budesonide-formoterol 2 puff Inhalation BID - RT   docusate sodium 100 mg Oral BID   famotidine 20 mg Oral Daily   vitamin B-1 100 mg Oral Daily   And      multivitamin 1 tablet Oral Daily   And      folic acid 1 mg Oral Daily   metoprolol tartrate 2.5 mg Intravenous Once   rosuvastatin 20 mg Oral Daily       sodium chloride 9 mL/hr Last Rate: 100 mL/hr (08/11/18 0829)   Diet Regular  Assessment/Plan       Assessment/Plan      Active Hospital Problems    Diagnosis Date Noted   • Acute kidney injury superimposed on chronic kidney disease (CMS/HCC)  [N17.9, N18.9] 08/10/2018   • Dyspnea on exertion [R06.09] 08/10/2018   • Generalized weakness [R53.1] 08/10/2018   • Elevated d-dimer [R79.89] 08/10/2018   • Dehydration [E86.0] 08/10/2018   • Mild persistent asthma without complication [J45.30] 08/10/2018   • Heme positive stool [R19.5] 08/10/2018   • Elevated LFTs [R79.89] 08/10/2018   • Alcohol use [Z78.9] 08/10/2018   • Urine retention [R33.9] 08/10/2018   • Hypertension [I10]       Resolved Hospital Problems    Diagnosis Date Noted Date Resolved   • Hyperkalemia [E87.5] 08/10/2018 08/11/2018     Discontinue Bactrim  Reduce the IV fluids  The creatinine is on down webb trend  A. fib with rapid ventricular rate, cardiology to see him  Follow the labs      Tanner Siddiqui MD  [facility]  08/11/18

## 2018-08-11 NOTE — NURSING NOTE
Page to Dr. Shipman at 1600 and 1800 about MD rounding for a new pt consult for pt HR up into the 130-140's thru the day. APRN gave orders over the phone d/t MD completing rounding for the day, MD will see pt tomorrow. Will continue to monitor

## 2018-08-11 NOTE — PROGRESS NOTES
Discharge Planning Assessment  [facility]     Patient Name: Guero Garay  MRN: [id number]  Today's Date: 8/11/2018    Admit Date: 8/10/2018          Discharge Needs Assessment     Row Name 08/11/18 1838       Living Environment    Lives With spouse    Current Living Arrangements home/apartment/condo    Primary Care Provided by self    Provides Primary Care For no one    Family Caregiver if Needed spouse    Quality of Family Relationships helpful;involved    Able to Return to Prior Arrangements yes       Resource/Environmental Concerns    Resource/Environmental Concerns none    Transportation Concerns car, none       Transition Planning    Patient/Family Anticipates Transition to home with family    Patient/Family Anticipated Services at Transition none    Transportation Anticipated family or friend will provide       Discharge Needs Assessment    Equipment Currently Used at Home cane, quad    Equipment Needed After Discharge cane, quad            Discharge Plan     Row Name 08/11/18 1838       Plan    Plan Home with spouse, denies needs.............[facility]     Patient/Family in Agreement with Plan yes    Plan Comments Met with patient and spouse at bedside, introduced self and role. Pt. lives with wife in a two-story house with 2 steps to enter, second floor bedroom and was using a cane prior to admit. Pt. denies problems navigating stairs at home. There is a bathroom on main level of home and second floor, laundry is on main level but spouse does the laundry. Pt. has used Home Health in the past, unsure of agency. No hx with [facility]. Plans to return home with spouse upon DC and denies needs at this time. Aware CCP available and can assist should needs arise. Will follow...........[facility]         Destination     No service coordination in this encounter.      Durable Medical Equipment     No service coordination in this encounter.      Dialysis/Infusion     No service coordination in this encounter.       Home Medical Care     No service coordination in this encounter.      Social Care     No service coordination in this encounter.                Demographic Summary     Row Name 08/11/18 1836       General Information    Admission Type inpatient    Arrived From home    Preferred Language English            Functional Status     Row Name 08/11/18 1837       Functional Status    Usual Activity Tolerance good    Current Activity Tolerance fair       Functional Status, IADL    Medications independent    Meal Preparation assistive person    Housekeeping assistive person    Laundry completely dependent    Shopping assistive person       Mental Status Summary    Recent Changes in Mental Status/Cognitive Functioning no changes            Psychosocial    No documentation.           Abuse/Neglect    No documentation.           Legal    No documentation.           Substance Abuse    No documentation.           Patient Forms     Row Name 08/11/18 1841       Patient Forms    Important Message from Medicare (IMM) Delivered    Delivered to Patient    Method of delivery In person          Lizzy Mancia RN

## 2018-08-11 NOTE — CONSULTS
Date of Hospital Visit: 18  Encounter Provider: Litzy Bell, RN EXTENDER  Place of Service: [facility] CARDIOLOGY  Patient Name: Guero Garay  :1935  [id number]  Referral Provider: Philippe Carranza MD    Chief complaint:    History of Present Illness:  Mr. Garay is an 83 year old man who follows with Dr. Galan and has a history of aortic stenosis, hypertension, hyperlipidemia, and CAD s/p GIN to LAD and RCA . In 2017, he was seen in office for worsening angina and had an abnormal perfusion stress test which showed a large area of anterior and septal ischemia. Echocardiogram at that time showed mild aortic stenosis. He underwent cardiac catheterization on 18 and had GIN to LAD and RCA. He began having syncope and bradycardia in 2017 and had a dual chamber Medtronic pacemaker implanted at that time. He was last seen in office yesterday when it was noted on device interrogation that his Afib burden in 10.6% and is associated with dyspnea. He had an echocardiogram yesterday that showed EF 55% and mild aortic stenosis. In office, he reported increased weakness and BP was 84/60 with . He was then sent to ED for evaluation of weakness, recent UTI, and dark stool x 5 days.     Upon arrival to ED, /71 with ; EKG showed Afib. Labs: occult blood positive, troponin <0.010 x 2, proBNP 1113, BUN 29, Crea 2.08 (1.91), K 5.3, , Lactate 2.2, D-dimer 1.13, WBC 5.54, Hgb 15.2, Hgb 48.2, Plt 261. He was admitted for sepsis work-up. We have been asked to see Afib with HR 140s. He was previously on atenolol-chlorthalidone but this was stopped by his PCP for hypotension.     Cardiac Testing:  Echocardiogram 8/10/18  Interpretation Summary     · Left ventricular systolic function is normal. Calculated EF = 55%. Estimated EF was in agreement with the calculated EF. All left ventricular wall segments contract normally.  · The left  ventricular cavity is small.  · Left ventricular wall thickness is consistent with mild concentric hypertrophy.  · There is mild calcification of the aortic valve.  · Mild aortic valve stenosis is present.       Cardiac Catheterization 4/27/17  FINDINGS:     LEFT VENTRICULOGRAPHY: The LV pressure was 130/16 on pullback the aortic pressure was 118/64 to there was an mean gradient of 12 and not shoot an LV gram is we have a high quality echo.     CORONARY ANGIOGRAPHY:  Left main: Normal  Left anterior descending: Normal proximally 99% mid LAD, distal LAD is normal diagonals are normal  Ramus intermedius:Not present  Circumflex: Normal circumflex OM1 with some 20% disease  RCA: Is a dominant vessel.  proximally 90% proximal RCA lesion 30% mid disease     Interventional Note:  I exchanged a 6 Fr sheath in the R femoral artery over a wire and I ended up having to put a 6 Romanian 45 cm arrow flex sheath in.  9000  units of heparin was given and the ACT was 301.  Clopidogrel was given in a loading dose of 600 mg.  A 6 Romanian Voda left 3.5 guiding catheter was passed up and intubated the main coronary artery.  A BMW wire was passed into the distal LAD.  3.5 x 12 mm trek balloon was inflated at 10 at 14 orion twice and then implanted a 4.0 x 15 mm Xience drug-eluting stent at 16 orion we postdilated that with a 4.5 x 12 mm NC trek balloon at 20 orion this gave a 0% residual and MARCOS-3 flow.     We then exchanged several guiding catheters trying to get something that would fit into this right coronary artery we used an ART 3.5 and AR-1 and then finally an AL-1 fit great.  BMW wire was passed in the distal RCA we predilated this lesion with a 3.5/12 trek balloon at 14 orion we put a 4.0 x 28 mm Xience Alpine drug-eluting stent in at 16 orion I postdilated that with a 4.5/12 NC trek balloon at 20 orion twice there was 0% residual and MARCOS-3 flow at that point this case was halted           SUMMARY: Successful drug-eluting stenting and  angioplasty to the mid LAD and proximal RCA    Myocardial Perfusion Stress Test 4/26/17  Interpretation Summary     · Myocardial perfusion imaging indicates a medium-sized, moderately severe area of ischemia located in the anterior wall, apex, lateral wall and septal wall.  · Left ventricular ejection fraction is normal (Calculated EF = 56%).  · Impressions are consistent with a high risk study.     Echocardiogram 4/26/17  Interpretation Summary     · Left ventricular function is normal. Calculated EF = 54.3%. Estimated EF was in agreement with the calculated EF. Normal left ventricular cavity size noted.  · The following segments are hypokinetic: apical septal, apical inferior and apex. All other segments are normal.  · Left ventricular wall thickness is consistent with mild concentric hypertrophy  · Left ventricular diastolic dysfunction is noted (grade I a w/high LAP) consistent with impaired relaxation.  · Mild aortic valve stenosis is present.  · is moderate calcification of the aortic valve.  · Mild aortic valve stenosis is present.         Past Medical History:   Diagnosis Date   • Arthralgia    • CAD (coronary artery disease), native coronary artery    • Chest pain, unspecified    • Essential hypertension    • Gout    • History of prostate cancer    • Jaw pain     both sides   • Mixed hyperlipidemia    • Osteoarthrosis    • Shoulder pain    • SOB (shortness of breath)    • Tinnitus        Past Surgical History:   Procedure Laterality Date   • CARDIAC CATHETERIZATION N/A 4/27/2017    Procedure: Coronary angiography;  Surgeon: Marvel Brito MD;  Location: [facility] INVASIVE LOCATION;  Service:    • CARDIAC CATHETERIZATION N/A 4/27/2017    Procedure: Left Heart Cath;  Surgeon: Marvel Brito MD;  Location: [facility] INVASIVE LOCATION;  Service:    • CARDIAC CATHETERIZATION N/A 4/27/2017    Procedure: Stent GIN coronary;  Surgeon: Marvel Brito MD;  Location: [facility] INVASIVE LOCATION;  Service:     • CARDIAC ELECTROPHYSIOLOGY PROCEDURE N/A 6/15/2017    Procedure: Pacemaker DC new   MEDTRONIC;  Surgeon: Gustavo Valladares MD;  Location:  INVASIVE LOCATION;  Service:    • COLONOSCOPY  2013   • INGUINAL HERNIA REPAIR Right    • NECK SURGERY      SPURS   • NV RECONSTR TOTAL SHOULDER IMPLANT Right 2/16/2017    Procedure: RIGHT TOTAL SHOULDER ARTHROPLASTY;  Surgeon: Marquez Galvan MD;  Location: [facility] OR [facility] [place];  Service: Orthopedics   • PROSTATECTOMY     • REPLACEMENT TOTAL KNEE Left    • TONSILLECTOMY     • TOTAL SHOULDER REPLACEMENT Bilateral        Prescriptions Prior to Admission   Medication Sig Dispense Refill Last Dose   • allopurinol (ZYLOPRIM) 100 MG tablet Take 1 tablet by mouth Daily. 90 tablet 3 8/10/2018 at Unknown time   • apixaban (ELIQUIS) 5 MG tablet tablet Take 1 tablet by mouth 2 (Two) Times a Day. 60 tablet 3 8/10/2018 at am   • aspirin 81 MG chewable tablet Chew 81 mg Daily.   8/10/2018 at Unknown time   • Cranberry 250 MG capsule Take 250 mg by mouth Daily.   8/10/2018 at Unknown time   • Fluticasone Furoate-Vilanterol (BREO ELLIPTA) 100-25 MCG/INH aerosol powder  Inhale 1 puff Every Morning.   8/10/2018 at Unknown time   • melatonin 5 MG tablet tablet Take 5 mg by mouth At Night As Needed.   Taking   • Multiple Vitamins-Minerals (MULTIVITAMIN WITH MINERALS) tablet tablet Take 1 tablet by mouth Daily.   8/10/2018 at Unknown time   • potassium chloride (MICRO-K) 10 MEQ CR capsule Take 1 capsule by mouth 2 (Two) Times a Day. 180 capsule 3 8/10/2018 at am   • rosuvastatin (CRESTOR) 20 MG tablet Take 1 tablet by mouth Daily. 90 tablet 3 8/9/2018 at Unknown time   • sulfamethoxazole-trimethoprim (BACTRIM DS,SEPTRA DS) 800-160 MG per tablet Take 1 tablet by mouth 2 (Two) Times a Day.   8/10/2018 at am       Current Meds  Scheduled Meds:  albuterol 2.5 mg Nebulization Q6H - RT   allopurinol 100 mg Oral Daily   aspirin 81 mg Oral Daily   budesonide-formoterol 2 puff Inhalation BID - RT    docusate sodium 100 mg Oral BID   famotidine 20 mg Oral Daily   vitamin B-1 100 mg Oral Daily   And      multivitamin 1 tablet Oral Daily   And      folic acid 1 mg Oral Daily   metoprolol tartrate 2.5 mg Intravenous Once   rosuvastatin 20 mg Oral Daily   tamsulosin 0.4 mg Oral Daily     Continuous Infusions:  sodium chloride 9 mL/hr Last Rate: 9 mL/hr (08/11/18 1104)     PRN Meds:.•  acetaminophen  •  calcium carbonate  •  LORazepam **OR** LORazepam **OR** LORazepam **OR** LORazepam **OR** LORazepam **OR** LORazepam  •  melatonin  •  nitroglycerin  •  ondansetron **OR** ondansetron ODT **OR** ondansetron  •  sodium chloride  •  Insert peripheral IV **AND** sodium chloride    Allergies as of 08/10/2018   • (No Known Allergies)       Social History     Social History   • Marital status: Unknown     Spouse name: N/A   • Number of children: N/A   • Years of education: N/A     Occupational History   • Not on file.     Social History Main Topics   • Smoking status: Never Smoker   • Smokeless tobacco: Never Used      Comment: caffeine - 1 every 2-3 days   • Alcohol use 12.6 oz/week     21 Cans of beer per week      Comment: 2-3 beers daily   • Drug use: No   • Sexual activity: Defer     Other Topics Concern   • Not on file     Social History Narrative   • No narrative on file       Family History   Problem Relation Age of Onset   • Cancer Mother         lung   • Cancer Father         lung   • Colon cancer Neg Hx    • Colon polyps Neg Hx        REVIEW OF SYSTEMS:   ROS was performed and is negative except as outlined in HPI     REVIEW OF SYSTEMS:   CONSTITUTIONAL: No weight loss, fever, chills, weakness or fatigue.   HEENT: Eyes: No visual loss, blurred vision, double vision or yellow sclerae. Ears, Nose, Throat: No hearing loss, sneezing, congestion, runny nose or sore throat.   SKIN: No rash or itching.     RESPIRATORY: No shortness of breath, hemoptysis, cough or sputum.   GASTROINTESTINAL: No anorexia, nausea,  "vomiting or diarrhea. No abdominal pain, bright red blood per rectum or melena.  GENITOURINARY: No burning on urination, hematuria or increased frequency.  NEUROLOGICAL: No headache, dizziness, syncope, paralysis, ataxia, numbness or tingling in the extremities. No change in bowel or bladder control.   MUSCULOSKELETAL: No muscle, back pain, joint pain or stiffness.   HEMATOLOGIC: No anemia, bleeding or bruising.   LYMPHATICS: No enlarged nodes. No history of splenectomy.   PSYCHIATRIC: No history of depression, anxiety, hallucinations.   ENDOCRINOLOGIC: No reports of sweating, cold or heat intolerance. No polyuria or polydipsia.        Objective:   Temp:  [97.3 °F (36.3 °C)-97.8 °F (36.6 °C)] 97.7 °F (36.5 °C)  Heart Rate:  [] 112  Resp:  [17-18] 18  BP: (103-123)/() 123/78  Body mass index is 32.9 kg/m².  Flowsheet Rows      First Filed Value   Admission Height  172.7 cm (68\") Documented at 08/10/2018 1438   Admission Weight  98 kg (216 lb) Documented at 08/10/2018 1438        Vitals:    08/11/18 1330   BP: 123/78   Pulse: 112   Resp: 18   Temp: 97.7 °F (36.5 °C)   SpO2: 93%       Head:    Normocephalic, without obvious abnormality, atraumatic   Eyes:            Lids and lashes normal, conjunctivae and sclerae normal, no   icterus, no pallor   Ears:    Ears appear intact with no abnormalities noted   Throat:   No oral lesions, dentition good   Neck:   No adenopathy, supple, trachea midline, no thyromegaly, no   carotid bruit, no JVD   Lungs:     Breath sounds are equal and clear to auscultation    Heart:    Normal S1 and S2, RRR, No M/G/R   Abdomen:     Normal bowel sounds, no masses, no organomegaly, soft        non-tender, non-distended, no guarding   Extremities:   Moves all extremities well, no edema, no cyanosis, no redness   Pulses:   Pulses palpable and equal bilaterally.    Skin:  Psychiatric:   No bleeding, bruising or rash    Awake, alert and oriented x 3, normal mood and affect         "   Telemetry:      EKG:            I personally viewed and interpreted the patient's EKG/Telemetry data    Assessment:  Active Hospital Problems    Diagnosis Date Noted   • Acute kidney injury superimposed on chronic kidney disease (CMS/HCC) [N17.9, N18.9] 08/10/2018   • Dyspnea on exertion [R06.09] 08/10/2018   • Generalized weakness [R53.1] 08/10/2018   • Elevated d-dimer [R79.89] 08/10/2018   • Dehydration [E86.0] 08/10/2018   • Mild persistent asthma without complication [J45.30] 08/10/2018   • Heme positive stool [R19.5] 08/10/2018   • Elevated LFTs [R79.89] 08/10/2018   • Alcohol use [Z78.9] 08/10/2018   • Urine retention [R33.9] 08/10/2018   • Hypertension [I10]       Resolved Hospital Problems    Diagnosis Date Noted Date Resolved   • Hyperkalemia [E87.5] 08/10/2018 08/11/2018       Plan:  1. afib     Hr moderately controlled,   Con dig, increase loressure to 25 mg po bid  2.cad      No angina  3pacemaker        Will interogrette  4. SOB         Multifactorial   5. Anticoagulated         Off due to black stools      Maninder Shen MD  08/11/18  2:40 PM.

## 2018-08-11 NOTE — H&P
Patient Name:  Guero Garay  YOB: 1935  MRN:  [id number]  Admit Date:  8/10/2018  Patient Care Team:  Librado Lai MD as PCP - General (Internal Medicine)  Avery Galan MD as PCP - Claims Attributed  Aida Varela RN as Care Coordinator (Population Health)      Chief Complaint   Patient presents with   • Shortness of Breath   • Abnormal Lab     Subjective   Mr. Garay is a 83 y.o. male with a history of Afib, CAD s/p DESx2 4/2017, symptomatic bradycardia s/p pacemaker, and CKD that presents to [facility] complaining of progressive generalized weakness and dyspnea on exertion for about the past 2 weeks.  This has been accompanied by light-headedness and dark stools for the past 5d.  He visited Dr. Galan's office today and was noted to have low BP. On his pacemaker interrogation he was found to have an extensive Afib burden and eliquis was ordered.  His dyspnea was not thought to be of cardiac origin.       The patient was hospitalized at the end of last month for urosepsis from acute pyelonephritis.  He was discharged on a 2-week course of bactrim at that time, and is still taking this.  He denies flank pain and dysuria.  He did have hematuria with this episode but he he no longer having this.        History of Present Illness    Past Medical History:   Diagnosis Date   • Arthralgia    • CAD (coronary artery disease), native coronary artery    • Chest pain, unspecified    • Essential hypertension    • Gout    • History of prostate cancer    • Jaw pain     both sides   • Mixed hyperlipidemia    • Osteoarthrosis    • Shoulder pain    • SOB (shortness of breath)    • Tinnitus      Past Surgical History:   Procedure Laterality Date   • CARDIAC CATHETERIZATION N/A 4/27/2017    Procedure: Coronary angiography;  Surgeon: Marvel Brito MD;  Location: [facility] CATH INVASIVE LOCATION;  Service:    • CARDIAC CATHETERIZATION N/A 4/27/2017    Procedure: Left Heart Cath;   Surgeon: Marvel Brito MD;  Location:  [facility] CATH INVASIVE LOCATION;  Service:    • CARDIAC CATHETERIZATION N/A 4/27/2017    Procedure: Stent GIN coronary;  Surgeon: Marvel Brito MD;  Location:  [facility] CATH INVASIVE LOCATION;  Service:    • CARDIAC ELECTROPHYSIOLOGY PROCEDURE N/A 6/15/2017    Procedure: Pacemaker DC new   [employer];  Surgeon: Gustavo Valladares MD;  Location:  [facility] CATH INVASIVE LOCATION;  Service:    • COLONOSCOPY  2013   • INGUINAL HERNIA REPAIR Right    • NECK SURGERY      SPURS   • MI RECONSTR TOTAL SHOULDER IMPLANT Right 2/16/2017    Procedure: RIGHT TOTAL SHOULDER ARTHROPLASTY;  Surgeon: Marquez Galvan MD;  Location:  [facility];  Service: Orthopedics   • PROSTATECTOMY     • REPLACEMENT TOTAL KNEE Left    • TONSILLECTOMY     • TOTAL SHOULDER REPLACEMENT Bilateral      Family History   Problem Relation Age of Onset   • Cancer Mother         lung   • Cancer Father         lung   • Colon cancer Neg Hx    • Colon polyps Neg Hx      Social History   Substance Use Topics   • Smoking status: Never Smoker   • Smokeless tobacco: Never Used      Comment: caffeine - 1 every 2-3 days   • Alcohol use 12.6 oz/week     21 Cans of beer per week      Comment: 2-3 beers daily     Prescriptions Prior to Admission   Medication Sig Dispense Refill Last Dose   • allopurinol (ZYLOPRIM) 100 MG tablet Take 1 tablet by mouth Daily. 90 tablet 3 8/10/2018 at Unknown time   • apixaban (ELIQUIS) 5 MG tablet tablet Take 1 tablet by mouth 2 (Two) Times a Day. 60 tablet 3 8/10/2018 at am   • aspirin 81 MG chewable tablet Chew 81 mg Daily.   8/10/2018 at Unknown time   • Cranberry 250 MG capsule Take 250 mg by mouth Daily.   8/10/2018 at Unknown time   • Fluticasone Furoate-Vilanterol (BREO ELLIPTA) 100-25 MCG/INH aerosol powder  Inhale 1 puff Every Morning.   8/10/2018 at Unknown time   • melatonin 5 MG tablet tablet Take 5 mg by mouth At Night As Needed.   Taking   • Multiple Vitamins-Minerals (MULTIVITAMIN WITH  MINERALS) tablet tablet Take 1 tablet by mouth Daily.   8/10/2018 at Unknown time   • potassium chloride (MICRO-K) 10 MEQ CR capsule Take 1 capsule by mouth 2 (Two) Times a Day. 180 capsule 3 8/10/2018 at am   • rosuvastatin (CRESTOR) 20 MG tablet Take 1 tablet by mouth Daily. 90 tablet 3 8/9/2018 at Unknown time   • sulfamethoxazole-trimethoprim (BACTRIM DS,SEPTRA DS) 800-160 MG per tablet Take 1 tablet by mouth 2 (Two) Times a Day.   8/10/2018 at am     Allergies:  No Known Allergies    Review of Systems   Constitutional: Positive for fatigue. Negative for appetite change, chills and fever.   HENT: Negative for congestion, nosebleeds, postnasal drip and trouble swallowing.    Eyes: Negative for photophobia, redness and visual disturbance.   Respiratory: Positive for shortness of breath. Negative for cough, chest tightness and wheezing.    Cardiovascular: Negative for chest pain, palpitations and leg swelling.   Gastrointestinal: Positive for blood in stool. Negative for abdominal pain, constipation, diarrhea, nausea and vomiting.   Endocrine: Negative for cold intolerance and heat intolerance.   Genitourinary: Negative for difficulty urinating, dysuria, flank pain and hematuria.   Musculoskeletal: Negative for arthralgias and myalgias.   Skin: Negative for pallor and rash.   Neurological: Positive for weakness (generalized) and light-headedness. Negative for dizziness and headaches.   Hematological: Negative for adenopathy. Does not bruise/bleed easily.   Psychiatric/Behavioral: Negative for confusion and decreased concentration.        Objective    Vital Signs  Temp:  [97.3 °F (36.3 °C)-97.8 °F (36.6 °C)] 97.8 °F (36.6 °C)  Heart Rate:  [] 114  Resp:  [17-20] 18  BP: ()/(60-91) 103/76  SpO2:  [92 %-97 %] 94 %  on   ;   Device (Oxygen Therapy): room air  Body mass index is 32.9 kg/m².    Physical Exam   Constitutional: He is oriented to person, place, and time. No distress.   HENT:   Head:  Normocephalic and atraumatic.   Mouth/Throat: Mucous membranes are dry.   Eyes: Pupils are equal, round, and reactive to light. Conjunctivae and EOM are normal.   Neck: Normal range of motion. Neck supple. No JVD present.   Cardiovascular: Normal rate, regular rhythm and intact distal pulses.    Pulmonary/Chest: Effort normal and breath sounds normal.   Abdominal: Soft. Bowel sounds are normal. There is no tenderness.   Musculoskeletal: He exhibits no edema or tenderness.   Neurological: He is alert and oriented to person, place, and time.   Skin: Skin is warm and dry. He is not diaphoretic.   Psychiatric: He has a normal mood and affect. His behavior is normal.   Nursing note and vitals reviewed.      Results Review:   I reviewed the patient's new clinical results including all labs and xray's.    Lab Results (last 24 hours)     Procedure Component Value Units Date/Time    BNP [[id number]]  (Normal) Collected:  08/10/18 1301    Specimen:  Blood Updated:  08/10/18 1455     proBNP 1,113.0 pg/mL     Narrative:       Among patients with dyspnea, NT-proBNP is highly sensitive for the detection of acute congestive heart failure. In addition NT-proBNP of <300 pg/ml effectively rules out acute congestive heart failure with 99% negative predictive value.    Troponin [[id number]]  (Normal) Collected:  08/10/18 1301    Specimen:  Blood Updated:  08/10/18 1455     Troponin T <0.010 ng/mL     Narrative:       Troponin T Reference Ranges:  Less than 0.03 ng/mL:    Negative for AMI  0.03 to 0.09 ng/mL:      Indeterminant for AMI  Greater than 0.09 ng/mL: Positive for AMI    Comprehensive Metabolic Panel [[id number]]  (Abnormal) Collected:  08/10/18 1301    Specimen:  Blood Updated:  08/10/18 1554     Glucose 93 mg/dL      BUN 29 (H) mg/dL      Creatinine 2.08 (H) mg/dL      Sodium 137 mmol/L      Potassium 5.3 (H) mmol/L      Chloride 99 mmol/L      CO2 19.6 (L) mmol/L      Calcium 9.8 mg/dL      Total Protein 7.5 g/dL      Albumin  3.50 g/dL      ALT (SGPT) 104 (H) U/L      AST (SGOT) 48 (H) U/L      Alkaline Phosphatase 478 (H) U/L      Total Bilirubin 0.5 mg/dL      eGFR Non African Amer 31 (L) mL/min/1.73      Globulin 4.0 gm/dL      A/G Ratio 0.9 g/dL      BUN/Creatinine Ratio 13.9     Anion Gap 18.4 mmol/L     Narrative:       The MDRD GFR formula is only valid for adults with stable renal function between ages 18 and 70.    CBC & Differential [[id number]] Collected:  08/10/18 1506    Specimen:  Blood Updated:  08/10/18 1528    Narrative:       The following orders were created for panel order CBC & Differential.  Procedure                               Abnormality         Status                     ---------                               -----------         ------                     CBC Auto Differential[[id number]]        Abnormal            Final result                 Please view results for these tests on the individual orders.    Protime-INR [[id number]]  (Abnormal) Collected:  08/10/18 1506    Specimen:  Blood Updated:  08/10/18 1536     Protime 15.9 (H) Seconds      INR 1.29 (H)    Troponin [[id number]]  (Normal) Collected:  08/10/18 1506    Specimen:  Blood Updated:  08/10/18 1547     Troponin T <0.010 ng/mL     Narrative:       Troponin T Reference Ranges:  Less than 0.03 ng/mL:    Negative for AMI  0.03 to 0.09 ng/mL:      Indeterminant for AMI  Greater than 0.09 ng/mL: Positive for AMI    Lactic Acid, Plasma [[id number]]  (Abnormal) Collected:  08/10/18 1506    Specimen:  Blood Updated:  08/10/18 1536     Lactate 2.2 (C) mmol/L     D-dimer, Quantitative [[id number]]  (Abnormal) Collected:  08/10/18 1506    Specimen:  Blood Updated:  08/10/18 1536     D-Dimer, Quantitative 1.13 (H) MCGFEU/mL     Narrative:       The Stago D-Dimer test used in conjunction with a clinical pretest probability (PTP) assessment model, has been approved by the FDA to rule out the presence of venous thromboembolism (VTE) in outpatients suspected of deep  venous thrombosis (DVT) or pulmonary embolism (PE).     CBC Auto Differential [[id number]]  (Abnormal) Collected:  08/10/18 1506    Specimen:  Blood Updated:  08/10/18 1528     WBC 5.54 10*3/mm3      RBC 5.15 10*6/mm3      Hemoglobin 15.2 g/dL      Hematocrit 48.2 %      MCV 93.6 fL      MCH 29.5 pg      MCHC 31.5 (L) g/dL      RDW 14.7 (H) %      RDW-SD 49.7 fl      MPV 9.6 fL      Platelets 261 10*3/mm3      Neutrophil % 68.6 %      Lymphocyte % 19.3 (L) %      Monocyte % 7.2 %      Eosinophil % 4.2 %      Basophil % 0.7 %      Immature Grans % 0.0 %      Neutrophils, Absolute 3.80 10*3/mm3      Lymphocytes, Absolute 1.07 10*3/mm3      Monocytes, Absolute 0.40 10*3/mm3      Eosinophils, Absolute 0.23 10*3/mm3      Basophils, Absolute 0.04 10*3/mm3      Immature Grans, Absolute 0.00 10*3/mm3     Lactic Acid, Reflex Timer (This will reflex a repeat order 3-3:15 hours after ordered.) [[id number]] Collected:  08/10/18 1506    Specimen:  Blood Updated:  08/10/18 1846     Extra Tube Hold for add-ons.     Comment: Auto resulted.       Urinalysis With Culture If Indicated - Urine, Clean Catch [[id number]]  (Normal) Collected:  08/10/18 1807    Specimen:  Urine from Urine, Catheter Updated:  08/10/18 1830     Color, UA Yellow     Appearance, UA Clear     pH, UA 6.0     Specific Gravity, UA 1.018     Glucose, UA Negative     Ketones, UA Negative     Bilirubin, UA Negative     Blood, UA Negative     Protein, UA Negative     Leuk Esterase, UA Negative     Nitrite, UA Negative     Urobilinogen, UA 1.0 E.U./dL    Narrative:       Urine microscopic not indicated.    Lactic Acid, Reflex [[id number]]  (Normal) Collected:  08/10/18 2016    Specimen:  Blood from Arm, Right Updated:  08/10/18 2052     Lactate 2.0 mmol/L     Ethanol [[id number]] Collected:  08/10/18 2016    Specimen:  Blood from Arm, Right Updated:  08/10/18 2048     Ethanol <10 mg/dL      Ethanol % <0.010 %     TSH [[id number]]  (Normal) Collected:  08/10/18 2016     Specimen:  Blood from Arm, Right Updated:  08/10/18 2143     TSH 1.630 mIU/mL     Cortisol [[id number]] Collected:  08/10/18 2016    Specimen:  Blood from Arm, Right Updated:  08/10/18 2139          CT Abdomen Pelvis Without Contrast   Final Result      XR Chest 2 View   Final Result      NM Lung Ventilation Perfusion    (Results Pending)     Assessment/Plan   Active Hospital Problems    Diagnosis Date Noted   • Acute kidney injury superimposed on chronic kidney disease (CMS/HCC) [N17.9, N18.9] 08/10/2018   • Dyspnea on exertion [R06.09] 08/10/2018   • Generalized weakness [R53.1] 08/10/2018   • Elevated d-dimer [R79.89] 08/10/2018   • Hyperkalemia [E87.5] 08/10/2018   • Dehydration [E86.0] 08/10/2018   • Mild persistent asthma without complication [J45.30] 08/10/2018   • Heme positive stool [R19.5] 08/10/2018   • Elevated LFTs [R79.89] 08/10/2018   • Alcohol use [Z78.9] 08/10/2018   • Urine retention [R33.9] 08/10/2018   • Hypertension [I10]       Resolved Hospital Problems    Diagnosis Date Noted Date Resolved   No resolved problems to display.   Generalized Weakness/Dyspnea on exertion  - not sure of the cause at this point  - he has recently been noted to have low BP and orthostasis, will check cortisol and TSH  - give IVF as he does appear to be a little dehydrated  - has asthma and scattered wheezing but these are very mild-place on bronchodilators  - has positive d-dimer likely influenced by renal dysfunction but given his shortness of breath will check V/Q scan-he had not previously been on eliquis    Dark Stools  - heme + in ER  - he does not appear to have gross bleeding but given that he is being started on AC for afib will ask GI to evaluate-he was scheduled for a colonoscopy with Dr. Powers but he has not yet seen him  - follow Hgb for now-I do expect some decrease with hydration    HEENA  - has CKD stage 3 - Cr's have been anywhere from 1.4-1.9 for the past month so difficult to know what his baseline  is  - will check PVR, monitor response to IVF  - has mild hyperkalemia and acidosis-should improve with IVF    Elevated LFTs  - improved from a few days ago, AST>LFT  - he states he has a couple drinks daily but has not in 3 weeks  - will place on CIWA at least for now as a precaution    UTI/Pyelonephritis  - appears to be resolving   - on course of bactrim and will continue for now but may need to switch to a different antibiotic if potassium does not improve    Afib  - patient had eliquis ordered today but has not taken  - will hold off on initiating this until GI issues are sorted out    DVT Prophylaxis  - SCDs    Code Status  I discussed with the patient and his wife and he is full code.    I discussed the patients findings and my recommendations with patient, family and nursing staff.      Philippe Carranza MD  [facility]  08/10/18  11:38 PM     Addendum:  Patient unable to void, doss catheter placed.  Will ask his urologist, Dr. Purvis, to see him in the AM.

## 2018-08-11 NOTE — PLAN OF CARE
Problem: Patient Care Overview  Goal: Plan of Care Review   08/11/18 2219   Coping/Psychosocial   Plan of Care Reviewed With patient   Plan of Care Review   Progress no change   OTHER   Outcome Summary pt admitted for HEENA and weekness, SOA. Pt states that he has not been able to do much the last two weeks. Pt c/o some blk stools and rectum bleeding recently. UTI and previously started on oral abx. Hyperkalemia. Recently diagnosed with afib. Pt saw Cardiologist before coming to ER. Pt had no c/o pain or discomfort overnight. Consult for GI, and Urology. Cont to monitor, safety maintianed.

## 2018-08-11 NOTE — PLAN OF CARE
Problem: Patient Care Overview  Goal: Plan of Care Review  Outcome: Ongoing (interventions implemented as appropriate)      Problem: Activity Intolerance (Adult)  Goal: Identify Related Risk Factors and Signs and Symptoms  Outcome: Ongoing (interventions implemented as appropriate)    Goal: Activity Tolerance  Outcome: Ongoing (interventions implemented as appropriate)      Problem: Renal Failure/Kidney Injury, Acute (Adult)  Goal: Signs and Symptoms of Listed Potential Problems Will be Absent, Minimized or Managed (Renal Failure/Kidney Injury, Acute)  Outcome: Ongoing (interventions implemented as appropriate)

## 2018-08-12 LAB
ALBUMIN SERPL-MCNC: 3.3 G/DL (ref 3.5–5.2)
ALP SERPL-CCNC: 372 U/L (ref 39–117)
ALT SERPL W P-5'-P-CCNC: 62 U/L (ref 1–41)
ANION GAP SERPL CALCULATED.3IONS-SCNC: 12.6 MMOL/L
AST SERPL-CCNC: 28 U/L (ref 1–40)
BASOPHILS # BLD AUTO: 0.04 10*3/MM3 (ref 0–0.2)
BASOPHILS NFR BLD AUTO: 0.9 % (ref 0–1.5)
BILIRUB CONJ SERPL-MCNC: 0.2 MG/DL (ref 0–0.3)
BILIRUB INDIRECT SERPL-MCNC: 0.4 MG/DL
BILIRUB SERPL-MCNC: 0.6 MG/DL (ref 0.1–1.2)
BUN BLD-MCNC: 17 MG/DL (ref 8–23)
BUN/CREAT SERPL: 9.7 (ref 7–25)
CALCIUM SPEC-SCNC: 9.1 MG/DL (ref 8.6–10.5)
CHLORIDE SERPL-SCNC: 100 MMOL/L (ref 98–107)
CO2 SERPL-SCNC: 23.4 MMOL/L (ref 22–29)
CREAT BLD-MCNC: 1.75 MG/DL (ref 0.76–1.27)
DEPRECATED RDW RBC AUTO: 51.2 FL (ref 37–54)
EOSINOPHIL # BLD AUTO: 0.19 10*3/MM3 (ref 0–0.7)
EOSINOPHIL NFR BLD AUTO: 4.3 % (ref 0.3–6.2)
ERYTHROCYTE [DISTWIDTH] IN BLOOD BY AUTOMATED COUNT: 14.9 % (ref 11.5–14.5)
GFR SERPL CREATININE-BSD FRML MDRD: 38 ML/MIN/1.73
GLUCOSE BLD-MCNC: 103 MG/DL (ref 65–99)
HCT VFR BLD AUTO: 40 % (ref 40.4–52.2)
HCT VFR BLD AUTO: 40.2 % (ref 40.4–52.2)
HCT VFR BLD AUTO: 43.8 % (ref 40.4–52.2)
HGB BLD-MCNC: 13 G/DL (ref 13.7–17.6)
HGB BLD-MCNC: 13.3 G/DL (ref 13.7–17.6)
HGB BLD-MCNC: 13.9 G/DL (ref 13.7–17.6)
IMM GRANULOCYTES # BLD: 0.01 10*3/MM3 (ref 0–0.03)
IMM GRANULOCYTES NFR BLD: 0.2 % (ref 0–0.5)
LYMPHOCYTES # BLD AUTO: 0.74 10*3/MM3 (ref 0.9–4.8)
LYMPHOCYTES NFR BLD AUTO: 16.6 % (ref 19.6–45.3)
MCH RBC QN AUTO: 29.8 PG (ref 27–32.7)
MCHC RBC AUTO-ENTMCNC: 32.3 G/DL (ref 32.6–36.4)
MCV RBC AUTO: 92.2 FL (ref 79.8–96.2)
MONOCYTES # BLD AUTO: 0.72 10*3/MM3 (ref 0.2–1.2)
MONOCYTES NFR BLD AUTO: 16.2 % (ref 5–12)
NEUTROPHILS # BLD AUTO: 2.76 10*3/MM3 (ref 1.9–8.1)
NEUTROPHILS NFR BLD AUTO: 62 % (ref 42.7–76)
NT-PROBNP SERPL-MCNC: 1457 PG/ML (ref 0–1800)
PLATELET # BLD AUTO: 227 10*3/MM3 (ref 140–500)
PMV BLD AUTO: 9.4 FL (ref 6–12)
POTASSIUM BLD-SCNC: 4.7 MMOL/L (ref 3.5–5.2)
PROT SERPL-MCNC: 6.6 G/DL (ref 6–8.5)
RBC # BLD AUTO: 4.36 10*6/MM3 (ref 4.6–6)
SODIUM BLD-SCNC: 136 MMOL/L (ref 136–145)
WBC NRBC COR # BLD: 4.45 10*3/MM3 (ref 4.5–10.7)

## 2018-08-12 PROCEDURE — 99232 SBSQ HOSP IP/OBS MODERATE 35: CPT | Performed by: INTERNAL MEDICINE

## 2018-08-12 PROCEDURE — 85014 HEMATOCRIT: CPT | Performed by: INTERNAL MEDICINE

## 2018-08-12 PROCEDURE — 97161 PT EVAL LOW COMPLEX 20 MIN: CPT

## 2018-08-12 PROCEDURE — 93005 ELECTROCARDIOGRAM TRACING: CPT | Performed by: INTERNAL MEDICINE

## 2018-08-12 PROCEDURE — 80048 BASIC METABOLIC PNL TOTAL CA: CPT | Performed by: INTERNAL MEDICINE

## 2018-08-12 PROCEDURE — 93010 ELECTROCARDIOGRAM REPORT: CPT | Performed by: INTERNAL MEDICINE

## 2018-08-12 PROCEDURE — 85025 COMPLETE CBC W/AUTO DIFF WBC: CPT | Performed by: INTERNAL MEDICINE

## 2018-08-12 PROCEDURE — 85018 HEMOGLOBIN: CPT | Performed by: INTERNAL MEDICINE

## 2018-08-12 PROCEDURE — 80076 HEPATIC FUNCTION PANEL: CPT | Performed by: INTERNAL MEDICINE

## 2018-08-12 PROCEDURE — 25010000002 FUROSEMIDE PER 20 MG: Performed by: INTERNAL MEDICINE

## 2018-08-12 PROCEDURE — 94799 UNLISTED PULMONARY SVC/PX: CPT

## 2018-08-12 PROCEDURE — 83880 ASSAY OF NATRIURETIC PEPTIDE: CPT | Performed by: INTERNAL MEDICINE

## 2018-08-12 RX ORDER — FUROSEMIDE 10 MG/ML
20 INJECTION INTRAMUSCULAR; INTRAVENOUS EVERY 12 HOURS
Status: DISCONTINUED | OUTPATIENT
Start: 2018-08-12 | End: 2018-08-13

## 2018-08-12 RX ADMIN — ASPIRIN 81 MG: 81 TABLET, CHEWABLE ORAL at 08:26

## 2018-08-12 RX ADMIN — TAMSULOSIN HYDROCHLORIDE 0.4 MG: 0.4 CAPSULE ORAL at 08:26

## 2018-08-12 RX ADMIN — FAMOTIDINE 20 MG: 20 TABLET, FILM COATED ORAL at 08:26

## 2018-08-12 RX ADMIN — ALBUTEROL SULFATE 2.5 MG: 2.5 SOLUTION RESPIRATORY (INHALATION) at 08:03

## 2018-08-12 RX ADMIN — ALBUTEROL SULFATE 2.5 MG: 2.5 SOLUTION RESPIRATORY (INHALATION) at 19:26

## 2018-08-12 RX ADMIN — DOCUSATE SODIUM 100 MG: 100 CAPSULE, LIQUID FILLED ORAL at 21:11

## 2018-08-12 RX ADMIN — METOPROLOL TARTRATE 12.5 MG: 25 TABLET ORAL at 21:11

## 2018-08-12 RX ADMIN — Medication 100 MG: at 08:26

## 2018-08-12 RX ADMIN — ROSUVASTATIN CALCIUM 20 MG: 20 TABLET, FILM COATED ORAL at 08:26

## 2018-08-12 RX ADMIN — Medication 1 TABLET: at 08:26

## 2018-08-12 RX ADMIN — FUROSEMIDE 20 MG: 10 INJECTION, SOLUTION INTRAMUSCULAR; INTRAVENOUS at 18:16

## 2018-08-12 RX ADMIN — METOPROLOL TARTRATE 12.5 MG: 25 TABLET ORAL at 13:03

## 2018-08-12 RX ADMIN — FOLIC ACID 1 MG: 1 TABLET ORAL at 08:26

## 2018-08-12 RX ADMIN — BUDESONIDE AND FORMOTEROL FUMARATE DIHYDRATE 2 PUFF: 160; 4.5 AEROSOL RESPIRATORY (INHALATION) at 19:30

## 2018-08-12 RX ADMIN — ALBUTEROL SULFATE 2.5 MG: 2.5 SOLUTION RESPIRATORY (INHALATION) at 23:09

## 2018-08-12 RX ADMIN — DOCUSATE SODIUM 100 MG: 100 CAPSULE, LIQUID FILLED ORAL at 08:26

## 2018-08-12 RX ADMIN — BUDESONIDE AND FORMOTEROL FUMARATE DIHYDRATE 2 PUFF: 160; 4.5 AEROSOL RESPIRATORY (INHALATION) at 08:03

## 2018-08-12 RX ADMIN — ALLOPURINOL 100 MG: 100 TABLET ORAL at 08:26

## 2018-08-12 NOTE — PLAN OF CARE
Problem: Patient Care Overview  Goal: Plan of Care Review  Outcome: Ongoing (interventions implemented as appropriate)   08/12/18 0316   Coping/Psychosocial   Plan of Care Reviewed With patient   Plan of Care Review   Progress improving   OTHER   Outcome Summary Continue to monitor vs, labs, doss catheter care, CAUTI PREVENTION EDUCATION, UP WITH ASSIST, US LIVER AND VQ SCAN PENDING.     Goal: Individualization and Mutuality  Outcome: Ongoing (interventions implemented as appropriate)   08/12/18 0316   Individualization   Patient Specific Interventions DOSS CATHETER CARE, CAUTI EDUCATION DONE       Problem: Activity Intolerance (Adult)  Goal: Identify Related Risk Factors and Signs and Symptoms  Outcome: Outcome(s) achieved Date Met: 08/12/18 08/12/18 0316   Activity Intolerance (Adult)   Related Risk Factors (Activity Intolerance) other (see comments)  (DIZZY UPON STANDING)   Signs and Symptoms (Activity Intolerance) dizziness/faintness     Goal: Activity Tolerance  Outcome: Ongoing (interventions implemented as appropriate)   08/12/18 0316   Activity Intolerance (Adult)   Activity Tolerance making progress toward outcome     Goal: Effective Energy Conservation Techniques  Outcome: Ongoing (interventions implemented as appropriate)   08/12/18 0316   Activity Intolerance (Adult)   Effective Energy Conservation Techniques making progress toward outcome       Problem: Renal Failure/Kidney Injury, Acute (Adult)  Goal: Signs and Symptoms of Listed Potential Problems Will be Absent, Minimized or Managed (Renal Failure/Kidney Injury, Acute)  Outcome: Ongoing (interventions implemented as appropriate)   08/12/18 0316   Goal/Outcome Evaluation   Problems Assessed (Acute Renal Failure/Kidney Injury) all   Problems Present (ARF/Kidney Injury) acid-base imbalance;drug/nephrotoxicity;fluid imbalance;electrolyte imbalance;situational response

## 2018-08-12 NOTE — THERAPY EVALUATION
Acute Care - Physical Therapy Initial Evaluation  [facility]     Patient Name: Guero Garay  : 1935  MRN: [id number]  Today's Date: 2018   Onset of Illness/Injury or Date of Surgery: 08/10/18  Date of Referral to PT: 08/10/18  Referring Physician: DARRYL KAM      Admit Date: 8/10/2018    Visit Dx:     ICD-10-CM ICD-9-CM   1. HEENA (acute kidney injury) (CMS/HCC) N17.9 584.9   2. Urinary retention R33.9 788.20   3. Atrial fibrillation with RVR (CMS/HCC) I48.91 427.31   4. Decreased functional mobility and endurance Z74.09 780.99     Patient Active Problem List   Diagnosis   • Gout   • Hypertension   • Hyperlipidemia   • Osteoarthrosis   • History of prostate cancer   • Tinnitus   • Benign neoplasm of colon   • Osteoarthritis of right shoulder   • Coronary artery disease involving native coronary artery of native heart with unstable angina pectoris (CMS/HCC)   • Syncopal episodes   • Screening for colon cancer   • Pyelonephritis, acute   • Acute kidney injury superimposed on chronic kidney disease (CMS/HCC)   • Dyspnea on exertion   • Generalized weakness   • Elevated d-dimer   • Dehydration   • Mild persistent asthma without complication   • Heme positive stool   • Elevated LFTs   • Alcohol use   • Urine retention     Past Medical History:   Diagnosis Date   • Arthralgia    • CAD (coronary artery disease), native coronary artery    • Chest pain, unspecified    • Essential hypertension    • Gout    • History of prostate cancer    • Jaw pain     both sides   • Mixed hyperlipidemia    • Osteoarthrosis    • Shoulder pain    • SOB (shortness of breath)    • Tinnitus      Past Surgical History:   Procedure Laterality Date   • CARDIAC CATHETERIZATION N/A 2017    Procedure: Coronary angiography;  Surgeon: Marvel Brito MD;  Location:  INVASIVE LOCATION;  Service:    • CARDIAC CATHETERIZATION N/A 2017    Procedure: Left Heart Cath;  Surgeon: Marvel Brito MD;  Location:   INVASIVE LOCATION;  Service:    • CARDIAC CATHETERIZATION N/A 4/27/2017    Procedure: Stent GIN coronary;  Surgeon: Marvel Brito MD;  Location:  [facility] CATH INVASIVE LOCATION;  Service:    • CARDIAC ELECTROPHYSIOLOGY PROCEDURE N/A 6/15/2017    Procedure: Pacemaker DC new   [employer];  Surgeon: Gustavo Valladares MD;  Location:  [facility] CATH INVASIVE LOCATION;  Service:    • COLONOSCOPY  2013   • INGUINAL HERNIA REPAIR Right    • NECK SURGERY      SPURS   • [place] RECONSTR TOTAL SHOULDER IMPLANT Right 2/16/2017    Procedure: RIGHT TOTAL SHOULDER ARTHROPLASTY;  Surgeon: Marquez Galvan MD;  [place]  [facility] [place] [facility] [place]  Service: Orthopedics   • PROSTATECTOMY     • REPLACEMENT TOTAL KNEE Left    • TONSILLECTOMY     • TOTAL SHOULDER REPLACEMENT Bilateral         PT ASSESSMENT (last 12 hours)      Physical Therapy Evaluation     Row Name 08/12/18 1527          PT Evaluation Time/Intention    Subjective Information no complaints  -     Document Type evaluation  -JR     Mode of Treatment physical therapy  -JR     Total Evaluation Minutes, Physical Therapy 25  -JR     Patient Effort good  -     Comment Pt FATIGUED AFTER AMBULATION.    -     Row Name 08/12/18 1527          General Information    Patient Profile Reviewed? yes  -JR     Onset of Illness/Injury or Date of Surgery 08/10/18  -JR     Referring Physician PER DR. KAM  -JR     Patient Observations alert;cooperative;agree to therapy  -JR     General Observations of Patient SITTING UP IN BED. WIFE IS PRESENT.   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    -JR     Pertinent History of Current Functional Problem HAS A HISTORY OF A-FIB.  HAS BEEN TACHYCARDIC WHILE HERE.   -JR     Existing Precautions/Restrictions oxygen therapy device and L/min;other (see comments)   ON 2L.    -JR     Risks Reviewed patient and  family:;LOB;nausea/vomiting;dizziness;increased discomfort;change in vital signs;increased drainage;lines disloged  -     Benefits Reviewed patient and family:;improve function;increase independence;increase strength;increase balance;decrease pain;decrease risk of DVT;improve skin integrity;increase knowledge  -     Barriers to Rehab medically complex  -JR     Row Name 08/12/18 1527          Relationship/Environment    Primary Source of Support/Comfort spouse  -JR     Lives With spouse  -JR     Row Name 08/12/18 1527          Resource/Environmental Concerns    Current Living Arrangements home/apartment/condo  -     Row Name 08/12/18 1527          Cognitive Assessment/Intervention- PT/OT    Orientation Status (Cognition) oriented x 3  -JR     Follows Commands (Cognition) WNL  -     Row Name 08/12/18 1527          Bed Mobility Assessment/Treatment    Bed Mobility Assessment/Treatment bed mobility (all) activities  -     [place] Level (Bed Mobility) nonverbal cues (demo/gesture);contact guard assist  -     Bed Mobility, Safety Issues decreased use of arms for pushing/pulling;decreased use of legs for bridging/pushing  -     Assistive Device (Bed Mobility) bed rails  -     Row Name 08/12/18 1527          Transfer Assessment/Treatment    Sit-Stand [place] (Transfers) nonverbal cues (demo/gesture);contact guard  -     Stand-Sit [place] (Transfers) nonverbal cues (demo/gesture);contact guard  -     Row Name 08/12/18 1527          Sit-Stand Transfer    Assistive Device (Sit-Stand Transfers) walker, front-wheeled  -JR     [facility] Name 08/12/18 1527          Stand-Sit Transfer    Assistive Device (Stand-Sit Transfers) walker, front-wheeled  -     Row Name 08/12/18 1527          Gait/Stairs Assessment/Training    [place] Level (Gait) verbal cues;minimum assist (75% patient effort)  -     Assistive Device (Gait) walker, front-wheeled  -     Distance in Feet (Gait) 325   TYPICALLY DOES  NOT USE WALKER AT HOME.    -JR     Deviations/Abnormal Patterns (Gait) ismael decreased;gait speed decreased;stride length decreased  -[facility] Name 08/12/18 1527          General ROM    GENERAL ROM COMMENTS WFLs FOR ALL 4 EXTREMITIES.    -[facility] Name 08/12/18 1527          General Assessment (Manual Muscle Testing)    General Manual Muscle Testing (MMT) Assessment upper extremity strength deficits identified;lower extremity strength deficits identified;other (see comments)   4/5 IN ALL 4 EXTREMITIES.    -[facility] Name 08/12/18 1527          Static Standing Balance    Level of [place] (Supported Standing, Static Balance) contact guard assist  -JR     Time Able to Maintain Position (Supported Standing, Static Balance) 3 to 4 minutes;other (see comments)   STOOD IN ORDER TO BE CLEANSED FOLLOWING BM.    -JR     Assistive Device Utilized (Supported Standing, Static Balance) rolling walker  -[facility] Name 08/12/18 1527          Pain Scale: Numbers Pre/Post-Treatment    Pain Scale: Numbers, Pretreatment 0/10 - no pain  -JR     Pain Scale: Numbers, Post-Treatment 0/10 - no pain  -JR     Row Name 08/12/18 1527          Plan of Care Review    Plan of Care Reviewed With patient  -JR     Row Name 08/12/18 1527          Physical Therapy Clinical Impression    Date of Referral to PT 08/10/18  -JR     PT Diagnosis (PT Clinical Impression) DECREASED MOBILITY AND ENDURANCE.   -JR     Prognosis (PT Clinical Impression) GOOD  -JR     Functional Level at Time of Evaluation (PT Clinical Impression) MIN TO CGA .    -JR     Patient/Family Goals Statement (PT Clinical Impression) GO HOME.   -JR     Criteria for Skilled Interventions Met (PT Clinical Impression) yes;treatment indicated  -JR     Pathology/Pathophysiology Noted (Describe Specifically for Each System) musculoskeletal  -JR     Impairments Found (describe specific impairments) aerobic capacity/endurance;gait, locomotion, and balance;muscle performance  -JR      Functional Limitations in Following Categories (Describe Specific Limitations) self-care;home management;community/leisure;work  -JR     Rehab Potential (PT Clinical Summary) good, to achieve stated therapy goals  -JR     Predicted Duration of Therapy (PT) 1 MONTH.    -     Row Name 08/12/18 1527          Vital Signs    Pretreatment Heart Rate (beats/min) 105  -JR     Intratreatment Heart Rate (beats/min) 135  -JR     Posttreatment Heart Rate (beats/min) 122  -JR     O2 Delivery Pre Treatment supplemental O2   2L  -JR     O2 Delivery Intra Treatment supplemental O2   2L  -JR     O2 Delivery Post Treatment supplemental O2   2L  -JR     Row Name 08/12/18 1527          Physical Therapy Goals    Bed Mobility Goal Selection (PT) bed mobility, PT goal 1  -     Transfer Goal Selection (PT) transfer, PT goal 1  -     Gait Training Goal Selection (PT) gait training, PT goal 1  -     Row Name 08/12/18 1527          Bed Mobility Goal 1 (PT)    Activity/Assistive Device (Bed Mobility Goal 1, PT) bed mobility activities, all  -JR     [place] Level/Cues Needed (Bed Mobility Goal 1, PT) independent  -JR     Time Frame (Bed Mobility Goal 1, PT) 2 weeks  -     Row Name 08/12/18 1527          Transfer Goal 1 (PT)    Activity/Assistive Device (Transfer Goal 1, PT) sit-to-stand/stand-to-sit  -JR     [place] Level/Cues Needed (Transfer Goal 1, PT) independent;tactile cues required  -JR     Time Frame (Transfer Goal 1, PT) 2 weeks  -     Row Name 08/12/18 1527          Gait Training Goal 1 (PT)    Activity/Assistive Device (Gait Training Goal 1, PT) gait (walking locomotion);improve balance and speed;increase endurance/gait distance;increase energy conservation  -JR     [place] Level (Gait Training Goal 1, PT) independent  -JR     Distance (Gait Goal 1, PT) 300   AMBULATE WITHOUT AN ASSISTIVE DEVICE OR WITH O2.    -JR     Time Frame (Gait Training Goal 1, PT) 2 weeks  -     Row Name 08/12/18 1527           Positioning and Restraints    Pre-Treatment Position in bed  -JR     Post Treatment Position bed  -JR     In Bed notified nsg;supine;call light within reach;encouraged to call for assist;exit alarm on  -JR       User Key  (r) = Recorded By, (t) = Taken By, (c) = Cosigned By    Initials Name Provider Type    Preston Interiano, PT Physical Therapist          Physical Therapy Education     Title: PT OT SLP Therapies (Done)     Topic: Physical Therapy (Done)     Point: Mobility training (Done)    Learning Progress Summary     Learner Status Readiness Method Response Comment Documented by    Patient Done NAZARIO [place] DU JR 08/12/18 1556    Family Done NAZARIO Sanford DU   08/12/18 1556          Point: Home exercise program (Done)    Learning Progress Summary     Learner Status Readiness Method Response Comment Documented by    Patient Done NAZARIO [place] DU JR 08/12/18 1556    Family Done NAZARIO [place]ford DU JR 08/12/18 1556          Point: Body mechanics (Done)    Learning Progress Summary     Learner Status Readiness Method Response Comment Documented by    Patient Done NAZARIO [place] DU JR 08/12/18 1556    Family Done NAZARIO [place]ford DU JR 08/12/18 1556          Point: Precautions (Done)    Learning Progress Summary     Learner Status Readiness Method Response Comment Documented by    Patient Done NAZARIO Sanford DU JR 08/12/18 1556    Family Done NAZARIO Sanford DU   08/12/18 1556                      User Key     Initials Effective Dates Name Provider Type [facility] 04/03/18 -  Preston Wood, PT Physical Therapist PT                PT Recommendation and Plan  Anticipated Discharge Disposition (PT): home with home health  Planned Therapy Interventions (PT Eval): balance training, bed mobility training, gait training, strengthening  Therapy Frequency (PT Clinical Impression): daily  Outcome Summary/Treatment Plan (PT)  Anticipated Discharge Disposition (PT): home with home health  Plan of Care Reviewed With:  patient, spouse  Progress: improving          Outcome Measures     Row Name 08/12/18 7730             How much help from another person do you currently need...    Turning from your back to your side while in flat bed without using bedrails? 3  -JR      Moving from lying on back to sitting on the side of a flat bed without bedrails? 3  -JR      Moving to and from a bed to a chair (including a wheelchair)? 3  -JR      Standing up from a chair using your arms (e.g., wheelchair, bedside chair)? 3  -JR      Climbing 3-5 steps with a railing? 2  -JR      To walk in hospital room? 3  -JR      AM-PAC 6 Clicks Score 17  -JR         Functional Assessment    Outcome Measure Options AM-PAC 6 Clicks Basic Mobility (PT)  -JR        User Key  (r) = Recorded By, (t) = Taken By, (c) = Cosigned By    Initials Name Provider Type    Preston Interiano, PT Physical Therapist           Time Calculation:         PT Charges     Row Name 08/12/18 3991             Time Calculation    Start Time 1530  -      Stop Time 1558  -      Time Calculation (min) 28 min  -JR      PT Received On 08/12/18  -      PT - Next Appointment 08/13/18  -      PT Goal Re-Cert Due Date 08/26/18  -        User Key  (r) = Recorded By, (t) = Taken By, (c) = Cosigned By    Initials Name Provider Type    Preston Interiano, EVGENY Physical Therapist        Therapy Suggested Charges     Code   Minutes Charges    None           Therapy Charges for Today     Code Description Service Date Service Provider Modifiers Qty    68123059753 HC PT EVAL LOW COMPLEXITY 2 8/12/2018 Preston Wood, PT GP 1          PT G-Codes  Outcome Measure Options: (S) AM-PAC 6 Clicks Basic Mobility (PT)      Preston Wood PT  8/12/2018

## 2018-08-12 NOTE — PROGRESS NOTES
[facility]  Cardiology Progress Note    Patient Identification:  Name: Guero Garay  Age: 83 y.o.  Sex: male  :  1935  MRN: [id number]                 Follow Up / Chief Complaint: SOB    Interval History:  83 YR OLD STILL C/O SOB, NO CP, NO PALPITATION    HR BETTER     Subjective:  STILL C/O SOB    Objective:    Past Medical History:  Past Medical History:   Diagnosis Date   • Arthralgia    • CAD (coronary artery disease), native coronary artery    • Chest pain, unspecified    • Essential hypertension    • Gout    • History of prostate cancer    • Jaw pain     both sides   • Mixed hyperlipidemia    • Osteoarthrosis    • Shoulder pain    • SOB (shortness of breath)    • Tinnitus      Past Surgical History:  Past Surgical History:   Procedure Laterality Date   • CARDIAC CATHETERIZATION N/A 2017    Procedure: Coronary angiography;  Surgeon: Marvel Brito MD;  Location: [facility] CATH INVASIVE LOCATION;  Service:    • CARDIAC CATHETERIZATION N/A 2017    Procedure: Left Heart Cath;  Surgeon: Marvel Brito MD;  Location: [facility] CATH INVASIVE LOCATION;  Service:    • CARDIAC CATHETERIZATION N/A 2017    Procedure: Stent GIN coronary;  Surgeon: Marvel Brito MD;  Location: [facility] CATH INVASIVE LOCATION;  Service:    • CARDIAC ELECTROPHYSIOLOGY PROCEDURE N/A 6/15/2017    Procedure: Pacemaker DC new   [employer];  Surgeon: Gustavo Valladares MD;  Location: [facility] CATH INVASIVE LOCATION;  Service:    • COLONOSCOPY     • INGUINAL HERNIA REPAIR Right    • NECK SURGERY      SPURS   • [place] RECONSTR TOTAL SHOULDER IMPLANT Right 2017    Procedure: RIGHT TOTAL SHOULDER ARTHROPLASTY;  Surgeon: Marquez Galvan MD;  [place] [facility] [place] [facility] [place]: [place]opedics   • PROSTATECTOMY     • REPLACEMENT TOTAL KNEE Left    • TONSILLECTOMY     • TOTAL SHOULDER REPLACEMENT Bilateral         Social History:   Social History   Substance Use Topics   • Smoking status: Never Smoker   • Smokeless  tobacco: Never Used      Comment: caffeine - 1 every 2-3 days   • Alcohol use 12.6 oz/week     21 Cans of beer per week      Comment: 2-3 beers daily      Family History:  Family History   Problem Relation Age of Onset   • Cancer Mother         lung   • Cancer Father         lung   • Colon cancer Neg Hx    • Colon polyps Neg Hx           Allergies:  No Known Allergies  Scheduled Meds:    albuterol 2.5 mg Q6H - RT   allopurinol 100 mg Daily   aspirin 81 mg Daily   budesonide-formoterol 2 puff BID - RT   docusate sodium 100 mg BID   famotidine 20 mg Daily   vitamin B-1 100 mg Daily   And     multivitamin 1 tablet Daily   And     folic acid 1 mg Daily   metoprolol tartrate 2.5 mg Once   metoprolol tartrate 12.5 mg Q12H   rosuvastatin 20 mg Daily   tamsulosin 0.4 mg Daily           INTAKE AND OUTPUT:    Intake/Output Summary (Last 24 hours) at 08/12/18 1721  Last data filed at 08/12/18 1515   Gross per 24 hour   Intake                0 ml   Output             2050 ml   Net            -2050 ml       Review of Systems:   GI:  Cardiac:  NO CP  Pulmonary:  STILL C/O SOB    Constitutional:  Temp:  [97.5 °F (36.4 °C)-98.4 °F (36.9 °C)] 97.8 °F (36.6 °C)  Heart Rate:  [] 122  Resp:  [16-20] 20  BP: ()/(62-87) 100/80    Physical Exam:  General:  Appears in no acute distress  Eyes: PERTL,  HEENT:  No JVD. Thyroid not visibly enlarged. No mucosal pallor or cyanosis  Respiratory: Respirations regular and unlabored at rest. BBS with good air entry in all fields. No crackles, rubs or wheezes auscultated  Cardiovascular: S1S2 IRRegular rate and rhythm. No murmur, rub or gallop. No carotid bruits. DP/PT pulses     . No pretibial pitting edema  Gastrointestinal: Abdomen soft, flat, non tender. Bowel sounds present. No hepatosplenomegaly. No ascites  Musculoskeletal: RAMÍREZ x4. No abnormal movements  Extremities: No digital clubbing or cyanosis  Skin: Color pink. Skin warm and dry to touch. No rashes    Neuro: AAO x3 CN II-XII  "grossly intact  Psych: Mood and affect normal, pleasant and cooperative          Cardiographics  Telemetry:     ECG:     Echocardiogram:     Lab Review     Results from last 7 days  Lab Units 08/10/18  1506 08/10/18  1301   TROPONIN T ng/mL <0.010 <0.010           Results from last 7 days  Lab Units 08/12/18  0452   SODIUM mmol/L 136   POTASSIUM mmol/L 4.7   BUN mg/dL 17   CREATININE mg/dL 1.75*   CALCIUM mg/dL 9.1     @[id number]@    Results from last 7 days  Lab Units 08/12/18  0854 08/12/18  0452 08/11/18  2357  08/11/18  0439  08/10/18  1506   WBC 10*3/mm3  --  4.45*  --   --  4.72  --  5.54   HEMOGLOBIN g/dL 13.9 13.0* 13.3*  < > 13.2*  < > 15.2   HEMATOCRIT % 43.8 40.2* 40.0*  < > 42.2  < > 48.2   PLATELETS 10*3/mm3  --  227  --   --  243  --  261   < > = values in this interval not displayed.    Results from last 7 days  Lab Units 08/10/18  1506   INR  1.29*         Assessment:    Plan:  1. afib     Hr moderately controlled,   Con dig, increase loressure to 25 mg po bid  2.cad      No angina  3pacemaker        Will interogrette  4. SOB         MILD CHF, WILL ADD LASIX 20MG IV Q 12 HRS  5. Anticoagulated          Off due to black stools       Plan:    Troponin neg, still sob    CXR MILD CONGESTION    ADD LASIX 20 MG IV Q 12 HRS     INCREASE METOPROLOL TO 25 MG PO BID    MAY NEED ISCHEMIC W/P      )8/12/2018  Maninder Shen MD      [facility] Dragon/Transcription:   \"Dictated utilizing Dragon dictation\".     "

## 2018-08-12 NOTE — PROGRESS NOTES
[facility], [facility]  Inpatient Progress Note    Reason for Follow Up:  Heme positive stool, plans for upcoming outpt colonoscopy    Subjective     Interval History:   Feeling a bit better.  HR 110s.  Eating breakfast.  Wife on phone, reports he stopped plavix at the end of July and had been started on eliquis at that time.    Current Facility-Administered Medications:   •  acetaminophen (TYLENOL) tablet 650 mg, 650 mg, Oral, Q4H PRN, Philippe Carranza MD  •  albuterol (PROVENTIL) nebulizer solution 0.083% 2.5 mg/3mL, 2.5 mg, Nebulization, Q6H - RT, Philippe Carranza MD, 2.5 mg at 08/12/18 0803  •  allopurinol (ZYLOPRIM) tablet 100 mg, 100 mg, Oral, Daily, Philippe Carranza MD, 100 mg at 08/12/18 0826  •  aspirin chewable tablet 81 mg, 81 mg, Oral, Daily, Philippe Carranza MD, 81 mg at 08/12/18 0826  •  budesonide-formoterol (SYMBICORT) 160-4.5 MCG/ACT inhaler 2 puff, 2 puff, Inhalation, BID - RT, Philippe Carranza MD, 2 puff at 08/12/18 0803  •  calcium carbonate (TUMS) chewable tablet 500 mg (200 mg elemental), 2 tablet, Oral, BID PRN, Philippe Carranza MD  •  docusate sodium (COLACE) capsule 100 mg, 100 mg, Oral, BID, Philippe Carranza MD, 100 mg at 08/12/18 0826  •  famotidine (PEPCID) tablet 20 mg, 20 mg, Oral, Daily, Nancy Aguilar MD, 20 mg at 08/12/18 0826  •  thiamine (VITAMIN B-1) tablet 100 mg, 100 mg, Oral, Daily, 100 mg at 08/12/18 0826 **AND** multivitamin (THERAGRAN) tablet 1 tablet, 1 tablet, Oral, Daily, 1 tablet at 08/12/18 0826 **AND** folic acid (FOLVITE) tablet 1 mg, 1 mg, Oral, Daily, Philippe Carranza MD, 1 mg at 08/12/18 0826  •  LORazepam (ATIVAN) tablet 1 mg, 1 mg, Oral, Q2H PRN **OR** LORazepam (ATIVAN) injection 1 mg, 1 mg, Intravenous, Q2H PRN **OR** LORazepam (ATIVAN) tablet 2 mg, 2 mg, Oral, Q1H PRN **OR** LORazepam (ATIVAN) injection 2 mg, 2 mg, Intravenous, Q1H PRN **OR** LORazepam (ATIVAN) injection 2 mg, 2 mg, Intravenous, Q15 Min PRN **OR** LORazepam  (ATIVAN) injection 2 mg, 2 mg, Intramuscular, Q15 Min PRN, Philippe Carranza MD  •  melatonin tablet 5 mg, 5 mg, Oral, Nightly PRN, Philippe Carranza MD  •  metoprolol tartrate (LOPRESSOR) injection 2.5 mg, 2.5 mg, Intravenous, Once, Jarrett Sarmiento MD  •  nitroglycerin (NITROSTAT) SL tablet 0.4 mg, 0.4 mg, Sublingual, Q5 Min PRN, Philippe Carranza MD  •  ondansetron (ZOFRAN) tablet 4 mg, 4 mg, Oral, Q6H PRN **OR** ondansetron ODT (ZOFRAN-ODT) disintegrating tablet 4 mg, 4 mg, Oral, Q6H PRN **OR** ondansetron (ZOFRAN) injection 4 mg, 4 mg, Intravenous, Q6H PRN, Philippe Carranza MD  •  rosuvastatin (CRESTOR) tablet 20 mg, 20 mg, Oral, Daily, Philippe Carranza MD, 20 mg at 08/12/18 0826  •  sodium chloride 0.9 % flush 1-10 mL, 1-10 mL, Intravenous, PRN, Philippe Carranza MD  •  Insert peripheral IV, , , Once **AND** sodium chloride 0.9 % flush 10 mL, 10 mL, Intravenous, PRN, Jarrett Sarmiento MD  •  sodium chloride 0.9 % infusion, 9 mL/hr, Intravenous, Continuous, Tanner Siddiqui MD, Last Rate: 9 mL/hr at 08/11/18 1104, 9 mL/hr at 08/11/18 1104  •  tamsulosin (FLOMAX) 24 hr capsule 0.4 mg, 0.4 mg, Oral, Daily, Vinny Cherry MD, 0.4 mg at 08/12/18 0826  Review of Systems:    All systems were reviewed and negative except for:  Constitution:  positive for fatigue    Objective     Vital Signs  Temp:  [97.5 °F (36.4 °C)-98.4 °F (36.9 °C)] 97.5 °F (36.4 °C)  Heart Rate:  [] 101  Resp:  [16-20] 16  BP: ()/(72-78) 113/77  Body mass index is 32.9 kg/m².    Intake/Output Summary (Last 24 hours) at 08/12/18 0924  Last data filed at 08/12/18 0619   Gross per 24 hour   Intake                0 ml   Output             2150 ml   Net            -2150 ml     No intake/output data recorded.     Physical Exam:   General: patient awake, alert and cooperative, tremor   Eyes: Normal lids and lashes, no scleral icterus   Neck: supple, normal ROM   Skin: warm and dry, not jaundiced   Cardiovascular: irregular  rhythm and rate, no murmurs auscultated   Pulm: clear to auscultation bilaterally, regular and unlabored   Abdomen: soft, nontender, nondistended; normal bowel sounds   Rectal: deferred   Extremities: no rash or edema   Psychiatric: Normal mood and behavior; memory intact     Results Review:     I reviewed the patient's new clinical results.      Results from last 7 days  Lab Units 08/12/18  0854 08/12/18  0452 08/11/18  2357  08/11/18  0439  08/10/18  1506   WBC 10*3/mm3  --  4.45*  --   --  4.72  --  5.54   HEMOGLOBIN g/dL 13.9 13.0* 13.3*  < > 13.2*  < > 15.2   HEMATOCRIT % 43.8 40.2* 40.0*  < > 42.2  < > 48.2   PLATELETS 10*3/mm3  --  227  --   --  243  --  261   < > = values in this interval not displayed.    Results from last 7 days  Lab Units 08/12/18  0452 08/11/18  0439 08/10/18  1301   SODIUM mmol/L 136 136 137   POTASSIUM mmol/L 4.7 4.9 5.3*   CHLORIDE mmol/L 100 104 99   CO2 mmol/L 23.4 21.8* 19.6*   BUN mg/dL 17 22 29*   CREATININE mg/dL 1.75* 1.63* 2.08*   CALCIUM mg/dL 9.1 9.3 9.8   BILIRUBIN mg/dL 0.6 0.5 0.5   ALK PHOS U/L 372* 405* 478*   ALT (SGPT) U/L 62* 77* 104*   AST (SGOT) U/L 28 35 48*   GLUCOSE mg/dL 103* 98 93       Results from last 7 days  Lab Units 08/10/18  1506   INR  1.29*     No results found for: LIPASE    Radiology:  LIVER ULTRASOUND     HISTORY: Elevated liver function tests.     FINDINGS: The gallbladder shows no gallstones or wall thickening and the  common bile duct is normal in caliber. The liver is normal in size and  texture and no focal lesions are seen. The visualized pancreas and right  kidney are unremarkable as also evaluated on recent CT scan.     CONCLUSION: Negative right upper quadrant ultrasound.     This report was finalized on 8/11/2018 2:42 PM by Dr. Yong Gallego M.D.    Assessment/Plan     Patient Active Problem List   Diagnosis   • Gout   • Hypertension   • Hyperlipidemia   • Osteoarthrosis   • History of prostate cancer   • Tinnitus   • Benign neoplasm of  colon   • Osteoarthritis of right shoulder   • Coronary artery disease involving native coronary artery of native heart with unstable angina pectoris (CMS/HCC)   • Syncopal episodes   • Screening for colon cancer   • Pyelonephritis, acute   • Acute kidney injury superimposed on chronic kidney disease (CMS/HCC)   • Dyspnea on exertion   • Generalized weakness   • Elevated d-dimer   • Dehydration   • Mild persistent asthma without complication   • Heme positive stool   • Elevated LFTs   • Alcohol use   • Urine retention       Impression  1. Heme positive stool: hgb stable  2. Elevated liver enzymes: improving, likely related to recent antibiotics vs other.  Liver US normal.  3. CAD, Afib: poor rate control, car  4. Weakness, fatigue: ? Due to afib    Plan  Follow blood count  Daily CMP  Awaiting cardiology input re: his A fib  EGD and colonoscopy for heme positive stool when cleared by cardiology-- with Dr Powers    I discussed the patients findings and my recommendations with patient and family.    Nancy Aguilar MD

## 2018-08-12 NOTE — NURSING NOTE
Pt HR in 140's while lying in bed. STAT EKG ordered, showed ST. No c/o SOA, CP per pt. Rachana with [facility] notified, will continue to monitor

## 2018-08-12 NOTE — PROGRESS NOTES
Name: Guero Garay ADMIT: 8/10/2018   : 1935  PCP: Librado Lai MD    MRN: [id number] LOS: 2 days   AGE/SEX: 83 y.o. male    ROOM: 436/1   Subjective   Still complains of shortness of breath  Patient still has atrial fibrillation with rapid ventricular rate, rate better    Brief hospital course since admission:  A. Fib  Recent history of urinary tract infection was being treated with Bactrim and he has completed antibiotics  Heme-positive stool  History of asthma  HEENA/CKD, snoring of creatinine most likely secondary to Bactrim  Elevated liver enzymes most likely to related to antibiotics      I have reviewed past medical history, social history, family history, allergies.  No changes from admission note.      Review of Systems   Constitutional: Positive for fatigue. Negative for fever.   Respiratory: Negative for shortness of breath.    Cardiovascular: Positive for palpitations. Negative for chest pain and leg swelling.   Gastrointestinal: Negative for nausea and vomiting.          Objective   Vital Signs  Temp:  [97.5 °F (36.4 °C)-98.4 °F (36.9 °C)] 97.8 °F (36.6 °C)  Heart Rate:  [] 111  Resp:  [16-20] 20  BP: ()/(62-87) 116/87  SpO2:  [92 %-95 %] 95 %  on  Flow (L/min):  [2] 2;   Device (Oxygen Therapy): nasal cannula  Body mass index is 32.9 kg/m².    Intake/Output Summary (Last 24 hours) at 18 1332  Last data filed at 18 0619   Gross per 24 hour   Intake                0 ml   Output             2150 ml   Net            -2150 ml       Physical Exam   Constitutional: He is oriented to person, place, and time. He appears well-developed and well-nourished. Nasal cannula in place.   HENT:   Head: Atraumatic.   Eyes: Pupils are equal, round, and reactive to light. No scleral icterus.   Cardiovascular: Normal rate.  An irregular rhythm present.   Pulmonary/Chest: No accessory muscle usage. No respiratory distress. He has no decreased breath sounds. He has no wheezes.    Abdominal: Soft. Normal appearance and bowel sounds are normal. He exhibits no ascites. There is no hepatosplenomegaly.   Neurological: He is alert and oriented to person, place, and time.   Skin: No laceration and no petechiae noted. No cyanosis. Nails show no clubbing.   Psychiatric: He has a normal mood and affect. His behavior is normal.   Vitals reviewed.      Results Review:      Results from last 7 days  Lab Units 08/12/18  0854 08/12/18  0452 08/11/18  2357  08/11/18  0439  08/10/18  1506   WBC 10*3/mm3  --  4.45*  --   --  4.72  --  5.54   HEMOGLOBIN g/dL 13.9 13.0* 13.3*  < > 13.2*  < > 15.2   HEMATOCRIT % 43.8 40.2* 40.0*  < > 42.2  < > 48.2   PLATELETS 10*3/mm3  --  227  --   --  243  --  261   < > = values in this interval not displayed.    Results from last 7 days  Lab Units 08/12/18  0452 08/11/18  0439 08/10/18  1301   SODIUM mmol/L 136 136 137   POTASSIUM mmol/L 4.7 4.9 5.3*   CHLORIDE mmol/L 100 104 99   CO2 mmol/L 23.4 21.8* 19.6*   BUN mg/dL 17 22 29*   CREATININE mg/dL 1.75* 1.63* 2.08*   GLUCOSE mg/dL 103* 98 93   CALCIUM mg/dL 9.1 9.3 9.8       Results from last 7 days  Lab Units 08/12/18  0452 08/11/18  0439 08/10/18  1301   ALK PHOS U/L 372* 405* 478*   BILIRUBIN mg/dL 0.6 0.5 0.5   BILIRUBIN DIRECT mg/dL 0.2 0.2  --    ALT (SGPT) U/L 62* 77* 104*   AST (SGOT) U/L 28 35 48*       Results from last 7 days  Lab Units 08/10/18  1506   INR  1.29*     Hemoglobin A1C:  Lab Results   Component Value Date    HGBA1C 5.71 (H) 04/28/2017     Glucose Range:No results found for: POCGLU      albuterol 2.5 mg Nebulization Q6H - RT   allopurinol 100 mg Oral Daily   aspirin 81 mg Oral Daily   budesonide-formoterol 2 puff Inhalation BID - RT   docusate sodium 100 mg Oral BID   famotidine 20 mg Oral Daily   vitamin B-1 100 mg Oral Daily   And      multivitamin 1 tablet Oral Daily   And      folic acid 1 mg Oral Daily   metoprolol tartrate 2.5 mg Intravenous Once   metoprolol tartrate 12.5 mg Oral Q12H    rosuvastatin 20 mg Oral Daily   tamsulosin 0.4 mg Oral Daily       sodium chloride 9 mL/hr Last Rate: 9 mL/hr (08/11/18 1104)   Diet Regular  Assessment/Plan       Assessment/Plan      Active Hospital Problems    Diagnosis Date Noted   • Acute kidney injury superimposed on chronic kidney disease (CMS/HCC) [N17.9, N18.9] 08/10/2018   • Dyspnea on exertion [R06.09] 08/10/2018   • Generalized weakness [R53.1] 08/10/2018   • Elevated d-dimer [R79.89] 08/10/2018   • Dehydration [E86.0] 08/10/2018   • Mild persistent asthma without complication [J45.30] 08/10/2018   • Heme positive stool [R19.5] 08/10/2018   • Elevated LFTs [R79.89] 08/10/2018   • Alcohol use [Z78.9] 08/10/2018   • Urine retention [R33.9] 08/10/2018   • Hypertension [I10]       Resolved Hospital Problems    Diagnosis Date Noted Date Resolved   • Hyperkalemia [E87.5] 08/10/2018 08/11/2018     Discontinue Bactrim  Reduce the IV fluids  The creatinine is on down webb trend  A. fib with rapid ventricular rate, cardiology to see him  Gall bladder US is normal  Follow the labs      Tanner Siddiqui MD  [facility]  08/12/18

## 2018-08-12 NOTE — PLAN OF CARE
Problem: Patient Care Overview  Goal: Plan of Care Review   08/12/18 5039   Coping/Psychosocial   Plan of Care Reviewed With patient;spouse   Plan of Care Review   Progress improving   Pt DISPLAYS GREAT REHAB POTENTIAL.  WILL NEED PT TO ADDRESS HIS ENDURANCE AND STRENGTH ISSUES IN ORDER TO PROMOTE SAFE, FUNCTIONAL MOBILITY.

## 2018-08-13 ENCOUNTER — READMISSION MANAGEMENT (OUTPATIENT)
Dept: CALL CENTER | Facility: HOSPITAL | Age: 83
End: 2018-08-13

## 2018-08-13 ENCOUNTER — APPOINTMENT (OUTPATIENT)
Dept: CT IMAGING | Facility: HOSPITAL | Age: 83
End: 2018-08-13

## 2018-08-13 PROBLEM — I50.32 CHRONIC DIASTOLIC CONGESTIVE HEART FAILURE: Status: ACTIVE | Noted: 2018-08-13

## 2018-08-13 LAB
ALBUMIN SERPL-MCNC: 3.5 G/DL (ref 3.5–5.2)
ALBUMIN/GLOB SERPL: 1 G/DL
ALP SERPL-CCNC: 364 U/L (ref 39–117)
ALT SERPL W P-5'-P-CCNC: 64 U/L (ref 1–41)
ANION GAP SERPL CALCULATED.3IONS-SCNC: 13.7 MMOL/L
AST SERPL-CCNC: 34 U/L (ref 1–40)
BASOPHILS # BLD AUTO: 0.04 10*3/MM3 (ref 0–0.2)
BASOPHILS NFR BLD AUTO: 0.9 % (ref 0–1.5)
BILIRUB SERPL-MCNC: 0.6 MG/DL (ref 0.1–1.2)
BUN BLD-MCNC: 17 MG/DL (ref 8–23)
BUN/CREAT SERPL: 10.9 (ref 7–25)
CALCIUM SPEC-SCNC: 9.5 MG/DL (ref 8.6–10.5)
CHLORIDE SERPL-SCNC: 99 MMOL/L (ref 98–107)
CO2 SERPL-SCNC: 24.3 MMOL/L (ref 22–29)
CREAT BLD-MCNC: 1.56 MG/DL (ref 0.76–1.27)
DEPRECATED RDW RBC AUTO: 50.4 FL (ref 37–54)
EOSINOPHIL # BLD AUTO: 0.21 10*3/MM3 (ref 0–0.7)
EOSINOPHIL NFR BLD AUTO: 4.6 % (ref 0.3–6.2)
ERYTHROCYTE [DISTWIDTH] IN BLOOD BY AUTOMATED COUNT: 14.9 % (ref 11.5–14.5)
GFR SERPL CREATININE-BSD FRML MDRD: 43 ML/MIN/1.73
GLOBULIN UR ELPH-MCNC: 3.5 GM/DL
GLUCOSE BLD-MCNC: 107 MG/DL (ref 65–99)
HCT VFR BLD AUTO: 41.6 % (ref 40.4–52.2)
HGB BLD-MCNC: 13.6 G/DL (ref 13.7–17.6)
IMM GRANULOCYTES # BLD: 0.01 10*3/MM3 (ref 0–0.03)
IMM GRANULOCYTES NFR BLD: 0.2 % (ref 0–0.5)
LYMPHOCYTES # BLD AUTO: 1 10*3/MM3 (ref 0.9–4.8)
LYMPHOCYTES NFR BLD AUTO: 22.1 % (ref 19.6–45.3)
MCH RBC QN AUTO: 30.2 PG (ref 27–32.7)
MCHC RBC AUTO-ENTMCNC: 32.7 G/DL (ref 32.6–36.4)
MCV RBC AUTO: 92.2 FL (ref 79.8–96.2)
MONOCYTES # BLD AUTO: 0.83 10*3/MM3 (ref 0.2–1.2)
MONOCYTES NFR BLD AUTO: 18.4 % (ref 5–12)
NEUTROPHILS # BLD AUTO: 2.44 10*3/MM3 (ref 1.9–8.1)
NEUTROPHILS NFR BLD AUTO: 54 % (ref 42.7–76)
PLATELET # BLD AUTO: 223 10*3/MM3 (ref 140–500)
PMV BLD AUTO: 9.8 FL (ref 6–12)
POTASSIUM BLD-SCNC: 4.6 MMOL/L (ref 3.5–5.2)
PROT SERPL-MCNC: 7 G/DL (ref 6–8.5)
RBC # BLD AUTO: 4.51 10*6/MM3 (ref 4.6–6)
SODIUM BLD-SCNC: 137 MMOL/L (ref 136–145)
WBC NRBC COR # BLD: 4.52 10*3/MM3 (ref 4.5–10.7)

## 2018-08-13 PROCEDURE — 25010000002 FUROSEMIDE PER 20 MG: Performed by: INTERNAL MEDICINE

## 2018-08-13 PROCEDURE — 99233 SBSQ HOSP IP/OBS HIGH 50: CPT | Performed by: INTERNAL MEDICINE

## 2018-08-13 PROCEDURE — 80053 COMPREHEN METABOLIC PANEL: CPT | Performed by: INTERNAL MEDICINE

## 2018-08-13 PROCEDURE — 85025 COMPLETE CBC W/AUTO DIFF WBC: CPT | Performed by: INTERNAL MEDICINE

## 2018-08-13 PROCEDURE — 94799 UNLISTED PULMONARY SVC/PX: CPT

## 2018-08-13 PROCEDURE — 97110 THERAPEUTIC EXERCISES: CPT

## 2018-08-13 PROCEDURE — 99232 SBSQ HOSP IP/OBS MODERATE 35: CPT | Performed by: NURSE PRACTITIONER

## 2018-08-13 RX ORDER — FUROSEMIDE 20 MG/1
20 TABLET ORAL DAILY
Status: DISCONTINUED | OUTPATIENT
Start: 2018-08-13 | End: 2018-08-15 | Stop reason: HOSPADM

## 2018-08-13 RX ADMIN — METOPROLOL TARTRATE 25 MG: 25 TABLET ORAL at 20:40

## 2018-08-13 RX ADMIN — DOCUSATE SODIUM 100 MG: 100 CAPSULE, LIQUID FILLED ORAL at 20:32

## 2018-08-13 RX ADMIN — ALLOPURINOL 100 MG: 100 TABLET ORAL at 09:36

## 2018-08-13 RX ADMIN — ALBUTEROL SULFATE 2.5 MG: 2.5 SOLUTION RESPIRATORY (INHALATION) at 23:03

## 2018-08-13 RX ADMIN — TAMSULOSIN HYDROCHLORIDE 0.4 MG: 0.4 CAPSULE ORAL at 09:36

## 2018-08-13 RX ADMIN — BUDESONIDE AND FORMOTEROL FUMARATE DIHYDRATE 2 PUFF: 160; 4.5 AEROSOL RESPIRATORY (INHALATION) at 19:28

## 2018-08-13 RX ADMIN — FAMOTIDINE 20 MG: 20 TABLET, FILM COATED ORAL at 09:36

## 2018-08-13 RX ADMIN — BUDESONIDE AND FORMOTEROL FUMARATE DIHYDRATE 2 PUFF: 160; 4.5 AEROSOL RESPIRATORY (INHALATION) at 13:10

## 2018-08-13 RX ADMIN — FUROSEMIDE 20 MG: 10 INJECTION, SOLUTION INTRAMUSCULAR; INTRAVENOUS at 06:21

## 2018-08-13 RX ADMIN — METOPROLOL TARTRATE 12.5 MG: 25 TABLET ORAL at 09:36

## 2018-08-13 RX ADMIN — Medication 1 TABLET: at 09:36

## 2018-08-13 RX ADMIN — ALBUTEROL SULFATE 2.5 MG: 2.5 SOLUTION RESPIRATORY (INHALATION) at 13:10

## 2018-08-13 RX ADMIN — ASPIRIN 81 MG: 81 TABLET, CHEWABLE ORAL at 09:36

## 2018-08-13 RX ADMIN — DOCUSATE SODIUM 100 MG: 100 CAPSULE, LIQUID FILLED ORAL at 09:36

## 2018-08-13 RX ADMIN — ALBUTEROL SULFATE 2.5 MG: 2.5 SOLUTION RESPIRATORY (INHALATION) at 19:26

## 2018-08-13 RX ADMIN — BUDESONIDE AND FORMOTEROL FUMARATE DIHYDRATE 2 PUFF: 160; 4.5 AEROSOL RESPIRATORY (INHALATION) at 09:18

## 2018-08-13 RX ADMIN — ROSUVASTATIN CALCIUM 20 MG: 20 TABLET, FILM COATED ORAL at 09:36

## 2018-08-13 RX ADMIN — Medication 100 MG: at 09:36

## 2018-08-13 RX ADMIN — ALBUTEROL SULFATE 2.5 MG: 2.5 SOLUTION RESPIRATORY (INHALATION) at 09:18

## 2018-08-13 RX ADMIN — FOLIC ACID 1 MG: 1 TABLET ORAL at 09:36

## 2018-08-13 NOTE — PROGRESS NOTES
Adult Nutrition  Assessment/PES    Patient Name:  Guero Garay  YOB: 1935  MRN: [id number]  Admit Date:  8/10/2018    Assessment Date:  8/13/2018    Nutrition assessment triggered by MST score-4. Patient reported poor appetite for the past 1-2 weeks, weight stable.  He stated he has been feeling better today, consuming about 50% of meals.  Provided nutrition therapy-agreed to nutrition supplement daily.  Will follow          Reason for Assessment     Row Name 08/13/18 1405          Reason for Assessment    Reason For Assessment identified at risk by screening criteria     Diagnosis   Primary Problem:  Acute kidney injury superimposed on chronic kidney disease (CMS/HCC)      Identified At Risk by Screening Criteria MST SCORE 2+               Anthropometrics     Row Name 08/13/18 1406          Body Mass Index (BMI)    BMI Assessment BMI 30-34.9: obesity grade I             Labs/Tests/Procedures/Meds     Row Name 08/13/18 1406          Labs/Procedures/Meds    Lab Results Reviewed reviewed, pertinent        Diagnostic Tests/Procedures    Diagnostic Test/Procedure Reviewed reviewed, pertinent        Medications    Pertinent Medications Reviewed reviewed, pertinent     Pertinent Medications Comments colace, NaCl             Physical Findings     Row Name 08/13/18 1406          Physical Findings    Overall Physical Appearance --   B=20             Estimated/Assessed Needs     Row Name 08/13/18 1406          Calculation Measurements    Weight Used For Calculations 98.2 kg (216 lb 7.9 oz)        Estimated/Assessed Needs    Additional Documentation KCAL/KG (Group);Protein Requirements (Group);Fluid Requirements (Group)        KCAL/KG                                                                kcal/kg (Specify) --   4681-1052        Vallejo-St. Jeor Equation    RMR (Vallejo-St. Jeor Equation) 1656.5        Protein Requirements    Est Protein Requirement Amount (gms/kg) 1.0 gm protein     Estimated Protein  Requirements (gms/day) 98.2        Fluid Requirements    Estimated Fluid Requirements (mL/day) 2400     RDA Method (mL) 2400     Layton-Segar Method (over 20 kg) 3464             Nutrition Prescription Ordered     Row Name 08/13/18 1407          Nutrition Prescription PO    Current PO Diet Regular             Evaluation of Received Nutrient/Fluid Intake     Row Name 08/13/18 1407 08/13/18 1406       Calculation Measurements    Weight Used For Calculations  -- 98.2 kg (216 lb 7.9 oz)       PO Evaluation    Number of Meals 2  --    % PO Intake 50  --            Evaluation of Prescribed Nutrient/Fluid Intake     Row Name 08/13/18 1406          Calculation Measurements    Weight Used For Calculations 98.2 kg (216 lb 7.9 oz)           Problem/Interventions:        Problem 1     Row Name 08/13/18 1407          Nutrition Diagnoses Problem 1    Problem 1 Nutrition Appropriate for Condition at this Time                     Intervention Goal     Row Name 08/13/18 1407          Intervention Goal    General Maintain nutrition;Meet nutritional needs for age/condition     PO Tolerate PO;Maintain intake     Weight Maintain weight             Nutrition Intervention     Row Name 08/13/18 1408          Nutrition Intervention    RD/Tech Action Interview for preference;Follow Tx progress;Care plan reviewd;Encourage intake;Recommend/ordered;Supplement provided     Recommended/Ordered Supplement             Nutrition Prescription     Row Name 08/13/18 1408          Nutrition Prescription PO    PO Prescription Begin/change supplement     Supplement Terrell Breakfast Essentials     Supplement Frequency Daily     New PO Prescription Ordered? Yes             Education/Evaluation     Row Name 08/13/18 1408          Education    Education Will Instruct as appropriate        Monitor/Evaluation    Monitor Per protocol     Education Follow-up Reinforce PRN         Electronically signed by:  Marleni Yang RD  08/13/18 2:09 PM

## 2018-08-13 NOTE — PROGRESS NOTES
Name: Guero Garay ADMIT: 8/10/2018   : 1935  PCP: Librado Lai MD    MRN: [id number] LOS: 3 days   AGE/SEX: 83 y.o. male    ROOM: 436/1   Subjective   Still complains of shortness of breath  Patient still has atrial fibrillation with rapid ventricular rate, rate better    Brief hospital course since admission:  A. Fib  Recent history of urinary tract infection was being treated with Bactrim and he has completed antibiotics  Heme-positive stool  History of asthma  HEENA/CKD, snoring of creatinine most likely secondary to Bactrim  Elevated liver enzymes most likely to related to antibiotics      I have reviewed past medical history, social history, family history, allergies.  No changes from admission note.      Review of Systems   Constitutional: Positive for fatigue. Negative for fever.   Respiratory: Negative for shortness of breath.    Cardiovascular: Negative for chest pain, palpitations and leg swelling.   Gastrointestinal: Negative for nausea and vomiting.          Objective   Vital Signs  Temp:  [97.8 °F (36.6 °C)-98.7 °F (37.1 °C)] 98.2 °F (36.8 °C)  Heart Rate:  [] 104  Resp:  [18-20] 20  BP: (100-123)/(73-88) 123/87  SpO2:  [91 %-96 %] 96 %  on  Flow (L/min):  [2] 2;   Device (Oxygen Therapy): nasal cannula  Body mass index is 32.9 kg/m².    Intake/Output Summary (Last 24 hours) at 18 1404  Last data filed at 18 1251   Gross per 24 hour   Intake              530 ml   Output             2650 ml   Net            -2120 ml       Physical Exam   Constitutional: He is oriented to person, place, and time. He appears well-developed and well-nourished. Nasal cannula in place.   HENT:   Head: Atraumatic.   Eyes: Pupils are equal, round, and reactive to light. No scleral icterus.   Cardiovascular: Normal rate.  An irregular rhythm present.   Pulmonary/Chest: No accessory muscle usage. No respiratory distress. He has no decreased breath sounds. He has no wheezes.   Abdominal: Soft.  Normal appearance and bowel sounds are normal. He exhibits no ascites. There is no hepatosplenomegaly.   Neurological: He is alert and oriented to person, place, and time.   Skin: No laceration and no petechiae noted. No cyanosis. Nails show no clubbing.   Psychiatric: He has a normal mood and affect. His behavior is normal.   Vitals reviewed.      Results Review:      Results from last 7 days  Lab Units 08/13/18  0423 08/12/18  0854 08/12/18 0452 08/11/18  0439   WBC 10*3/mm3 4.52  --  4.45*  --  4.72   HEMOGLOBIN g/dL 13.6* 13.9 13.0*  < > 13.2*   HEMATOCRIT % 41.6 43.8 40.2*  < > 42.2   PLATELETS 10*3/mm3 223  --  227  --  243   < > = values in this interval not displayed.    Results from last 7 days  Lab Units 08/13/18  0423 08/12/18 0452 08/11/18  0439   SODIUM mmol/L 137 136 136   POTASSIUM mmol/L 4.6 4.7 4.9   CHLORIDE mmol/L 99 100 104   CO2 mmol/L 24.3 23.4 21.8*   BUN mg/dL 17 17 22   CREATININE mg/dL 1.56* 1.75* 1.63*   GLUCOSE mg/dL 107* 103* 98   CALCIUM mg/dL 9.5 9.1 9.3       Results from last 7 days  Lab Units 08/13/18  0423 08/12/18 0452 08/11/18  0439   ALK PHOS U/L 364* 372* 405*   BILIRUBIN mg/dL 0.6 0.6 0.5   BILIRUBIN DIRECT mg/dL  --  0.2 0.2   ALT (SGPT) U/L 64* 62* 77*   AST (SGOT) U/L 34 28 35       Results from last 7 days  Lab Units 08/10/18  1506   INR  1.29*     Hemoglobin A1C:  Lab Results   Component Value Date    HGBA1C 5.71 (H) 04/28/2017     Glucose Range:No results found for: POCGLU      albuterol 2.5 mg Nebulization Q6H - RT   allopurinol 100 mg Oral Daily   aspirin 81 mg Oral Daily   budesonide-formoterol 2 puff Inhalation BID - RT   docusate sodium 100 mg Oral BID   famotidine 20 mg Oral Daily   furosemide 20 mg Oral Daily   metoprolol tartrate 2.5 mg Intravenous Once   metoprolol tartrate 12.5 mg Oral Q12H   rosuvastatin 20 mg Oral Daily   tamsulosin 0.4 mg Oral Daily       sodium chloride 9 mL/hr Last Rate: 9 mL/hr (08/11/18 1104)   Diet Regular  Assessment/Plan        Assessment/Plan      Active Hospital Problems    Diagnosis Date Noted   • Chronic diastolic congestive heart failure (CMS/HCC) [I50.32] 08/13/2018   • Acute kidney injury superimposed on chronic kidney disease (CMS/HCC) [N17.9, N18.9] 08/10/2018   • Dyspnea on exertion [R06.09] 08/10/2018   • Generalized weakness [R53.1] 08/10/2018   • Elevated d-dimer [R79.89] 08/10/2018   • Dehydration [E86.0] 08/10/2018   • Mild persistent asthma without complication [J45.30] 08/10/2018   • Heme positive stool [R19.5] 08/10/2018   • Elevated LFTs [R79.89] 08/10/2018   • Alcohol use [Z78.9] 08/10/2018   • Urine retention [R33.9] 08/10/2018   • Hypertension [I10]       Resolved Hospital Problems    Diagnosis Date Noted Date Resolved   • Hyperkalemia [E87.5] 08/10/2018 08/11/2018     Discontinue Bactrim  Reduce the IV fluids  The creatinine is on down webb trend  A. fib with rapid ventricular rate, increase metoprolol   Gall bladder US is normal  Follow the labs    Wean oxygen off  Hopefully home in AM      Tanner Siddiqui MD  [facility]  08/13/18

## 2018-08-13 NOTE — OUTREACH NOTE
Sepsis Week 3 Survey      Responses   Facility patient discharged from?  [facility]   Does the patient have one of the following disease processes/diagnoses(primary or secondary)?  Sepsis   Week 3 attempt successful?  No   Revoke  Readmitted          Marcy Olivarez RN

## 2018-08-13 NOTE — PLAN OF CARE
Problem: Patient Care Overview  Goal: Plan of Care Review  Outcome: Ongoing (interventions implemented as appropriate)   08/13/18 0556   Coping/Psychosocial   Plan of Care Reviewed With patient   Plan of Care Review   Progress no change   OTHER   Outcome Summary VSS, NO ACUTE DISTRESS NOTED THIS SHIFT, PT SLEPT , CONTINUE PLAN OF CARE     Goal: Individualization and Mutuality  Outcome: Ongoing (interventions implemented as appropriate)    Goal: Discharge Needs Assessment  Outcome: Ongoing (interventions implemented as appropriate)      Problem: Activity Intolerance (Adult)  Goal: Activity Tolerance  Outcome: Ongoing (interventions implemented as appropriate)    Goal: Effective Energy Conservation Techniques  Outcome: Ongoing (interventions implemented as appropriate)      Problem: Renal Failure/Kidney Injury, Acute (Adult)  Goal: Signs and Symptoms of Listed Potential Problems Will be Absent, Minimized or Managed (Renal Failure/Kidney Injury, Acute)  Outcome: Ongoing (interventions implemented as appropriate)

## 2018-08-13 NOTE — PROGRESS NOTES
"CC:  A fib/ mild CHF    Interval History: shortness of breath better.      Vital Signs  Temp:  [97.5 °F (36.4 °C)-98.7 °F (37.1 °C)] 98.7 °F (37.1 °C)  Heart Rate:  [] 103  Resp:  [16-20] 20  BP: ()/(62-87) 108/74    Intake/Output Summary (Last 24 hours) at 08/13/18 0745  Last data filed at 08/13/18 0500   Gross per 24 hour   Intake              210 ml   Output             2650 ml   Net            -2440 ml     Flowsheet Rows      First Filed Value   Admission Height  172.7 cm (68\") Documented at 08/10/2018 1438   Admission Weight  98 kg (216 lb) Documented at 08/10/2018 1438          PHYSICAL EXAM:  General: No acute distress  Resp:NL Rate, unlabored, clear  CV:NL rate and irregular rhythm, NL PMI, Nl S1 and S2, no Murmur, no gallop, no rub, No JVD. Normal pedal pulses  ABD:Nl sounds, no masses or tenderness, nondistended, no guarding or rebound  Neuro: alert,cooperative and oriented  Extr: No edema or cyanosis, moves all extremities      Results Review:      Results from last 7 days  Lab Units 08/13/18  0423   SODIUM mmol/L 137   POTASSIUM mmol/L 4.6   CHLORIDE mmol/L 99   CO2 mmol/L 24.3   BUN mg/dL 17   CREATININE mg/dL 1.56*   GLUCOSE mg/dL 107*   CALCIUM mg/dL 9.5       Results from last 7 days  Lab Units 08/10/18  1506 08/10/18  1301   TROPONIN T ng/mL <0.010 <0.010       Results from last 7 days  Lab Units 08/13/18  0423   WBC 10*3/mm3 4.52   HEMOGLOBIN g/dL 13.6*   HEMATOCRIT % 41.6   PLATELETS 10*3/mm3 223       Results from last 7 days  Lab Units 08/10/18  1506   INR  1.29*                 I reviewed the patient's new clinical results.  I personally viewed and interpreted the patient's EKG/Telemetry data        Medication Review:   Meds reviewed      sodium chloride 9 mL/hr Last Rate: 9 mL/hr (08/11/18 1104)       Assessment/Plan  1. Afib -CHADS2-VASc score is 4. rate still a little high but much  better. Off AC due to heme positive stool. Continue Lopressor 12.5 mg BID. Blood pressure low " normal  2. Mild CHF- on CXR. Lasix 20 mg IV BID started yesterday. Will transition to lasix PO 20 mg daily today.  3. HEENA/ urinary retention. Cr trending down/ Tran in place. Urology following  4. Presence of permanent pacemaker  5. CAD- no angina ASA 81 mg continues  6.Heme positive stool- GI planning colonoscopy as outpatient   7. Hyperlipidemia on rosuvastatin 20 mg        Sofie Lee, BUNNY  08/13/18  7:45 AM

## 2018-08-13 NOTE — THERAPY TREATMENT NOTE
Acute Care - Physical Therapy Treatment Note  [facility]     Patient Name: Guero Garay  : 1935  MRN: [id number]  Today's Date: 2018  Onset of Illness/Injury or Date of Surgery: 08/10/18  Date of Referral to PT: 08/10/18  Referring Physician: DARRYL KAM    Admit Date: 8/10/2018    Visit Dx:    ICD-10-CM ICD-9-CM   1. HEENA (acute kidney injury) (CMS/HCC) N17.9 584.9   2. Urinary retention R33.9 788.20   3. Atrial fibrillation with RVR (CMS/HCC) I48.91 427.31   4. Decreased functional mobility and endurance Z74.09 780.99     Patient Active Problem List   Diagnosis   • Gout   • Hypertension   • Hyperlipidemia   • Osteoarthrosis   • History of prostate cancer   • Tinnitus   • Benign neoplasm of colon   • Osteoarthritis of right shoulder   • Coronary artery disease involving native coronary artery of native heart with unstable angina pectoris (CMS/HCC)   • Syncopal episodes   • Screening for colon cancer   • Pyelonephritis, acute   • Acute kidney injury superimposed on chronic kidney disease (CMS/HCC)   • Dyspnea on exertion   • Generalized weakness   • Elevated d-dimer   • Dehydration   • Mild persistent asthma without complication   • Heme positive stool   • Elevated LFTs   • Alcohol use   • Urine retention   • Chronic diastolic congestive heart failure (CMS/HCC)       Therapy Treatment          Rehabilitation Treatment Summary     Row Name 18 1100             Treatment Time/Intention    Discipline physical therapy assistant  -CW      Document Type therapy note (daily note)  -CW      Subjective Information no complaints  -CW      Mode of Treatment physical therapy  -CW      Therapy Frequency (PT Clinical Impression) daily  -CW      Patient Effort good  -CW      Existing Precautions/Restrictions fall;oxygen therapy device and L/min  -CW      Recorded by [CW] Mango Castillo, PTA 18 1102      Row Name 18 1100             Vital Signs    O2 Delivery Pre Treatment supplemental O2   -CW      O2 Delivery Intra Treatment supplemental O2  -CW      O2 Delivery Post Treatment supplemental O2  -CW      Recorded by [CW] Mango Castillo, PTA 08/13/18 1102      Row Name 08/13/18 1100             Cognitive Assessment/Intervention- PT/OT    Orientation Status (Cognition) oriented x 4  -CW      Follows Commands (Cognition) WNL  -CW      Personal Safety Interventions fall prevention program maintained;gait belt;muscle strengthening facilitated;nonskid shoes/slippers when out of bed  -CW      Recorded by [CW] Mango Castillo, PTA 08/13/18 1102      Row Name 08/13/18 1100             Bed Mobility Assessment/Treatment    Bed Mobility Assessment/Treatment supine-sit;sit-supine  -CW      Supine-Sit [place] (Bed Mobility) supervision  -CW      Sit-Supine [place] (Bed Mobility) supervision  -CW      Assistive Device (Bed Mobility) bed rails  -CW      Recorded by [CW] Mango Castillo, PTA 08/13/18 1102      Row Name 08/13/18 1100             Transfer Assessment/Treatment    Transfer Assessment/Treatment sit-stand transfer;stand-sit transfer  -CW      Recorded by [CW] Mango aCstillo, PTA 08/13/18 1102      Row Name 08/13/18 1100             Sit-Stand Transfer    Sit-Stand [place] (Transfers) supervision  -CW      Assistive Device (Sit-Stand Transfers) walker, front-wheeled  -CW      Recorded by [CW] Mango Castillo, PTA 08/13/18 1102      Row Name 08/13/18 1100             Stand-Sit Transfer    Stand-Sit [place] (Transfers) supervision  -CW      Assistive Device (Stand-Sit Transfers) walker, front-wheeled  -CW      Recorded by [CW] Mango Castillo, PTA 08/13/18 1102      Row Name 08/13/18 1100             Gait/Stairs Assessment/Training    [place] Level (Gait) supervision  -CW      Assistive Device (Gait) walker, front-wheeled  -CW      Distance in Feet (Gait) 540  -CW      Pattern (Gait) step-through  -CW      Recorded by [CW] Mango Castillo, PTA 08/13/18 1102       Row Name 08/13/18 1100             Positioning and Restraints    Pre-Treatment Position in bed  -CW      Post Treatment Position bed  -CW      In Bed notified nsg;fowlers;call light within reach;encouraged to call for assist;with family/caregiver  -CW      Recorded by [CW] Mango Castilol, PTA 08/13/18 1102      Row Name 08/13/18 1100             Pain Assessment    Additional Documentation Pain Scale: Numbers Pre/Post-Treatment (Group)  -CW      Recorded by [CW] Mango Castillo, PTA 08/13/18 1102      Row Name 08/13/18 1100             Pain Scale: Numbers Pre/Post-Treatment    Pain Scale: Numbers, Pretreatment 0/10 - no pain  -CW      Recorded by [CW] Mango Castillo, PTA 08/13/18 1102      Row Name 08/13/18 1100             Outcome Summary/Treatment Plan (PT)    Anticipated Discharge Disposition (PT) home with home health  -CW      Recorded by [CW] Mango Castillo, PTA 08/13/18 1102        User Key  (r) = Recorded By, (t) = Taken By, (c) = Cosigned By    Initials Name Effective Dates Discipline    CW Mango Castillo, PTA 03/07/18 -  PT                     Physical Therapy Education     Title: PT OT SLP Therapies (Resolved)     Topic: Physical Therapy (Resolved)     Point: Mobility training (Resolved)    Learning Progress Summary     Learner Status Readiness Method Response Comment Documented by    Patient Done NAZARIO Sanford DU JR 08/12/18 1556    Family Done NAZARIO Sanford DU JR 08/12/18 1556          Point: Home exercise program (Resolved)    Learning Progress Summary     Learner Status Readiness Method Response Comment Documented by    Patient Done NAZARIO Sanford DU JR 08/12/18 1556    Family Done NAZARIO Sanford DU JR 08/12/18 1556          Point: Body mechanics (Resolved)    Learning Progress Summary     Learner Status Readiness Method Response Comment Documented by    Patient Done NAZARIO Sanford DU JR 08/12/18 1556    Family Done NAZARIO Sanford DU JR 08/12/18 1556          Point: Precautions  (Resolved)    Learning Progress Summary     Learner Status Readiness Method Response Comment Documented by    Patient Done NAZARIO SanfordMACY   08/12/18 1556    Family Done NAZARIO SanfordMACY   08/12/18 1556                      User Key     Initials Effective Dates Name Provider Type Rena     04/03/18 -  Preston Wood, PT Physical Therapist PT                    PT Recommendation and Plan  Anticipated Discharge Disposition (PT): home with home health  Therapy Frequency (PT Clinical Impression): daily  Outcome Summary/Treatment Plan (PT)  Anticipated Discharge Disposition (PT): home with home health  Plan of Care Reviewed With: patient  Progress: improving  Outcome Summary: Pt increased with bed mobility I and safety and transfer I to RWX and improved gait distance and safety          Outcome Measures     Row Name 08/13/18 1100 08/12/18 1557          How much help from another person do you currently need...    Turning from your back to your side while in flat bed without using bedrails? 4  -CW 3  -JR     Moving from lying on back to sitting on the side of a flat bed without bedrails? 4  -CW 3  -JR     Moving to and from a bed to a chair (including a wheelchair)? 4  -CW 3  -JR     Standing up from a chair using your arms (e.g., wheelchair, bedside chair)? 4  -CW 3  -JR     Climbing 3-5 steps with a railing? 2  -CW 2  -JR     To walk in hospital room? 3  -CW 3  -JR     AM-PAC 6 Clicks Score 21  -CW 17  -JR        Functional Assessment    Outcome Measure Options AM-PAC 6 Clicks Basic Mobility (PT)  -CW AM-PAC 6 Clicks Basic Mobility (PT)  -JR       User Key  (r) = Recorded By, (t) = Taken By, (c) = Cosigned By    Initials Name Provider Type    Preston Interiano, PT Physical Therapist    CW Mango Castillo, PTA Physical Therapy Assistant           Time Calculation:         PT Charges     Row Name 08/13/18 1104             Time Calculation    Start Time 1047  -CW      Stop Time 1104  -CW      Time  Calculation (min) 17 min  -CW      PT Received On 08/13/18  -CW      PT - Next Appointment 08/14/18  -CW        User Key  (r) = Recorded By, (t) = Taken By, (c) = Cosigned By    Initials Name Provider Type    CW Mango Castillo, JUSTEN Physical Therapy Assistant        Therapy Suggested Charges     Code   Minutes Charges    None           Therapy Charges for Today     Code Description Service Date Service Provider Modifiers Qty    89217360585 HC PT THER PROC EA 15 MIN 8/13/2018 Mango Castillo PTA GP 1          PT G-Codes  Outcome Measure Options: AM-PAC 6 Clicks Basic Mobility (PT)    Mango Castillo PTA  8/13/2018

## 2018-08-13 NOTE — PLAN OF CARE
Problem: Patient Care Overview  Goal: Plan of Care Review  Outcome: Ongoing (interventions implemented as appropriate)   08/13/18 1103   Coping/Psychosocial   Plan of Care Reviewed With patient   Plan of Care Review   Progress improving   OTHER   Outcome Summary Pt increased with bed mobility I and safety and transfer I to RWX and improved gait distance and safety

## 2018-08-13 NOTE — PROGRESS NOTES
GI Daily Progress Note    Assessment/Plan:    Active Problems:    Hypertension    Acute kidney injury superimposed on chronic kidney disease (CMS/HCC)    Dyspnea on exertion    Generalized weakness    Elevated d-dimer    Dehydration    Mild persistent asthma without complication    Heme positive stool    Elevated LFTs    Alcohol use    Urine retention    Chronic diastolic congestive heart failure (CMS/HCC)       LOS: 3 days     Guero Garay is a 83 y.o. male who was admitted with Urinary retention , CHF,CKD. He reports his symptoms are improving with treatment. Still SOA at rest from CHF also still has F/C in but of course no Prostate( post resection) on Flomax awaiting voiding trial. Has had hypotensive spells today during which he gets Dizzy. His HGB is rock steady and his MCv is abbove the mean. This argues against acute or chronic blood loss.     Subjective:    Patient expresses SOA  Patient denies abdominal pain, vomiting, diarrhea and bloody stools    Objective:    Vital signs in last 24 hours:  Temp:  [97.8 °F (36.6 °C)-98.7 °F (37.1 °C)] 98.2 °F (36.8 °C)  Heart Rate:  [] 104  Resp:  [18-20] 20  BP: (104-123)/(73-88) 123/87    Intake/Output last 3 shifts:  I/O last 3 completed shifts:  In: 770 [P.O.:770]  Out: 3175 [Urine:3175]  Intake/Output this shift:  No intake/output data recorded.    Physical Exam:Abdomen  Sounds Normal Active Bowel Sounds   Distension Soft   Tenderness Nontender     Imaging Results (last 72 hours)     Procedure Component Value Units Date/Time    US Liver [[id number]] Collected:  08/11/18 1343     Updated:  08/11/18 1445    Narrative:       LIVER ULTRASOUND     HISTORY: Elevated liver function tests.     FINDINGS: The gallbladder shows no gallstones or wall thickening and the  common bile duct is normal in caliber. The liver is normal in size and  texture and no focal lesions are seen. The visualized pancreas and right  kidney are unremarkable as also evaluated on recent CT  scan.     CONCLUSION: Negative right upper quadrant ultrasound.     This report was finalized on 8/11/2018 2:42 PM by Dr. Yong Gallego M.D.       NM Lung Ventilation Perfusion [[id number]] Collected:  08/11/18 1252     Updated:  08/11/18 1306    Narrative:       VENTILATION AND PERFUSION LUNG SCAN INCLUDING REBREATHING AND WASHOUT     HISTORY: Shortness of breath.     FINDINGS: The examination includes rebreathing and washout. The  ventilation and perfusion images are homogeneous. There are no findings  to suggest pulmonary embolus.     This report was finalized on 8/11/2018 1:03 PM by Dr. Yong Gallego M.D.             WBC   Date Value Ref Range Status   08/13/2018 4.52 4.50 - 10.70 10*3/mm3 Final     RBC   Date Value Ref Range Status   08/13/2018 4.51 (L) 4.60 - 6.00 10*6/mm3 Final     Hemoglobin   Date Value Ref Range Status   08/13/2018 13.6 (L) 13.7 - 17.6 g/dL Final     Hematocrit   Date Value Ref Range Status   08/13/2018 41.6 40.4 - 52.2 % Final     MCV   Date Value Ref Range Status   08/13/2018 92.2 79.8 - 96.2 fL Final     MCH   Date Value Ref Range Status   08/13/2018 30.2 27.0 - 32.7 pg Final     MCHC   Date Value Ref Range Status   08/13/2018 32.7 32.6 - 36.4 g/dL Final     RDW   Date Value Ref Range Status   08/13/2018 14.9 (H) 11.5 - 14.5 % Final     RDW-SD   Date Value Ref Range Status   08/13/2018 50.4 37.0 - 54.0 fl Final     MPV   Date Value Ref Range Status   08/13/2018 9.8 6.0 - 12.0 fL Final     Platelets   Date Value Ref Range Status   08/13/2018 223 140 - 500 10*3/mm3 Final     Neutrophil %   Date Value Ref Range Status   08/13/2018 54.0 42.7 - 76.0 % Final     Lymphocyte %   Date Value Ref Range Status   08/13/2018 22.1 19.6 - 45.3 % Final     Monocyte %   Date Value Ref Range Status   08/13/2018 18.4 (H) 5.0 - 12.0 % Final     Eosinophil %   Date Value Ref Range Status   08/13/2018 4.6 0.3 - 6.2 % Final     Basophil %   Date Value Ref Range Status   08/13/2018 0.9 0.0 - 1.5 % Final      Immature Grans %   Date Value Ref Range Status   08/13/2018 0.2 0.0 - 0.5 % Final     Neutrophils, Absolute   Date Value Ref Range Status   08/13/2018 2.44 1.90 - 8.10 10*3/mm3 Final     Lymphocytes, Absolute   Date Value Ref Range Status   08/13/2018 1.00 0.90 - 4.80 10*3/mm3 Final     Monocytes, Absolute   Date Value Ref Range Status   08/13/2018 0.83 0.20 - 1.20 10*3/mm3 Final     Eosinophils, Absolute   Date Value Ref Range Status   08/13/2018 0.21 0.00 - 0.70 10*3/mm3 Final     Basophils, Absolute   Date Value Ref Range Status   08/13/2018 0.04 0.00 - 0.20 10*3/mm3 Final     Immature Grans, Absolute   Date Value Ref Range Status   08/13/2018 0.01 0.00 - 0.03 10*3/mm3 Final       Glucose   Date Value Ref Range Status   08/13/2018 107 (H) 65 - 99 mg/dL Final     Sodium   Date Value Ref Range Status   08/13/2018 137 136 - 145 mmol/L Final     Potassium   Date Value Ref Range Status   08/13/2018 4.6 3.5 - 5.2 mmol/L Final     CO2   Date Value Ref Range Status   08/13/2018 24.3 22.0 - 29.0 mmol/L Final     Chloride   Date Value Ref Range Status   08/13/2018 99 98 - 107 mmol/L Final     Anion Gap   Date Value Ref Range Status   08/13/2018 13.7 mmol/L Final     Creatinine   Date Value Ref Range Status   08/13/2018 1.56 (H) 0.76 - 1.27 mg/dL Final     BUN   Date Value Ref Range Status   08/13/2018 17 8 - 23 mg/dL Final     BUN/Creatinine Ratio   Date Value Ref Range Status   08/13/2018 10.9 7.0 - 25.0 Final     Calcium   Date Value Ref Range Status   08/13/2018 9.5 8.6 - 10.5 mg/dL Final     eGFR Non  Amer   Date Value Ref Range Status   08/13/2018 43 (L) >60 mL/min/1.73 Final     Alkaline Phosphatase   Date Value Ref Range Status   08/13/2018 364 (H) 39 - 117 U/L Final     Total Protein   Date Value Ref Range Status   08/13/2018 7.0 6.0 - 8.5 g/dL Final     ALT (SGPT)   Date Value Ref Range Status   08/13/2018 64 (H) 1 - 41 U/L Final     AST (SGOT)   Date Value Ref Range Status   08/13/2018 34 1 - 40 U/L  Final     Total Bilirubin   Date Value Ref Range Status   08/13/2018 0.6 0.1 - 1.2 mg/dL Final     Albumin   Date Value Ref Range Status   08/13/2018 3.50 3.50 - 5.20 g/dL Final     Globulin   Date Value Ref Range Status   08/13/2018 3.5 gm/dL Final     Urinary Retention  Chronic CHF  CKD-GFR 43  Dark Stools    At this point after several blood draws his JHGB is stabeland his MCV is normal so no signs of acute or chronic blood loss, so with no acute indication for endoscopy feel his risk of Prep to his renal function and sedation to his CHF are prohibitive to elective sedation. Will follow but no plans for EGD/Colonoscopy at this time. The Above was discussed in detail with the Pt and his Wife.

## 2018-08-14 LAB
ALBUMIN SERPL-MCNC: 3.3 G/DL (ref 3.5–5.2)
ALBUMIN/GLOB SERPL: 0.9 G/DL
ALP SERPL-CCNC: 318 U/L (ref 39–117)
ALT SERPL W P-5'-P-CCNC: 57 U/L (ref 1–41)
ANION GAP SERPL CALCULATED.3IONS-SCNC: 12.2 MMOL/L
AST SERPL-CCNC: 31 U/L (ref 1–40)
BASOPHILS # BLD AUTO: 0.04 10*3/MM3 (ref 0–0.2)
BASOPHILS NFR BLD AUTO: 0.9 % (ref 0–1.5)
BILIRUB SERPL-MCNC: 0.5 MG/DL (ref 0.1–1.2)
BUN BLD-MCNC: 19 MG/DL (ref 8–23)
BUN/CREAT SERPL: 13.8 (ref 7–25)
CALCIUM SPEC-SCNC: 9.3 MG/DL (ref 8.6–10.5)
CHLORIDE SERPL-SCNC: 99 MMOL/L (ref 98–107)
CO2 SERPL-SCNC: 23.8 MMOL/L (ref 22–29)
CREAT BLD-MCNC: 1.38 MG/DL (ref 0.76–1.27)
DEPRECATED RDW RBC AUTO: 49.6 FL (ref 37–54)
EOSINOPHIL # BLD AUTO: 0.25 10*3/MM3 (ref 0–0.7)
EOSINOPHIL NFR BLD AUTO: 5.6 % (ref 0.3–6.2)
ERYTHROCYTE [DISTWIDTH] IN BLOOD BY AUTOMATED COUNT: 14.6 % (ref 11.5–14.5)
GFR SERPL CREATININE-BSD FRML MDRD: 49 ML/MIN/1.73
GLOBULIN UR ELPH-MCNC: 3.5 GM/DL
GLUCOSE BLD-MCNC: 108 MG/DL (ref 65–99)
HCT VFR BLD AUTO: 42.5 % (ref 40.4–52.2)
HGB BLD-MCNC: 13 G/DL (ref 13.7–17.6)
IMM GRANULOCYTES # BLD: 0 10*3/MM3 (ref 0–0.03)
IMM GRANULOCYTES NFR BLD: 0 % (ref 0–0.5)
LYMPHOCYTES # BLD AUTO: 0.98 10*3/MM3 (ref 0.9–4.8)
LYMPHOCYTES NFR BLD AUTO: 21.8 % (ref 19.6–45.3)
MCH RBC QN AUTO: 28.8 PG (ref 27–32.7)
MCHC RBC AUTO-ENTMCNC: 30.6 G/DL (ref 32.6–36.4)
MCV RBC AUTO: 94 FL (ref 79.8–96.2)
MONOCYTES # BLD AUTO: 0.82 10*3/MM3 (ref 0.2–1.2)
MONOCYTES NFR BLD AUTO: 18.2 % (ref 5–12)
NEUTROPHILS # BLD AUTO: 2.41 10*3/MM3 (ref 1.9–8.1)
NEUTROPHILS NFR BLD AUTO: 53.5 % (ref 42.7–76)
PLATELET # BLD AUTO: 211 10*3/MM3 (ref 140–500)
PMV BLD AUTO: 9.8 FL (ref 6–12)
POTASSIUM BLD-SCNC: 4.3 MMOL/L (ref 3.5–5.2)
PROT SERPL-MCNC: 6.8 G/DL (ref 6–8.5)
RBC # BLD AUTO: 4.52 10*6/MM3 (ref 4.6–6)
SODIUM BLD-SCNC: 135 MMOL/L (ref 136–145)
WBC NRBC COR # BLD: 4.5 10*3/MM3 (ref 4.5–10.7)

## 2018-08-14 PROCEDURE — 85025 COMPLETE CBC W/AUTO DIFF WBC: CPT | Performed by: INTERNAL MEDICINE

## 2018-08-14 PROCEDURE — 94799 UNLISTED PULMONARY SVC/PX: CPT

## 2018-08-14 PROCEDURE — 80053 COMPREHEN METABOLIC PANEL: CPT | Performed by: INTERNAL MEDICINE

## 2018-08-14 PROCEDURE — 99233 SBSQ HOSP IP/OBS HIGH 50: CPT | Performed by: INTERNAL MEDICINE

## 2018-08-14 PROCEDURE — 99231 SBSQ HOSP IP/OBS SF/LOW 25: CPT | Performed by: INTERNAL MEDICINE

## 2018-08-14 PROCEDURE — 94618 PULMONARY STRESS TESTING: CPT

## 2018-08-14 RX ADMIN — ALBUTEROL SULFATE 2.5 MG: 2.5 SOLUTION RESPIRATORY (INHALATION) at 11:52

## 2018-08-14 RX ADMIN — BUDESONIDE AND FORMOTEROL FUMARATE DIHYDRATE 2 PUFF: 160; 4.5 AEROSOL RESPIRATORY (INHALATION) at 19:21

## 2018-08-14 RX ADMIN — DOCUSATE SODIUM 100 MG: 100 CAPSULE, LIQUID FILLED ORAL at 10:01

## 2018-08-14 RX ADMIN — METOPROLOL TARTRATE 25 MG: 25 TABLET ORAL at 20:48

## 2018-08-14 RX ADMIN — ALLOPURINOL 100 MG: 100 TABLET ORAL at 10:01

## 2018-08-14 RX ADMIN — ASPIRIN 81 MG: 81 TABLET, CHEWABLE ORAL at 10:01

## 2018-08-14 RX ADMIN — APIXABAN 2.5 MG: 2.5 TABLET, FILM COATED ORAL at 20:48

## 2018-08-14 RX ADMIN — ROSUVASTATIN CALCIUM 20 MG: 20 TABLET, FILM COATED ORAL at 10:01

## 2018-08-14 RX ADMIN — ALBUTEROL SULFATE 2.5 MG: 2.5 SOLUTION RESPIRATORY (INHALATION) at 19:15

## 2018-08-14 RX ADMIN — BUDESONIDE AND FORMOTEROL FUMARATE DIHYDRATE 2 PUFF: 160; 4.5 AEROSOL RESPIRATORY (INHALATION) at 07:12

## 2018-08-14 RX ADMIN — DOCUSATE SODIUM 100 MG: 100 CAPSULE, LIQUID FILLED ORAL at 20:48

## 2018-08-14 RX ADMIN — ALBUTEROL SULFATE 2.5 MG: 2.5 SOLUTION RESPIRATORY (INHALATION) at 07:02

## 2018-08-14 RX ADMIN — APIXABAN 2.5 MG: 2.5 TABLET, FILM COATED ORAL at 12:44

## 2018-08-14 RX ADMIN — TAMSULOSIN HYDROCHLORIDE 0.4 MG: 0.4 CAPSULE ORAL at 10:02

## 2018-08-14 RX ADMIN — METOPROLOL TARTRATE 25 MG: 25 TABLET ORAL at 10:02

## 2018-08-14 RX ADMIN — FAMOTIDINE 20 MG: 20 TABLET, FILM COATED ORAL at 10:01

## 2018-08-14 NOTE — PROGRESS NOTES
Exercise Oximetry    Patient Name:Guero Garay   MRN: [id number]   Date: 08/14/18             ROOM AIR BASELINE   SpO2% 94   Heart Rate 99   Blood Pressure      EXERCISE ON ROOM AIR SpO2% EXERCISE ON O2 @na LPM SpO2%   1 MINUTE 96 1 MINUTE    2 MINUTES 96 2 MINUTES    3 MINUTES 96 3 MINUTES    4 MINUTES 97 4 MINUTES    5 MINUTES 97 5 MINUTES    6 MINUTES 98 6 MINUTES               Distance Walked  For 6 minutes Distance Walked   Dyspnea (Shelly Scale)  Dyspnea (Shelly Scale)   Fatigue (Shelly Scale)  Fatigue (Shelly Scale)   SpO2% Post Exercise 98 SpO2% Post Exercise   HR Post Exercise  103 HR Post Exercise   Time to Recovery  Less than 1 minute Time to Recovery     Comments: pt. Walked at slow steady pace no increase in work of breathing no increase in hr slow increase in spo2 with no supplemmental o2

## 2018-08-14 NOTE — PROGRESS NOTES
Name: Guero Garay ADMIT: 8/10/2018   : 1935  PCP: Librado Lai MD    MRN: [id number] LOS: 4 days   AGE/SEX: 83 y.o. male    ROOM: [facility]/   Subjective   Still complains of shortness of breath  Patient still has atrial fibrillation with rapid ventricular rate, rate better  Tran catheter has been removed and patient was able to void    Brief hospital course since admission:  A. Fib  Recent history of urinary tract infection was being treated with Bactrim and he has completed antibiotics  Heme-positive stool  History of asthma  HEENA/CKD, snoring of creatinine most likely secondary to Bactrim  Elevated liver enzymes most likely to related to antibiotics      I have reviewed past medical history, social history, family history, allergies.  No changes from admission note.      Review of Systems   Constitutional: Positive for fatigue. Negative for fever.   Respiratory: Negative for shortness of breath.    Cardiovascular: Negative for chest pain, palpitations and leg swelling.   Gastrointestinal: Negative for nausea and vomiting.          Objective   Vital Signs  Temp:  [97.3 °F (36.3 °C)-98.2 °F (36.8 °C)] 97.5 °F (36.4 °C)  Heart Rate:  [] 109  Resp:  [16-20] 20  BP: ()/(59-87) 94/63  SpO2:  [93 %-98 %] 94 %  on  Flow (L/min):  [1-2] 1;   Device (Oxygen Therapy): room air  Body mass index is 32.9 kg/m².    Intake/Output Summary (Last 24 hours) at 18 1731  Last data filed at 18 1253   Gross per 24 hour   Intake             1080 ml   Output              825 ml   Net              255 ml       Physical Exam   Constitutional: He is oriented to person, place, and time. He appears well-developed and well-nourished. Nasal cannula in place.   HENT:   Head: Atraumatic.   Eyes: Pupils are equal, round, and reactive to light. No scleral icterus.   Cardiovascular: Normal rate.  An irregular rhythm present.   Pulmonary/Chest: No accessory muscle usage. No respiratory distress. He has no  decreased breath sounds. He has no wheezes.   Abdominal: Soft. Normal appearance and bowel sounds are normal. He exhibits no ascites. There is no hepatosplenomegaly.   Neurological: He is alert and oriented to person, place, and time.   Skin: No laceration and no petechiae noted. No cyanosis. Nails show no clubbing.   Psychiatric: He has a normal mood and affect. His behavior is normal.   Vitals reviewed.      Results Review:      Results from last 7 days  Lab Units 08/14/18  0526 08/13/18  0423 08/12/18  0854 08/12/18  0452   WBC 10*3/mm3 4.50 4.52  --  4.45*   HEMOGLOBIN g/dL 13.0* 13.6* 13.9 13.0*   HEMATOCRIT % 42.5 41.6 43.8 40.2*   PLATELETS 10*3/mm3 211 223  --  227       Results from last 7 days  Lab Units 08/14/18  0526 08/13/18  0423 08/12/18  0452   SODIUM mmol/L 135* 137 136   POTASSIUM mmol/L 4.3 4.6 4.7   CHLORIDE mmol/L 99 99 100   CO2 mmol/L 23.8 24.3 23.4   BUN mg/dL 19 17 17   CREATININE mg/dL 1.38* 1.56* 1.75*   GLUCOSE mg/dL 108* 107* 103*   CALCIUM mg/dL 9.3 9.5 9.1       Results from last 7 days  Lab Units 08/14/18  0526 08/13/18  0423 08/12/18  0452 08/11/18  0439   ALK PHOS U/L 318* 364* 372* 405*   BILIRUBIN mg/dL 0.5 0.6 0.6 0.5   BILIRUBIN DIRECT mg/dL  --   --  0.2 0.2   ALT (SGPT) U/L 57* 64* 62* 77*   AST (SGOT) U/L 31 34 28 35       Results from last 7 days  Lab Units 08/10/18  1506   INR  1.29*     Hemoglobin A1C:  Lab Results   Component Value Date    HGBA1C 5.71 (H) 04/28/2017     Glucose Range:No results found for: POCGLU      albuterol 2.5 mg Nebulization Q6H - RT   allopurinol 100 mg Oral Daily   apixaban 2.5 mg Oral Q12H   aspirin 81 mg Oral Daily   budesonide-formoterol 2 puff Inhalation BID - RT   docusate sodium 100 mg Oral BID   famotidine 20 mg Oral Daily   furosemide 20 mg Oral Daily   metoprolol tartrate 25 mg Oral Q12H   rosuvastatin 20 mg Oral Daily   tamsulosin 0.4 mg Oral Daily       sodium chloride 9 mL/hr Last Rate: 9 mL/hr (08/11/18 1104)   Diet  Regular  Assessment/Plan       Assessment/Plan      Active Hospital Problems    Diagnosis Date Noted   • Chronic diastolic congestive heart failure (CMS/HCC) [I50.32] 08/13/2018   • Acute kidney injury superimposed on chronic kidney disease (CMS/HCC) [N17.9, N18.9] 08/10/2018   • Dyspnea on exertion [R06.09] 08/10/2018   • Generalized weakness [R53.1] 08/10/2018   • Elevated d-dimer [R79.89] 08/10/2018   • Dehydration [E86.0] 08/10/2018   • Mild persistent asthma without complication [J45.30] 08/10/2018   • Heme positive stool [R19.5] 08/10/2018   • Elevated LFTs [R79.89] 08/10/2018   • Alcohol use [Z78.9] 08/10/2018   • Urine retention [R33.9] 08/10/2018   • Hypertension [I10]       Resolved Hospital Problems    Diagnosis Date Noted Date Resolved   • Hyperkalemia [E87.5] 08/10/2018 08/11/2018     Discontinue Bactrim  Reduce the IV fluids  The creatinine is on down webb trend  A. fib with rapid ventricular rate, increase metoprolol, Rate better controlled and is been on low-dose Eliquis   Exercise oximetry did not show any need for oxygen  Gall bladder US is normal  Follow the labs      Hopefully home on Wednesday      Tanner Siddiqui MD  [facility]  08/14/18

## 2018-08-14 NOTE — PLAN OF CARE
Problem: Patient Care Overview  Goal: Plan of Care Review  Outcome: Ongoing (interventions implemented as appropriate)   08/14/18 0336   Coping/Psychosocial   Plan of Care Reviewed With patient   Plan of Care Review   Progress improving   OTHER   Outcome Summary Pt had no c/o pain or discomfort. Abx discontinued. Beta blocker tonight and hr is lower. Tran cath. EGD and colonoscopy will be outpatient scheduled. Cont to monitor, safety maintained.        Problem: Activity Intolerance (Adult)  Goal: Activity Tolerance  Outcome: Ongoing (interventions implemented as appropriate)      Problem: Renal Failure/Kidney Injury, Acute (Adult)  Goal: Signs and Symptoms of Listed Potential Problems Will be Absent, Minimized or Managed (Renal Failure/Kidney Injury, Acute)  Outcome: Ongoing (interventions implemented as appropriate)      Problem: Fall Risk (Adult)  Goal: Identify Related Risk Factors and Signs and Symptoms  Outcome: Ongoing (interventions implemented as appropriate)

## 2018-08-14 NOTE — PLAN OF CARE
Problem: Patient Care Overview  Goal: Plan of Care Review  Outcome: Ongoing (interventions implemented as appropriate)  Vs as noted-pt heart rate from 100 to 130's with minimal activity-Dr Galan's nurse notified  this pm r/t hypotension -pt verbalized he felt dizzy when he got into a cnair- nurse advised to continue to monitor and give coreg if B/P- greater than 90-pt and spouse informed  Up to chair x 2-pt very winded and tired after  No falls this shift  Progressing slowly

## 2018-08-14 NOTE — PROGRESS NOTES
GI Daily Progress Note    Assessment/Plan:    Active Problems:    Hypertension    Acute kidney injury superimposed on chronic kidney disease (CMS/HCC)    Dyspnea on exertion    Generalized weakness    Elevated d-dimer    Dehydration    Mild persistent asthma without complication    Heme positive stool    Elevated LFTs    Alcohol use    Urine retention    Chronic diastolic congestive heart failure (CMS/HCC)       LOS: 4 days     Guero Garay is a 83 y.o. male who was admitted with UR, CHF,CKD . He reports his symptoms are improving with treatment. Didn't sleep well but his Creat is down and his HGB is stable. WIll look to urology for next step on hi bladder.    Subjective:    Patient expresses No GI compalints  Patient denies abdominal pain, constipation and diarrhea    Objective:    Vital signs in last 24 hours:  Temp:  [97.3 °F (36.3 °C)-98.2 °F (36.8 °C)] 97.3 °F (36.3 °C)  Heart Rate:  [] 88  Resp:  [16-20] 18  BP: (100-131)/(68-87) 102/78    Intake/Output last 3 shifts:  I/O last 3 completed shifts:  In: 920 [P.O.:920]  Out: 2575 [Urine:2575]  Intake/Output this shift:  No intake/output data recorded.    Physical Exam:Abdomen  Sounds Normal Active Bowel Sounds   Distension Soft   Tenderness Nontender     Imaging Results (last 72 hours)     Procedure Component Value Units Date/Time    US Liver [[id number]] Collected:  08/11/18 1343     Updated:  08/11/18 1445    Narrative:       LIVER ULTRASOUND     HISTORY: Elevated liver function tests.     FINDINGS: The gallbladder shows no gallstones or wall thickening and the  common bile duct is normal in caliber. The liver is normal in size and  texture and no focal lesions are seen. The visualized pancreas and right  kidney are unremarkable as also evaluated on recent CT scan.     CONCLUSION: Negative right upper quadrant ultrasound.     This report was finalized on 8/11/2018 2:42 PM by Dr. Yong Gallego M.D.       NM Lung Ventilation Perfusion [[id number]]  Collected:  08/11/18 1252     Updated:  08/11/18 1306    Narrative:       VENTILATION AND PERFUSION LUNG SCAN INCLUDING REBREATHING AND WASHOUT     HISTORY: Shortness of breath.     FINDINGS: The examination includes rebreathing and washout. The  ventilation and perfusion images are homogeneous. There are no findings  to suggest pulmonary embolus.     This report was finalized on 8/11/2018 1:03 PM by Dr. Yong Gallego M.D.             WBC   Date Value Ref Range Status   08/14/2018 4.50 4.50 - 10.70 10*3/mm3 Final     RBC   Date Value Ref Range Status   08/14/2018 4.52 (L) 4.60 - 6.00 10*6/mm3 Final     Hemoglobin   Date Value Ref Range Status   08/14/2018 13.0 (L) 13.7 - 17.6 g/dL Final     Hematocrit   Date Value Ref Range Status   08/14/2018 42.5 40.4 - 52.2 % Final     MCV   Date Value Ref Range Status   08/14/2018 94.0 79.8 - 96.2 fL Final     MCH   Date Value Ref Range Status   08/14/2018 28.8 27.0 - 32.7 pg Final     MCHC   Date Value Ref Range Status   08/14/2018 30.6 (L) 32.6 - 36.4 g/dL Final     RDW   Date Value Ref Range Status   08/14/2018 14.6 (H) 11.5 - 14.5 % Final     RDW-SD   Date Value Ref Range Status   08/14/2018 49.6 37.0 - 54.0 fl Final     MPV   Date Value Ref Range Status   08/14/2018 9.8 6.0 - 12.0 fL Final     Platelets   Date Value Ref Range Status   08/14/2018 211 140 - 500 10*3/mm3 Final     Neutrophil %   Date Value Ref Range Status   08/14/2018 53.5 42.7 - 76.0 % Final     Lymphocyte %   Date Value Ref Range Status   08/14/2018 21.8 19.6 - 45.3 % Final     Monocyte %   Date Value Ref Range Status   08/14/2018 18.2 (H) 5.0 - 12.0 % Final     Eosinophil %   Date Value Ref Range Status   08/14/2018 5.6 0.3 - 6.2 % Final     Basophil %   Date Value Ref Range Status   08/14/2018 0.9 0.0 - 1.5 % Final     Immature Grans %   Date Value Ref Range Status   08/14/2018 0.0 0.0 - 0.5 % Final     Neutrophils, Absolute   Date Value Ref Range Status   08/14/2018 2.41 1.90 - 8.10 10*3/mm3 Final      Lymphocytes, Absolute   Date Value Ref Range Status   08/14/2018 0.98 0.90 - 4.80 10*3/mm3 Final     Monocytes, Absolute   Date Value Ref Range Status   08/14/2018 0.82 0.20 - 1.20 10*3/mm3 Final     Eosinophils, Absolute   Date Value Ref Range Status   08/14/2018 0.25 0.00 - 0.70 10*3/mm3 Final     Basophils, Absolute   Date Value Ref Range Status   08/14/2018 0.04 0.00 - 0.20 10*3/mm3 Final     Immature Grans, Absolute   Date Value Ref Range Status   08/14/2018 0.00 0.00 - 0.03 10*3/mm3 Final       Glucose   Date Value Ref Range Status   08/14/2018 108 (H) 65 - 99 mg/dL Final     Sodium   Date Value Ref Range Status   08/14/2018 135 (L) 136 - 145 mmol/L Final     Potassium   Date Value Ref Range Status   08/14/2018 4.3 3.5 - 5.2 mmol/L Final     CO2   Date Value Ref Range Status   08/14/2018 23.8 22.0 - 29.0 mmol/L Final     Chloride   Date Value Ref Range Status   08/14/2018 99 98 - 107 mmol/L Final     Anion Gap   Date Value Ref Range Status   08/14/2018 12.2 mmol/L Final     Creatinine   Date Value Ref Range Status   08/14/2018 1.38 (H) 0.76 - 1.27 mg/dL Final     BUN   Date Value Ref Range Status   08/14/2018 19 8 - 23 mg/dL Final     BUN/Creatinine Ratio   Date Value Ref Range Status   08/14/2018 13.8 7.0 - 25.0 Final     Calcium   Date Value Ref Range Status   08/14/2018 9.3 8.6 - 10.5 mg/dL Final     eGFR Non  Amer   Date Value Ref Range Status   08/14/2018 49 (L) >60 mL/min/1.73 Final     Alkaline Phosphatase   Date Value Ref Range Status   08/14/2018 318 (H) 39 - 117 U/L Final     Total Protein   Date Value Ref Range Status   08/14/2018 6.8 6.0 - 8.5 g/dL Final     ALT (SGPT)   Date Value Ref Range Status   08/14/2018 57 (H) 1 - 41 U/L Final     AST (SGOT)   Date Value Ref Range Status   08/14/2018 31 1 - 40 U/L Final     Total Bilirubin   Date Value Ref Range Status   08/14/2018 0.5 0.1 - 1.2 mg/dL Final     Albumin   Date Value Ref Range Status   08/14/2018 3.30 (L) 3.50 - 5.20 g/dL Final      Globulin   Date Value Ref Range Status   08/14/2018 3.5 gm/dL Final     Urinary Retention  Chronic CHF  CKD-GFR 43  Dark Stools    Following along, urology to see today.OK to resume anticoagulation form GI standpoint, at adjusted dose per cardiology.

## 2018-08-14 NOTE — PLAN OF CARE
Problem: Patient Care Overview  Goal: Plan of Care Review  Outcome: Ongoing (interventions implemented as appropriate)   08/14/18 7890   Coping/Psychosocial   Plan of Care Reviewed With patient   Plan of Care Review   Progress improving   OTHER   Outcome Summary Tran cath discontinued. Valentina well. Has voided x1 since catheter removed. Heart rate elevated most of the day, 130s this am prior to Metoprolol and then down to low 100s after.. Denies any pain.Pt anticipates going hiome in am.     Goal: Individualization and Mutuality  Outcome: Ongoing (interventions implemented as appropriate)    Goal: Discharge Needs Assessment  Outcome: Ongoing (interventions implemented as appropriate)    Goal: Interprofessional Rounds/Family Conf  Outcome: Ongoing (interventions implemented as appropriate)      Problem: Activity Intolerance (Adult)  Goal: Activity Tolerance  Outcome: Ongoing (interventions implemented as appropriate)    Goal: Effective Energy Conservation Techniques  Outcome: Ongoing (interventions implemented as appropriate)      Problem: Renal Failure/Kidney Injury, Acute (Adult)  Goal: Signs and Symptoms of Listed Potential Problems Will be Absent, Minimized or Managed (Renal Failure/Kidney Injury, Acute)  Outcome: Ongoing (interventions implemented as appropriate)      Problem: Fall Risk (Adult)  Goal: Identify Related Risk Factors and Signs and Symptoms  Outcome: Outcome(s) achieved Date Met: 08/14/18    Goal: Absence of Fall  Outcome: Ongoing (interventions implemented as appropriate)      Problem: Cardiac: Heart Failure (Adult)  Goal: Signs and Symptoms of Listed Potential Problems Will be Absent, Minimized or Managed (Cardiac: Heart Failure)  Outcome: Ongoing (interventions implemented as appropriate)

## 2018-08-14 NOTE — CONSULTS
[facility]  Alejandro Purvis MD    Patient Care Team:  Librado Lai MD as PCP - General (Internal Medicine)  Avery Galan MD as PCP - Claims Attributed  Aida Varela, RN as Care Coordinator (Population Health)    Chief complaint : Urinary retention    Subjective .     History of present illness:  This 83-year-old male was readmitted to the hospital several days ago with progressive weakness and some shortness of air.  He was very lethargic.  A catheter was placed for urinary control and possible retention.  He relates he was voiding fairly well before he came to the hospital.    His past medical history includes Carito score 7 cancer of the prostate-status post laparoscopic radical prostatectomy on 2/9/2006 with extraprostatic extension and positive margins at that time.    Review of Systems  All systems were reviewed and were negative except for catheter is bothersome.  He wants a voiding trial.  No shortness of air.  Still somewhat weak but again is stronger.  No gross hematuria since last hospitalization.    History  Past Medical History:   Diagnosis Date   • Arthralgia    • CAD (coronary artery disease), native coronary artery    • Chest pain, unspecified    • Essential hypertension    • Gout    • History of prostate cancer    • Jaw pain     both sides   • Mixed hyperlipidemia    • Osteoarthrosis    • Shoulder pain    • SOB (shortness of breath)    • Tinnitus    , Past Surgical History:   Procedure Laterality Date   • CARDIAC CATHETERIZATION N/A 4/27/2017    Procedure: Coronary angiography;  Surgeon: Marvel Brito MD;  Location: [facility] INVASIVE LOCATION;  Service:    • CARDIAC CATHETERIZATION N/A 4/27/2017    Procedure: Left Heart Cath;  Surgeon: Marvel Brito MD;  Location: [facility] CATH INVASIVE LOCATION;  Service:    • CARDIAC CATHETERIZATION N/A 4/27/2017    Procedure: Stent GIN coronary;  Surgeon: Marvel Brito MD;  Location: [facility] CATH INVASIVE LOCATION;  Service:    •  CARDIAC ELECTROPHYSIOLOGY PROCEDURE N/A 6/15/2017    Procedure: Pacemaker DC new   [employer];  Surgeon: Gustavo Valladares MD;  Location: [facility] CATH INVASIVE LOCATION;  Service:    • COLONOSCOPY  2013   • INGUINAL HERNIA REPAIR Right    • NECK SURGERY      SPURS   • MI RECONSTR TOTAL SHOULDER IMPLANT Right 2/16/2017    Procedure: RIGHT TOTAL SHOULDER ARTHROPLASTY;  Surgeon: Marquez Galvan MD;  Location: [facility] OR [facility];  Service: Orthopedics   • PROSTATECTOMY     • REPLACEMENT TOTAL KNEE Left    • TONSILLECTOMY     • TOTAL SHOULDER REPLACEMENT Bilateral    , Family History   Problem Relation Age of Onset   • Cancer Mother         lung   • Cancer Father         lung   • Colon cancer Neg Hx    • Colon polyps Neg Hx    , Social History   Substance Use Topics   • Smoking status: Never Smoker   • Smokeless tobacco: Never Used      Comment: caffeine - 1 every 2-3 days   • Alcohol use 12.6 oz/week     21 Cans of beer per week      Comment: 2-3 beers daily   , Prescriptions Prior to Admission   Medication Sig Dispense Refill Last Dose   • allopurinol (ZYLOPRIM) 100 MG tablet Take 1 tablet by mouth Daily. 90 tablet 3 8/10/2018 at Unknown time   • apixaban (ELIQUIS) 5 MG tablet tablet Take 1 tablet by mouth 2 (Two) Times a Day. 60 tablet 3 8/10/2018 at am   • aspirin 81 MG chewable tablet Chew 81 mg Daily.   8/10/2018 at Unknown time   • Cranberry 250 MG capsule Take 250 mg by mouth Daily.   8/10/2018 at Unknown time   • Fluticasone Furoate-Vilanterol (BREO ELLIPTA) 100-25 MCG/INH aerosol powder  Inhale 1 puff Every Morning.   8/10/2018 at Unknown time   • melatonin 5 MG tablet tablet Take 5 mg by mouth At Night As Needed.   Taking   • Multiple Vitamins-Minerals (MULTIVITAMIN WITH MINERALS) tablet tablet Take 1 tablet by mouth Daily.   8/10/2018 at Unknown time   • potassium chloride (MICRO-K) 10 MEQ CR capsule Take 1 capsule by mouth 2 (Two) Times a Day. 180 capsule 3 8/10/2018 at am   • rosuvastatin (CRESTOR) 20 MG tablet  Take 1 tablet by mouth Daily. 90 tablet 3 8/9/2018 at Unknown time   • sulfamethoxazole-trimethoprim (BACTRIM DS,SEPTRA DS) 800-160 MG per tablet Take 1 tablet by mouth 2 (Two) Times a Day.   8/10/2018 at am   , Scheduled Meds:    albuterol 2.5 mg Nebulization Q6H - RT   allopurinol 100 mg Oral Daily   apixaban 2.5 mg Oral Q12H   aspirin 81 mg Oral Daily   budesonide-formoterol 2 puff Inhalation BID - RT   docusate sodium 100 mg Oral BID   famotidine 20 mg Oral Daily   furosemide 20 mg Oral Daily   metoprolol tartrate 25 mg Oral Q12H   rosuvastatin 20 mg Oral Daily   tamsulosin 0.4 mg Oral Daily   , Continuous Infusions:    sodium chloride 9 mL/hr Last Rate: 9 mL/hr (08/11/18 1104)   , PRN Meds:  •  acetaminophen  •  calcium carbonate  •  LORazepam **OR** LORazepam **OR** LORazepam **OR** LORazepam **OR** LORazepam **OR** LORazepam  •  melatonin  •  nitroglycerin  •  ondansetron **OR** ondansetron ODT **OR** ondansetron  •  sodium chloride  •  Insert peripheral IV **AND** sodium chloride, Allergies:  Patient has no known allergies. and     Objective     Vital Signs   Temp:  [97.3 °F (36.3 °C)-98.2 °F (36.8 °C)] 97.5 °F (36.4 °C)  Heart Rate:  [] 146  Resp:  [16-20] 20  BP: ()/(59-87) 78/59    Physical Exam:     General Appearance:    Alert, cooperative, in no acute distress   Head:    Normocephalic, without obvious abnormality, atraumatic   Eyes:            Lids and lashes normal, conjunctivae and sclerae normal, no   icterus, no pallor, corneas clear, PERRLA   Ears:    Ears appear intact with no abnormalities noted   Throat:   No oral lesions, no thrush, oral mucosa moist   Neck:   No adenopathy, supple, trachea midline,    Back:     No kyphosis present, no scoliosis present, no skin lesions,      erythema or scars, no tenderness to percussion or                   palpation,   range of motion normal   Lungs:     Clear to auscultation,respirations regular, even and                  unlabored    Heart:     Regular rhythm and normal rate, normal S1 and S2, no            murmur, no gallop, no rub, no click   Chest Wall:    No abnormalities observed   Abdomen:     Normal bowel sounds, no masses, no organomegaly, soft        non-tender, non-distended, no guarding, no rebound                tenderness   Genital/Rectal:     Catheter in place.  Output clear.     Extremities:   Moves all extremities well, no edema, no cyanosis, no             redness       Skin:   No bleeding, bruising or rash       Neurologic:   Cranial nerves 2 - 12 grossly intact, sensation intact,       Results Review:   I reviewed the patient's new clinical results.  And I personally reviewed his CT scan.  No hydronephrosis.  Some tiny stones that are nonclinical at this point.      Assessment/Plan     Active Problems:    Hypertension    Acute kidney injury superimposed on chronic kidney disease (CMS/HCC)    Dyspnea on exertion    Generalized weakness    Elevated d-dimer    Dehydration    Mild persistent asthma without complication    Heme positive stool    Elevated LFTs    Alcohol use    Urine retention    Chronic diastolic congestive heart failure (CMS/HCC)      Bilateral ureteral stones  History of gross hematuria  History of prostate cancer status post radical prostatectomy    I discussed the patients findings and my recommendations with patient, family, nursing staff and We will give him a voiding trial today.    Alejandro Purvis MD  08/14/18  1:08 PM    Time: 40+ reviewing x-rays, history and physical, reviewing records, discussion of plan

## 2018-08-14 NOTE — THERAPY DISCHARGE NOTE
Acute Care - Physical Therapy Treatment Note/Discharge  [facility]     Patient Name: Guero Garay  : 1935  MRN: [id number]  Today's Date: 2018  Onset of Illness/Injury or Date of Surgery: 08/10/18  Date of Referral to PT: 08/10/18  Referring Physician: DARRYL KAM    Admit Date: 8/10/2018    Visit Dx:    ICD-10-CM ICD-9-CM   1. HEENA (acute kidney injury) (CMS/HCC) N17.9 584.9   2. Urinary retention R33.9 788.20   3. Atrial fibrillation with RVR (CMS/HCC) I48.91 427.31   4. Decreased functional mobility and endurance Z74.09 780.99     Patient Active Problem List   Diagnosis   • Gout   • Hypertension   • Hyperlipidemia   • Osteoarthrosis   • History of prostate cancer   • Tinnitus   • Benign neoplasm of colon   • Osteoarthritis of right shoulder   • Coronary artery disease involving native coronary artery of native heart with unstable angina pectoris (CMS/HCC)   • Syncopal episodes   • Screening for colon cancer   • Pyelonephritis, acute   • Acute kidney injury superimposed on chronic kidney disease (CMS/HCC)   • Dyspnea on exertion   • Generalized weakness   • Elevated d-dimer   • Dehydration   • Mild persistent asthma without complication   • Heme positive stool   • Elevated LFTs   • Alcohol use   • Urine retention   • Chronic diastolic congestive heart failure (CMS/HCC)       Physical Therapy Education     Title: PT OT SLP Therapies (Resolved)     Topic: Physical Therapy (Resolved)     Point: Mobility training (Resolved)    Learning Progress Summary     Learner Status Readiness Method Response Comment Documented by    Patient Done NAZARIO Sanford DU JR 18 1550    Family Done NAZARIO Sanford DU JR 18 [id number]          Point: Home exercise program (Resolved)    Learning Progress Summary     Learner Status Readiness Method Response Comment Documented by    Patient Done NAZARIO Sanford DU JR 18 155    Family Done NAZARIO Sanford DU JR 18 [id number]          Point: Body mechanics (Resolved)     Learning Progress Summary     Learner Status Readiness Method Response Comment Documented by    Patient Done ArsenioNAZARIO BillMACY   08/12/18 1556    Family Done Uma OSMANNAZARIO MACY MANRIQUE   08/12/18 1556          Point: Precautions (Resolved)    Learning Progress Summary     Learner Status Readiness Method Response Comment Documented by    Patient Done Arseniogermaine DAWSONNAZARIO MACY MANRIQUE   08/12/18 1556    Family Done NAZARIO Sanford DU   08/12/18 1556                      User Key     Initials Effective Dates Name Provider Type [facility] 04/03/18 -  Preston Wood, PT Physical Therapist PT                Therapy Treatment         PT Recommendation and Plan  Anticipated Discharge Disposition (PT): home with home health  Therapy Frequency (PT Clinical Impression): daily  Outcome Summary/Treatment Plan (PT)  Anticipated Discharge Disposition (PT): home with home health  Plan of Care Reviewed With: patient  Progress: improving  Outcome Summary: Pt increased with bed mobility I and safety and transfer I to RWX and improved gait distance and safety          Outcome Measures     Row Name 08/13/18 1100 08/12/18 1557          How much help from another person do you currently need...    Turning from your back to your side while in flat bed without using bedrails? 4  -CW 3  -JR     Moving from lying on back to sitting on the side of a flat bed without bedrails? 4  -CW 3  -JR     Moving to and from a bed to a chair (including a wheelchair)? 4  -CW 3  -JR     Standing up from a chair using your arms (e.g., wheelchair, bedside chair)? 4  -CW 3  -JR     Climbing 3-5 steps with a railing? 2  -CW 2  -JR     To walk in hospital room? 3  -CW 3  -JR     AM-PAC 6 Clicks Score 21  -CW 17  -JR        Functional Assessment    Outcome Measure Options AM-PAC 6 Clicks Basic Mobility (PT)  -CW AM-PAC 6 Clicks Basic Mobility (PT)  -JR       User Key  (r) = Recorded By, (t) = Taken By, (c) = Cosigned By    Initials Name Provider Type    Preston Interiano, PT  Physical Therapist    CW Mango Castillo, JUSTEN Physical Therapy Assistant           Time Calculation:     Therapy Suggested Charges     Code   Minutes Charges    None             Therapy Charges for Today     Code Description Service Date Service Provider Modifiers Qty    78828129402 HC PT THER PROC EA 15 MIN 8/13/2018 Mango Castillo PTA GP 1          PT G-Codes  Outcome Measure Options: AM-PAC 6 Clicks Basic Mobility (PT)    PT Discharge Summary  Anticipated Discharge Disposition (PT): home with home health  Reason for Discharge: All goals achieved  Outcomes Achieved: Refer to plan of care for updates on goals achieved    Mango Castillo PTA  8/14/2018

## 2018-08-14 NOTE — PROGRESS NOTES
"CC: CAD    Interval History:   Feels better today    Vital Signs  Temp:  [97.8 °F (36.6 °C)-98.2 °F (36.8 °C)] 97.9 °F (36.6 °C)  Heart Rate:  [] 83  Resp:  [16-20] 16  BP: (100-131)/(68-88) 131/87    Intake/Output Summary (Last 24 hours) at 08/14/18 0718  Last data filed at 08/14/18 0510   Gross per 24 hour   Intake              920 ml   Output              825 ml   Net               95 ml     Flowsheet Rows      First Filed Value   Admission Height  172.7 cm (68\") Documented at 08/10/2018 1438   Admission Weight  98 kg (216 lb) Documented at 08/10/2018 1438          PHYSICAL EXAM:  General: No acute distress  Resp:NL Rate, unlabored, clear  CV:NL rate and rhythm, NL PMI, Nl S1 and S2, no Murmur, no gallop, no rub, No JVD. Normal pedal pulses  ABD:Nl sounds, no masses or tenderness, nondistended, no guarding or rebound  Neuro: alert,cooperative and oriented  Extr: No edema or cyanosis, moves all extremities      Results Review:      Results from last 7 days  Lab Units 08/14/18  0526   SODIUM mmol/L 135*   POTASSIUM mmol/L 4.3   CHLORIDE mmol/L 99   CO2 mmol/L 23.8   BUN mg/dL 19   CREATININE mg/dL 1.38*   GLUCOSE mg/dL 108*   CALCIUM mg/dL 9.3       Results from last 7 days  Lab Units 08/10/18  1506 08/10/18  1301   TROPONIN T ng/mL <0.010 <0.010       Results from last 7 days  Lab Units 08/14/18  0526   WBC 10*3/mm3 4.50   HEMOGLOBIN g/dL 13.0*   HEMATOCRIT % 42.5   PLATELETS 10*3/mm3 211       Results from last 7 days  Lab Units 08/10/18  1506   INR  1.29*                 I reviewed the patient's new clinical results.  I personally viewed and interpreted the patient's EKG/Telemetry data        Medication Review:   Meds reviewed      sodium chloride 9 mL/hr Last Rate: 9 mL/hr (08/11/18 1104)       Assessment/Plan  1. PAroxysmal Afib -CHADS2-VASc score is 4. He needs to be anticoagulated.  He is remaining in sinus rhythm though now but still having episodes on his monitor.  We'll start low-dose eliquis and " follow.  We'll see him as an outpatient.  2. Mild diastolic CHF- on CXR.  Echocardiogram May 10, 2018 normal LV function.  One by mouth diuretic.  4. Presence of permanent pacemaker  5. CAD- preserved left ventricular systolic function.  Previous drug-eluting stents placed left anterior descending and right coronary artery.  no angina ASA 81 mg continues  6. Heme positive stool- GI feels no active bleeding.  He does need to be anticoagulated however.  Due to his age and mild renal dysfunction will decrease his eliquis dose.  7. Hyperlipidemia on rosuvastatin 20 mg        Avery Galan MD  08/14/18  7:18 AM

## 2018-08-15 VITALS
TEMPERATURE: 97.4 F | HEIGHT: 68 IN | HEART RATE: 126 BPM | WEIGHT: 208.1 LBS | SYSTOLIC BLOOD PRESSURE: 100 MMHG | RESPIRATION RATE: 18 BRPM | OXYGEN SATURATION: 96 % | BODY MASS INDEX: 31.54 KG/M2 | DIASTOLIC BLOOD PRESSURE: 73 MMHG

## 2018-08-15 LAB
ALBUMIN SERPL-MCNC: 3.2 G/DL (ref 3.5–5.2)
ALBUMIN/GLOB SERPL: 0.9 G/DL
ALP SERPL-CCNC: 272 U/L (ref 39–117)
ALT SERPL W P-5'-P-CCNC: 49 U/L (ref 1–41)
ANION GAP SERPL CALCULATED.3IONS-SCNC: 14.8 MMOL/L
AST SERPL-CCNC: 27 U/L (ref 1–40)
BASOPHILS # BLD AUTO: 0.06 10*3/MM3 (ref 0–0.2)
BASOPHILS NFR BLD AUTO: 1.4 % (ref 0–1.5)
BILIRUB SERPL-MCNC: 0.5 MG/DL (ref 0.1–1.2)
BUN BLD-MCNC: 21 MG/DL (ref 8–23)
BUN/CREAT SERPL: 16.2 (ref 7–25)
CALCIUM SPEC-SCNC: 9.1 MG/DL (ref 8.6–10.5)
CHLORIDE SERPL-SCNC: 100 MMOL/L (ref 98–107)
CO2 SERPL-SCNC: 23.2 MMOL/L (ref 22–29)
CREAT BLD-MCNC: 1.3 MG/DL (ref 0.76–1.27)
DEPRECATED RDW RBC AUTO: 49.1 FL (ref 37–54)
EOSINOPHIL # BLD AUTO: 0.23 10*3/MM3 (ref 0–0.7)
EOSINOPHIL NFR BLD AUTO: 5.5 % (ref 0.3–6.2)
ERYTHROCYTE [DISTWIDTH] IN BLOOD BY AUTOMATED COUNT: 14.4 % (ref 11.5–14.5)
GFR SERPL CREATININE-BSD FRML MDRD: 53 ML/MIN/1.73
GLOBULIN UR ELPH-MCNC: 3.5 GM/DL
GLUCOSE BLD-MCNC: 102 MG/DL (ref 65–99)
HCT VFR BLD AUTO: 42.8 % (ref 40.4–52.2)
HGB BLD-MCNC: 13.2 G/DL (ref 13.7–17.6)
IMM GRANULOCYTES # BLD: 0.02 10*3/MM3 (ref 0–0.03)
IMM GRANULOCYTES NFR BLD: 0.5 % (ref 0–0.5)
LYMPHOCYTES # BLD AUTO: 1.32 10*3/MM3 (ref 0.9–4.8)
LYMPHOCYTES NFR BLD AUTO: 31.5 % (ref 19.6–45.3)
MCH RBC QN AUTO: 29.1 PG (ref 27–32.7)
MCHC RBC AUTO-ENTMCNC: 30.8 G/DL (ref 32.6–36.4)
MCV RBC AUTO: 94.3 FL (ref 79.8–96.2)
MONOCYTES # BLD AUTO: 0.56 10*3/MM3 (ref 0.2–1.2)
MONOCYTES NFR BLD AUTO: 13.4 % (ref 5–12)
NEUTROPHILS # BLD AUTO: 2 10*3/MM3 (ref 1.9–8.1)
NEUTROPHILS NFR BLD AUTO: 47.7 % (ref 42.7–76)
PLATELET # BLD AUTO: 210 10*3/MM3 (ref 140–500)
PMV BLD AUTO: 9.8 FL (ref 6–12)
POTASSIUM BLD-SCNC: 4.5 MMOL/L (ref 3.5–5.2)
PROT SERPL-MCNC: 6.7 G/DL (ref 6–8.5)
RBC # BLD AUTO: 4.54 10*6/MM3 (ref 4.6–6)
SODIUM BLD-SCNC: 138 MMOL/L (ref 136–145)
WBC NRBC COR # BLD: 4.19 10*3/MM3 (ref 4.5–10.7)

## 2018-08-15 PROCEDURE — 85025 COMPLETE CBC W/AUTO DIFF WBC: CPT | Performed by: INTERNAL MEDICINE

## 2018-08-15 PROCEDURE — 99232 SBSQ HOSP IP/OBS MODERATE 35: CPT | Performed by: INTERNAL MEDICINE

## 2018-08-15 PROCEDURE — 99231 SBSQ HOSP IP/OBS SF/LOW 25: CPT | Performed by: INTERNAL MEDICINE

## 2018-08-15 PROCEDURE — 94799 UNLISTED PULMONARY SVC/PX: CPT

## 2018-08-15 PROCEDURE — 80053 COMPREHEN METABOLIC PANEL: CPT | Performed by: INTERNAL MEDICINE

## 2018-08-15 RX ORDER — FUROSEMIDE 20 MG/1
20 TABLET ORAL DAILY
Qty: 30 TABLET | Refills: 0 | Status: SHIPPED | OUTPATIENT
Start: 2018-08-16 | End: 2018-09-24 | Stop reason: SDUPTHER

## 2018-08-15 RX ORDER — TAMSULOSIN HYDROCHLORIDE 0.4 MG/1
0.4 CAPSULE ORAL DAILY
Qty: 30 CAPSULE | Refills: 1 | Status: SHIPPED | OUTPATIENT
Start: 2018-08-16 | End: 2019-12-02 | Stop reason: HOSPADM

## 2018-08-15 RX ADMIN — BUDESONIDE AND FORMOTEROL FUMARATE DIHYDRATE 2 PUFF: 160; 4.5 AEROSOL RESPIRATORY (INHALATION) at 06:40

## 2018-08-15 RX ADMIN — FUROSEMIDE 20 MG: 20 TABLET ORAL at 09:26

## 2018-08-15 RX ADMIN — ROSUVASTATIN CALCIUM 20 MG: 20 TABLET, FILM COATED ORAL at 09:26

## 2018-08-15 RX ADMIN — APIXABAN 2.5 MG: 2.5 TABLET, FILM COATED ORAL at 09:26

## 2018-08-15 RX ADMIN — TAMSULOSIN HYDROCHLORIDE 0.4 MG: 0.4 CAPSULE ORAL at 09:26

## 2018-08-15 RX ADMIN — METOPROLOL TARTRATE 25 MG: 25 TABLET ORAL at 09:25

## 2018-08-15 RX ADMIN — ALBUTEROL SULFATE 2.5 MG: 2.5 SOLUTION RESPIRATORY (INHALATION) at 06:40

## 2018-08-15 RX ADMIN — ALLOPURINOL 100 MG: 100 TABLET ORAL at 09:26

## 2018-08-15 RX ADMIN — ASPIRIN 81 MG: 81 TABLET, CHEWABLE ORAL at 09:27

## 2018-08-15 RX ADMIN — FAMOTIDINE 20 MG: 20 TABLET, FILM COATED ORAL at 09:26

## 2018-08-15 NOTE — PLAN OF CARE
Problem: Patient Care Overview  Goal: Plan of Care Review  Outcome: Ongoing (interventions implemented as appropriate)   08/15/18 0533   Coping/Psychosocial   Plan of Care Reviewed With patient   Plan of Care Review   Progress improving   OTHER   Outcome Summary HR improved, 70's-80's, SR and AFib, voiding without difficulty. Safety maintained.      Goal: Individualization and Mutuality  Outcome: Ongoing (interventions implemented as appropriate)    Goal: Discharge Needs Assessment  Outcome: Ongoing (interventions implemented as appropriate)      Problem: Activity Intolerance (Adult)  Goal: Activity Tolerance  Outcome: Ongoing (interventions implemented as appropriate)    Goal: Effective Energy Conservation Techniques  Outcome: Ongoing (interventions implemented as appropriate)      Problem: Renal Failure/Kidney Injury, Acute (Adult)  Goal: Signs and Symptoms of Listed Potential Problems Will be Absent, Minimized or Managed (Renal Failure/Kidney Injury, Acute)  Outcome: Ongoing (interventions implemented as appropriate)      Problem: Fall Risk (Adult)  Goal: Absence of Fall  Outcome: Ongoing (interventions implemented as appropriate)      Problem: Cardiac: Heart Failure (Adult)  Goal: Signs and Symptoms of Listed Potential Problems Will be Absent, Minimized or Managed (Cardiac: Heart Failure)  Outcome: Ongoing (interventions implemented as appropriate)

## 2018-08-15 NOTE — DISCHARGE SUMMARY
Name: Guero Garay  Age: 83 y.o.  Sex: male  :  1935  MRN: [id number]         Primary Care Physician: Librado Lai MD      Date of Admission:  8/10/2018  Date of Discharge:  8/15/2018      CHIEF COMPLAINT     Shortness of Breath and Abnormal Lab       DISCHARGE DIAGNOSIS  Active Hospital Problems    Diagnosis Date Noted   • Chronic diastolic congestive heart failure (CMS/HCC) [I50.32] 2018   • Acute kidney injury superimposed on chronic kidney disease (CMS/HCC) [N17.9, N18.9] 08/10/2018   • Dyspnea on exertion [R06.09] 08/10/2018   • Generalized weakness [R53.1] 08/10/2018   • Elevated d-dimer [R79.89] 08/10/2018   • Dehydration [E86.0] 08/10/2018   • Mild persistent asthma without complication [J45.30] 08/10/2018   • Heme positive stool [R19.5] 08/10/2018   • Elevated LFTs [R79.89] 08/10/2018   • Alcohol use [Z78.9] 08/10/2018   • Urine retention [R33.9] 08/10/2018   • Hypertension [I10]       Resolved Hospital Problems    Diagnosis Date Noted Date Resolved   • Hyperkalemia [E87.5] 08/10/2018 2018       SECONDARY DIAGNOSES  Past Medical History:   Diagnosis Date   • Arthralgia    • CAD (coronary artery disease), native coronary artery    • Chest pain, unspecified    • Essential hypertension    • Gout    • History of prostate cancer    • Jaw pain     both sides   • Mixed hyperlipidemia    • Osteoarthrosis    • Shoulder pain    • SOB (shortness of breath)    • Tinnitus        CONSULTS   Consulting Physician(s)     Provider Relationship Specialty    Avery Galan MD Consulting Physician Cardiology    Gio, Kwadwo Kamara MD Consulting Physician Gastroenterology    Alejandro Purvis MD Consulting Physician Urology          PROCEDURES PERFORMED  Ultrasound of the liver  VQ scan of the lung  CT of the abdomen        HOSPITAL COURSE  This is a 23-year-old gentleman with a history of atrial fibrillation, coronary artery disease status post drug-eluting stents times 2 in April  2017, symptomatic bradycardia status post pacemaker and chronic kidney disease was admitted to the hospital with history of having generalized weakness and dyspnea for the past 2 weeks.  He was sent from Dr. Galan's office, due to low blood pressure and abnormal labs.  Quite recently patient was discharged from the hospital after treating for urosepsis with acute pyelonephritis and he was sent home on 2 weeks of Bactrim.  He also had dark stool heme positive in the emergency room.  He had elevated liver enzymes which most likely related to antibiotic therapy and underwent ultrasound of the liver which was normal and CT of the abdomen which was also normal.  Patient was in atrial fibrillation with sporadic rapid ventricular rate.  Cardiology also saw the patient in consultation and has been on beta blocker.  He was restarted on telemetry was at a lower dose at 2.5 milligrams twice a day.  His Tran catheter was removed and has no hematuria unable to void urine.  He is labs are slowly returning to normal and patient is stable.  He will be sent home with instructions to have a stress test as an outpatient at cardiology office and follow-up with his cardiologist      PHYSICAL EXAM  Temp:  [97.4 °F (36.3 °C)-98.4 °F (36.9 °C)] 97.4 °F (36.3 °C)  Heart Rate:  [] 126  Resp:  [18-20] 18  BP: ()/(59-86) 100/73  Body mass index is 31.64 kg/m².  Physical Exam  HEENT: Unremarkable, pupils are round equal and reacting to light   NECK: No lymphadenopathy, throat is clear,   RESPRATORY SYSTEM: Breath sounds are equal on both sides and are normal, no wheezes or crackles  CARDIOVASULAR SYSTEM: Heart rate is irregular without murmur  ABDOMEN: Soft, no ascites, no hepatosplenomegaly.  EXTREMITIES: No cyanosis, clubbing or edema    CONDITION ON DISCHARGE  Stable.      DISCHARGE DISPOSITION   Home      ALLERGIES  No Known Allergies    RECENT LABS    Results from last 7 days  Lab Units 08/15/18  0524 08/14/18  4021  08/13/18  0423   WBC 10*3/mm3 4.19* 4.50 4.52   HEMOGLOBIN g/dL 13.2* 13.0* 13.6*   HEMATOCRIT % 42.8 42.5 41.6   PLATELETS 10*3/mm3 210 211 223       Results from last 7 days  Lab Units 08/15/18  0524 08/14/18  0526 08/13/18  0423   SODIUM mmol/L 138 135* 137   POTASSIUM mmol/L 4.5 4.3 4.6   CHLORIDE mmol/L 100 99 99   CO2 mmol/L 23.2 23.8 24.3   BUN mg/dL 21 19 17   CREATININE mg/dL 1.30* 1.38* 1.56*   GLUCOSE mg/dL 102* 108* 107*   CALCIUM mg/dL 9.1 9.3 9.5       Results from last 7 days  Lab Units 08/10/18  1506   INR  1.29*       DIET;  Diet Order   Procedures   • Diet Regular       DISCHARGE MEDICATIONS     Your medication list      START taking these medications      Instructions Last Dose Given Next Dose Due   furosemide 20 MG tablet  Commonly known as:  LASIX      Take 1 tablet by mouth Daily.       metoprolol tartrate 25 MG tablet  Commonly known as:  LOPRESSOR      Take 1 tablet by mouth Every 12 (Twelve) Hours.       tamsulosin 0.4 MG capsule 24 hr capsule  Commonly known as:  FLOMAX      Take 1 capsule by mouth Daily.          CHANGE how you take these medications      Instructions Last Dose Given Next Dose Due   apixaban 2.5 MG tablet tablet  Commonly known as:  ELIQUIS  What changed:  · medication strength  · how much to take  · when to take this      Take 1 tablet by mouth Every 12 (Twelve) Hours.          CONTINUE taking these medications      Instructions Last Dose Given Next Dose Due   allopurinol 100 MG tablet  Commonly known as:  ZYLOPRIM      Take 1 tablet by mouth Daily.       aspirin 81 MG chewable tablet      Chew 81 mg Daily.       BREO ELLIPTA 100-25 MCG/INH aerosol powder   Generic drug:  Fluticasone Furoate-Vilanterol      Inhale 1 puff Every Morning.       Cranberry 250 MG capsule      Take 250 mg by mouth Daily.       melatonin 5 MG tablet tablet      Take 5 mg by mouth At Night As Needed.       multivitamin with minerals tablet tablet      Take 1 tablet by mouth Daily.       potassium  chloride 10 MEQ CR capsule  Commonly known as:  MICRO-K      Take 1 capsule by mouth 2 (Two) Times a Day.       rosuvastatin 20 MG tablet  Commonly known as:  CRESTOR      Take 1 tablet by mouth Daily.          STOP taking these medications    sulfamethoxazole-trimethoprim 800-160 MG per tablet  Commonly known as:  BACTRIM DS,SEPTRA DS              Where to Get Your Medications      These medications were sent to [facility] - [place], [place] - [address] [address] AT [facility] & [address] - [phone]  - [phone] [facility] [address] [facility] [address], [place] [address]    Phone:  [phone]   · apixaban 2.5 MG tablet tablet  · furosemide 20 MG tablet  · metoprolol tartrate 25 MG tablet  · tamsulosin 0.4 MG capsule 24 hr capsule        Future Appointments  Date Time Provider Department Center   8/17/2018 8:00 AM [facility]  [facility]   8/23/2018 7:30 PM [facility] ED SLEEP ROOMS [facility] ED SLEEP LAG   8/27/2018 9:00 AM Sofie Lee APRN [facility] CD LCGKR None   9/18/2018 8:30 AM Librado Lai MD [facility] PC MDEST None   10/24/2018 12:10 AM [facility] REMOTE, LCG [facility] CD [facility] None   12/14/2018 10:00 AM Avery Galan MD [facility] CD [facility]KR None   12/14/2018 10:30 AM [facility] IN OFFICE, LCG GARCIA [facility] CD LCGKR None     Follow-up Information     Gio, Kwadwo Kamara MD. Schedule an appointment as soon as possible for a visit in 2 month(s).    Specialty:  Gastroenterology  Contact information:  [address]  [address]  [facility] [address] [phone]             Librado Lai MD Follow up.    Specialty:  Internal Medicine  Contact information:  [address]  [facility] [address] [phone]             Avery Galan MD Follow up.    Specialty:  Cardiology  Contact information:  [address]  [facility] [address] [phone]                      CODE STATUS  Code Status and Medical Interventions:   Ordered at: 08/10/18 2046     Level Of Support Discussed With:    Patient      Code Status:    CPR     Medical Interventions (Level of Support Prior to Arrest):    Full           Tanner Siddiuqi MD  [facility]  08/15/18      Time: greater than 35 minutes.

## 2018-08-15 NOTE — PROGRESS NOTES
"CC: PAF    Interval History:   Feels good today no new events last night.    Vital Signs  Temp:  [97.4 °F (36.3 °C)-98.4 °F (36.9 °C)] 97.4 °F (36.3 °C)  Heart Rate:  [] 73  Resp:  [17-20] 18  BP: ()/(59-86) 112/78    Intake/Output Summary (Last 24 hours) at 08/15/18 0739  Last data filed at 08/15/18 0545   Gross per 24 hour   Intake              800 ml   Output              725 ml   Net               75 ml     Flowsheet Rows      First Filed Value   Admission Height  172.7 cm (68\") Documented at 08/10/2018 1438   Admission Weight  98 kg (216 lb) Documented at 08/10/2018 1438          PHYSICAL EXAM:  General: No acute distress  Resp:NL Rate, unlabored, clear  CV:NL rate and rhythm, NL PMI, Nl S1 and S2, no Murmur, no gallop, no rub, No JVD. Normal pedal pulses  ABD:Nl sounds, no masses or tenderness, nondistended, no guarding or rebound  Neuro: alert,cooperative and oriented  Extr: No edema or cyanosis, moves all extremities      Results Review:      Results from last 7 days  Lab Units 08/15/18  0524   SODIUM mmol/L 138   POTASSIUM mmol/L 4.5   CHLORIDE mmol/L 100   CO2 mmol/L 23.2   BUN mg/dL 21   CREATININE mg/dL 1.30*   GLUCOSE mg/dL 102*   CALCIUM mg/dL 9.1       Results from last 7 days  Lab Units 08/10/18  1506 08/10/18  1301   TROPONIN T ng/mL <0.010 <0.010       Results from last 7 days  Lab Units 08/15/18  0524   WBC 10*3/mm3 4.19*   HEMOGLOBIN g/dL 13.2*   HEMATOCRIT % 42.8   PLATELETS 10*3/mm3 210       Results from last 7 days  Lab Units 08/10/18  1506   INR  1.29*                 I reviewed the patient's new clinical results.  I personally viewed and interpreted the patient's EKG/Telemetry data        Medication Review:   Meds reviewed      sodium chloride 9 mL/hr Last Rate: 9 mL/hr (08/11/18 1104)       Assessment/Plan  1. Paroxysmal Afib -CHADS2-VASc score is 4. He needs to be anticoagulated.  He is remaining in sinus rhythm though now but still having episodes on his monitor.  We'll start " low-dose eliquis and follow.  We'll see him as an outpatient. He is to have a perfusion stress test this Friday if unremarkable we'll see him in a couple months.  2. Mild diastolic CHF- on CXR.  Echocardiogram May 10, 2018 normal LV function.  One by mouth diuretic.  4. Presence of permanent pacemaker  5. CAD- preserved left ventricular systolic function.  Previous drug-eluting stents placed left anterior descending and right coronary artery.  no angina ASA 81 mg continues  6. Heme positive stool- GI feels no active bleeding.  He does need to be anticoagulated however.  Due to his age and mild renal dysfunction will decrease his eliquis dose.  7. Hyperlipidemia on rosuvastatin 20 mg        Avery Galan MD  08/15/18  7:39 AM

## 2018-08-15 NOTE — PROGRESS NOTES
GI Daily Progress Note    Assessment/Plan:    Active Problems:    Hypertension    Acute kidney injury superimposed on chronic kidney disease (CMS/HCC)    Dyspnea on exertion    Generalized weakness    Elevated d-dimer    Dehydration    Mild persistent asthma without complication    Heme positive stool    Elevated LFTs    Alcohol use    Urine retention    Chronic diastolic congestive heart failure (CMS/HCC)       LOS: 5 days     Guero Garay is a 83 y.o. male who was admitted with UR HEENA,CKD,CHF. He reports his symptoms are improving with treatment. Off O2 and catheter ourt and urinating, had tachycardia, hypotension yesterday but did well overnight, HGB is stable    Subjective:    Patient expresses No GI complaints  Patient denies abdominal pain, vomiting, diarrhea and bloody stools    Objective:    Vital signs in last 24 hours:  Temp:  [97.4 °F (36.3 °C)-98.4 °F (36.9 °C)] 97.4 °F (36.3 °C)  Heart Rate:  [] 126  Resp:  [18-20] 18  BP: ()/(59-86) 100/73    Intake/Output last 3 shifts:  I/O last 3 completed shifts:  In: 1160 [P.O.:1160]  Out: 1025 [Urine:1025]  Intake/Output this shift:  I/O this shift:  In: 420 [P.O.:420]  Out: -     Physical Exam:Abdomen  Sounds Normal Active Bowel Sounds   Distension Soft   Tenderness Nontender     Imaging Results (last 72 hours)     ** No results found for the last 72 hours. **          WBC   Date Value Ref Range Status   08/15/2018 4.19 (L) 4.50 - 10.70 10*3/mm3 Final     RBC   Date Value Ref Range Status   08/15/2018 4.54 (L) 4.60 - 6.00 10*6/mm3 Final     Hemoglobin   Date Value Ref Range Status   08/15/2018 13.2 (L) 13.7 - 17.6 g/dL Final     Hematocrit   Date Value Ref Range Status   08/15/2018 42.8 40.4 - 52.2 % Final     MCV   Date Value Ref Range Status   08/15/2018 94.3 79.8 - 96.2 fL Final     MCH   Date Value Ref Range Status   08/15/2018 29.1 27.0 - 32.7 pg Final     MCHC   Date Value Ref Range Status   08/15/2018 30.8 (L) 32.6 - 36.4 g/dL Final     RDW    Date Value Ref Range Status   08/15/2018 14.4 11.5 - 14.5 % Final     RDW-SD   Date Value Ref Range Status   08/15/2018 49.1 37.0 - 54.0 fl Final     MPV   Date Value Ref Range Status   08/15/2018 9.8 6.0 - 12.0 fL Final     Platelets   Date Value Ref Range Status   08/15/2018 210 140 - 500 10*3/mm3 Final     Neutrophil %   Date Value Ref Range Status   08/15/2018 47.7 42.7 - 76.0 % Final     Lymphocyte %   Date Value Ref Range Status   08/15/2018 31.5 19.6 - 45.3 % Final     Monocyte %   Date Value Ref Range Status   08/15/2018 13.4 (H) 5.0 - 12.0 % Final     Eosinophil %   Date Value Ref Range Status   08/15/2018 5.5 0.3 - 6.2 % Final     Basophil %   Date Value Ref Range Status   08/15/2018 1.4 0.0 - 1.5 % Final     Immature Grans %   Date Value Ref Range Status   08/15/2018 0.5 0.0 - 0.5 % Final     Neutrophils, Absolute   Date Value Ref Range Status   08/15/2018 2.00 1.90 - 8.10 10*3/mm3 Final     Lymphocytes, Absolute   Date Value Ref Range Status   08/15/2018 1.32 0.90 - 4.80 10*3/mm3 Final     Monocytes, Absolute   Date Value Ref Range Status   08/15/2018 0.56 0.20 - 1.20 10*3/mm3 Final     Eosinophils, Absolute   Date Value Ref Range Status   08/15/2018 0.23 0.00 - 0.70 10*3/mm3 Final     Basophils, Absolute   Date Value Ref Range Status   08/15/2018 0.06 0.00 - 0.20 10*3/mm3 Final     Immature Grans, Absolute   Date Value Ref Range Status   08/15/2018 0.02 0.00 - 0.03 10*3/mm3 Final       Glucose   Date Value Ref Range Status   08/15/2018 102 (H) 65 - 99 mg/dL Final     Sodium   Date Value Ref Range Status   08/15/2018 138 136 - 145 mmol/L Final     Potassium   Date Value Ref Range Status   08/15/2018 4.5 3.5 - 5.2 mmol/L Final     CO2   Date Value Ref Range Status   08/15/2018 23.2 22.0 - 29.0 mmol/L Final     Chloride   Date Value Ref Range Status   08/15/2018 100 98 - 107 mmol/L Final     Anion Gap   Date Value Ref Range Status   08/15/2018 14.8 mmol/L Final     Creatinine   Date Value Ref Range  "Status   08/15/2018 1.30 (H) 0.76 - 1.27 mg/dL Final     BUN   Date Value Ref Range Status   08/15/2018 21 8 - 23 mg/dL Final     BUN/Creatinine Ratio   Date Value Ref Range Status   08/15/2018 16.2 7.0 - 25.0 Final     Calcium   Date Value Ref Range Status   08/15/2018 9.1 8.6 - 10.5 mg/dL Final     eGFR Non  Amer   Date Value Ref Range Status   08/15/2018 53 (L) >60 mL/min/1.73 Final     Alkaline Phosphatase   Date Value Ref Range Status   08/15/2018 272 (H) 39 - 117 U/L Final     Total Protein   Date Value Ref Range Status   08/15/2018 6.7 6.0 - 8.5 g/dL Final     ALT (SGPT)   Date Value Ref Range Status   08/15/2018 49 (H) 1 - 41 U/L Final     AST (SGOT)   Date Value Ref Range Status   08/15/2018 27 1 - 40 U/L Final     Total Bilirubin   Date Value Ref Range Status   08/15/2018 0.5 0.1 - 1.2 mg/dL Final     Albumin   Date Value Ref Range Status   08/15/2018 3.20 (L) 3.50 - 5.20 g/dL Final     Globulin   Date Value Ref Range Status   08/15/2018 3.5 gm/dL Final     Urinary Retention  Chronic CHF  CKD-GFR 53  Dark Stools    Looks improved on all fronts and will be glad to see as OP for elective colonoscopy once \"cleared\" by cardio and Pulmonary.  "

## 2018-08-15 NOTE — PLAN OF CARE
Problem: Patient Care Overview  Goal: Plan of Care Review  Outcome: Outcome(s) achieved Date Met: 08/15/18  Pt to be discharged home after instructions-s/l dc'd-instructions per logicare to pt and wife-both verbalize understanding of discharge and follow-up care

## 2018-08-16 ENCOUNTER — READMISSION MANAGEMENT (OUTPATIENT)
Dept: CALL CENTER | Facility: HOSPITAL | Age: 83
End: 2018-08-16

## 2018-08-16 NOTE — OUTREACH NOTE
Prep Survey      Responses   Facility patient discharged from?  [facility]   Is patient eligible?  Yes   Discharge diagnosis  Chronic diastolic congestive heart failure  Acute kidney injury superimposed on chronic kidney  Dehydration   Does the patient have one of the following disease processes/diagnoses(primary or secondary)?  CHF   Does the patient have Home health ordered?  No   Is there a DME ordered?  No   Prep survey completed?  Yes          Phyllis Diaz RN

## 2018-08-16 NOTE — PROGRESS NOTES
Case Management Discharge Note    Final Note: Home no needs    Destination     No service has been selected for the patient.      Durable Medical Equipment     No service has been selected for the patient.      Dialysis/Infusion     No service has been selected for the patient.      Home Medical Care     No service has been selected for the patient.      Social Care     No service has been selected for the patient.        Other: Other    Final Discharge Disposition Code: 01 - home or self-care

## 2018-08-17 ENCOUNTER — HOSPITAL ENCOUNTER (OUTPATIENT)
Dept: CARDIOLOGY | Facility: HOSPITAL | Age: 83
Discharge: HOME | End: 2018-08-17
Attending: INTERNAL MEDICINE | Admitting: INTERNAL MEDICINE

## 2018-08-17 ENCOUNTER — TELEPHONE (OUTPATIENT)
Dept: CARDIOLOGY | Facility: CLINIC | Age: 83
End: 2018-08-17

## 2018-08-17 LAB
BH CV NUCLEAR PRIOR STUDY: 2
BH CV STRESS BP STAGE 1: NORMAL
BH CV STRESS COMMENTS STAGE 1: NORMAL
BH CV STRESS DOSE REGADENOSON STAGE 1: 0.4
BH CV STRESS DURATION MIN STAGE 1: 0
BH CV STRESS DURATION SEC STAGE 1: 10
BH CV STRESS HR STAGE 1: 92
BH CV STRESS PROTOCOL 1: NORMAL
BH CV STRESS RECOVERY BP: NORMAL MMHG
BH CV STRESS RECOVERY HR: 92 BPM
BH CV STRESS STAGE 1: 1
LV EF NUC BP: 57 %
MAXIMAL PREDICTED HEART RATE: 138 BPM
PERCENT MAX PREDICTED HR: 66.67 %
STRESS BASELINE BP: NORMAL MMHG
STRESS BASELINE HR: 80 BPM
STRESS PERCENT HR: 78 %
STRESS POST EXERCISE DUR SEC: 10 SEC
STRESS POST PEAK BP: NORMAL MMHG
STRESS POST PEAK HR: 92 BPM
STRESS TARGET HR: 117 BPM

## 2018-08-17 PROCEDURE — A9502 TC99M TETROFOSMIN: HCPCS | Performed by: INTERNAL MEDICINE

## 2018-08-17 PROCEDURE — 93018 CV STRESS TEST I&R ONLY: CPT | Performed by: INTERNAL MEDICINE

## 2018-08-17 PROCEDURE — 0 TECHNETIUM TETROFOSMIN KIT: Performed by: INTERNAL MEDICINE

## 2018-08-17 PROCEDURE — 78452 HT MUSCLE IMAGE SPECT MULT: CPT | Performed by: INTERNAL MEDICINE

## 2018-08-17 PROCEDURE — 78452 HT MUSCLE IMAGE SPECT MULT: CPT

## 2018-08-17 PROCEDURE — 93016 CV STRESS TEST SUPVJ ONLY: CPT | Performed by: INTERNAL MEDICINE

## 2018-08-17 PROCEDURE — 93017 CV STRESS TEST TRACING ONLY: CPT

## 2018-08-17 PROCEDURE — 25010000002 REGADENOSON 0.4 MG/5ML SOLUTION: Performed by: INTERNAL MEDICINE

## 2018-08-17 RX ADMIN — TETROFOSMIN 1 DOSE: 1.38 INJECTION, POWDER, LYOPHILIZED, FOR SOLUTION INTRAVENOUS at 07:54

## 2018-08-17 RX ADMIN — TETROFOSMIN 1 DOSE: 1.38 INJECTION, POWDER, LYOPHILIZED, FOR SOLUTION INTRAVENOUS at 08:50

## 2018-08-17 RX ADMIN — REGADENOSON 0.4 MG: 0.08 INJECTION, SOLUTION INTRAVENOUS at 08:50

## 2018-08-17 NOTE — TELEPHONE ENCOUNTER
Guero Garay  Male, 83 y.o., 1935  PCP:   Librado Lai MD  Language:   English  Need Interp:   No  Last Weight:   94.4 kg (208 lb 1.6 oz)  Phone:   M: [phone] H: [phone]  Allergies  No Known Allergies  Health Maintenance:   Due  FYI:   None  Primary Ins.:   MEDICARE  MRN:   [id number]  MyChart:   Active  Pharmacy:   [facility] - [address] [phone] [facility] [phone] FX [[id number]] [facility] [address] [place] [address] AT [facility] & [facility] [phone] [facility] [phone] FX [[id number]]  Preferred Lab:   None  Next Appt with Me:   12/14/2018  Next Appt Date by Dept:   08/22/2018        PACS Images     Radiology Images   Stress Test With Myocardial Perfusion One Day   Order: [id number]   Status:  Final result   Visible to patient:  No (Not Released) Dx:  Coronary artery disease involving veronika...   Details     Reading Physician Reading Date Result Priority   Avery Galan MD Imburgia, Michael, MD Imburgia, Michael, MD 8/17/2018 8/17/2018 8/17/2018 Routine   Result Text   ·   Myocardial perfusion imaging indicates a normal myocardial perfusion study with no evidence of ischemia.  · Left ventricular ejection fraction is normal (Calculated EF = 57%).  · Impressions are consistent with a low risk study.            All Measurements                                                                                                                          [facility]  [address] [address] [phone]      Stress Data     Stage Heart Rate (BPM) Blood Pressure (mmHg) Minutes Seconds Dose (mg) Comments   1        92        118/72        0        10        0.4        10 sec bolus injection          Stress Measurements     Baseline Vitals   Baseline HR 80 bpm      Baseline /82 mmHg       Peak Stress Vitals   Peak HR 92 bpm      Peak /72 mmHg       Recovery Vitals    Recovery HR 92 bpm      Recovery /76 mmHg       Exercise Data   Target HR (85%) 117 bpm      Max. Pred. HR (100%) 138 bpm      Percent Max Pred HR 66.67 %      Exercise duration (sec) 10 sec         Stress Procedure     Rest ECG Baseline ECG of normal sinus rhythm noted. Normal baseline ECG noted.   OH interval = 240 ms. QRS complex = 80 ms. There was no ST segment deviation noted.      Stress ECG Stress ECG of normal sinus rhythm noted. Normal ECG with no significant stress induced changes noted.   There was no ST segment deviation noted during stress.   There were no arrhythmias during stress.   There were no arrhythmias during recovery.   There were no significant arrhythmias noted during the test.   ECG was interpretable.   Indeterminate stress ECG interpretation.      Stress Description A pharmacological stress test was performed using regadenoson without low-level exercise.   The patient reached the end of the protocol.   The patient reported dyspnea during the stress test. Symptoms were not clinically significant.      Stress Findings Equivocal ECG evidence of myocardial ischemia.      Nuclear Perfusion Findings     Study Impression Myocardial perfusion imaging indicates a normal myocardial perfusion study with no evidence of ischemia. Impressions are consistent with a low risk study.      Rest Perfusion Defect 1 No rest myocardial perfusion defect noted.      Stress Perfusion Defect 1 No stress myocardial perfusion defect noted.      Ventricle Size / Description Left ventricular ejection fraction is normal (Calculated EF = 57%). Normal LV cavity size. Normal LV wall motion noted. Normal RV cavity size.      PACS Images     Show images for Stress Test With Myocardial Perfusion One Day          Last Resulted: 08/17/18 13:45                Status of Other Orders     Order  Lab Status Result Date Provider Status   ECG 12 Lead Final result 7/26/2018 Open   SCANNED EKG Final result 7/26/2018 Open           Encounter Notes      All notes         Routing History     Priority Sent On From To Message Type    8/17/2018  1:46 PM Avery Galan MD Imburgia, Michael, MD Results    8/17/2018  1:46 PM Avery Galan MD Hurst, Arthur T, MD Results

## 2018-08-20 ENCOUNTER — READMISSION MANAGEMENT (OUTPATIENT)
Dept: CALL CENTER | Facility: HOSPITAL | Age: 83
End: 2018-08-20

## 2018-08-20 NOTE — OUTREACH NOTE
"CHF Week 1 Survey      Responses   Facility patient discharged from?  [facility]   Does the patient have one of the following disease processes/diagnoses(primary or secondary)?  CHF   Is there a successful TCM telephone encounter documented?  No   CHF Week 1 attempt successful?  Yes   Call start time  1237   Call end time  1252   Discharge diagnosis  Chronic diastolic congestive heart failure  Acute kidney injury superimposed on chronic kidney  Dehydration   Meds reviewed with patient/caregiver?  Yes   Is the patient having any side effects they believe may be caused by any medication additions or changes?  No   Does the patient have all medications ordered at discharge?  Yes   Is the patient taking all medications as directed (includes completed medication regime)?  Yes   Does the patient have a primary care provider?   Yes   Does the patient have an appointment with their PCP within 7 days of discharge?  Yes   Has the patient kept scheduled appointments due by today?  N/A   Psychosocial issues?  No   Comments  His HR and BP is \"all over the place\", HR , denies pressure, CP, palpitations. BP /. He did get dizzy today when BP 94/64, HR 80, advised to call Cardiology.    Did the patient receive a copy of their discharge instructions?  Yes   Nursing interventions  Reviewed instructions with patient   What is the patient's perception of their health status since discharge?  Improving   Nursing interventions  Nurse provided patient education   Is the patient weighing daily?  No   Does the patient have scales?  Yes   Daily weight interventions  Education provided on importance of daily weight   Is the patient able to teach back Heart Failure diet management?  Yes   Is the patient able to teach back Heart Failure Zones?  Yes   Is the patient able to teach back signs and symptoms of worsening condition? (i.e. weight gain, shortness of air, etc.)  Yes   Is the patient/caregiver able to teach back the " "hierarchy of who to call/visit for symptoms/problems? PCP, Specialist, Home health nurse, Urgent Care, ED, 911  Yes   Additional teach back comments  Pt does not do wts every day, he does them \"few times a week\". Educated on importance of doing daily.     CHF Week 1 call completed?  Yes          Katheryn Clark RN  "

## 2018-08-22 ENCOUNTER — OFFICE VISIT (OUTPATIENT)
Dept: INTERNAL MEDICINE | Facility: CLINIC | Age: 83
End: 2018-08-22

## 2018-08-22 VITALS
OXYGEN SATURATION: 99 % | HEIGHT: 68 IN | WEIGHT: 214 LBS | BODY MASS INDEX: 32.43 KG/M2 | SYSTOLIC BLOOD PRESSURE: 120 MMHG | HEART RATE: 70 BPM | DIASTOLIC BLOOD PRESSURE: 90 MMHG

## 2018-08-22 DIAGNOSIS — I25.110 CORONARY ARTERY DISEASE INVOLVING NATIVE CORONARY ARTERY OF NATIVE HEART WITH UNSTABLE ANGINA PECTORIS (HCC): Primary | ICD-10-CM

## 2018-08-22 DIAGNOSIS — R53.1 GENERALIZED WEAKNESS: ICD-10-CM

## 2018-08-22 DIAGNOSIS — N18.30 STAGE 3 CHRONIC KIDNEY DISEASE (HCC): ICD-10-CM

## 2018-08-22 DIAGNOSIS — N39.0 URINARY TRACT INFECTION WITHOUT HEMATURIA, SITE UNSPECIFIED: ICD-10-CM

## 2018-08-22 LAB
ALBUMIN SERPL-MCNC: 4 G/DL (ref 3.5–5.2)
ALBUMIN/GLOB SERPL: 1.2 G/DL
ALP SERPL-CCNC: 159 U/L (ref 39–117)
ALT SERPL W P-5'-P-CCNC: 23 U/L (ref 1–41)
ANION GAP SERPL CALCULATED.3IONS-SCNC: 7.9 MMOL/L
AST SERPL-CCNC: 22 U/L (ref 1–40)
BASOPHILS # BLD AUTO: 0.03 10*3/MM3 (ref 0–0.2)
BASOPHILS NFR BLD AUTO: 0.6 % (ref 0–2)
BILIRUB SERPL-MCNC: 0.4 MG/DL (ref 0.1–1.2)
BILIRUB UR QL STRIP: NEGATIVE
BUN BLD-MCNC: 24 MG/DL (ref 8–23)
BUN/CREAT SERPL: 16.3 (ref 7–25)
CALCIUM SPEC-SCNC: 9.9 MG/DL (ref 8.6–10.5)
CHLORIDE SERPL-SCNC: 105 MMOL/L (ref 98–107)
CLARITY UR: CLEAR
CO2 SERPL-SCNC: 28.1 MMOL/L (ref 22–29)
COLOR UR: YELLOW
CREAT BLD-MCNC: 1.47 MG/DL (ref 0.76–1.27)
DEPRECATED RDW RBC AUTO: 49.4 FL (ref 37–54)
EOSINOPHIL # BLD AUTO: 0.11 10*3/MM3 (ref 0–0.7)
EOSINOPHIL NFR BLD AUTO: 2.1 % (ref 0–5)
ERYTHROCYTE [DISTWIDTH] IN BLOOD BY AUTOMATED COUNT: 14.5 % (ref 11.5–15)
GFR SERPL CREATININE-BSD FRML MDRD: 46 ML/MIN/1.73
GLOBULIN UR ELPH-MCNC: 3.3 GM/DL
GLUCOSE BLD-MCNC: 96 MG/DL (ref 65–99)
GLUCOSE UR STRIP-MCNC: NEGATIVE MG/DL
HCT VFR BLD AUTO: 45.7 % (ref 40.1–51)
HGB BLD-MCNC: 14.5 G/DL (ref 13.7–17.5)
HGB UR QL STRIP.AUTO: NEGATIVE
KETONES UR QL STRIP: NEGATIVE
LEUKOCYTE ESTERASE UR QL STRIP.AUTO: NEGATIVE
LYMPHOCYTES # BLD AUTO: 1.92 10*3/MM3 (ref 0.8–7)
LYMPHOCYTES NFR BLD AUTO: 37.1 % (ref 10–60)
MCH RBC QN AUTO: 30.3 PG (ref 26–34)
MCHC RBC AUTO-ENTMCNC: 31.7 G/DL (ref 31–37)
MCV RBC AUTO: 95.4 FL (ref 80–100)
MONOCYTES # BLD AUTO: 0.99 10*3/MM3 (ref 0–1)
MONOCYTES NFR BLD AUTO: 19.1 % (ref 0–13)
NEUTROPHILS # BLD AUTO: 2.12 10*3/MM3 (ref 1–11)
NEUTROPHILS NFR BLD AUTO: 41.1 % (ref 30–85)
NITRITE UR QL STRIP: NEGATIVE
PH UR STRIP.AUTO: 7 [PH] (ref 5–8)
PLATELET # BLD AUTO: 179 10*3/MM3 (ref 150–450)
PMV BLD AUTO: 10.2 FL (ref 6–12)
POTASSIUM BLD-SCNC: 4.8 MMOL/L (ref 3.5–5.2)
PROT SERPL-MCNC: 7.3 G/DL (ref 6–8.5)
PROT UR QL STRIP: NEGATIVE
RBC # BLD AUTO: 4.79 10*6/MM3 (ref 4.63–6.08)
SODIUM BLD-SCNC: 141 MMOL/L (ref 136–145)
SP GR UR STRIP: 1.01 (ref 1–1.03)
UROBILINOGEN UR QL STRIP: NORMAL
WBC NRBC COR # BLD: 5.17 10*3/MM3 (ref 5–10)

## 2018-08-22 PROCEDURE — 99214 OFFICE O/P EST MOD 30 MIN: CPT | Performed by: INTERNAL MEDICINE

## 2018-08-22 PROCEDURE — 85025 COMPLETE CBC W/AUTO DIFF WBC: CPT | Performed by: INTERNAL MEDICINE

## 2018-08-22 PROCEDURE — 36415 COLL VENOUS BLD VENIPUNCTURE: CPT | Performed by: INTERNAL MEDICINE

## 2018-08-22 PROCEDURE — 81003 URINALYSIS AUTO W/O SCOPE: CPT | Performed by: INTERNAL MEDICINE

## 2018-08-22 PROCEDURE — 80053 COMPREHEN METABOLIC PANEL: CPT | Performed by: INTERNAL MEDICINE

## 2018-08-22 NOTE — PROGRESS NOTES
Subjective   Guero Garay is a 83 y.o. male.     Here for Hospital F/U          The following portions of the patient's history were reviewed and updated as appropriate: allergies, current medications, past family history, past medical history, past social history, past surgical history and problem list.    Review of Systems   Constitutional: Positive for fatigue (Wife staes he's been sleeping a lot ).        Was admitted to [facility] W/ weakness & Acute on chronic renal failure Had extensive W/U Doing better but still very weal      HENT: Negative.    Respiratory: Negative.    Cardiovascular:        Has stress anum last week & did well   Gastrointestinal: Negative.    Genitourinary: Negative.        Objective   Physical Exam   Constitutional: He is oriented to person, place, and time. He appears well-developed.   HENT:   Head: Normocephalic.   Eyes: EOM are normal.   Neck: Neck supple.   Cardiovascular: Normal rate, regular rhythm and normal heart sounds.    Repeat 120/70 seated ,110/70 erect   Pulmonary/Chest: Effort normal and breath sounds normal.   Musculoskeletal: Normal range of motion.   Neurological: He is alert and oriented to person, place, and time.       Assessment/Plan   Guero was seen today for hospital f/u.    Diagnoses and all orders for this visit:    Coronary artery disease involving native coronary artery of native heart with unstable angina pectoris (CMS/[facility])  -     CBC Auto Differential; Future  -     Ambulatory Referral to Physical Medicine Rehab  -     CBC Auto Differential    Stage 3 chronic kidney disease  -     Comprehensive Metabolic Panel; Future  -     Comprehensive Metabolic Panel    Generalized weakness    Urinary tract infection without hematuria, site unspecified  -     Urinalysis With Culture If Indicated - Urine, Clean Catch; Future  -     Urinalysis With Culture If Indicated - Urine, Clean Catch

## 2018-08-27 ENCOUNTER — OFFICE VISIT (OUTPATIENT)
Dept: CARDIOLOGY | Facility: CLINIC | Age: 83
End: 2018-08-27

## 2018-08-27 VITALS
WEIGHT: 215 LBS | BODY MASS INDEX: 32.58 KG/M2 | HEART RATE: 69 BPM | SYSTOLIC BLOOD PRESSURE: 82 MMHG | DIASTOLIC BLOOD PRESSURE: 58 MMHG | HEIGHT: 68 IN

## 2018-08-27 DIAGNOSIS — I10 ESSENTIAL HYPERTENSION: ICD-10-CM

## 2018-08-27 DIAGNOSIS — I25.10 CORONARY ARTERY DISEASE INVOLVING NATIVE CORONARY ARTERY OF NATIVE HEART WITHOUT ANGINA PECTORIS: ICD-10-CM

## 2018-08-27 DIAGNOSIS — R42 DIZZINESS: ICD-10-CM

## 2018-08-27 DIAGNOSIS — I48.0 PAROXYSMAL ATRIAL FIBRILLATION (HCC): Primary | ICD-10-CM

## 2018-08-27 DIAGNOSIS — Z95.0 PACEMAKER: ICD-10-CM

## 2018-08-27 DIAGNOSIS — I50.32 CHRONIC DIASTOLIC CONGESTIVE HEART FAILURE (HCC): ICD-10-CM

## 2018-08-27 PROCEDURE — 93000 ELECTROCARDIOGRAM COMPLETE: CPT | Performed by: NURSE PRACTITIONER

## 2018-08-27 PROCEDURE — 99214 OFFICE O/P EST MOD 30 MIN: CPT | Performed by: NURSE PRACTITIONER

## 2018-08-27 NOTE — PROGRESS NOTES
Date of Office Visit: 2018  Encounter Provider: BUNNY Solitario  Place of Service: [facility] CARDIOLOGY  Patient Name: Guero Garay  :1935    Chief Complaint   Patient presents with   • Atrial Fibrillation   • Follow-up   • Coronary Artery Disease   :     HPI: Guero Garay is a 83 y.o. male is a patient of Dr. Galan. I am seeing him today and have reviewed his record.     His past medical history is significant of coronary artery disease, atrial fibrillation, hypertension, aortic stenosis, chronic diastolic heart failure, asthma, hyperlipidemia, chronic kidney disease, and history of prostate cancer.    In 2017 he presented with progressive angina.  He had a perfusion stress test which showed a large area of anterior septal ischemia.  Echocardiogram showed mild aortic stenosis.  He underwent cardiac catheterization on 2017 and was found to have preserved left ventricular ejection fraction, severe disease of the mid LAD and right coronary artery.  He had a 4.0 x 15 mm drug-eluting stent placed in the LAD and a 4.0 x 28 mm drug-eluting stent dilated to 4.5 mm in the right coronary artery.  He was hypokalemic and was started on potassium.  He was also started on rosuvastatin.    He then presented in 2017 with dizziness and syncope as well as bradycardia.  He ended up having a permanent pacemaker implanted.  He also had a tick bite but was negative for [place] spotted fever.     By 2018 she presented as his pacemaker interrogation showed that he was having episodes of atrial fibrillation fairly prolonged acute thallium 10.6% burden.  He also had some shortness of breath and was scheduled for outpatient perfusion stress test.  Echocardiogram on 8/10/2018 showed normal left ventricular systolic function with a small LV cavity, mild concentric hypertrophy, mild calcification of the aortic valve amount aortic valve stenosis.  His renal  function and liver function studies were abnormal four days prior.  His blood pressure was low in the office visit on 8/10/2018 and 84/60 and he was arranged for admission to the hospital. VQ scan was showed no findings suggestive of PE. He was found to have urinary retention and required Tran catheter as well as urology following.  He had recently been treated for urosepsis and acute pyelonephritis.  CT of the abdomen was normal as well as ultrasound of the liver which was normal.  Elevated liver enzymes were felt to be from antibiotic patient was recently on.  He had a heme positive stool and was to have an outpatient colonoscopy.  Outpatient Perfusion stress test completed on 8/17/2018 showed no evidence of ischemia and was a low risk study.    Patient presents today for hospital follow-up.  He does not have a palpitation sensation but has noticed his heart rate varying at times.  He supplied a blood pressure and heart rate low which shows low systolic values 70s to 160s.  He has occasional high heart rate above 100 but his averages approximately 80s.  He recorded dizziness when his blood pressure was 113/73 with heart rate of 130. He continues to have positional lightheadedness improved with slower movements.There is no correlation of lightheadedness to hypotension.  He had 4 days without lightheadedness but this comes and goes.  Shortness of breath with exertion is overall better since starting diuretic.  He is able to walk 10 minutes at a time.  He is to follow-up with neurology in 2 weeks after having lab work and still has not scheduled his outpatient colonoscopy.  He denies ortega bleeding like blood in the urine or stool.  He is tolerating Eliquis 2.5 mg twice a day.  He is weighing himself every other day. He is doing better by not adding salt to his diet.       Allergies   Allergen Reactions   • Sulfa Antibiotics Other (See Comments)     Pat was shaky and could hardly walk       Past Medical History:    Diagnosis Date   • Acute kidney injury superimposed on chronic kidney disease (CMS/HCC)    • Arthralgia    • Asthma     mild persistent without complication   • CAD (coronary artery disease), native coronary artery    • Chest pain, unspecified    • Chronic diastolic congestive heart failure (CMS/HCC)    • Dyspnea on exertion    • Essential hypertension    • Generalized weakness    • Gout    • History of prostate cancer    • Hyperkalemia    • Jaw pain     both sides   • Mixed hyperlipidemia    • Osteoarthrosis    • PAF (paroxysmal atrial fibrillation) (CMS/[facility])    • Shoulder pain    • SOB (shortness of breath)    • Tinnitus    • Urine retention        Past Surgical History:   Procedure Laterality Date   • CARDIAC CATHETERIZATION N/A 4/27/2017    Procedure: Coronary angiography;  Surgeon: Marvel Brito MD;  Location: [facility] CATH INVASIVE LOCATION;  Service:    • CARDIAC CATHETERIZATION N/A 4/27/2017    Procedure: Left Heart Cath;  Surgeon: Marvel Brito MD;  Location: [facility] CATH INVASIVE LOCATION;  Service:    • CARDIAC CATHETERIZATION N/A 4/27/2017    Procedure: Stent GIN coronary;  Surgeon: Marvel Brito MD;  Location: [facility] CATH INVASIVE LOCATION;  Service:    • CARDIAC ELECTROPHYSIOLOGY PROCEDURE N/A 6/15/2017    Procedure: Pacemaker DC new   [employer];  Surgeon: Gustavo Valladares MD;  Location: [facility] CATH INVASIVE LOCATION;  Service:    • COLONOSCOPY  2013   • INGUINAL HERNIA REPAIR Right    • NECK SURGERY      SPURS   • AZ RECONSTR TOTAL SHOULDER IMPLANT Right 2/16/2017    Procedure: RIGHT TOTAL SHOULDER ARTHROPLASTY;  Surgeon: Marquez Galvan MD;  Location: [facility] OR [facility];  Service: Orthopedics   • PROSTATECTOMY     • REPLACEMENT TOTAL KNEE Left    • TONSILLECTOMY     • TOTAL SHOULDER REPLACEMENT Bilateral          Family and social history reviewed.     Review of Systems   Constitution: Positive for malaise/fatigue.   Cardiovascular: Positive for dyspnea on exertion.   Neurological: Positive for  "light-headedness.     All other systems were reviewed and are negative          Objective:     Vitals:    08/27/18 0853   BP: (!) 82/58   BP Location: Left arm   Patient Position: Sitting   Pulse: 69   Weight: 97.5 kg (215 lb)   Height: 172.7 cm (68\")     Body mass index is 32.69 kg/m².    PHYSICAL EXAM:  Physical Exam   Constitutional: He is oriented to person, place, and time. He appears well-developed and well-nourished. No distress.   HENT:   Head: Normocephalic.   Eyes: Conjunctivae are normal.   glasses   Neck: Normal range of motion. No JVD present.   Cardiovascular: Normal rate, normal heart sounds and intact distal pulses.  An irregular rhythm present.   No murmur heard.  Pulses:       Carotid pulses are 2+ on the right side, and 2+ on the left side.       Radial pulses are 2+ on the right side, and 2+ on the left side.        Posterior tibial pulses are 2+ on the right side, and 2+ on the left side.   Pulmonary/Chest: Effort normal and breath sounds normal. No respiratory distress. He has no wheezes. He has no rhonchi. He has no rales. He exhibits no tenderness.   Abdominal: Soft. Bowel sounds are normal. He exhibits no distension.   Musculoskeletal: Normal range of motion. He exhibits no edema.   Neurological: He is alert and oriented to person, place, and time.   Skin: Skin is warm, dry and intact. No rash noted. He is not diaphoretic. No cyanosis.   Psychiatric: He has a normal mood and affect. His behavior is normal. Judgment and thought content normal.         ECG 12 Lead  Date/Time: 8/27/2018 9:07 AM  Performed by: PIPPA SEXTON  Authorized by: PIPPA SEXTON   Comparison: compared with previous ECG from 8/12/2018  Similar to previous ECG  Rhythm: atrial fibrillation  Rate: normal  BPM: 69  QRS axis: left  Other findings: prolonged QTc interval  Clinical impression: abnormal ECG  Comments: Inferior T-wave abnormalities            Current Outpatient Prescriptions   Medication Sig Dispense Refill   • " allopurinol (ZYLOPRIM) 100 MG tablet Take 1 tablet by mouth Daily. 90 tablet 3   • apixaban (ELIQUIS) 2.5 MG tablet tablet Take 1 tablet by mouth Every 12 (Twelve) Hours. 60 tablet 0   • aspirin 81 MG chewable tablet Chew 81 mg Daily.     • Cranberry 250 MG capsule Take 250 mg by mouth Daily.     • Fluticasone Furoate-Vilanterol (BREO ELLIPTA) 100-25 MCG/INH aerosol powder  Inhale 1 puff Every Morning.     • furosemide (LASIX) 20 MG tablet Take 1 tablet by mouth Daily. 30 tablet 0   • melatonin 5 MG tablet tablet Take 5 mg by mouth At Night As Needed.     • metoprolol tartrate (LOPRESSOR) 25 MG tablet Take 1 tablet by mouth Every 12 (Twelve) Hours. 60 tablet 0   • Multiple Vitamins-Minerals (MULTIVITAMIN WITH MINERALS) tablet tablet Take 1 tablet by mouth Daily.     • potassium chloride (MICRO-K) 10 MEQ CR capsule Take 1 capsule by mouth 2 (Two) Times a Day. 180 capsule 3   • rosuvastatin (CRESTOR) 20 MG tablet Take 1 tablet by mouth Daily. 90 tablet 3   • tamsulosin (FLOMAX) 0.4 MG capsule 24 hr capsule Take 1 capsule by mouth Daily. 30 capsule 1     No current facility-administered medications for this visit.      Assessment:       Diagnosis Plan   1. Paroxysmal atrial fibrillation (CMS/HCC)  ECG 12 Lead   2. Coronary artery disease involving native coronary artery of native heart without angina pectoris  ECG 12 Lead   3. Chronic diastolic congestive heart failure (CMS/HCC)     4. Pacemaker     5. Essential hypertension     6. Dizziness          Orders Placed This Encounter   Procedures   • ECG 12 Lead     This order was created via procedure documentation         Plan:         1.  Paroxysmal atrial fibrillation EKG today shows atrial fibrillation with normal rate at 69.  He occasionally has elevated heart rate at home which is rare.  He is to continue to monitor this  Atrial Fibrillation and Atrial Flutter  Assessment  • The patient has paroxysmal atrial fibrillation  • This is non-valvular in etiology  • The  patient's CHADS2-VASc score is 5  • A [id number] score of 2 or more is considered a high risk for a thromboembolic event  • Apixaban prescribed    Plan  • Continue in atrial fibrillation with rate control  • Continue apixaban for antithrombotic therapy, bleeding issues discussed  • Continue beta blocker for rhythm control      2.  Chronic diastolic congestive heart failure status post mild acute exacerbation required IV diuretic now on maintenance daily lasix. Encouraged daily weights and to notify our office if he is up 3 pounds over night or 5 pounds in a week.  Heart Failure  Assessment  • NYHA class II - There is slight limitation of physical activity. The patient is comfortable at rest, but physical activity results in fatigue, palpitations or shortness of breath.  • ACE inhibitor not prescribed  • Beta blocker prescribed  • Diuretics prescribed  • Left ventricular function is normal by qualitative assessment  • The left ventricle was last assessed on 8/10/2018    Plan  • The patient has received heart failure education on the following topics: dietary sodium restriction, minimizing or avoiding NSAID use, symptom management and minimizing alcohol intake  • The heart failure care plan was discussed with the patient today including: continuing the current program    Subjective/Objective    • Physical exam findings negative for rales and elevated JVP.      3.  Coronary artery disease status post drug-eluting stent placement to the LAD and right coronary artery in April 2017.  Coronary Artery Disease  Assessment  • The patient has no angina  • There is a new diagnosis of stable angina in the past 12 months  • The patient is having symptoms consistent with unstable angina     Plan  • Lifestyle modifications discussed include adhering to a heart healthy diet, avoidance of tobacco products, maintenance of a healthy weight, medication compliance, regular exercise and regular monitoring of cholesterol and blood  pressure    Subjective - Objective  • There has been a previous stent procedure using GIN  • Current antiplatelet therapy includes aspirin 81 mg and clopidogrel 75 mg  • The patient qualifies for cardiac rehabilitation, and has been referred to cardiac rehab        4. Heme positive stool no ortega bleeding. To schedule outpatient colonoscopy.  5. History of prostate cancer- recent HEENA/ urinary retention required doss catheter. Now voiding spontaneously on Flomax and lasix. Due to follow up with urology in 2 weeks with blood work  6.  Hyperlipidemia on target dose rosuvastatin  7.  History of symptomatic bradycardia and syncope status post permanent pacemaker  8. Aortic stenosis mild on echo 08/2018  9. CKD- cr 1.47 om 8/22/2018  10. Positional lightheadedness encouraged changing position slowly and staying hydrated.  11.Elevated transaminase on 8/10/2018. ALT and AST normal on 8/22/2018.   12. Labile blood pressure- copy of home readings in the chart. He is to continue to monitor and notify the office if he remains low or high.    Follow up in 4 months with Dr. Galan as scheduled    Patient was instructed to call the office if new symptoms develop or report to nearest ER if heart attack or stroke is suspected.        It has been a pleasure to participate in this patient's care.      Thank you,  BUNNY Solitario      **Dragon Disclaimer:**  Much of this encounter note is an electronic transcription/translation of spoken language to printed text. The electronic translation of spoken language may permit erroneous, or at times, nonsensical words or phrases to be inadvertently transcribed. Although I have reviewed the note for such errors, some may still exist.

## 2018-08-29 ENCOUNTER — READMISSION MANAGEMENT (OUTPATIENT)
Dept: CALL CENTER | Facility: HOSPITAL | Age: 83
End: 2018-08-29

## 2018-08-29 NOTE — OUTREACH NOTE
CHF Week 2 Survey      Responses   Facility patient discharged from?  [place]   Does the patient have one of the following disease processes/diagnoses(primary or secondary)?  CHF   Week 2 attempt successful?  Yes   Call start time  1531   Call end time  1534   Discharge diagnosis  Chronic diastolic congestive heart failure  Acute kidney injury superimposed on chronic kidney  Dehydration   Is patient permission given to speak with other caregiver?  Yes   List who call center can speak with  spouse   Meds reviewed with patient/caregiver?  Yes   Is the patient having any side effects they believe may be caused by any medication additions or changes?  No   Is the patient taking all medications as directed (includes completed medication regime)?  Yes   Does the patient have a primary care provider?   Yes   Does the patient have an appointment with their PCP within 7 days of discharge?  Yes   Has the patient kept scheduled appointments due by today?  Yes   Psychosocial issues?  No   Comments  HR is sporadic per his report. Still having some dizziness. MD aware   What is the patient's perception of their health status since discharge?  Improving   Nursing interventions  Nurse provided patient education   Is the patient weighing daily?  No   Does the patient have scales?  Yes   Daily weight interventions  Education provided on importance of daily weight   Is the patient able to teach back Heart Failure diet management?  Yes   Is the patient able to teach back Heart Failure Zones?  Yes   Is the patient able to teach back signs and symptoms of worsening condition? (i.e. weight gain, shortness of air, etc.)  Yes   Is the patient/caregiver able to teach back the hierarchy of who to call/visit for symptoms/problems? PCP, Specialist, Home health nurse, Urgent Care, ED, 911  Yes   Additional teach back comments  Pt is managing wts every other day.    CHF Week 2 call completed?  Yes          Katheryn Clark RN

## 2018-09-07 ENCOUNTER — READMISSION MANAGEMENT (OUTPATIENT)
Dept: CALL CENTER | Facility: HOSPITAL | Age: 83
End: 2018-09-07

## 2018-09-07 NOTE — OUTREACH NOTE
CHF Week 3 Survey      Responses   Facility patient discharged from?  [place]   Does the patient have one of the following disease processes/diagnoses(primary or secondary)?  CHF   Week 3 attempt successful?  Yes   Call start time  1251   Call end time  1259   Is patient permission given to speak with other caregiver?  Yes   List who call center can speak with  Mrs. Garay wife   Person spoke with today (if not patient) and relationship  Mrs. Garay wife   Meds reviewed with patient/caregiver?  Yes   Is the patient having any side effects they believe may be caused by any medication additions or changes?  No   Does the patient have all medications ordered at discharge?  Yes   Is the patient taking all medications as directed (includes completed medication regime)?  Yes   Medication comments  Wife states medication change yesterday by PCP: flomax dc'd and metoprolol dose halved.    Does the patient have an appointment with their PCP within 7 days of discharge?  Greater than 7 days   Comments regarding PCP  PCP Dr Librado Lai   Has the patient kept scheduled appointments due by today?  Yes   Comments  Wife states patient saw urologist yesterday.    Has home health visited the patient within 72 hours of discharge?  N/A   Psychosocial issues?  No   Did the patient receive a copy of their discharge instructions?  Yes   Nursing interventions  Reviewed instructions with patient   What is the patient's perception of their health status since discharge?  Improving   Is the patient weighing daily?  Yes   Does the patient have scales?  Yes   Daily weight interventions  Education provided on importance of daily weight   Is the patient able to teach back Heart Failure diet management?  Yes   Is the patient able to teach back Heart Failure Zones?  Yes   Is the patient able to teach back signs and symptoms of worsening condition? (i.e. weight gain, shortness of air, etc.)  Yes   Is the patient/caregiver able to teach back  the hierarchy of who to call/visit for symptoms/problems? PCP, Specialist, Home health nurse, Urgent Care, ED, 911  Yes   CHF Week 3 call completed?  Yes          Phyllis De La O RN

## 2018-09-10 ENCOUNTER — TELEPHONE (OUTPATIENT)
Dept: CARDIOLOGY | Facility: CLINIC | Age: 83
End: 2018-09-10

## 2018-09-10 RX ORDER — METOPROLOL TARTRATE 50 MG/1
50 TABLET, FILM COATED ORAL 2 TIMES DAILY
Qty: 60 TABLET | Refills: 11 | Status: SHIPPED | OUTPATIENT
Start: 2018-09-10 | End: 2019-04-24 | Stop reason: DRUGHIGH

## 2018-09-10 NOTE — TELEPHONE ENCOUNTER
It is correct.  Pt states he is taking Allopurinol 100mg po QAM, Eliquis 2.5mg po BID, ASA 81mg po QD, Breo QAM, Crandberry Extract QAM, Furosemide 20mg po QAM, Metoprolol 25mg po QD, MV, KCl 10 meq po BID, Crestor 20mg po QD, Flomax po QD and Tenoretic 50-25mg po QAM.  Can you advise?  [facility] RMA

## 2018-09-10 NOTE — TELEPHONE ENCOUNTER
[phone]    Pt called stating he had a syncope/fall episode on 9/5/18, he took his BP and it was 69/66.  He called his PCP and he dc'd his Tenoretic.    Pt has been keeping track of his BP and it has been elevated.      9/9/18--  152/11, 127/82, 142/82, 164/90, and 117/104-various times of day    9/10/18-this am 161/94    Pt states because his BP has been elevated he restarted Tenoretic on 9/9/18.  He would like to know what you would like him to do.  Please advise.  [facility]    Pt would also like you to know they are talking of scheduling a colonoscopy with Dr. Powers and he may need to be off medications and would like to know if this is ok.  Thanks.

## 2018-09-10 NOTE — TELEPHONE ENCOUNTER
Pt told to discontinue tenoretic.  He will take metoprolol 50mg po BID, rx sent to [facility].  Pt voiced understanding.  [facility]

## 2018-09-17 ENCOUNTER — READMISSION MANAGEMENT (OUTPATIENT)
Dept: CALL CENTER | Facility: HOSPITAL | Age: 83
End: 2018-09-17

## 2018-09-17 ENCOUNTER — PREP FOR SURGERY (OUTPATIENT)
Dept: OTHER | Facility: HOSPITAL | Age: 83
End: 2018-09-17

## 2018-09-17 ENCOUNTER — TELEPHONE (OUTPATIENT)
Dept: GASTROENTEROLOGY | Facility: CLINIC | Age: 83
End: 2018-09-17

## 2018-09-17 DIAGNOSIS — Z12.11 SCREENING FOR COLON CANCER: Primary | ICD-10-CM

## 2018-09-17 NOTE — TELEPHONE ENCOUNTER
CALLING TO RESCHEDULED COLONOSCOPY FROM 08/01/2018, WHEN HE WAS ADMITTED TO HOSPITAL.  SCHEDULED [facility] 12/12/2018 AT 12:30PM - ARRIVE 11:30AM.  WILL MAIL INSTRUCTIONS.

## 2018-09-17 NOTE — OUTREACH NOTE
CHF Week 4 Survey      Responses   Facility patient discharged from?  [place]   Does the patient have one of the following disease processes/diagnoses(primary or secondary)?  CHF   Week 4 attempt successful?  No          Katheryn Clark RN

## 2018-09-18 ENCOUNTER — OFFICE VISIT (OUTPATIENT)
Dept: INTERNAL MEDICINE | Facility: CLINIC | Age: 83
End: 2018-09-18

## 2018-09-18 ENCOUNTER — TELEPHONE (OUTPATIENT)
Dept: CARDIOLOGY | Facility: CLINIC | Age: 83
End: 2018-09-18

## 2018-09-18 ENCOUNTER — EPISODE CHANGES (OUTPATIENT)
Dept: CASE MANAGEMENT | Facility: OTHER | Age: 83
End: 2018-09-18

## 2018-09-18 VITALS
DIASTOLIC BLOOD PRESSURE: 86 MMHG | BODY MASS INDEX: 33.34 KG/M2 | HEIGHT: 68 IN | OXYGEN SATURATION: 97 % | SYSTOLIC BLOOD PRESSURE: 124 MMHG | WEIGHT: 220 LBS | HEART RATE: 69 BPM

## 2018-09-18 DIAGNOSIS — I25.110 CORONARY ARTERY DISEASE INVOLVING NATIVE CORONARY ARTERY OF NATIVE HEART WITH UNSTABLE ANGINA PECTORIS (HCC): Primary | ICD-10-CM

## 2018-09-18 DIAGNOSIS — R53.1 WEAKNESS: ICD-10-CM

## 2018-09-18 DIAGNOSIS — N18.30 STAGE 3 CHRONIC KIDNEY DISEASE (HCC): ICD-10-CM

## 2018-09-18 DIAGNOSIS — R09.82 POST-NASAL DRAINAGE: ICD-10-CM

## 2018-09-18 PROCEDURE — 99214 OFFICE O/P EST MOD 30 MIN: CPT | Performed by: NURSE PRACTITIONER

## 2018-09-18 RX ORDER — FLUTICASONE PROPIONATE 50 MCG
2 SPRAY, SUSPENSION (ML) NASAL DAILY
Qty: 1 BOTTLE | Refills: 1 | Status: SHIPPED | OUTPATIENT
Start: 2018-09-18 | End: 2018-10-18

## 2018-09-18 NOTE — PATIENT INSTRUCTIONS
"DASH Eating Plan  DASH stands for \"Dietary Approaches to Stop Hypertension.\" The DASH eating plan is a healthy eating plan that has been shown to reduce high blood pressure (hypertension). It may also reduce your risk for type 2 diabetes, heart disease, and stroke. The DASH eating plan may also help with weight loss.  What are tips for following this plan?  General guidelines  · Avoid eating more than 2,300 mg (milligrams) of salt (sodium) a day. If you have hypertension, you may need to reduce your sodium intake to 1,500 mg a day.  · Limit alcohol intake to no more than 1 drink a day for nonpregnant women and 2 drinks a day for men. One drink equals 12 oz of beer, 5 oz of wine, or 1½ oz of hard liquor.  · Work with your health care provider to maintain a healthy body weight or to lose weight. Ask what an ideal weight is for you.  · Get at least 30 minutes of exercise that causes your heart to beat faster (aerobic exercise) most days of the week. Activities may include walking, swimming, or biking.  · Work with your health care provider or diet and nutrition specialist (dietitian) to adjust your eating plan to your individual calorie needs.  Reading food labels  · Check food labels for the amount of sodium per serving. Choose foods with less than 5 percent of the Daily Value of sodium. Generally, foods with less than 300 mg of sodium per serving fit into this eating plan.  · To find whole grains, look for the word \"whole\" as the first word in the ingredient list.  Shopping  · Buy products labeled as \"low-sodium\" or \"no salt added.\"  · Buy fresh foods. Avoid canned foods and premade or frozen meals.  Cooking  · Avoid adding salt when cooking. Use salt-free seasonings or herbs instead of table salt or sea salt. Check with your health care provider or pharmacist before using salt substitutes.  · Do not brady foods. Cook foods using healthy methods such as baking, boiling, grilling, and broiling instead.  · Cook with " heart-healthy oils, such as olive, canola, soybean, or sunflower oil.  Meal planning    · Eat a balanced diet that includes:  ? 5 or more servings of fruits and vegetables each day. At each meal, try to fill half of your plate with fruits and vegetables.  ? Up to 6-8 servings of whole grains each day.  ? Less than 6 oz of lean meat, poultry, or fish each day. A 3-oz serving of meat is about the same size as a deck of cards. One egg equals 1 oz.  ? 2 servings of low-fat dairy each day.  ? A serving of nuts, seeds, or beans 5 times each week.  ? Heart-healthy fats. Healthy fats called Omega-3 fatty acids are found in foods such as flaxseeds and coldwater fish, like sardines, salmon, and mackerel.  · Limit how much you eat of the following:  ? Canned or prepackaged foods.  ? Food that is high in trans fat, such as fried foods.  ? Food that is high in saturated fat, such as fatty meat.  ? Sweets, desserts, sugary drinks, and other foods with added sugar.  ? Full-fat dairy products.  · Do not salt foods before eating.  · Try to eat at least 2 vegetarian meals each week.  · Eat more home-cooked food and less restaurant, buffet, and fast food.  · When eating at a restaurant, ask that your food be prepared with less salt or no salt, if possible.  What foods are recommended?  The items listed may not be a complete list. Talk with your dietitian about what dietary choices are best for you.  Grains  Whole-grain or whole-wheat bread. Whole-grain or whole-wheat pasta. Brown rice. Oatmeal. Quinoa. Bulgur. Whole-grain and low-sodium cereals. Ashely bread. Low-fat, low-sodium crackers. Whole-wheat flour tortillas.  Vegetables  Fresh or frozen vegetables (raw, steamed, roasted, or grilled). Low-sodium or reduced-sodium tomato and vegetable juice. Low-sodium or reduced-sodium tomato sauce and tomato paste. Low-sodium or reduced-sodium canned vegetables.  Fruits  All fresh, dried, or frozen fruit. Canned fruit in natural juice (without  added sugar).  Meat and other protein foods  Skinless chicken or turkey. Ground chicken or turkey. Pork with fat trimmed off. Fish and seafood. Egg whites. Dried beans, peas, or lentils. Unsalted nuts, nut butters, and seeds. Unsalted canned beans. Lean cuts of beef with fat trimmed off. Low-sodium, lean deli meat.  Dairy  Low-fat (1%) or fat-free (skim) milk. Fat-free, low-fat, or reduced-fat cheeses. Nonfat, low-sodium ricotta or cottage cheese. Low-fat or nonfat yogurt. Low-fat, low-sodium cheese.  Fats and oils  Soft margarine without trans fats. Vegetable oil. Low-fat, reduced-fat, or light mayonnaise and salad dressings (reduced-sodium). Canola, safflower, olive, soybean, and sunflower oils. Avocado.  Seasoning and other foods  Herbs. Spices. Seasoning mixes without salt. Unsalted popcorn and pretzels. Fat-free sweets.  What foods are not recommended?  The items listed may not be a complete list. Talk with your dietitian about what dietary choices are best for you.  Grains  Baked goods made with fat, such as croissants, muffins, or some breads. Dry pasta or rice meal packs.  Vegetables  Creamed or fried vegetables. Vegetables in a cheese sauce. Regular canned vegetables (not low-sodium or reduced-sodium). Regular canned tomato sauce and paste (not low-sodium or reduced-sodium). Regular tomato and vegetable juice (not low-sodium or reduced-sodium). Pickles. Olives.  Fruits  Canned fruit in a light or heavy syrup. Fried fruit. Fruit in cream or butter sauce.  Meat and other protein foods  Fatty cuts of meat. Ribs. Fried meat. Meyer. Sausage. Bologna and other processed lunch meats. Salami. Fatback. Hotdogs. Bratwurst. Salted nuts and seeds. Canned beans with added salt. Canned or smoked fish. Whole eggs or egg yolks. Chicken or turkey with skin.  Dairy  Whole or 2% milk, cream, and half-and-half. Whole or full-fat cream cheese. Whole-fat or sweetened yogurt. Full-fat cheese. Nondairy creamers. Whipped toppings.  Processed cheese and cheese spreads.  Fats and oils  Butter. Stick margarine. Lard. Shortening. Ghee. Meyer fat. Tropical oils, such as coconut, palm kernel, or palm oil.  Seasoning and other foods  Salted popcorn and pretzels. Onion salt, garlic salt, seasoned salt, table salt, and sea salt. Worcestershire sauce. Tartar sauce. Barbecue sauce. Teriyaki sauce. Soy sauce, including reduced-sodium. Steak sauce. Canned and packaged gravies. Fish sauce. Oyster sauce. Cocktail sauce. Horseradish that you find on the shelf. Ketchup. Mustard. Meat flavorings and tenderizers. Bouillon cubes. Hot sauce and Tabasco sauce. Premade or packaged marinades. Premade or packaged taco seasonings. Relishes. Regular salad dressings.  Where to find more information:  · National Heart, Lung, and Blood [place]: www.nhlbi.nih.gov  · American Heart Association: www.heart.org  Summary  · The DASH eating plan is a healthy eating plan that has been shown to reduce high blood pressure (hypertension). It may also reduce your risk for type 2 diabetes, heart disease, and stroke.  · With the DASH eating plan, you should limit salt (sodium) intake to 2,300 mg a day. If you have hypertension, you may need to reduce your sodium intake to 1,500 mg a day.  · When on the DASH eating plan, aim to eat more fresh fruits and vegetables, whole grains, lean proteins, low-fat dairy, and heart-healthy fats.  · Work with your health care provider or diet and nutrition specialist (dietitian) to adjust your eating plan to your individual calorie needs.  This information is not intended to replace advice given to you by your health care provider. Make sure you discuss any questions you have with your health care provider.  Document Released: 12/06/2012 Document Revised: 12/11/2017 Document Reviewed: 12/11/2017  [employer] Interactive Patient Education © 2018 [employer]

## 2018-09-18 NOTE — PROGRESS NOTES
Subjective   Guero Garay is a 83 y.o. male.     He saw urologist on 9/7/2018 for routine follow up.       Coronary Artery Disease   Presents for follow-up visit. Symptoms include dizziness (intermittent ), leg swelling (intermittent ), palpitations (intermittent ) and shortness of breath (chronic ). Pertinent negatives include no chest pain, chest pressure or chest tightness. The symptoms have been stable. Compliance with diet is variable. Compliance with exercise is variable. Compliance with medications is good.        The following portions of the patient's history were reviewed and updated as appropriate: allergies, current medications, past family history, past medical history, past social history, past surgical history and problem list.    Review of Systems   Constitutional: Negative for chills and fever.   HENT: Positive for postnasal drip.    Eyes: Negative for visual disturbance.   Respiratory: Positive for shortness of breath (chronic ). Negative for chest tightness.    Cardiovascular: Positive for palpitations (intermittent ) and leg swelling (intermittent ). Negative for chest pain.   Allergic/Immunologic: Positive for environmental allergies.   Neurological: Positive for dizziness (intermittent ). Negative for speech difficulty and headaches.       Objective   Physical Exam   Constitutional: He is oriented to person, place, and time. He appears well-developed and well-nourished.   HENT:   Head: Normocephalic.   Nose: Nose normal.   Post nasal drainage    Neck: Carotid bruit is not present. No thyroid mass and no thyromegaly present.   Cardiovascular: Regular rhythm and normal heart sounds.  Exam reveals no S3 and no S4.    No murmur heard.  Repeat bp left arm 110/70  No pedal edema    Pulmonary/Chest: Effort normal and breath sounds normal. He has no decreased breath sounds. He has no wheezes. He has no rhonchi. He has no rales.   Musculoskeletal: He exhibits no edema.   Neurological: He is alert and  oriented to person, place, and time. Gait normal.   Skin: Skin is warm and dry.   Psychiatric: He has a normal mood and affect.       Assessment/Plan   Guero was seen today for coronary artery disease.    Diagnoses and all orders for this visit:    Coronary artery disease involving native coronary artery of native heart with unstable angina pectoris (CMS/HCC)  Comments:  needs strict low salt diet     Stage 3 chronic kidney disease  Comments:  hydrating well; will recheck labs at next appt     Weakness  Comments:  needs rehab     Post-nasal drainage  -     fluticasone (FLONASE) 50 MCG/ACT nasal spray; 2 sprays into the nostril(s) as directed by provider Daily for 30 days. Administer 2 sprays in each nostril for each dose.      He is due to have colonoscopy.  He was referred to cardiac rehab by dr ramirez at last visit. Will discuss with cardiologist if patient if suitable and get order.

## 2018-09-18 NOTE — TELEPHONE ENCOUNTER
Beth called from Dr. Lai office and they are requesting a cardiac rehab referral from you.  Dr Lia was gonna send but it is needed to be sent by his cardio.  Please advise.  The pt would like to go to [facility], [place] as he lives in [place].  [facility]

## 2018-09-21 DIAGNOSIS — M10.00 IDIOPATHIC GOUT, UNSPECIFIED CHRONICITY, UNSPECIFIED SITE: ICD-10-CM

## 2018-09-21 RX ORDER — ALLOPURINOL 100 MG/1
100 TABLET ORAL DAILY
Qty: 90 TABLET | Refills: 1 | Status: SHIPPED | OUTPATIENT
Start: 2018-09-21 | End: 2019-02-27 | Stop reason: SDUPTHER

## 2018-09-24 ENCOUNTER — TELEPHONE (OUTPATIENT)
Dept: INTERNAL MEDICINE | Facility: CLINIC | Age: 83
End: 2018-09-24

## 2018-09-24 RX ORDER — FUROSEMIDE 20 MG/1
20 TABLET ORAL DAILY
Qty: 90 TABLET | Refills: 3 | Status: SHIPPED | OUTPATIENT
Start: 2018-09-24 | End: 2018-09-24 | Stop reason: SDUPTHER

## 2018-09-24 RX ORDER — FUROSEMIDE 20 MG/1
20 TABLET ORAL DAILY
Qty: 90 TABLET | Refills: 3 | Status: SHIPPED | OUTPATIENT
Start: 2018-09-24 | End: 2019-08-15 | Stop reason: SDUPTHER

## 2018-09-24 NOTE — TELEPHONE ENCOUNTER
I called patient to discuss PT referral (unable to send to cardiac rehab). There was no answer so I left a message for patient to return call.

## 2018-09-25 ENCOUNTER — TELEPHONE (OUTPATIENT)
Dept: INTERNAL MEDICINE | Facility: CLINIC | Age: 83
End: 2018-09-25

## 2018-09-25 DIAGNOSIS — R53.1 GENERALIZED WEAKNESS: Primary | ICD-10-CM

## 2018-09-25 NOTE — TELEPHONE ENCOUNTER
I called patient to inform him that he does not qualify for cardiac rehab. He will start PT for generalized weakness. He request [facility].

## 2018-09-25 NOTE — TELEPHONE ENCOUNTER
----- Message from Jami Love sent at 9/25/2018 12:47 PM EDT -----  Contact: Patient  Patient returning Milagros's phone call. Please advise    Patient:[phone]

## 2018-10-02 ENCOUNTER — HOSPITAL ENCOUNTER (OUTPATIENT)
Dept: PHYSICAL THERAPY | Facility: HOSPITAL | Age: 83
Setting detail: THERAPIES SERIES
Discharge: HOME | End: 2018-10-02

## 2018-10-02 DIAGNOSIS — R53.1 GENERALIZED WEAKNESS: Primary | ICD-10-CM

## 2018-10-02 PROCEDURE — 97161 PT EVAL LOW COMPLEX 20 MIN: CPT

## 2018-10-02 PROCEDURE — G8979 MOBILITY GOAL STATUS: HCPCS

## 2018-10-02 PROCEDURE — 97110 THERAPEUTIC EXERCISES: CPT

## 2018-10-02 PROCEDURE — G8978 MOBILITY CURRENT STATUS: HCPCS

## 2018-10-02 NOTE — THERAPY EVALUATION
Outpatient Physical Therapy Ortho Initial Evaluation   Elena Ritchie     Patient Name: Guero Garay  : 1935  MRN: [id number]  Today's Date: 10/2/2018      Visit Date: 10/02/2018    Patient Active Problem List   Diagnosis   • Gout   • Hypertension   • Hyperlipidemia   • Osteoarthrosis   • History of prostate cancer   • Tinnitus   • Benign neoplasm of colon   • Osteoarthritis of right shoulder   • Coronary artery disease involving native coronary artery of native heart with unstable angina pectoris (CMS/HCC)   • Syncopal episodes   • Screening for colon cancer   • Pyelonephritis, acute   • Acute kidney injury superimposed on chronic kidney disease (CMS/HCC)   • Dyspnea on exertion   • Generalized weakness   • Elevated d-dimer   • Dehydration   • Mild persistent asthma without complication   • Heme positive stool   • Elevated LFTs   • Alcohol use   • Urine retention   • Chronic diastolic congestive heart failure (CMS/HCC)        Past Medical History:   Diagnosis Date   • Acute kidney injury superimposed on chronic kidney disease (CMS/HCC)    • Arthralgia    • Asthma     mild persistent without complication   • CAD (coronary artery disease), native coronary artery    • Chest pain, unspecified    • Chronic diastolic congestive heart failure (CMS/HCC)    • Dyspnea on exertion    • Essential hypertension    • Generalized weakness    • Gout    • History of prostate cancer    • Hyperkalemia    • Jaw pain     both sides   • Mixed hyperlipidemia    • Osteoarthrosis    • PAF (paroxysmal atrial fibrillation) (CMS/HCC)    • Shoulder pain    • SOB (shortness of breath)    • Tinnitus    • Urine retention         Past Surgical History:   Procedure Laterality Date   • CARDIAC CATHETERIZATION N/A 2017    Procedure: Coronary angiography;  Surgeon: Marvel Brito MD;  Location: [facility] INVASIVE LOCATION;  Service:    • CARDIAC CATHETERIZATION N/A 2017    Procedure: Left Heart Cath;  Surgeon: Marvel Brito  MD;  Location:  [facility] CATH INVASIVE LOCATION;  Service:    • CARDIAC CATHETERIZATION N/A 4/27/2017    Procedure: Stent GIN coronary;  Surgeon: Marvel Brito MD;  Location:  [facility]ATH INVASIVE LOCATION;  Service:    • CARDIAC ELECTROPHYSIOLOGY PROCEDURE N/A 6/15/2017    Procedure: Pacemaker DC new   [employer];  Surgeon: Gustavo Valladares MD;  Location:  [facility]ATH INVASIVE LOCATION;  Service:    • COLONOSCOPY  2013   • INGUINAL HERNIA REPAIR Right    • NECK SURGERY      SPURS   • AL RECONSTR TOTAL SHOULDER IMPLANT Right 2/16/2017    Procedure: RIGHT TOTAL SHOULDER ARTHROPLASTY;  Surgeon: Marquez Galvan MD;  Location:  [facility];  Service: Orthopedics   • PROSTATECTOMY     • REPLACEMENT TOTAL KNEE Left    • TONSILLECTOMY     • TOTAL SHOULDER REPLACEMENT Bilateral        Visit Dx:     ICD-10-CM ICD-9-CM   1. Generalized weakness R53.1 780.79             Patient History     Row Name 10/02/18 1100             History    Chief Complaint Difficulty Walking;Dizziness;Muscle weakness;Falls/history of falls  -CC      Date Current Problem(s) Began --   Past couple months  -CC      Brief Description of Current Complaint Pt having c/o falls and dizziness past couple months which pt claims seems to be under control. Pt also has cardiac hx and was referred to PT by PCP for exercise since no longer qualifies for cardiac rehab. 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 Listening  -CC      Teaching needs identified Home Exercise Program  -CC      Patient is concerned about/has problems with  Climbing Stairs;Walking  -CC      Does patient have problems with the following? None  -CC      Barriers to learning None  -CC      Pt Participated in POC and Goals Yes  -CC         Safety    Are you being hurt, hit, or frightened by anyone at home or in your life? No  -CC      Are you being neglected by a caregiver No  -CC        User Key  (r) = Recorded By, (t) = Taken By, (c) = Cosigned By    Initials Name Provider Type    CC Marcy Del Rosario, PT Physical Therapist                PT Ortho     Row Name 10/02/18 1100       Subjective Comments    Subjective Comments Pt relates that his legs feel weak going up his stairs -c/o greater sx in L knee. Pt relates that he will be leaving for [place] next week and would like some exercises to strengthen his legs. Pt relates he plans to walk in [place] which he can tolerate better since there are not hills where he walks.  -CC       Precautions and Contraindications    Precautions/Limitations no known precautions/limitations   per MD but has cardiac history  -CC       Subjective Pain    Able to rate subjective pain? --   pt denies having any pain  -CC       Posture/Observations    Posture/Observations Comments Pt ambulates into PT dept without any distress and normal paced ismael -no AD. Pt keeps LLE in increased ER  -CC       General ROM    GENERAL ROM COMMENTS Grossly B LE WNL  -CC       MMT Right Lower Ext    Rt Hip Flexion MMT, Gross Movement (4/5) good  -CC    Rt Hip ABduction MMT, Gross Movement (4+/5) good plus  -CC    Rt Hip Internal (Medial) Rotation MMT, Gross Movement (4/5) good  -CC    Rt Hip External (Lateral) Rotation MMT, Gross Movement (4/5) good  -CC    Rt Knee Extension MMT, Gross Movement (4+/5) good plus  -CC    Rt Knee Flexion MMT, Gross Movement (4+/5) good plus  -CC    Rt Ankle Plantarflexion MMT, Gross Movement (4+/5) good plus  -CC    Rt Ankle Dorsiflexion MMT, Gross Movement (4+/5) good plus  -CC       MMT Left Lower Ext    Lt Hip Flexion  MMT, Gross Movement (4+/5) good plus  -CC    Lt Hip ABduction MMT, Gross Movement (4/5) good  -CC    Lt Hip Internal (Medial) Rotation MMT, Gross Movement (4/5) good  -CC    Lt Hip External (Lateral) Rotation MMT, Gross Movement (4/5) good  -CC    Lt Knee Extension MMT, Gross Movement (4/5) good  -CC    Lt Knee Flexion MMT, Gross Movement (4+/5) good plus  -CC    Lt Ankle Plantarflexion MMT, Gross Movement (4+/5) good plus  -CC    Lt Ankle Dorsiflexion MMT, Gross Movement (4+/5) good plus  -CC       Sensation    Sensation WNL? WFL  -CC       Flexibility    Flexibility Tested? Lower Extremity  -CC       Lower Extremity Flexibility    Hamstrings Bilateral:;Mildly limited;Moderately limited  -CC      User Key  (r) = Recorded By, (t) = Taken By, (c) = Cosigned By    Initials Name Provider Type    Marcy Byrne, PT Physical Therapist                      Therapy Education  Education Details: Issued HEP handout and discussed progressions of exercises and recommended pt should do alternate days or 2-3x /week on regular basis to get results.    Given: HEP  Program: New  How Provided: Verbal  Provided to: Patient  Level of Understanding: Verbalized, Demonstrated           PT OP Goals     Row Name 10/02/18 1100          PT Short Term Goals    STG Date to Achieve 10/02/18  -     STG 1 Pt will be instructed in LE exercises and have good understanding how to perform independently  -CC     STG 1 Progress Met  -     STG 1 Progress Comments Pt stated all his questions were answered regarding HEP  -CC       User Key  (r) = Recorded By, (t) = Taken By, (c) = Cosigned By    Initials Name Provider Type    Marcy Bynre, PT Physical Therapist                PT Assessment/Plan     Row Name 10/02/18 7066 10/02/18 1852       PT Assessment    Functional Limitations  -- Limitations in functional capacity and performance  -    Impairments  -- Muscle strength  -CC    Assessment Comments Pt referred to PT with  Dx of generalized weakness. Most significant finding was pt has decreased R hip flexor and L quad strength. Pt also with pulmomary and cardiac impairments also effecting c/o weakness going upstairs and when walking in his driveway with hills. Discussed with pt he needs to build strength in his hips and knees to improve his muscle endurance with increased demands for cardio activities like walking and climbing stairs                                                                                                                                                                                                                                                        -CC  --    Please refer to paper survey for additional self-reported information  -- Yes   LEF -score 60  -CC    Rehab Potential  -- Good  -CC    Patient/caregiver participated in establishment of treatment plan and goals  -- Yes  -CC    Patient would benefit from skilled therapy intervention  -- No  -CC       PT Plan    PT Frequency  -- One time visit  -CC    Planned CPT's?  -- PT EVAL LOW COMPLEXITY: 34949  -CC    Physical Therapy Interventions (Optional Details)  -- strengthening;stretching  -CC    PT Plan Comments  -- Pt seen for one time visit for HEP for B LE strengthening  -CC      User Key  (r) = Recorded By, (t) = Taken By, (c) = Cosigned By    Initials Name Provider Type    CC Marcy Del Rosario, PT Physical Therapist                  Exercises     Row Name 10/02/18 1100             Subjective Comments    Subjective Comments Pt relates that his legs feel weak going up his stairs -c/o greater sx in L knee. Pt relates that he will be leaving for [place] next week and would like some exercises to strengthen his legs. Pt relates he plans to walk in [place] which he can tolerate better since there are not hills for him to do.  -CC         Subjective Pain    Able to rate subjective pain? --   pt denies having any pain  -CC         Exercise 1     Exercise Name 1 Seated hamstrings stretch  -CC      Cueing 1 Verbal;Tactile  -CC      Reps 1 4  -CC      Time 1 15 sec  -CC      Additional Comments Use sheet /belt for home use  -CC         Exercise 2    Exercise Name 2 Hooklying ABD vs TB  -CC      Cueing 2 Verbal;Tactile  -CC      Reps 2 10  -CC      Time 2 hold  5 sec  -CC      Additional Comments Reinforced not to hold breath  -CC         Exercise 3    Exercise Name 3 Hooklying ADD vs Ball  -CC      Cueing 3 Verbal;Tactile  -CC      Reps 3 10  -CC      Time 3 hold sec  -CC      Additional Comments Reinforced not to hold breath  -CC         Exercise 4    Exercise Name 4 Bridges   -CC      Cueing 4 Verbal  -CC      Reps 4 10  -CC      Additional Comments do mid range  -CC         Exercise 5    Exercise Name 5 Seated hip flexion  -CC      Cueing 5 Demo  -CC      Reps 5 10  -CC      Additional Comments Alternate legs and can do with 2# wt as progresses  -CC         Exercise 6    Exercise Name 6 LAQ  -CC      Cueing 6 Demo  -CC      Reps 6 10  -CC      Time 6 hold 5 sec  -CC      Additional Comments Alternate legs and can progress to 23  -CC        User Key  (r) = Recorded By, (t) = Taken By, (c) = Cosigned By    Initials Name Provider Type    CC Marcy Del Rosario, PT Physical Therapist                                  Time Calculation:     Therapy Suggested Charges     Code   Minutes Charges    None             Start Time: 1105  Stop Time: 1200  Time Calculation (min): 55 min     Therapy Charges for Today     Code Description Service Date Service Provider Modifiers Qty    79011826822 HC PT MOBILITY CURRENT 10/2/2018 Marcy Del Rosario, PT GP, CJ 1    36423761036 HC PT MOBILITY PROJECTED 10/2/2018 Marcy Del Rosario, PT GP, CJ 1    23873735317 HC PT EVAL LOW COMPLEXITY 2 10/2/2018 Marcy Del Rosario, PT GP 1    70844466731 HC PT THER PROC EA 15 MIN 10/2/2018 Marcy Del Rosario, PT GP 2          PT G-Codes  Functional Limitation:  Mobility: Walking and moving around  Mobility: Walking and Moving Around Current Status (): At least 20 percent but less than 40 percent impaired, limited or restricted  Mobility: Walking and Moving Around Goal Status (): At least 20 percent but less than 40 percent impaired, limited or restricted         Marcy Del Rosario, PT  10/2/2018

## 2018-10-09 ENCOUNTER — TELEPHONE (OUTPATIENT)
Dept: INTERNAL MEDICINE | Facility: CLINIC | Age: 83
End: 2018-10-09

## 2018-10-09 ENCOUNTER — EPISODE CHANGES (OUTPATIENT)
Dept: CASE MANAGEMENT | Facility: OTHER | Age: 83
End: 2018-10-09

## 2018-10-09 DIAGNOSIS — E78.2 MIXED HYPERLIPIDEMIA: ICD-10-CM

## 2018-10-09 RX ORDER — ROSUVASTATIN CALCIUM 20 MG/1
20 TABLET, COATED ORAL DAILY
Qty: 90 TABLET | Refills: 0 | Status: SHIPPED | OUTPATIENT
Start: 2018-10-09 | End: 2018-12-17 | Stop reason: SDUPTHER

## 2018-10-09 NOTE — TELEPHONE ENCOUNTER
----- Message from Celsa Ramirez sent at 10/9/2018 10:04 AM EDT -----  Contact: pt - Dr PATEL's pt - RE: Rx refill  Pt calling and would like a refill on Rx      rosuvastatin (CRESTOR) 20 MG tablet 90 tablet     Sig - Route: Take 1 tablet by mouth Daily. - Oral     [facility] [place] [address] AT [facility] & [address] - [phone]  - [phone] FX    Pt #[phone]

## 2018-10-24 ENCOUNTER — CLINICAL SUPPORT NO REQUIREMENTS (OUTPATIENT)
Dept: CARDIOLOGY | Facility: CLINIC | Age: 83
End: 2018-10-24

## 2018-10-24 DIAGNOSIS — I48.0 PAROXYSMAL ATRIAL FIBRILLATION (HCC): Primary | ICD-10-CM

## 2018-10-24 PROCEDURE — 93294 REM INTERROG EVL PM/LDLS PM: CPT | Performed by: INTERNAL MEDICINE

## 2018-10-24 PROCEDURE — 93296 REM INTERROG EVL PM/IDS: CPT | Performed by: INTERNAL MEDICINE

## 2018-11-25 ENCOUNTER — HOSPITAL ENCOUNTER (EMERGENCY)
Facility: HOSPITAL | Age: 83
Discharge: HOME | End: 2018-11-25
Attending: EMERGENCY MEDICINE | Admitting: EMERGENCY MEDICINE

## 2018-11-25 ENCOUNTER — APPOINTMENT (OUTPATIENT)
Dept: GENERAL RADIOLOGY | Facility: HOSPITAL | Age: 83
End: 2018-11-25

## 2018-11-25 VITALS
RESPIRATION RATE: 18 BRPM | DIASTOLIC BLOOD PRESSURE: 95 MMHG | HEIGHT: 69 IN | BODY MASS INDEX: 31.84 KG/M2 | TEMPERATURE: 98.2 F | WEIGHT: 215 LBS | SYSTOLIC BLOOD PRESSURE: 161 MMHG | OXYGEN SATURATION: 94 % | HEART RATE: 73 BPM

## 2018-11-25 DIAGNOSIS — S61.412A LACERATION OF LEFT HAND WITHOUT FOREIGN BODY, INITIAL ENCOUNTER: Primary | ICD-10-CM

## 2018-11-25 PROCEDURE — 90471 IMMUNIZATION ADMIN: CPT | Performed by: EMERGENCY MEDICINE

## 2018-11-25 PROCEDURE — 73130 X-RAY EXAM OF HAND: CPT

## 2018-11-25 PROCEDURE — 99282 EMERGENCY DEPT VISIT SF MDM: CPT

## 2018-11-25 PROCEDURE — 90715 TDAP VACCINE 7 YRS/> IM: CPT | Performed by: EMERGENCY MEDICINE

## 2018-11-25 PROCEDURE — 12002 RPR S/N/AX/GEN/TRNK2.6-7.5CM: CPT | Performed by: EMERGENCY MEDICINE

## 2018-11-25 PROCEDURE — 25010000002 TDAP 5-2.5-18.5 LF-MCG/0.5 SUSPENSION: Performed by: EMERGENCY MEDICINE

## 2018-11-25 RX ORDER — LIDOCAINE HYDROCHLORIDE AND EPINEPHRINE BITARTRATE 20; .01 MG/ML; MG/ML
10 INJECTION, SOLUTION SUBCUTANEOUS ONCE
Status: COMPLETED | OUTPATIENT
Start: 2018-11-25 | End: 2018-11-25

## 2018-11-25 RX ORDER — CLINDAMYCIN HYDROCHLORIDE 300 MG/1
300 CAPSULE ORAL 3 TIMES DAILY
Qty: 15 CAPSULE | Refills: 0 | Status: SHIPPED | OUTPATIENT
Start: 2018-11-25 | End: 2018-11-30

## 2018-11-25 RX ADMIN — LIDOCAINE HYDROCHLORIDE,EPINEPHRINE BITARTRATE 10 ML: 20; .01 INJECTION, SOLUTION INFILTRATION; PERINEURAL at 12:00

## 2018-11-25 RX ADMIN — TETANUS TOXOID, REDUCED DIPHTHERIA TOXOID AND ACELLULAR PERTUSSIS VACCINE, ADSORBED 0.5 ML: 5; 2.5; 8; 8; 2.5 SUSPENSION INTRAMUSCULAR at 11:05

## 2018-11-25 NOTE — ED PROVIDER NOTES
Subjective   History of Present Illness  History of Present Illness    Chief complaint: Laceration of left hand    Location: Base of left index finger and interdigital web between digits 2 and 3    Quality/Severity:  Moderate    Timing/Duration: Injury occurred just prior to arrival    Modifying Factors: On blood thinners    Associated Symptoms: None    Narrative: The patient is an 83-year-old white male who presents as noted above.  Patient relates that he was working on a nacho pigeon thrower when it unexpectedly released and hit his left hand.    Review of Systems   Skin: Positive for wound.   Hematological: Bruises/bleeds easily.   All other systems reviewed and are negative.      Past Medical History:   Diagnosis Date   • Acute kidney injury superimposed on chronic kidney disease (CMS/HCC)    • Arthralgia    • Asthma     mild persistent without complication   • CAD (coronary artery disease), native coronary artery    • Chest pain, unspecified    • Chronic diastolic congestive heart failure (CMS/HCC)    • Dyspnea on exertion    • Essential hypertension    • Generalized weakness    • Gout    • History of prostate cancer    • Hyperkalemia    • Jaw pain     both sides   • Mixed hyperlipidemia    • Osteoarthrosis    • PAF (paroxysmal atrial fibrillation) (CMS/HCC)    • Shoulder pain    • SOB (shortness of breath)    • Tinnitus    • Urine retention        Allergies   Allergen Reactions   • Sulfa Antibiotics Other (See Comments)     Pat was shaky and could hardly walk       Past Surgical History:   Procedure Laterality Date   • COLONOSCOPY  2013   • INGUINAL HERNIA REPAIR Right    • NECK SURGERY      SPURS   • PROSTATECTOMY     • REPLACEMENT TOTAL KNEE Left    • TONSILLECTOMY     • TOTAL SHOULDER REPLACEMENT Bilateral        Family History   Problem Relation Age of Onset   • Cancer Mother         lung   • Cancer Father         lung   • Diabetes Father    • Colon cancer Neg Hx    • Colon polyps Neg Hx        Social  History     Socioeconomic History   • Marital status: Unknown     Spouse name: Not on file   • Number of children: Not on file   • Years of education: Not on file   • Highest education level: Not on file   Tobacco Use   • Smoking status: Never Smoker   • Smokeless tobacco: Never Used   • Tobacco comment: caffeine - 1 every 2-3 days   Substance and Sexual Activity   • Alcohol use: Yes     Alcohol/week: 12.6 oz     Types: 21 Cans of beer per week     Comment: 2-3 beers daily   • Drug use: No   • Sexual activity: Defer           Objective   Physical Exam   Constitutional: He is oriented to person, place, and time. He appears well-developed and well-nourished.   HENT:   Head: Normocephalic and atraumatic.   Eyes: Conjunctivae and EOM are normal.   Musculoskeletal:   Left hand with 3.5 cm laceration over the ulnar aspect of the index finger that extends proximally into the interdigital web.  Neuromuscular vascular exams intact.  No active bleeding.   Neurological: He is alert and oriented to person, place, and time.   Nursing note and vitals reviewed.      Procedures           ED Course  ED Course as of Nov 25 1205   Sun Nov 25, 2018   1201 Local anesthesia achieved using 2% Xylocaine with epinephrine.  Wound was explored to a bloodless base and no injury to deep structures.  Wound prepped by the emergency department technician in the usual sterile fashion.  Wound edges approximated with 7 simple sutures of 5-0 Ethilon.  Wound care and warnings discussed with patient and family.  Bacitracin and dressing applied.  [ML]      ED Course User Index  [ML] Avery Suárez MD                  [facility]  Number of Diagnoses or Management Options  Laceration of left hand without foreign body, initial encounter: new and requires workup     Amount and/or Complexity of Data Reviewed  Tests in the radiology section of CPT®: ordered and reviewed  Independent visualization of images, tracings, or specimens: yes    Risk of Complications,  Morbidity, and/or Mortality  Presenting problems: moderate  Diagnostic procedures: moderate  Management options: moderate    Patient Progress  Patient progress: stable    Labs this visit  Lab Results (last 24 hours)     ** No results found for the last 24 hours. **        Prescribed on discharge             Medication List      New Prescriptions    clindamycin 300 MG capsule  Commonly known as:  CLEOCIN  Take 1 capsule by mouth 3 (Three) Times a Day for 5 days.        Changed    metoprolol tartrate 50 MG tablet  Commonly known as:  LOPRESSOR  Take 1 tablet by mouth 2 (Two) Times a Day.  What changed:  how much to take          All lab results, imaging results and other tests were reviewed by Avery Suárez MD and unless otherwise specified were found to be unremarkable.      Final diagnoses:   Laceration of left hand without foreign body, initial encounter            Avery Suárez MD  11/25/18 8373

## 2018-11-26 ENCOUNTER — PATIENT OUTREACH (OUTPATIENT)
Dept: CASE MANAGEMENT | Facility: OTHER | Age: 83
End: 2018-11-26

## 2018-11-26 ENCOUNTER — EPISODE CHANGES (OUTPATIENT)
Dept: CASE MANAGEMENT | Facility: OTHER | Age: 83
End: 2018-11-26

## 2018-11-26 ENCOUNTER — EPISODE CHANGES (OUTPATIENT)
Dept: SOCIAL WORK | Facility: HOSPITAL | Age: 83
End: 2018-11-26

## 2018-11-26 NOTE — OUTREACH NOTE
No needs or concerns voiced.  Review of AVS from 11/25 ED visit.  Adherent to daily dressing changes and Cleocin as advised.  No redness or drainage from incision.  Has an appt. 12/5 with ARNP and will evaluate at that time suture removal.  Discussed dosage of Lopressor and will clarify with his cardiologist, Dr. Galan.  Self monitors BP at home.  AWV not completed due to absence of Dr. Lai from the office.  Active with My Chart.

## 2018-12-05 ENCOUNTER — OFFICE VISIT (OUTPATIENT)
Dept: INTERNAL MEDICINE | Facility: CLINIC | Age: 83
End: 2018-12-05

## 2018-12-05 VITALS
HEART RATE: 66 BPM | OXYGEN SATURATION: 97 % | HEIGHT: 69 IN | SYSTOLIC BLOOD PRESSURE: 140 MMHG | DIASTOLIC BLOOD PRESSURE: 82 MMHG | WEIGHT: 220 LBS | BODY MASS INDEX: 32.58 KG/M2

## 2018-12-05 DIAGNOSIS — N18.30 STAGE 3 CHRONIC KIDNEY DISEASE (HCC): ICD-10-CM

## 2018-12-05 DIAGNOSIS — S61.211A LACERATION OF LEFT INDEX FINGER WITHOUT FOREIGN BODY, NAIL DAMAGE STATUS UNSPECIFIED, INITIAL ENCOUNTER: ICD-10-CM

## 2018-12-05 DIAGNOSIS — E78.2 MIXED HYPERLIPIDEMIA: ICD-10-CM

## 2018-12-05 DIAGNOSIS — I10 ESSENTIAL HYPERTENSION: ICD-10-CM

## 2018-12-05 DIAGNOSIS — Z23 NEED FOR VACCINATION: ICD-10-CM

## 2018-12-05 DIAGNOSIS — I25.110 CORONARY ARTERY DISEASE INVOLVING NATIVE CORONARY ARTERY OF NATIVE HEART WITH UNSTABLE ANGINA PECTORIS (HCC): Primary | ICD-10-CM

## 2018-12-05 DIAGNOSIS — M10.00 IDIOPATHIC GOUT, UNSPECIFIED CHRONICITY, UNSPECIFIED SITE: ICD-10-CM

## 2018-12-05 PROCEDURE — G0009 ADMIN PNEUMOCOCCAL VACCINE: HCPCS | Performed by: NURSE PRACTITIONER

## 2018-12-05 PROCEDURE — 99214 OFFICE O/P EST MOD 30 MIN: CPT | Performed by: NURSE PRACTITIONER

## 2018-12-05 PROCEDURE — 90732 PPSV23 VACC 2 YRS+ SUBQ/IM: CPT | Performed by: NURSE PRACTITIONER

## 2018-12-05 NOTE — PROGRESS NOTES
Subjective   Guero Garay is a 83 y.o. male.     He checks his blood pressure and readings 120-130/60-80's every other day. He went to ER due to hand laceration and had 7 sutures placed on 11/25/2018 at [facility] ER. He was given a tetanus shot. He reports no drainage, fever or chills. He is able to move index finger with minimal pain. He has been keeping it dry and apply bacitracin as ordered.       Coronary Artery Disease   Presents for follow-up visit. Pertinent negatives include no chest pain, chest pressure, chest tightness, dizziness, leg swelling, muscle weakness, palpitations, shortness of breath or weight gain. Risk factors include hyperlipidemia. Compliance with diet is good. Compliance with exercise is good. Compliance with medications is good.   Hyperlipidemia   This is a chronic problem. The current episode started more than 1 year ago. The problem is controlled. Recent lipid tests were reviewed and are normal. Pertinent negatives include no chest pain or shortness of breath. Current antihyperlipidemic treatment includes statins. The current treatment provides significant improvement of lipids.   Hypertension   This is a chronic problem. The current episode started more than 1 year ago. The problem is controlled. Pertinent negatives include no anxiety, blurred vision, chest pain, headaches, orthopnea, palpitations, peripheral edema, PND or shortness of breath. Current antihypertension treatment includes beta blockers. The current treatment provides significant improvement.        The following portions of the patient's history were reviewed and updated as appropriate: allergies, current medications, past family history, past medical history, past social history, past surgical history and problem list.    Review of Systems   Constitutional: Negative for activity change, appetite change, fatigue, fever and weight gain.        Overall doing well    Eyes: Positive for visual disturbance (wears glasses and last  eye exam 5/2018). Negative for blurred vision.   Respiratory: Negative for cough, chest tightness, shortness of breath and wheezing.    Cardiovascular: Negative for chest pain, palpitations, orthopnea, leg swelling and PND.   Musculoskeletal: Negative for muscle weakness.   Neurological: Positive for weakness (he is improving, completed physical therapy ). Negative for dizziness and headaches.       Objective   Physical Exam   Constitutional: He is oriented to person, place, and time. He appears well-developed and well-nourished.   HENT:   Head: Normocephalic.   Nose: Nose normal.   Cardiovascular: Regular rhythm and normal heart sounds. Exam reveals no S3 and no S4.   No murmur heard.  Repeat bp left arm 122/78  No pedal edema    Pulmonary/Chest: Effort normal and breath sounds normal. He has no decreased breath sounds. He has no wheezes. He has no rhonchi. He has no rales.   Musculoskeletal: He exhibits no edema.   Neurological: He is alert and oriented to person, place, and time. Gait normal.   Skin: Skin is warm and dry. Laceration (right index finger, measuring approximately 1 cm with 7 sutures, no redness or warmth noted) noted.   Psychiatric: He has a normal mood and affect.       Assessment/Plan   Guero was seen today for coronary artery disease, hyperlipidemia and hypertension.    Diagnoses and all orders for this visit:    Coronary artery disease involving native coronary artery of native heart with unstable angina pectoris (CMS/HCC)  -     Comprehensive Metabolic Panel; Future  -     CBC (No Diff); Future    Mixed hyperlipidemia  -     Lipid Panel; Future    Stage 3 chronic kidney disease (CMS/[facility])    Essential hypertension    Idiopathic gout, unspecified chronicity, unspecified site  -     Uric Acid; Future    Laceration of left index finger without foreign body, nail damage status unspecified, initial encounter  -     Suture Removal    Need for vaccination  -     Pneumococcal Polysaccharide Vaccine  23-Valent (PPSV23) Greater Than or Equal To 3yo Subcutaneous / IM    He was given information regarding shingrix and hepatitis A. He will return for fasting labs   .Suture Removal  Date/Time: 12/5/2018 10:30 AM  Performed by: Milagros Barksdale APRN  Authorized by: Milagros Barksdale APRN   Consent: Verbal consent obtained.  Body area: upper extremity  Location details: left index finger  Wound Appearance: clean  Sutures Removed: 7  Post-removal: Steri-Strips applied and antibiotic ointment applied (3)  Facility: sutures placed in this facility  Patient tolerance: Patient tolerated the procedure well with no immediate complications

## 2018-12-06 ENCOUNTER — LAB (OUTPATIENT)
Dept: INTERNAL MEDICINE | Facility: CLINIC | Age: 83
End: 2018-12-06

## 2018-12-06 DIAGNOSIS — E78.2 MIXED HYPERLIPIDEMIA: ICD-10-CM

## 2018-12-06 DIAGNOSIS — M10.00 IDIOPATHIC GOUT, UNSPECIFIED CHRONICITY, UNSPECIFIED SITE: ICD-10-CM

## 2018-12-06 DIAGNOSIS — I25.110 CORONARY ARTERY DISEASE INVOLVING NATIVE CORONARY ARTERY OF NATIVE HEART WITH UNSTABLE ANGINA PECTORIS (HCC): ICD-10-CM

## 2018-12-06 LAB
ALBUMIN SERPL-MCNC: 4.4 G/DL (ref 3.5–5.2)
ALBUMIN/GLOB SERPL: 1.5 G/DL
ALP SERPL-CCNC: 66 U/L (ref 39–117)
ALT SERPL W P-5'-P-CCNC: 20 U/L (ref 1–41)
ANION GAP SERPL CALCULATED.3IONS-SCNC: 10.9 MMOL/L
AST SERPL-CCNC: 22 U/L (ref 1–40)
BILIRUB SERPL-MCNC: 0.5 MG/DL (ref 0.1–1.2)
BUN BLD-MCNC: 17 MG/DL (ref 8–23)
BUN/CREAT SERPL: 13.9 (ref 7–25)
CALCIUM SPEC-SCNC: 9.8 MG/DL (ref 8.6–10.5)
CHLORIDE SERPL-SCNC: 101 MMOL/L (ref 98–107)
CHOLEST SERPL-MCNC: 98 MG/DL (ref 0–200)
CO2 SERPL-SCNC: 28.1 MMOL/L (ref 22–29)
CREAT BLD-MCNC: 1.22 MG/DL (ref 0.76–1.27)
DEPRECATED RDW RBC AUTO: 44.8 FL (ref 37–54)
ERYTHROCYTE [DISTWIDTH] IN BLOOD BY AUTOMATED COUNT: 12.7 % (ref 11.5–15)
GFR SERPL CREATININE-BSD FRML MDRD: 57 ML/MIN/1.73
GLOBULIN UR ELPH-MCNC: 2.9 GM/DL
GLUCOSE BLD-MCNC: 109 MG/DL (ref 65–99)
HCT VFR BLD AUTO: 50 % (ref 40.1–51)
HDLC SERPL-MCNC: 39 MG/DL (ref 40–60)
HGB BLD-MCNC: 16.4 G/DL (ref 13.7–17.5)
LDLC SERPL CALC-MCNC: 42 MG/DL (ref 0–100)
LDLC/HDLC SERPL: 1.08 {RATIO}
MCH RBC QN AUTO: 32 PG (ref 26–34)
MCHC RBC AUTO-ENTMCNC: 32.8 G/DL (ref 31–37)
MCV RBC AUTO: 97.5 FL (ref 80–100)
PLATELET # BLD AUTO: 169 10*3/MM3 (ref 150–450)
PMV BLD AUTO: 10.5 FL (ref 6–12)
POTASSIUM BLD-SCNC: 4.3 MMOL/L (ref 3.5–5.2)
PROT SERPL-MCNC: 7.3 G/DL (ref 6–8.5)
RBC # BLD AUTO: 5.13 10*6/MM3 (ref 4.63–6.08)
SODIUM BLD-SCNC: 140 MMOL/L (ref 136–145)
TRIGL SERPL-MCNC: 84 MG/DL (ref 0–150)
URATE SERPL-MCNC: 6.2 MG/DL (ref 3.4–7)
VLDLC SERPL-MCNC: 16.8 MG/DL (ref 5–40)
WBC NRBC COR # BLD: 6.15 10*3/MM3 (ref 5–10)

## 2018-12-06 PROCEDURE — 84550 ASSAY OF BLOOD/URIC ACID: CPT | Performed by: NURSE PRACTITIONER

## 2018-12-06 PROCEDURE — 80061 LIPID PANEL: CPT | Performed by: NURSE PRACTITIONER

## 2018-12-06 PROCEDURE — 80053 COMPREHEN METABOLIC PANEL: CPT | Performed by: NURSE PRACTITIONER

## 2018-12-06 PROCEDURE — 36415 COLL VENOUS BLD VENIPUNCTURE: CPT | Performed by: NURSE PRACTITIONER

## 2018-12-06 PROCEDURE — 85027 COMPLETE CBC AUTOMATED: CPT | Performed by: NURSE PRACTITIONER

## 2018-12-07 ENCOUNTER — CLINICAL SUPPORT NO REQUIREMENTS (OUTPATIENT)
Dept: CARDIOLOGY | Facility: CLINIC | Age: 83
End: 2018-12-07

## 2018-12-07 ENCOUNTER — OFFICE VISIT (OUTPATIENT)
Dept: CARDIOLOGY | Facility: CLINIC | Age: 83
End: 2018-12-07

## 2018-12-07 VITALS
BODY MASS INDEX: 33.04 KG/M2 | DIASTOLIC BLOOD PRESSURE: 80 MMHG | HEIGHT: 68 IN | SYSTOLIC BLOOD PRESSURE: 124 MMHG | HEART RATE: 62 BPM | WEIGHT: 218 LBS

## 2018-12-07 DIAGNOSIS — I48.0 PAROXYSMAL ATRIAL FIBRILLATION (HCC): ICD-10-CM

## 2018-12-07 DIAGNOSIS — I25.10 CORONARY ARTERY DISEASE INVOLVING NATIVE CORONARY ARTERY OF NATIVE HEART WITHOUT ANGINA PECTORIS: ICD-10-CM

## 2018-12-07 DIAGNOSIS — I50.32 CHRONIC DIASTOLIC CONGESTIVE HEART FAILURE (HCC): ICD-10-CM

## 2018-12-07 DIAGNOSIS — I48.0 PAROXYSMAL ATRIAL FIBRILLATION (HCC): Primary | ICD-10-CM

## 2018-12-07 DIAGNOSIS — R00.1 BRADYCARDIA: Primary | ICD-10-CM

## 2018-12-07 DIAGNOSIS — I10 ESSENTIAL HYPERTENSION: ICD-10-CM

## 2018-12-07 DIAGNOSIS — Z95.0 PACEMAKER: ICD-10-CM

## 2018-12-07 DIAGNOSIS — E78.2 MIXED HYPERLIPIDEMIA: ICD-10-CM

## 2018-12-07 PROCEDURE — 99214 OFFICE O/P EST MOD 30 MIN: CPT | Performed by: INTERNAL MEDICINE

## 2018-12-07 PROCEDURE — 93000 ELECTROCARDIOGRAM COMPLETE: CPT | Performed by: INTERNAL MEDICINE

## 2018-12-07 PROCEDURE — 93280 PM DEVICE PROGR EVAL DUAL: CPT | Performed by: INTERNAL MEDICINE

## 2018-12-07 NOTE — PROGRESS NOTES
Date of Office Visit: 18  Encounter Provider: Avery Galan MD  Place of Service: [facility] CARDIOLOGY  Patient Name: Guero Garay  :1935  Referral Provider:No ref. provider found      No chief complaint on file.    History of Present Illness  Mr Garay is a pleasant 82 yo gentleman with a history of aortic stenosis, hypertension, hyperlipidemia, and coronary artery disease.  He presented to our office in 2017 with progressive angina.  He then had a perfusion stress test that showed a large area of anterior and septal ischemia echocardiogram at that time showed mild aortic stenosis he then underwent cardiac catheterization 2017 he was found have preserved left ventricular ejection fraction severe disease of the mid left anterior descending and right coronary artery.  Had a 4.0 x 15 mm drug-eluting stent placed in the left anterior descending and a 4.0 x 28 mm drug-eluting stent dilated to 4.5 mm in the right coronary artery.  He was hypokalemic on admission started on potassium, he was also started on on rosuvastatin.    He then presented in 2017 with dizziness and syncope and bradycardia.  He ended up having a permanent pacemaker implanted.  He also had a tick bite but was negative for [place] spotted fever. He's coming in early because on his pacemaker interrogation he was found to have episodes of atrial fibrillation fairly prolonged and now occupying 10.6% burden.   He was started on eliquis.  He also had some shortness of breath and was scheduled for outpatient perfusion stress test.  Echocardiogram on 8/10/2018 showed normal left ventricular systolic function with a small LV cavity, mild concentric hypertrophy, mild calcification of the aortic valve mild aortic valve stenosis.  His renal function and liver function studies were abnormal four days prior.  His blood pressure was low in the office visit on 8/10/2018 and 84/60 and he was  arranged for admission to the hospital. VQ scan was showed no findings suggestive of PE. He was found to have urinary retention and required Tran catheter as well as urology following.  He had recently been treated for urosepsis and acute pyelonephritis.  CT of the abdomen was normal as well as ultrasound of the liver which was normal.  Elevated liver enzymes were felt to be from antibiotic patient was recently on.  He had a heme positive stool and was to have an outpatient colonoscopy.  Outpatient Perfusion stress test completed on 8/17/2018 showed no evidence of ischemia and was a low risk study.  He now comes in for follow-up.  He is doing much better his fatigue is better his shortness of breath is better.  He's walking 1.3 miles 4+ days a week without problems he is denying any palpitations, orthopnea or PND.  He brought a number of his blood pressure recordings and since the August September issues they are now under much better control less labile.  Overall he feels like he's doing better he thinks the walking has a lot to do with that.      Coronary Artery Disease   Pertinent negatives include no dizziness, muscle weakness or weight gain. There is no history of past myocardial infarction.         Past Medical History:   Diagnosis Date   • Acute kidney injury superimposed on chronic kidney disease (CMS/HCC)    • Arthralgia    • Asthma     mild persistent without complication   • CAD (coronary artery disease), native coronary artery    • Chest pain, unspecified    • Chronic diastolic congestive heart failure (CMS/HCC)    • Dyspnea on exertion    • Essential hypertension    • Generalized weakness    • Gout    • History of prostate cancer    • Hyperkalemia    • Jaw pain     both sides   • Mixed hyperlipidemia    • Osteoarthrosis    • PAF (paroxysmal atrial fibrillation) (CMS/HCC)    • Shoulder pain    • SOB (shortness of breath)    • Tinnitus    • Urine retention          Past Surgical History:   Procedure  Laterality Date   • COLONOSCOPY  2013   • INGUINAL HERNIA REPAIR Right    • NECK SURGERY      SPURS   • PROSTATECTOMY     • REPLACEMENT TOTAL KNEE Left    • TONSILLECTOMY     • TOTAL SHOULDER REPLACEMENT Bilateral          Current Outpatient Medications on File Prior to Visit   Medication Sig Dispense Refill   • allopurinol (ZYLOPRIM) 100 MG tablet TAKE 1 TABLET BY MOUTH  DAILY 90 tablet 1   • apixaban (ELIQUIS) 2.5 MG tablet tablet Take 1 tablet by mouth Every 12 (Twelve) Hours. 180 tablet 3   • aspirin 81 MG chewable tablet Chew 81 mg Daily.     • Cranberry 250 MG capsule Take 250 mg by mouth Daily.     • Fluticasone Furoate-Vilanterol (BREO ELLIPTA) 100-25 MCG/INH aerosol powder  Inhale 1 puff Every Morning.     • furosemide (LASIX) 20 MG tablet Take 1 tablet by mouth Daily. 90 tablet 3   • melatonin 5 MG tablet tablet Take 5 mg by mouth At Night As Needed.     • metoprolol tartrate (LOPRESSOR) 50 MG tablet Take 1 tablet by mouth 2 (Two) Times a Day. (Patient taking differently: Take 25 mg by mouth 2 (Two) Times a Day.) 60 tablet 11   • Multiple Vitamins-Minerals (MULTIVITAMIN WITH MINERALS) tablet tablet Take 1 tablet by mouth Daily.     • potassium chloride (MICRO-K) 10 MEQ CR capsule Take 1 capsule by mouth 2 (Two) Times a Day. 180 capsule 3   • rosuvastatin (CRESTOR) 20 MG tablet Take 1 tablet by mouth Daily. 90 tablet 0   • tamsulosin (FLOMAX) 0.4 MG capsule 24 hr capsule Take 1 capsule by mouth Daily. 30 capsule 1     No current facility-administered medications on file prior to visit.          Social History     Socioeconomic History   • Marital status: Unknown     Spouse name: Not on file   • Number of children: Not on file   • Years of education: Not on file   • Highest education level: Not on file   Social Needs   • Financial resource strain: Not on file   • Food insecurity - worry: Not on file   • Food insecurity - inability: Not on file   • Transportation needs - medical: Not on file   •  Transportation needs - non-medical: Not on file   Occupational History   • Not on file   Tobacco Use   • Smoking status: Never Smoker   • Smokeless tobacco: Never Used   • Tobacco comment: caffeine - 1 every 2-3 days   Substance and Sexual Activity   • Alcohol use: Yes     Alcohol/week: 12.6 oz     Types: 21 Cans of beer per week     Comment: 2-3 beers daily   • Drug use: No   • Sexual activity: Defer   Other Topics Concern   • Not on file   Social History Narrative   • Not on file       Family History   Problem Relation Age of Onset   • Cancer Mother         lung   • Cancer Father         lung   • Diabetes Father    • Colon cancer Neg Hx    • Colon polyps Neg Hx          Review of Systems   Constitution: Negative for decreased appetite, diaphoresis, fever, weakness, malaise/fatigue, weight gain and weight loss.   HENT: Negative for congestion, hearing loss, nosebleeds and tinnitus.    Eyes: Negative for blurred vision, double vision, vision loss in left eye, vision loss in right eye and visual disturbance.   Cardiovascular:        As noted in HPI   Respiratory:        As noted HPI   Endocrine: Negative for cold intolerance and heat intolerance.   Hematologic/Lymphatic: Negative for bleeding problem. Does not bruise/bleed easily.   Skin: Negative for color change, flushing, itching and rash.   Musculoskeletal: Negative for arthritis, back pain, joint pain, joint swelling, muscle weakness and myalgias.   Gastrointestinal: Negative for bloating, abdominal pain, constipation, diarrhea, dysphagia, heartburn, hematemesis, hematochezia, melena, nausea and vomiting.   Genitourinary: Negative for bladder incontinence, dysuria, frequency, nocturia and urgency.   Neurological: Negative for dizziness, focal weakness, headaches, light-headedness, loss of balance, numbness, paresthesias and vertigo.   Psychiatric/Behavioral: Negative for depression, memory loss and substance abuse.       Procedures      ECG 12 Lead  Date/Time:  12/7/2018 11:35 AM  Performed by: Avery Galan MD  Authorized by: Avery Galan MD   Comparison: compared with previous ECG   Comparison to previous ECG: First degree AV block is new.  Rhythm: sinus rhythm  Rate: normal  Conduction: 1st degree  QRS axis: normal  Q wave noted on lead: Probable old inferior MI.                  Objective:    There were no vitals taken for this visit.       Physical Exam  Physical Exam   Constitutional: He is oriented to person, place, and time. He appears well-developed and well-nourished. No distress.   HENT:   Head: Normocephalic.   Eyes: Conjunctivae are normal. Pupils are equal, round, and reactive to light. No scleral icterus.   Neck: Normal carotid pulses, no hepatojugular reflux and no JVD present. Carotid bruit is not present. No tracheal deviation, no edema and no erythema present. No thyromegaly present.   Cardiovascular: Normal rate, regular rhythm, S1 normal, S2 normal and intact distal pulses.  No extrasystoles are present. PMI is not displaced. Exam reveals no gallop, no distant heart sounds and no friction rub.   Murmur heard.   Systolic murmur is present with a grade of 2/6 at the upper right sternal border.  Pulses:       Carotid pulses are 2+ on the right side, and 2+ on the left side.       Radial pulses are 2+ on the right side, and 2+ on the left side.        Femoral pulses are 2+ on the right side, and 2+ on the left side.       Dorsalis pedis pulses are 2+ on the right side, and 2+ on the left side.        Posterior tibial pulses are 2+ on the right side, and 2+ on the left side.   Pulmonary/Chest: Effort normal and breath sounds normal. No respiratory distress. He has no decreased breath sounds. He has no wheezes. He has no rhonchi. He has no rales. He exhibits no tenderness.   Abdominal: Soft. Bowel sounds are normal. He exhibits no distension and no mass. There is no hepatosplenomegaly. There is no tenderness. There is no rebound and no guarding.    Musculoskeletal: He exhibits no edema, tenderness or deformity.   Neurological: He is alert and oriented to person, place, and time.   Skin: Skin is warm and dry. No rash noted. He is not diaphoretic. No cyanosis or erythema. No pallor. Nails show no clubbing.   Psychiatric: He has a normal mood and affect. His speech is normal and behavior is normal. Judgment and thought content normal.           Assessment:   1. 83-year-old gentleman with history of severe coronary artery disease preserved left ventricular systolic function.  Status post drug-eluting stents placed to the right coronary artery and the left anterior descending.  Coronary Artery Disease  Assessment  • The patient has no angina  • There is a new diagnosis of stable angina in the past 12 months  • The patient is having symptoms consistent with unstable angina      Plan  • Lifestyle modifications discussed include adhering to a heart healthy diet, avoidance of tobacco products, maintenance of a healthy weight, medication compliance, regular exercise and regular monitoring of cholesterol and blood pressure     Subjective - Objective  • There has been a previous stent procedure using GIN  • Current antiplatelet therapy includes aspirin 81 mg and clopidogrel 75 mg  • The patient qualifies for cardiac rehabilitation, and has been referred to cardiac rehab  No angina or heart failure to continue the same.       2.  Hyperlipidemia now on target dose rosuvastatin continue the same.  3.  Hypertension blood pressure adequately controlled if anything on the low side.  4.  History of prostate cancer.  5.   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 He does have first-degree AV block but again stable.  6.  Paroxysmal atrial fibrillation, asymptomatic, noted on his pacemaker.  But a large percent of his rhythm was in atrial fibrillation.  Atrial Fibrillation and Atrial  Flutter  Assessment  • The patient has paroxysmal atrial fibrillation  • This is non-valvular in etiology  • The patient's CHADS2-VASc score is 5  • A [id number] score of 2 or more is considered a high risk for a thromboembolic event  • Apixaban prescribed    Plan  • Attempt to maintain sinus rhythm  • Continue apixaban for antithrombotic therapy, bleeding issues discussed  • Continue beta blocker for rhythm control  He's tolerating eliquis well he's to continue the same.    7.  Aortic stenosis.  Mild echocardiogram August 2018 continue to follow him clinically.       Plan:

## 2018-12-11 ENCOUNTER — ANESTHESIA EVENT (OUTPATIENT)
Dept: PERIOP | Facility: HOSPITAL | Age: 83
End: 2018-12-11

## 2018-12-12 ENCOUNTER — ANESTHESIA (OUTPATIENT)
Dept: PERIOP | Facility: HOSPITAL | Age: 83
End: 2018-12-12

## 2018-12-12 ENCOUNTER — HOSPITAL ENCOUNTER (OUTPATIENT)
Facility: HOSPITAL | Age: 83
Setting detail: HOSPITAL OUTPATIENT SURGERY
Discharge: HOME | End: 2018-12-12
Attending: INTERNAL MEDICINE | Admitting: INTERNAL MEDICINE

## 2018-12-12 VITALS
DIASTOLIC BLOOD PRESSURE: 93 MMHG | RESPIRATION RATE: 16 BRPM | SYSTOLIC BLOOD PRESSURE: 161 MMHG | WEIGHT: 212.6 LBS | OXYGEN SATURATION: 97 % | HEIGHT: 68 IN | HEART RATE: 59 BPM | BODY MASS INDEX: 32.22 KG/M2 | TEMPERATURE: 98.2 F

## 2018-12-12 DIAGNOSIS — Z12.11 SCREENING FOR COLON CANCER: ICD-10-CM

## 2018-12-12 LAB — POTASSIUM BLD-SCNC: 4.4 MMOL/L (ref 3.5–5.2)

## 2018-12-12 PROCEDURE — 25010000002 PROPOFOL 10 MG/ML EMULSION: Performed by: NURSE ANESTHETIST, CERTIFIED REGISTERED

## 2018-12-12 PROCEDURE — 45385 COLONOSCOPY W/LESION REMOVAL: CPT | Performed by: INTERNAL MEDICINE

## 2018-12-12 PROCEDURE — 45380 COLONOSCOPY AND BIOPSY: CPT | Performed by: INTERNAL MEDICINE

## 2018-12-12 PROCEDURE — 88305 TISSUE EXAM BY PATHOLOGIST: CPT | Performed by: INTERNAL MEDICINE

## 2018-12-12 PROCEDURE — 84132 ASSAY OF SERUM POTASSIUM: CPT | Performed by: ANESTHESIOLOGY

## 2018-12-12 RX ORDER — PROPOFOL 10 MG/ML
VIAL (ML) INTRAVENOUS AS NEEDED
Status: DISCONTINUED | OUTPATIENT
Start: 2018-12-12 | End: 2018-12-12 | Stop reason: SURG

## 2018-12-12 RX ORDER — SODIUM CHLORIDE 0.9 % (FLUSH) 0.9 %
1-10 SYRINGE (ML) INJECTION AS NEEDED
Status: DISCONTINUED | OUTPATIENT
Start: 2018-12-12 | End: 2018-12-12 | Stop reason: HOSPADM

## 2018-12-12 RX ORDER — SODIUM CHLORIDE 0.9 % (FLUSH) 0.9 %
3 SYRINGE (ML) INJECTION EVERY 12 HOURS SCHEDULED
Status: DISCONTINUED | OUTPATIENT
Start: 2018-12-12 | End: 2018-12-12 | Stop reason: HOSPADM

## 2018-12-12 RX ORDER — GLYCOPYRROLATE 0.2 MG/ML
INJECTION INTRAMUSCULAR; INTRAVENOUS AS NEEDED
Status: DISCONTINUED | OUTPATIENT
Start: 2018-12-12 | End: 2018-12-12 | Stop reason: SURG

## 2018-12-12 RX ORDER — SODIUM CHLORIDE 9 MG/ML
40 INJECTION, SOLUTION INTRAVENOUS AS NEEDED
Status: DISCONTINUED | OUTPATIENT
Start: 2018-12-12 | End: 2018-12-12 | Stop reason: HOSPADM

## 2018-12-12 RX ORDER — LIDOCAINE HYDROCHLORIDE 20 MG/ML
INJECTION, SOLUTION INFILTRATION; PERINEURAL AS NEEDED
Status: DISCONTINUED | OUTPATIENT
Start: 2018-12-12 | End: 2018-12-12 | Stop reason: SURG

## 2018-12-12 RX ORDER — SODIUM CHLORIDE, SODIUM LACTATE, POTASSIUM CHLORIDE, CALCIUM CHLORIDE 600; 310; 30; 20 MG/100ML; MG/100ML; MG/100ML; MG/100ML
9 INJECTION, SOLUTION INTRAVENOUS CONTINUOUS
Status: DISCONTINUED | OUTPATIENT
Start: 2018-12-12 | End: 2018-12-12 | Stop reason: HOSPADM

## 2018-12-12 RX ORDER — LIDOCAINE HYDROCHLORIDE 10 MG/ML
0.5 INJECTION, SOLUTION EPIDURAL; INFILTRATION; INTRACAUDAL; PERINEURAL ONCE AS NEEDED
Status: DISCONTINUED | OUTPATIENT
Start: 2018-12-12 | End: 2018-12-12 | Stop reason: HOSPADM

## 2018-12-12 RX ADMIN — PROPOFOL 50 MG: 10 INJECTION, EMULSION INTRAVENOUS at 13:42

## 2018-12-12 RX ADMIN — PROPOFOL 20 MG: 10 INJECTION, EMULSION INTRAVENOUS at 13:25

## 2018-12-12 RX ADMIN — SODIUM CHLORIDE, POTASSIUM CHLORIDE, SODIUM LACTATE AND CALCIUM CHLORIDE: 600; 310; 30; 20 INJECTION, SOLUTION INTRAVENOUS at 11:25

## 2018-12-12 RX ADMIN — PROPOFOL 50 MG: 10 INJECTION, EMULSION INTRAVENOUS at 13:39

## 2018-12-12 RX ADMIN — PROPOFOL 50 MG: 10 INJECTION, EMULSION INTRAVENOUS at 13:35

## 2018-12-12 RX ADMIN — PROPOFOL 50 MG: 10 INJECTION, EMULSION INTRAVENOUS at 13:29

## 2018-12-12 RX ADMIN — PROPOFOL 50 MG: 10 INJECTION, EMULSION INTRAVENOUS at 13:46

## 2018-12-12 RX ADMIN — PROPOFOL 50 MG: 10 INJECTION, EMULSION INTRAVENOUS at 13:22

## 2018-12-12 RX ADMIN — LIDOCAINE HYDROCHLORIDE 100 MG: 20 INJECTION, SOLUTION INFILTRATION; PERINEURAL at 13:18

## 2018-12-12 RX ADMIN — PROPOFOL 80 MG: 10 INJECTION, EMULSION INTRAVENOUS at 13:18

## 2018-12-12 RX ADMIN — GLYCOPYRROLATE 0.1 MG: 0.2 INJECTION INTRAMUSCULAR; INTRAVENOUS at 13:17

## 2018-12-12 NOTE — H&P
Patient Care Team:  Librado Lai MD as PCP - General (Internal Medicine)  Avery Galan MD as PCP - Claims Attributed  Aida Varela, RN as Care Coordinator (Population Health)    CHIEF COMPLAINT: Screening for Colon Cancer    HISTORY OF PRESENT ILLNESS:    Last exam >10year      Past Medical History:   Diagnosis Date   • Acute kidney injury superimposed on chronic kidney disease (CMS/HCC)    • Arthralgia    • Asthma     mild persistent without complication   • CAD (coronary artery disease), native coronary artery    • Chest pain, unspecified    • Chronic diastolic congestive heart failure (CMS/HCC)    • Dyspnea on exertion    • Essential hypertension    • Generalized weakness    • Gout    • History of prostate cancer    • Hyperkalemia    • Jaw pain     both sides   • Mixed hyperlipidemia    • Osteoarthrosis    • PAF (paroxysmal atrial fibrillation) (CMS/HCC)    • Shoulder pain    • SOB (shortness of breath)    • Tinnitus    • Urine retention      Past Surgical History:   Procedure Laterality Date   • COLONOSCOPY  2013   • INGUINAL HERNIA REPAIR Right    • JOINT REPLACEMENT     • NECK SURGERY      SPURS   • PROSTATECTOMY     • REPLACEMENT TOTAL KNEE Left    • TONSILLECTOMY     • TOTAL SHOULDER REPLACEMENT Bilateral      Family History   Problem Relation Age of Onset   • Cancer Mother         lung   • Cancer Father         lung   • Diabetes Father    • Colon cancer Neg Hx    • Colon polyps Neg Hx      Social History     Tobacco Use   • Smoking status: Never Smoker   • Smokeless tobacco: Never Used   • Tobacco comment: caffeine - 1 every 2-3 days   Substance Use Topics   • Alcohol use: Yes     Alcohol/week: 12.6 oz     Types: 21 Cans of beer per week     Comment: 2-3 beers daily   • Drug use: No     Medications Prior to Admission   Medication Sig Dispense Refill Last Dose   • allopurinol (ZYLOPRIM) 100 MG tablet TAKE 1 TABLET BY MOUTH  DAILY 90 tablet 1 Taking   • apixaban (ELIQUIS) 2.5 MG tablet tablet  "Take 1 tablet by mouth Every 12 (Twelve) Hours. 180 tablet 3 Taking   • aspirin 81 MG chewable tablet Chew 81 mg Daily.   Taking   • Cranberry 250 MG capsule Take 250 mg by mouth Daily.   Taking   • Fluticasone Furoate-Vilanterol (BREO ELLIPTA) 100-25 MCG/INH aerosol powder  Inhale 1 puff Every Morning.   Taking   • furosemide (LASIX) 20 MG tablet Take 1 tablet by mouth Daily. 90 tablet 3 Taking   • melatonin 5 MG tablet tablet Take 5 mg by mouth At Night As Needed.   Taking   • metoprolol tartrate (LOPRESSOR) 50 MG tablet Take 1 tablet by mouth 2 (Two) Times a Day. (Patient taking differently: Take 25 mg by mouth 2 (Two) Times a Day.) 60 tablet 11 Taking   • Multiple Vitamins-Minerals (MULTIVITAMIN WITH MINERALS) tablet tablet Take 1 tablet by mouth Daily.   Taking   • potassium chloride (MICRO-K) 10 MEQ CR capsule Take 1 capsule by mouth 2 (Two) Times a Day. 180 capsule 3 Taking   • rosuvastatin (CRESTOR) 20 MG tablet Take 1 tablet by mouth Daily. 90 tablet 0 Taking   • tamsulosin (FLOMAX) 0.4 MG capsule 24 hr capsule Take 1 capsule by mouth Daily. 30 capsule 1 Taking     Allergies:  Sulfa antibiotics    REVIEW OF SYSTEMS:  Please see the above history of present illness for pertinent positives and negatives.  The remainder of the patient's systems have been reviewed and are negative.     Vital Signs       Flowsheet Rows      First Filed Value   Admission Height  172.7 cm (68\") Documented at 12/11/2018 1608   Admission Weight  97.5 kg (215 lb) Documented at 12/11/2018 1608           Physical Exam:  Physical Exam   Constitutional: Patient appears well-developed and well-nourished and in no acute distress   HEENT:   Head: Normocephalic and atraumatic.   Eyes:  Pupils are equal, round, and reactive to light. EOM are intact. Sclerae are anicteric and non-injected.  Mouth and Throat: Patient has moist mucous membranes. Oropharynx is clear of any erythema or exudate.     Neck: Neck supple. No JVD present. No thyromegaly " present. No lymphadenopathy present.  Cardiovascular: Regular rate, regular rhythm, S1 normal and S2 normal.  Exam reveals no gallop and no friction rub.  No murmur heard.  Pulmonary/Chest: Lungs are clear to auscultation bilaterally. No respiratory distress. No wheezes. No rhonchi. No rales.   Abdominal: Soft. Bowel sounds are normal. No distension and no mass. There is no hepatosplenomegaly. There is no tenderness.   Musculoskeletal: Normal Muscle tone  Extremities: No edema. Pulses are palpable in all 4 extremities.  Neurological: Patient is alert and oriented to person, place, and time. Cranial nerves II-XII are grossly intact with no focal deficits.  Skin: Skin is warm. No rash noted. Nails show no clubbing.  No cyanosis or erythema.    Debilities/Disabilities Identified: None  Emotional Behavior: Appropriate     Results Review:    I reviewed the patient's new clinical results.  Lab Results (most recent)     None          Imaging Results (most recent)     None        reviewed    ECG/EMG Results (most recent)     None        reviewed    Assessment/Plan     Screening for Colon Cancer/ Colonoscopy    I discussed the patients findings and my recommendations with patient .     Kwadwo Powers MD  12/12/18  11:36 AM    Time: 10 min prior to procedure.

## 2018-12-12 NOTE — BRIEF OP NOTE
COLONOSCOPY  Progress Note    Guero Garay  12/12/2018    Pre-op Diagnosis:   Screening for colon cancer [Z12.11]       Post-Op Diagnosis Codes:     * Screening for colon cancer [Z12.11]     * Diverticulosis [K57.90]     * Colon polyp [K63.5]    Procedure/CPT® Codes:      Procedure(s):  COLONOSCOPY with polypectomy    Surgeon(s):  Kwadwo Powers MD    Anesthesia: Monitor Anesthesia Care    Staff:   Circulator: Francisca Gunderson RN  Scrub Person: Mervat Godinez    Estimated Blood Loss: none    Urine Voided: * No values recorded between 12/12/2018  1:12 PM and 12/12/2018  1:48 PM *    Specimens:                ID Type Source Tests Collected by Time   A : transverse colon polyps x3 Polyp Large Intestine, Transverse Colon TISSUE PATHOLOGY EXAM Kwadwo Powers MD 12/12/2018 1327   B : ascending colon polyps x4 Polyp Large Intestine, Right / Ascending Colon TISSUE PATHOLOGY EXAM Kwawdo Powers MD 12/12/2018 1332   C : descending colon polyps x3 Polyp Large Intestine, Left / Descending Colon TISSUE PATHOLOGY EXAM Kwadwo Powers MD 12/12/2018 1341   D : sigmoid colon polyps x2 Polyp Large Intestine, Sigmoid Colon TISSUE PATHOLOGY EXAM Kwadwo Powers MD 12/12/2018 1344         Drains:      Findings: Colon to TI good Prep   Pan-Diverticulosis  Polyps (12) Cold Snare/Biopsy    Complications: None      Kwadwo Powres MD     Date: 12/12/2018  Time: 1:50 PM

## 2018-12-12 NOTE — ANESTHESIA PREPROCEDURE EVALUATION
Anesthesia Evaluation     Patient summary reviewed and Nursing notes reviewed   no history of anesthetic complications:  NPO Solid Status: > 8 hours  NPO Liquid Status: > 8 hours           Airway   Mallampati: I  TM distance: >3 FB  Neck ROM: full  No difficulty expected  Dental - normal exam     Pulmonary - normal exam    breath sounds clear to auscultation  (+) asthma (breo used daily),   Sleep apnea: occ.  Cardiovascular - normal exam  Exercise tolerance: good (4-7 METS)    ECG reviewed  Rhythm: regular  Rate: normal    (+) pacemaker pacemaker interrogated <1 month ago, hypertension, valvular problems/murmurs AS, past MI , CAD, dysrhythmias Paroxysmal Atrial Fib, angina (denies), CHF, hyperlipidemia,     ROS comment: Avery Galan MD     2018 11:44 AM    ECG 12 Lead  Date/Time: 2018 11:35 AM  Performed by: Avery Galan MD  Authorized by: Avery Galan MD   Comparison: compared with previous ECG   Comparison to previous ECG: First degree AV block is new.  Rhythm: sinus rhythm  Rate: normal  Conduction: 1st degree  QRS axis: normal  Q wave noted on lead: Probable old inferior MI.  Reading physician: Avery Galan MD Ordering physician: Avery Galan MD Study date: 8/10/18  Patient Information     Patient Name  Guero Garay MRN  [id number] Sex  Male  (Age)  1935 (83 y.o.)  Sedation Narrator Report     Sedation Narrator Report  Interpretation Summary     · Left ventricular systolic function is normal. Calculated EF = 55%. Estimated EF was in agreement with the calculated EF. All left ventricular wall segments contract normally.  · The left ventricular cavity is small.  · Left ventricular wall thickness is consistent with mild concentric hypertrophy.  · There is mild calcification of the aortic valve.  · Mild aortic valve stenosis is present.           Neuro/Psych  (+) syncope (July and 2018 due to UTI and meds.),     GI/Hepatic/Renal/Endo    (+) obesity,   renal  disease (UTI with sepsis last summer),     Musculoskeletal     Abdominal   (+) obese,    Substance History   (+) alcohol use (2 to 3 beers per day),      OB/GYN negative ob/gyn ROS         Other   (+) arthritis                   Anesthesia Plan    ASA 3     MAC     intravenous induction   Anesthetic plan, all risks, benefits, and alternatives have been provided, discussed and informed consent has been obtained with: patient and spouse/significant other.

## 2018-12-12 NOTE — ANESTHESIA POSTPROCEDURE EVALUATION
Patient: Guero Garay    Procedure Summary     Date:  12/12/18 Room / Location:  [facility] ENDOSCOPY 1 /  [facility]    Anesthesia Start:  1312 Anesthesia Stop:  1354    Procedure:  COLONOSCOPY with polypectomy (N/A ) Diagnosis:       Screening for colon cancer      Diverticulosis      Colon polyp      (Screening for colon cancer [Z12.11])    Surgeon:  Kwadwo Powers MD Provider:  Gopi Arriaza CRNA    Anesthesia Type:  MAC ASA Status:  3          Anesthesia Type: MAC  Last vitals  BP   94/61 (12/12/18 1355)   Temp   98.2 °F (36.8 °C) (12/12/18 1355)   Pulse   67 (12/12/18 1355)   Resp   16 (12/12/18 1355)     SpO2   96 % (12/12/18 1355)     Post Anesthesia Care and Evaluation    Patient location during evaluation: PHASE II  Patient participation: complete - patient participated  Level of consciousness: awake and alert  Pain score: 0  Pain management: satisfactory to patient  Airway patency: patent  Anesthetic complications: No anesthetic complications  PONV Status: none  Cardiovascular status: acceptable  Respiratory status: acceptable  Hydration status: acceptable

## 2018-12-12 NOTE — OP NOTE
COLONOSCOPY  Procedure Report    Patient Name:  Guero Garay  YOB: 1935    Date of Surgery:  12/12/2018     Indications:  Screening for Colon Cancer    Pre-op Diagnosis:   Screening for colon cancer [Z12.11]    Post-Op Diagnosis Codes:     * Screening for colon cancer [Z12.11]     * Diverticulosis [K57.90]     * Colon polyp [K63.5]         Procedure/CPT® Codes:      Procedure(s):  COLONOSCOPY with polypectomy    Staff:  Surgeon(s):  Kwadwo Powers MD         Anesthesia: Monitor Anesthesia Care    Estimated Blood Loss: none    Specimens:   ID Type Source Tests Collected by Time   A : transverse colon polyps x3 Polyp Large Intestine, Transverse Colon TISSUE PATHOLOGY EXAM Kwadwo Powers MD 12/12/2018 1327   B : ascending colon polyps x4 Polyp Large Intestine, Right / Ascending Colon TISSUE PATHOLOGY EXAM Kwadwo Powers MD 12/12/2018 1332   C : descending colon polyps x3 Polyp Large Intestine, Left / Descending Colon TISSUE PATHOLOGY EXAM Kwadwo Powers MD 12/12/2018 1341   D : sigmoid colon polyps x2 Polyp Large Intestine, Sigmoid Colon TISSUE PATHOLOGY EXAM Kwadwo Powers MD 12/12/2018 1344       Implants:    Nothing was implanted during the procedure      Description of Procedure: After having signed informed consent, he was brought to the endoscopy suite and placed in the left lateral decubitus position and given his IV sedation. Rectal exam revealed no external lesions, normal anal tone, and no rectal mass. Prostate was not palpable. The scope was introduced into the rectum and advanced under direct visualization through the rectum, sigmoid colon, and past multiple diverticular openings. These extended throughout the entire course of the colon. The scope was advanced through the sigmoid colon, descending colon, to and around the splenic flexure, reduced and advanced into the transverse colon. There was a sessile 5 mm polyp noted  that was biopsied, felt to be completely removed, and sent as transverse colon polyp. The scope was advanced to and around the hepatic flexure, reduced into the ascending colon, and then advanced into the cecum. The cecum was identified by the appendiceal orifice and the ileocecal valve. The ileocecal valve was briefly opened but was not significantly intubated. The cecum itself was normal appearing; however, as soon as the scope was withdrawn past the valve into the ascending colon there were 2 sessile polyps noted. These were removed with cold snare technique. Two diminutive polyps were identified and they were removed with cold biopsy forceps. They were sent as ascending colon polyps x 4. The scope was withdrawn around the hepatic flexure into the transverse colon. Two other polyps were noted in the same area of the previous polyp. There was no residual polyp and there was some oozing from the initial site but showed excellent hemostasis as the scope was withdrawn past this. Altogether 3 transverse colon polyps were sent, removed with cold snare and cold biopsy. The scope was withdrawn then around the splenic flexure into the descending colon . There were 3 sessile descending colon polyps. Two were removed with cold snare and 1 with biopsy forceps. They were sent together as descending colon polyps x 3. The scope was then withdrawn back into the sigmoid colon and within the sigmoid colon there were 2 diminutive polyps noted. They were both biopsied, felt to be completely removed and sent separately as sigmoid colon polyps x 2. The scope was then withdrawn into the rectum and was retroflexed revealing an intact dentate line and no mucosal lesions. The scope was deretroflexed and withdrawn. The patient tolerated the procedure very well.             Findings:  Colon to TI good Prep   Pan-Diverticulosis  Polyps (12) Cold Snare/Biopsy    Complications: None    Recommendations: Results to be called  Resume AC in the  BETO Powers MD     Date: 12/12/2018  Time: 1:51 PM

## 2018-12-14 LAB
CYTO UR: NORMAL
LAB AP CASE REPORT: NORMAL
PATH REPORT.FINAL DX SPEC: NORMAL
PATH REPORT.GROSS SPEC: NORMAL

## 2018-12-16 ENCOUNTER — HOSPITAL ENCOUNTER (EMERGENCY)
Facility: HOSPITAL | Age: 83
Discharge: HOME | End: 2018-12-16
Attending: EMERGENCY MEDICINE | Admitting: EMERGENCY MEDICINE

## 2018-12-16 ENCOUNTER — APPOINTMENT (OUTPATIENT)
Dept: CT IMAGING | Facility: HOSPITAL | Age: 83
End: 2018-12-16

## 2018-12-16 VITALS
WEIGHT: 215 LBS | OXYGEN SATURATION: 95 % | HEIGHT: 68 IN | BODY MASS INDEX: 32.58 KG/M2 | DIASTOLIC BLOOD PRESSURE: 70 MMHG | HEART RATE: 80 BPM | RESPIRATION RATE: 14 BRPM | TEMPERATURE: 97.6 F | SYSTOLIC BLOOD PRESSURE: 110 MMHG
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
## 2018-12-16 PROCEDURE — 99282 EMERGENCY DEPT VISIT SF MDM: CPT | Performed by: EMERGENCY MEDICINE

## 2018-12-16 PROCEDURE — 70450 CT HEAD/BRAIN W/O DYE: CPT

## 2018-12-16 PROCEDURE — 99284 EMERGENCY DEPT VISIT MOD MDM: CPT

## 2018-12-16 NOTE — DISCHARGE INSTRUCTIONS
Follow-up with Librado Lai in 1 week.  Return to emergency department if there is worsening pain, numbness, tingling, weakness, change in bladder or bowel function, worse in any way at all.  Avoid alcohol use.

## 2018-12-16 NOTE — ED PROVIDER NOTES
Subjective   History of Present Illness  History of Present Illness    Chief complaint: Fall    Location: Barn    Quality/Severity:  No complaints    Timing/Onset/Duration: Acute onset just prior to arrival    Modifying Factors: No modifying factors    Associated Symptoms: No headache.  No neck or back pain.  No chest pain or shortness of breath.  No abdominal pain.  No numbness, tingling, weakness, or change in bladder or bowel function.    Narrative: This 83-year-old white male on eliquis for  atrial fibrillation, has been drinking tonight, and fell in his barn.  He has no complaints.    PCP:  Ming      Review of Systems   Constitutional: Negative for chills and fever.   HENT: Negative for ear pain, nosebleeds, rhinorrhea and sore throat.    Eyes: Negative for discharge and redness.   Respiratory: Negative for cough, chest tightness, shortness of breath and wheezing.    Cardiovascular: Negative for chest pain, palpitations and leg swelling.   Gastrointestinal: Negative for abdominal pain, diarrhea, nausea and vomiting.   Genitourinary: Negative for difficulty urinating, flank pain and hematuria.   Musculoskeletal: Negative for back pain and neck pain.   Skin: Negative for wound.   Neurological: Negative for dizziness, speech difficulty, weakness, light-headedness, numbness and headaches.   Psychiatric/Behavioral: Negative.  Negative for confusion.        Medication List      CHANGE how you take these medications    metoprolol tartrate 50 MG tablet  Commonly known as:  LOPRESSOR  Take 1 tablet by mouth 2 (Two) Times a Day.  What changed:  how much to take        CONTINUE taking these medications    allopurinol 100 MG tablet  Commonly known as:  ZYLOPRIM  TAKE 1 TABLET BY MOUTH  DAILY     apixaban 2.5 MG tablet tablet  Commonly known as:  ELIQUIS  Take 1 tablet by mouth Every 12 (Twelve) Hours.     aspirin 81 MG chewable tablet     BREO ELLIPTA 100-25 MCG/INH aerosol powder   Generic drug:  Fluticasone  Furoate-Vilanterol     Cranberry 250 MG capsule     furosemide 20 MG tablet  Commonly known as:  LASIX  Take 1 tablet by mouth Daily.     melatonin 5 MG tablet tablet     multivitamin with minerals tablet tablet     potassium chloride 10 MEQ CR capsule  Commonly known as:  MICRO-K  Take 1 capsule by mouth 2 (Two) Times a Day.     rosuvastatin 20 MG tablet  Commonly known as:  CRESTOR  Take 1 tablet by mouth Daily.     tamsulosin 0.4 MG capsule 24 hr capsule  Commonly known as:  FLOMAX  Take 1 capsule by mouth Daily.          Past Medical History:   Diagnosis Date   • Acute kidney injury superimposed on chronic kidney disease (CMS/HCC)    • Arthralgia    • Asthma     mild persistent without complication   • CAD (coronary artery disease), native coronary artery    • Chest pain, unspecified    • Chronic diastolic congestive heart failure (CMS/HCC)    • Dyspnea on exertion    • Essential hypertension    • Generalized weakness    • Gout    • History of prostate cancer    • Hyperkalemia    • Jaw pain     both sides   • Mixed hyperlipidemia    • Osteoarthrosis    • PAF (paroxysmal atrial fibrillation) (CMS/HCC)    • Shoulder pain    • SOB (shortness of breath)    • Tinnitus    • Urine retention        Allergies   Allergen Reactions   • Sulfa Antibiotics Other (See Comments)     Pat was shaky and could hardly walk       Past Surgical History:   Procedure Laterality Date   • COLONOSCOPY  2013   • INGUINAL HERNIA REPAIR Right    • JOINT REPLACEMENT     • NECK SURGERY      SPURS   • PROSTATECTOMY     • REPLACEMENT TOTAL KNEE Left    • TONSILLECTOMY     • TOTAL SHOULDER REPLACEMENT Bilateral        Family History   Problem Relation Age of Onset   • Cancer Mother         lung   • Cancer Father         lung   • Diabetes Father    • Colon cancer Neg Hx    • Colon polyps Neg Hx        Social History     Socioeconomic History   • Marital status: Unknown     Spouse name: Not on file   • Number of children: Not on file   • Years of  education: Not on file   • Highest education level: Not on file   Tobacco Use   • Smoking status: Never Smoker   • Smokeless tobacco: Never Used   • Tobacco comment: caffeine - 1 every 2-3 days   Substance and Sexual Activity   • Alcohol use: Yes     Alcohol/week: 12.6 oz     Types: 21 Cans of beer per week     Comment: 2-3 beers daily   • Drug use: No   • Sexual activity: Defer           Objective   Physical Exam   Constitutional: He is oriented to person, place, and time. He appears well-developed and well-nourished. No distress.   ED Triage Vitals  Temp: 97.6 °F (36.4 °C) (12/16/18 0224)  Heart Rate: 88 (12/16/18 0225)  Resp: 14 (12/16/18 0225)  BP: 129/84 (12/16/18 0225)  SpO2: 94 % (12/16/18 0225)  Temp src: Oral (12/16/18 0224)  Heart Rate Source: Monitor (12/16/18 0224)  Patient Position: Lying (12/16/18 0225)  BP Location: Left arm (12/16/18 0225)  FiO2 (%): n/a    The patient's vitals were reviewed by me.  Unless otherwise noted they are within normal limits.     HENT:   Head: Normocephalic and atraumatic.   Right Ear: External ear normal.   Left Ear: External ear normal.   Nose: Nose normal.   Mouth/Throat: Oropharynx is clear and moist.   Eyes: Conjunctivae and EOM are normal. Pupils are equal, round, and reactive to light. Right eye exhibits no discharge. Left eye exhibits no discharge. No scleral icterus.   Neck: Normal range of motion. Neck supple. No JVD present. No tracheal deviation present. No thyromegaly present.   There is no tenderness, deformity, or bony step-offs upon palpation the cervical, thoracic, lumbar sacrococcygeal spine.   Cardiovascular: Normal rate, regular rhythm, normal heart sounds and intact distal pulses. Exam reveals no gallop and no friction rub.   No murmur heard.  Pulmonary/Chest: Effort normal and breath sounds normal. No stridor. No respiratory distress. He has no wheezes. He has no rales. He exhibits no tenderness.   Abdominal: Soft. Bowel sounds are normal. He exhibits  no distension and no mass. There is no tenderness. There is no rebound and no guarding. No hernia.   Musculoskeletal: Normal range of motion. He exhibits no edema, tenderness or deformity.   Lymphadenopathy:     He has no cervical adenopathy.   Neurological: He is alert and oriented to person, place, and time. No cranial nerve deficit or sensory deficit. He exhibits normal muscle tone. Coordination abnormal.   Skin: Skin is warm and dry. No rash noted. He is not diaphoretic. No erythema. No pallor.   Psychiatric: His behavior is normal.   Nursing note and vitals reviewed.      Procedures           ED Course      3:23 AM, 12/16/18:  The patient was reassessed.  He has no complaints.  His vital signs reviewed and are stable.  Abdominal exam: Soft nontender no masses positive bowel sounds.  Neurological exam: Conscious alert oriented ×4 with no focal deficits noted.    3:23 AM, 12/16/18:  The patient's diagnosis of fall with no apparent injury was discussed with him.  He should avoid drinking alcohol.  He should follow-up with Librado Lai next week.  He should return to emergency department if there is increasing pain, vomiting, numbness, tingling, weakness, change in bladder or bowel function, worse in any way at all.            MDM    CT Head Without Contrast   ED Interpretation   The CT of the head as read by Dr. Smith shows atrophy, nothing acute.        Labs Reviewed - No data to display  Xr Hand 3+ View Left    Result Date: 11/26/2018  Narrative: LEFT HAND 3 VIEWS 11/25/2018  HISTORY: Left hand pain and swelling status post trauma today. Struck left hand on biomechanical nacho pigeon thrower. Laceration base of second finger.  FINDINGS: 3 views of the left hand demonstrate no fracture. There is degenerative change with narrowing of the first carpometacarpal joint with subchondral cyst formation and marginal osteophytes. There is also osteophytic spurring and cyst formation involving the head of the third  metacarpal. There is a 2 mm linear metallic foreign body seen along the lateral aspect overlying the second metacarpophalangeal joint. There are also punctate foreign bodies seen along the dorsal aspect overlying the head of the first proximal phalanx. Clinical correlation recommended. There is no other soft tissue abnormality.      Impression: 1. No evidence of fracture. Degenerative changes as noted above. 2. 2 mm linear metallic foreign body seen overlying the lateral aspect of the second metacarpophalangeal joint. There are also several punctate metallic foreign bodies seen along the dorsal aspect overlying the head of the first proximal phalanx. Clinical correlation is recommended.  This report was finalized on 11/26/2018 8:38 AM by Dr. Josh Draper MD.        Final diagnoses:   Fall, initial encounter         ED Medications:  Medications - No data to display    New Medications:     Medication List      CHANGE how you take these medications    metoprolol tartrate 50 MG tablet  Commonly known as:  LOPRESSOR  Take 1 tablet by mouth 2 (Two) Times a Day.  What changed:  how much to take        CONTINUE taking these medications    allopurinol 100 MG tablet  Commonly known as:  ZYLOPRIM  TAKE 1 TABLET BY MOUTH  DAILY     apixaban 2.5 MG tablet tablet  Commonly known as:  ELIQUIS  Take 1 tablet by mouth Every 12 (Twelve) Hours.     aspirin 81 MG chewable tablet     BREO ELLIPTA 100-25 MCG/INH aerosol powder   Generic drug:  Fluticasone Furoate-Vilanterol     Cranberry 250 MG capsule     furosemide 20 MG tablet  Commonly known as:  LASIX  Take 1 tablet by mouth Daily.     melatonin 5 MG tablet tablet     multivitamin with minerals tablet tablet     potassium chloride 10 MEQ CR capsule  Commonly known as:  MICRO-K  Take 1 capsule by mouth 2 (Two) Times a Day.     rosuvastatin 20 MG tablet  Commonly known as:  CRESTOR  Take 1 tablet by mouth Daily.     tamsulosin 0.4 MG capsule 24 hr capsule  Commonly known as:   FLOMAX  Take 1 capsule by mouth Daily.          Stopped Medications:     Medication List      CHANGE how you take these medications    metoprolol tartrate 50 MG tablet  Commonly known as:  LOPRESSOR  Take 1 tablet by mouth 2 (Two) Times a Day.  What changed:  how much to take        CONTINUE taking these medications    allopurinol 100 MG tablet  Commonly known as:  ZYLOPRIM  TAKE 1 TABLET BY MOUTH  DAILY     apixaban 2.5 MG tablet tablet  Commonly known as:  ELIQUIS  Take 1 tablet by mouth Every 12 (Twelve) Hours.     aspirin 81 MG chewable tablet     BREO ELLIPTA 100-25 MCG/INH aerosol powder   Generic drug:  Fluticasone Furoate-Vilanterol     Cranberry 250 MG capsule     furosemide 20 MG tablet  Commonly known as:  LASIX  Take 1 tablet by mouth Daily.     melatonin 5 MG tablet tablet     multivitamin with minerals tablet tablet     potassium chloride 10 MEQ CR capsule  Commonly known as:  MICRO-K  Take 1 capsule by mouth 2 (Two) Times a Day.     rosuvastatin 20 MG tablet  Commonly known as:  CRESTOR  Take 1 tablet by mouth Daily.     tamsulosin 0.4 MG capsule 24 hr capsule  Commonly known as:  FLOMAX  Take 1 capsule by mouth Daily.            Final diagnoses:   Fall, initial encounter   Fall with no injury, initial encounter            Javier Gonzalez MD  12/16/18 0527       Javier Gonzalez MD  12/19/18 0845       Javier Gonzalez MD  12/28/18 0045       Javier Gonzalez MD  12/28/18 0046

## 2018-12-16 NOTE — ED NOTES
PT  was working out in the barn this evening and had maybe a few to many beers . Pt  got up to go to bathroom and the next thing he knew he had fallen. Wife said he was yelling at her , Pt was incontinent upon arrival to ED. Able to state place, month, year, birth date. Does not know who the president is. Keshawn carlson is on blood thinners. Brought back to room via w/fifi.      Julienne Marquez, RN  12/16/18 0284

## 2018-12-17 DIAGNOSIS — E78.2 MIXED HYPERLIPIDEMIA: ICD-10-CM

## 2018-12-17 RX ORDER — ROSUVASTATIN CALCIUM 20 MG/1
20 TABLET, COATED ORAL DAILY
Qty: 90 TABLET | Refills: 0 | Status: SHIPPED | OUTPATIENT
Start: 2018-12-17 | End: 2019-06-06 | Stop reason: SDUPTHER

## 2019-01-09 ENCOUNTER — PATIENT OUTREACH (OUTPATIENT)
Dept: CASE MANAGEMENT | Facility: OTHER | Age: 84
End: 2019-01-09

## 2019-01-09 NOTE — OUTREACH NOTE
No issues or concerns voiced.  Denies need for CA intervention.  Review of April AWV planned follow-up visit with Dr. Lai.

## 2019-01-10 DIAGNOSIS — E78.2 MIXED HYPERLIPIDEMIA: ICD-10-CM

## 2019-01-10 RX ORDER — ROSUVASTATIN CALCIUM 20 MG/1
20 TABLET, COATED ORAL DAILY
Qty: 90 TABLET | Refills: 1 | Status: SHIPPED | OUTPATIENT
Start: 2019-01-10 | End: 2019-05-21 | Stop reason: SDUPTHER

## 2019-01-15 ENCOUNTER — EPISODE CHANGES (OUTPATIENT)
Dept: CASE MANAGEMENT | Facility: OTHER | Age: 84
End: 2019-01-15

## 2019-01-26 DIAGNOSIS — I10 ESSENTIAL HYPERTENSION: ICD-10-CM

## 2019-01-26 DIAGNOSIS — I25.10 CORONARY ARTERY DISEASE INVOLVING NATIVE CORONARY ARTERY OF NATIVE HEART WITHOUT ANGINA PECTORIS: ICD-10-CM

## 2019-01-28 RX ORDER — ATENOLOL AND CHLORTHALIDONE TABLET 50; 25 MG/1; MG/1
1 TABLET ORAL EVERY MORNING
Qty: 90 TABLET | Refills: 3 | OUTPATIENT
Start: 2019-01-28

## 2019-01-28 RX ORDER — CLOPIDOGREL BISULFATE 75 MG/1
75 TABLET ORAL DAILY
Qty: 90 TABLET | Refills: 3 | OUTPATIENT
Start: 2019-01-28

## 2019-01-28 RX ORDER — POTASSIUM CHLORIDE 750 MG/1
CAPSULE, EXTENDED RELEASE ORAL
Qty: 180 CAPSULE | Refills: 1 | Status: SHIPPED | OUTPATIENT
Start: 2019-01-28 | End: 2019-08-29 | Stop reason: SDUPTHER

## 2019-02-27 DIAGNOSIS — M10.00 IDIOPATHIC GOUT, UNSPECIFIED CHRONICITY, UNSPECIFIED SITE: ICD-10-CM

## 2019-02-27 RX ORDER — ALLOPURINOL 100 MG/1
100 TABLET ORAL DAILY
Qty: 90 TABLET | Refills: 1 | Status: SHIPPED | OUTPATIENT
Start: 2019-02-27 | End: 2019-08-29 | Stop reason: SDUPTHER

## 2019-03-08 ENCOUNTER — CLINICAL SUPPORT NO REQUIREMENTS (OUTPATIENT)
Dept: CARDIOLOGY | Facility: CLINIC | Age: 84
End: 2019-03-08

## 2019-03-08 DIAGNOSIS — R55 SYNCOPE AND COLLAPSE: ICD-10-CM

## 2019-03-08 DIAGNOSIS — R00.1 BRADYCARDIA: Primary | ICD-10-CM

## 2019-03-08 PROCEDURE — 93296 REM INTERROG EVL PM/IDS: CPT | Performed by: INTERNAL MEDICINE

## 2019-03-08 PROCEDURE — 93294 REM INTERROG EVL PM/LDLS PM: CPT | Performed by: INTERNAL MEDICINE

## 2019-04-24 ENCOUNTER — TELEPHONE (OUTPATIENT)
Dept: CARDIOLOGY | Facility: CLINIC | Age: 84
End: 2019-04-24

## 2019-04-24 NOTE — TELEPHONE ENCOUNTER
I called and s/w pt.  He understands verbally.  He did confirm he is taking Metoprolol tart 50 mg BID.  I updated med list.  He will continue to monitor his BP/HR and call us back next week with his readings./jonah

## 2019-04-24 NOTE — TELEPHONE ENCOUNTER
Pt called c/o his blood pressure starting to increase.    Pt denies any other symptoms.  4/22  146/99  4/23  143/117  (11 am)   165/99  (12:30 pm)  4/24   161/96  (AM)  144/95  (2:15 pm)    Pt states he is taking Metoprolol Tart 50 mg BID.    # [phone]/jlf

## 2019-04-24 NOTE — TELEPHONE ENCOUNTER
His blood pressure was 124/80 at LOV. We have documented he was taking 25 metoprolol BID instead of 50 mg BID, please confirm.     His goal is less than 130/80. I want him to check BP and heart rate/ pulse for another week morning and night and call with list next week prior to adjusting any medication. Have him watch his sodium intake and increase his physical activity. Please confirm again the dose he is taking of metoprolol also.

## 2019-05-21 ENCOUNTER — OFFICE VISIT (OUTPATIENT)
Dept: FAMILY MEDICINE CLINIC | Facility: CLINIC | Age: 84
End: 2019-05-21

## 2019-05-21 VITALS
BODY MASS INDEX: 33.19 KG/M2 | HEIGHT: 68 IN | DIASTOLIC BLOOD PRESSURE: 82 MMHG | TEMPERATURE: 97.9 F | WEIGHT: 219 LBS | SYSTOLIC BLOOD PRESSURE: 110 MMHG | HEART RATE: 76 BPM | OXYGEN SATURATION: 96 % | RESPIRATION RATE: 16 BRPM

## 2019-05-21 DIAGNOSIS — I25.110 CORONARY ARTERY DISEASE INVOLVING NATIVE CORONARY ARTERY OF NATIVE HEART WITH UNSTABLE ANGINA PECTORIS (HCC): ICD-10-CM

## 2019-05-21 DIAGNOSIS — I10 HYPERTENSION, UNSPECIFIED TYPE: ICD-10-CM

## 2019-05-21 DIAGNOSIS — E78.2 MIXED HYPERLIPIDEMIA: Primary | ICD-10-CM

## 2019-05-21 DIAGNOSIS — M10.00 IDIOPATHIC GOUT, UNSPECIFIED CHRONICITY, UNSPECIFIED SITE: ICD-10-CM

## 2019-05-21 PROCEDURE — 99213 OFFICE O/P EST LOW 20 MIN: CPT | Performed by: NURSE PRACTITIONER

## 2019-05-21 RX ORDER — TESTOSTERONE CYPIONATE 200 MG/ML
1 INJECTION, SOLUTION INTRAMUSCULAR WEEKLY
Refills: 1 | COMMUNITY
Start: 2019-03-12 | End: 2022-12-14 | Stop reason: ALTCHOICE

## 2019-05-21 RX ORDER — ATENOLOL AND CHLORTHALIDONE TABLET 50; 25 MG/1; MG/1
1 TABLET ORAL DAILY
COMMUNITY
End: 2019-07-30 | Stop reason: SDUPTHER

## 2019-05-21 NOTE — PROGRESS NOTES
Patient ID: Guero Garay is a 84 y.o. male     Subjective     Chief Complaint   Patient presents with   • Establish Care   • Fatigue       History of Present Illness    Guero Garay presents to the office today for new patient evaluation. He was a previous patient of Dr. Lai.    Hypertension along with history of aortic stenosis, hypertension, hyperlipidemia, and coronary artery disease.   History of cardiac catheterization in April 2017  Along with GIN placed LAD and RCA. Followed per Dr. Galan. Recently restarted Tenoretic at beginning of month on his own due to elevated B/P of 160/100's.  Had been discontinued last December due low B/P.  Now his B/P stable at 120-130/70-80's per B/P log.      Urology:  Followed by Dr. Avalos. Hx of prostate cancer.  Removed in 2006.  Since then, has had urinary incontinence.  Also managed by urology for testosterone injections for hypogonadism.    Colonoscopy completed 12/2018 per Dr. Powers.  Had polyps which were adenomas with recommended repeat in 3 years.   Has chronic fatigue however does not appear to be have worsen.  He started having right groin area burning sensation which started 1-2 months ago.  Pain is intermittent.  Does not recall any heavy lifting, pushing, pulling.     History of gout.  Has not had a flare up since started on allopurinol.    He denies any complaints of fever, chills, cough, chest pain, shortness of air, abdominal pain, nausea, or any other concerns.     The following portions of the patient's history were reviewed and updated as appropriate: allergies, current medications, past family history, past medical history, past social history, past surgical history and problem list.       Review of Systems   Constitution: Positive for malaise/fatigue.   HENT: Negative.    Eyes: Negative.    Cardiovascular: Negative.    Respiratory: Negative.    Endocrine: Negative.    Hematologic/Lymphatic: Negative.    Skin: Negative.    Musculoskeletal:  Negative.    Gastrointestinal: Negative.    Genitourinary: Negative.         Right inguinal burning.  Intermittent for couple of months.   Neurological: Negative.    Psychiatric/Behavioral: Negative.        Vitals:    05/21/19 1308   BP: 110/82   Pulse: 76   Resp: 16   Temp: 97.9 °F (36.6 °C)   SpO2: 96%       Documented weights    05/21/19 1308   Weight: 99.3 kg (219 lb)     Body mass index is 33.3 kg/m².    Results for orders placed or performed during the hospital encounter of 12/12/18   Potassium   Result Value Ref Range    Potassium 4.4 3.5 - 5.2 mmol/L   Tissue Pathology Exam   Result Value Ref Range    Case Report       Surgical Pathology Report                         Case: [id number]                                  Authorizing Provider:  Kwadwo Powers        Collected:           12/12/2018 01:27 PM                                 MD Asad                                                                   Ordering Location:     [facility]   Received:            12/12/2018 02:52 PM                                 OR                                                                           Pathologist:           Magnus Layne MD                                                         Specimens:   1) - Large Intestine, Transverse Colon, transverse colon polyps x3                                  2) - Large Intestine, Right / Ascending Colon, ascending colon polyps x4                            3) - Large Intestine, Left / Descending Colon, descending colon polyps x3                           4) - Large Intestine, Sigmoid Colon, sigmoid colon polyps x2                               Final Diagnosis       1.  Colon, Transverse, Biopsy:  Tubular adenomas.      2.  Colon, Ascending, Biopsy: Tubular adenomas.     3.  Colon, Descending, Biopsy:  Tubular adenomas.      4.  Colon, Sigmoid, Biopsy:  Tubular adenomas.      mary/bb       Gross Description       1.  The specimen is received in  formalin labeled with the patient's name and further designated 'transverse colon polyp biopsy x3' are multiple small fragments of gray-tan tissue. The specimen is submitted for embedding as received.    2.  The specimen is received in formalin labeled with the patient's name and further designated 'ascending colon polyps biopsy x3' are multiple small fragments of gray-tan tissue. The specimen is submitted for embedding as received.    3.  The specimen is received in formalin labeled with the patient's name and further designated 'descending colon polyps biopsy' are multiple small fragments of gray-tan tissue. The specimen is submitted for embedding as received.    4.  The specimen is received in formalin labeled with the patient's name and further designated 'sigmoid colon polyps biopsy x2' are multiple small fragments of gray-tan tissue. The specimen is submitted for embedding as received.        Microscopic Description       Microscopic performed, incorporated in diagnosis.          Objective     Physical Exam   Constitutional: He is oriented to person, place, and time. Vital signs are normal. He appears well-developed.   HENT:   Head: Normocephalic and atraumatic.   Right Ear: Tympanic membrane normal.   Left Ear: Tympanic membrane normal.   Mouth/Throat: Oropharynx is clear and moist.   Eyes: EOM are normal. Pupils are equal, round, and reactive to light.   Neck: Normal range of motion. Neck supple.   Cardiovascular: Normal rate, regular rhythm and intact distal pulses.   Murmur heard.  Pulmonary/Chest: Effort normal and breath sounds normal. He has no wheezes. He has no rhonchi. He has no rales.   Abdominal: Soft. Bowel sounds are normal. There is no hepatosplenomegaly. There is no tenderness. Hernia confirmed negative in the right inguinal area and confirmed negative in the left inguinal area.   Musculoskeletal: Normal range of motion. He exhibits no edema or tenderness.   Lymphadenopathy: No inguinal  adenopathy noted on the right or left side.   Neurological: He is alert and oriented to person, place, and time. He has normal strength.   Skin: Skin is warm and dry. No rash noted. No cyanosis or erythema.   Psychiatric: He has a normal mood and affect. His behavior is normal.       Assessment/Plan     Assessment/Plan   Guero was seen today for establish care and fatigue.    Diagnoses and all orders for this visit:    Mixed hyperlipidemia  -     CBC & Differential; Future  -     Comprehensive Metabolic Panel; Future  -     Lipid Panel; Future  -     TSH; Future  -     Uric acid; Future    Hypertension, unspecified type  -     CBC & Differential; Future  -     Comprehensive Metabolic Panel; Future  -     Lipid Panel; Future  -     TSH; Future  -     Uric acid; Future    Coronary artery disease involving native coronary artery of native heart with unstable angina pectoris (CMS/HCC)  -     CBC & Differential; Future  -     Comprehensive Metabolic Panel; Future  -     Lipid Panel; Future  -     TSH; Future  -     Uric acid; Future    Idiopathic gout, unspecified chronicity, unspecified site  -     Uric acid; Future          Summary:  Guero Garay presents to the office today for new patient evaluation.  He is not fasting.  We will have him schedule for fasting lab appointment and we will call him with results.  Once lab results are received, will be able to provide further recommendations.  Instructed to follow-up with Dr. Galan as directed.  Follow-up with urology as directed. No inguinal hernia noted with exam.  Sound like groin burning sensation related to groin strain.  Also he thinks may be related to scar tissue from previous cardiac catheterization.  Continue to monitor and notify us if worsens. If labs stable, instructed to follow-up in 6 months for next recheck appointment with fasting labs along with AWV.    In the meantime, instructed to contact us sooner for any problems or concerns.    Yesenia Fraga,  APRN  Family Medicine  [facility] [place] [facility]  05/21/19  1:24 PM

## 2019-05-24 ENCOUNTER — RESULTS ENCOUNTER (OUTPATIENT)
Dept: FAMILY MEDICINE CLINIC | Facility: CLINIC | Age: 84
End: 2019-05-24

## 2019-05-24 DIAGNOSIS — E78.2 MIXED HYPERLIPIDEMIA: ICD-10-CM

## 2019-05-24 DIAGNOSIS — M10.00 IDIOPATHIC GOUT, UNSPECIFIED CHRONICITY, UNSPECIFIED SITE: ICD-10-CM

## 2019-05-24 DIAGNOSIS — I25.110 CORONARY ARTERY DISEASE INVOLVING NATIVE CORONARY ARTERY OF NATIVE HEART WITH UNSTABLE ANGINA PECTORIS (HCC): ICD-10-CM

## 2019-05-24 DIAGNOSIS — I10 HYPERTENSION, UNSPECIFIED TYPE: ICD-10-CM

## 2019-06-04 LAB
ALBUMIN SERPL-MCNC: 4.5 G/DL (ref 3.5–5.2)
ALBUMIN/GLOB SERPL: 2 G/DL
ALP SERPL-CCNC: 66 U/L (ref 39–117)
ALT SERPL-CCNC: 19 U/L (ref 1–41)
AST SERPL-CCNC: 27 U/L (ref 1–40)
BASOPHILS # BLD AUTO: 0.04 10*3/MM3 (ref 0–0.2)
BASOPHILS NFR BLD AUTO: 0.8 % (ref 0–1.5)
BILIRUB SERPL-MCNC: 0.8 MG/DL (ref 0.2–1.2)
BUN SERPL-MCNC: 19 MG/DL (ref 8–23)
BUN/CREAT SERPL: 14.8 (ref 7–25)
CALCIUM SERPL-MCNC: 10.7 MG/DL (ref 8.6–10.5)
CHLORIDE SERPL-SCNC: 99 MMOL/L (ref 98–107)
CHOLEST SERPL-MCNC: 110 MG/DL (ref 0–200)
CO2 SERPL-SCNC: 31.4 MMOL/L (ref 22–29)
CREAT SERPL-MCNC: 1.28 MG/DL (ref 0.76–1.27)
EOSINOPHIL # BLD AUTO: 0.16 10*3/MM3 (ref 0–0.4)
EOSINOPHIL NFR BLD AUTO: 3.2 % (ref 0.3–6.2)
ERYTHROCYTE [DISTWIDTH] IN BLOOD BY AUTOMATED COUNT: 13.6 % (ref 12.3–15.4)
GLOBULIN SER CALC-MCNC: 2.3 GM/DL
GLUCOSE SERPL-MCNC: 105 MG/DL (ref 65–99)
HCT VFR BLD AUTO: 50.5 % (ref 37.5–51)
HDLC SERPL-MCNC: 38 MG/DL (ref 40–60)
HGB BLD-MCNC: 15.9 G/DL (ref 13–17.7)
IMM GRANULOCYTES # BLD AUTO: 0.03 10*3/MM3 (ref 0–0.05)
IMM GRANULOCYTES NFR BLD AUTO: 0.6 % (ref 0–0.5)
LDLC SERPL CALC-MCNC: 40 MG/DL (ref 0–100)
LYMPHOCYTES # BLD AUTO: 1.34 10*3/MM3 (ref 0.7–3.1)
LYMPHOCYTES NFR BLD AUTO: 27 % (ref 19.6–45.3)
MCH RBC QN AUTO: 31.3 PG (ref 26.6–33)
MCHC RBC AUTO-ENTMCNC: 31.5 G/DL (ref 31.5–35.7)
MCV RBC AUTO: 99.4 FL (ref 79–97)
MONOCYTES # BLD AUTO: 0.66 10*3/MM3 (ref 0.1–0.9)
MONOCYTES NFR BLD AUTO: 13.3 % (ref 5–12)
NEUTROPHILS # BLD AUTO: 2.73 10*3/MM3 (ref 1.7–7)
NEUTROPHILS NFR BLD AUTO: 55.1 % (ref 42.7–76)
NRBC BLD AUTO-RTO: 0 /100 WBC (ref 0–0.2)
PLATELET # BLD AUTO: 158 10*3/MM3 (ref 140–450)
POTASSIUM SERPL-SCNC: 4.2 MMOL/L (ref 3.5–5.2)
PROT SERPL-MCNC: 6.8 G/DL (ref 6–8.5)
RBC # BLD AUTO: 5.08 10*6/MM3 (ref 4.14–5.8)
SODIUM SERPL-SCNC: 141 MMOL/L (ref 136–145)
TRIGL SERPL-MCNC: 160 MG/DL (ref 0–150)
TSH SERPL DL<=0.005 MIU/L-ACNC: 2.1 MIU/ML (ref 0.27–4.2)
URATE SERPL-MCNC: 7.2 MG/DL (ref 3.4–7)
VLDLC SERPL CALC-MCNC: 32 MG/DL
WBC # BLD AUTO: 4.96 10*3/MM3 (ref 3.4–10.8)

## 2019-06-06 DIAGNOSIS — E78.2 MIXED HYPERLIPIDEMIA: ICD-10-CM

## 2019-06-06 RX ORDER — ROSUVASTATIN CALCIUM 20 MG/1
20 TABLET, COATED ORAL DAILY
Qty: 90 TABLET | Refills: 3 | Status: SHIPPED | OUTPATIENT
Start: 2019-06-06 | End: 2020-04-28

## 2019-06-13 ENCOUNTER — CLINICAL SUPPORT NO REQUIREMENTS (OUTPATIENT)
Dept: CARDIOLOGY | Facility: CLINIC | Age: 84
End: 2019-06-13

## 2019-06-13 DIAGNOSIS — I48.0 PAROXYSMAL ATRIAL FIBRILLATION (HCC): Primary | ICD-10-CM

## 2019-06-13 PROCEDURE — 93280 PM DEVICE PROGR EVAL DUAL: CPT | Performed by: INTERNAL MEDICINE

## 2019-07-30 RX ORDER — ATENOLOL AND CHLORTHALIDONE TABLET 50; 25 MG/1; MG/1
1 TABLET ORAL DAILY
Qty: 90 TABLET | Refills: 1 | Status: SHIPPED | OUTPATIENT
Start: 2019-07-30 | End: 2019-08-29 | Stop reason: SDUPTHER

## 2019-08-15 RX ORDER — FUROSEMIDE 20 MG/1
TABLET ORAL
Qty: 90 TABLET | Refills: 1 | Status: SHIPPED | OUTPATIENT
Start: 2019-08-15 | End: 2020-01-31

## 2019-08-15 RX ORDER — METOPROLOL TARTRATE 50 MG/1
TABLET, FILM COATED ORAL
Qty: 180 TABLET | Refills: 1 | Status: SHIPPED | OUTPATIENT
Start: 2019-08-15 | End: 2020-01-31

## 2019-08-29 ENCOUNTER — TELEPHONE (OUTPATIENT)
Dept: CARDIOLOGY | Facility: CLINIC | Age: 84
End: 2019-08-29

## 2019-08-29 DIAGNOSIS — M10.00 IDIOPATHIC GOUT, UNSPECIFIED CHRONICITY, UNSPECIFIED SITE: ICD-10-CM

## 2019-08-29 DIAGNOSIS — I10 ESSENTIAL HYPERTENSION: ICD-10-CM

## 2019-08-29 RX ORDER — ALLOPURINOL 100 MG/1
100 TABLET ORAL DAILY
Qty: 90 TABLET | Refills: 0 | Status: SHIPPED | OUTPATIENT
Start: 2019-08-29 | End: 2019-11-29 | Stop reason: SDUPTHER

## 2019-08-29 RX ORDER — ATENOLOL AND CHLORTHALIDONE TABLET 50; 25 MG/1; MG/1
1 TABLET ORAL DAILY
Qty: 90 TABLET | Refills: 1 | Status: SHIPPED | OUTPATIENT
Start: 2019-08-29 | End: 2020-02-05

## 2019-08-29 RX ORDER — POTASSIUM CHLORIDE 750 MG/1
10 CAPSULE, EXTENDED RELEASE ORAL 2 TIMES DAILY
Qty: 180 CAPSULE | Refills: 1 | Status: SHIPPED | OUTPATIENT
Start: 2019-08-29 | End: 2020-03-02

## 2019-08-29 RX ORDER — APIXABAN 2.5 MG/1
TABLET, FILM COATED ORAL
Qty: 60 TABLET | Refills: 2 | Status: SHIPPED | OUTPATIENT
Start: 2019-08-29 | End: 2019-12-02 | Stop reason: SDUPTHER

## 2019-08-29 NOTE — TELEPHONE ENCOUNTER
Patient called informing me that he needs 2 refills at [facility] in [place].  One is Kcl 10meq twice daily and the other is Atenolol 50/25mg daily.  Patient said his Atenolol-chlorthalidone had been stopped.  But, his b/p became elevated and patient started taking the Atenolol-Chlorthalidone on his own.  Patient also takes Metoprolol Tar 50mg twice daily.  Is it ok to fill Atenolol Chlorthalidone 50/25mg daily at [facility]?    Patient said his b/p has been 120-135/ 65-80 and HR 50's to 60.  Patient said he feel great and hasn't felt this good in a long time.  Please advise.  Margareth

## 2019-09-13 ENCOUNTER — CLINICAL SUPPORT NO REQUIREMENTS (OUTPATIENT)
Dept: CARDIOLOGY | Facility: CLINIC | Age: 84
End: 2019-09-13

## 2019-09-13 DIAGNOSIS — I48.0 PAROXYSMAL ATRIAL FIBRILLATION (HCC): Primary | ICD-10-CM

## 2019-09-13 PROCEDURE — 93296 REM INTERROG EVL PM/IDS: CPT | Performed by: INTERNAL MEDICINE

## 2019-09-13 PROCEDURE — 93294 REM INTERROG EVL PM/LDLS PM: CPT | Performed by: INTERNAL MEDICINE

## 2019-11-29 DIAGNOSIS — M10.00 IDIOPATHIC GOUT, UNSPECIFIED CHRONICITY, UNSPECIFIED SITE: ICD-10-CM

## 2019-12-02 ENCOUNTER — CLINICAL SUPPORT NO REQUIREMENTS (OUTPATIENT)
Dept: CARDIOLOGY | Facility: CLINIC | Age: 84
End: 2019-12-02

## 2019-12-02 ENCOUNTER — OFFICE VISIT (OUTPATIENT)
Dept: CARDIOLOGY | Facility: CLINIC | Age: 84
End: 2019-12-02

## 2019-12-02 VITALS
HEART RATE: 70 BPM | HEIGHT: 68 IN | WEIGHT: 219 LBS | DIASTOLIC BLOOD PRESSURE: 60 MMHG | BODY MASS INDEX: 33.19 KG/M2 | SYSTOLIC BLOOD PRESSURE: 110 MMHG

## 2019-12-02 DIAGNOSIS — I48.0 PAROXYSMAL ATRIAL FIBRILLATION (HCC): Primary | ICD-10-CM

## 2019-12-02 DIAGNOSIS — I25.110 CORONARY ARTERY DISEASE INVOLVING NATIVE CORONARY ARTERY OF NATIVE HEART WITH UNSTABLE ANGINA PECTORIS (HCC): Primary | ICD-10-CM

## 2019-12-02 DIAGNOSIS — Z95.0 PACEMAKER: ICD-10-CM

## 2019-12-02 DIAGNOSIS — I48.0 PAROXYSMAL ATRIAL FIBRILLATION (HCC): ICD-10-CM

## 2019-12-02 DIAGNOSIS — I50.32 CHRONIC DIASTOLIC CONGESTIVE HEART FAILURE (HCC): ICD-10-CM

## 2019-12-02 DIAGNOSIS — E78.2 MIXED HYPERLIPIDEMIA: ICD-10-CM

## 2019-12-02 DIAGNOSIS — I10 ESSENTIAL HYPERTENSION: ICD-10-CM

## 2019-12-02 PROCEDURE — 99214 OFFICE O/P EST MOD 30 MIN: CPT | Performed by: INTERNAL MEDICINE

## 2019-12-02 PROCEDURE — 93000 ELECTROCARDIOGRAM COMPLETE: CPT | Performed by: INTERNAL MEDICINE

## 2019-12-02 PROCEDURE — 93280 PM DEVICE PROGR EVAL DUAL: CPT | Performed by: INTERNAL MEDICINE

## 2019-12-02 RX ORDER — ALLOPURINOL 100 MG/1
100 TABLET ORAL DAILY
Qty: 90 TABLET | Refills: 0 | Status: SHIPPED | OUTPATIENT
Start: 2019-12-02 | End: 2020-03-02

## 2019-12-02 NOTE — PROGRESS NOTES
Date of Office Visit: 19  Encounter Provider: Avery Galan MD  Place of Service: [facility] CARDIOLOGY  Patient Name: Guero Garay  :1935  Referral Provider:No ref. provider found      Chief Complaint   Patient presents with   • Follow-up     History of Present Illness  Mr Garay is a pleasant 83 yo gentleman with a history of aortic stenosis, hypertension, hyperlipidemia, and coronary artery disease.  He presented to our office in 2017 with progressive angina.  He then had a perfusion stress test that showed a large area of anterior and septal ischemia echocardiogram at that time showed mild aortic stenosis he then underwent cardiac catheterization 2017 he was found have preserved left ventricular ejection fraction severe disease of the mid left anterior descending and right coronary artery.  Had a 4.0 x 15 mm drug-eluting stent placed in the left anterior descending and a 4.0 x 28 mm drug-eluting stent dilated to 4.5 mm in the right coronary artery.  He was hypokalemic on admission started on potassium, he was also started on on rosuvastatin.    He then presented in 2017 with dizziness and syncope and bradycardia.  He ended up having a permanent pacemaker implanted.  He also had a tick bite but was negative for [place] spotted fever. He's coming in early because on his pacemaker interrogation he was found to have episodes of atrial fibrillation fairly prolonged and now occupying 10.6% burden.   He was started on eliquis.  He also had some shortness of breath and was scheduled for outpatient perfusion stress test.  Echocardiogram on 8/10/2018 showed normal left ventricular systolic function with a small LV cavity, mild concentric hypertrophy, mild calcification of the aortic valve mild aortic valve stenosis.  His renal function and liver function studies were abnormal four days prior.  His blood pressure was low in the office visit on  8/10/2018 and 84/60 and he was arranged for admission to the hospital. VQ scan was showed no findings suggestive of PE. He was found to have urinary retention and required Tran catheter as well as urology following.  He had recently been treated for urosepsis and acute pyelonephritis.  CT of the abdomen was normal as well as ultrasound of the liver which was normal.  Elevated liver enzymes were felt to be from antibiotic patient was recently on.  He had a heme positive stool and was to have an outpatient colonoscopy.  Outpatient Perfusion stress test completed on 8/17/2018 showed no evidence of ischemia and was a low risk study.  He now comes in for follow-up.  He had been doing great but while in [place] and noted a couple times while walking of jaw pain resolved with rest no further discomfort though.  No orthopnea or PND.  No palpitations no near syncope or syncope.  Does get some occasional lightheadedness when bending over and does have some fatigue but overall feels like he is doing reasonably well.      Coronary Artery Disease   Pertinent negatives include no dizziness, muscle weakness or weight gain. There is no history of past myocardial infarction.         Past Medical History:   Diagnosis Date   • Acute kidney injury superimposed on chronic kidney disease (CMS/HCC)    • Arthralgia    • Asthma     mild persistent without complication   • CAD (coronary artery disease), native coronary artery    • Cancer (CMS/HCC)     Prostate   • Chest pain, unspecified    • Chronic diastolic congestive heart failure (CMS/HCC)    • Colon polyp    • Dyspnea on exertion    • Erectile dysfunction    • Essential hypertension    • Generalized weakness    • Gout    • History of prostate cancer    • Hyperkalemia    • Jaw pain     both sides   • Mixed hyperlipidemia    • Osteoarthrosis    • PAF (paroxysmal atrial fibrillation) (CMS/HCC)    • Shoulder pain    • SOB (shortness of breath)    • Tinnitus    • Urine retention           Past Surgical History:   Procedure Laterality Date   • CARDIAC CATHETERIZATION N/A 4/27/2017    Procedure: Coronary angiography;  Surgeon: Marvel Brito MD;  Location:  [facility];  Service:    • CARDIAC CATHETERIZATION N/A 4/27/2017    Procedure: Left Heart Cath;  Surgeon: Marvel Brito MD;  Location:  [facility];  Service:    • CARDIAC CATHETERIZATION N/A 4/27/2017    Procedure: Stent GIN coronary;  Surgeon: Marvel Brito MD;  Location:  [facility];  Service:    • CARDIAC ELECTROPHYSIOLOGY PROCEDURE N/A 6/15/2017    Procedure: Pacemaker DC new   [employer];  Surgeon: Gustavo Valladares MD;  Location:  [facility];  Service:    • COLONOSCOPY  2013   • COLONOSCOPY N/A 12/12/2018    Procedure: COLONOSCOPY with polypectomy;  Surgeon: Kwadwo Powers MD;  Location:  [facility];  Service: Gastroenterology   • INGUINAL HERNIA REPAIR Right    • JOINT REPLACEMENT      left knee, left and right shoulder   • NECK SURGERY      SPURS   • NE RECONSTR TOTAL SHOULDER IMPLANT Right 2/16/2017    Procedure: RIGHT TOTAL SHOULDER ARTHROPLASTY;  Surgeon: Marquez Galvan MD;  Location:  [facility];  Service: Orthopedics   • PROSTATECTOMY     • REPLACEMENT TOTAL KNEE Left    • TONSILLECTOMY     • TOTAL SHOULDER REPLACEMENT Bilateral          Current Outpatient Medications on File Prior to Visit   Medication Sig Dispense Refill   • allopurinol (ZYLOPRIM) 100 MG tablet TAKE 1 TABLET BY MOUTH DAILY 90 tablet 0   • apixaban (ELIQUIS) 2.5 MG tablet tablet Take 1 tablet by mouth Every 12 (Twelve) Hours. 180 tablet 3   • aspirin 81 MG chewable tablet Chew 81 mg Daily.     • atenolol-chlorthalidone (TENORETIC) 50-25 MG per tablet Take 1 tablet by mouth Daily. 90 tablet 1   • Cranberry 250 MG capsule Take 250 mg by mouth Daily.     • Fluticasone Furoate-Vilanterol (BREO ELLIPTA) 100-25 MCG/INH aerosol powder  Inhale 1 puff Every Morning.     •  furosemide (LASIX) 20 MG tablet TAKE 1 TABLET BY MOUTH EVERY DAY 90 tablet 1   • melatonin 5 MG tablet tablet Take 5 mg by mouth At Night As Needed.     • metoprolol tartrate (LOPRESSOR) 50 MG tablet TAKE 1 TABLET BY MOUTH TWICE DAILY 180 tablet 1   • Multiple Vitamins-Minerals (MULTIVITAMIN WITH MINERALS) tablet tablet Take 1 tablet by mouth Daily.     • potassium chloride (MICRO-K) 10 MEQ CR capsule Take 1 capsule by mouth 2 (Two) Times a Day. 180 capsule 1   • rosuvastatin (CRESTOR) 20 MG tablet Take 1 tablet by mouth Daily. 90 tablet 3   • Testosterone Cypionate (DEPOTESTOTERONE CYPIONATE) 200 MG/ML injection 1 mL 1 (One) Time Per Week.  1   • [DISCONTINUED] metoprolol tartrate (LOPRESSOR) 50 MG tablet Take 50 mg by mouth 2 (Two) Times a Day.     • [DISCONTINUED] ELIQUIS 2.5 MG tablet tablet TAKE 1 TABLET BY MOUTH EVERY 12 HOURS 60 tablet 2   • [DISCONTINUED] tamsulosin (FLOMAX) 0.4 MG capsule 24 hr capsule Take 1 capsule by mouth Daily. 30 capsule 1     No current facility-administered medications on file prior to visit.          Social History     Socioeconomic History   • Marital status:      Spouse name: Not on file   • Number of children: Not on file   • Years of education: Not on file   • Highest education level: Not on file   Tobacco Use   • Smoking status: Never Smoker   • Smokeless tobacco: Never Used   • Tobacco comment: caffeine - 1 every 2-3 days   Substance and Sexual Activity   • Alcohol use: Yes     Alcohol/week: 12.6 oz     Types: 21 Cans of beer per week     Comment: 2-3 beers daily   • Drug use: No   • Sexual activity: Defer   Lifestyle   • Physical activity:     Days per week: 5 days     Minutes per session: 20 min   • Stress: Not on file       Family History   Problem Relation Age of Onset   • Cancer Mother         lung   • Cancer Father         lung   • Diabetes Father    • Colon cancer Neg Hx    • Colon polyps Neg Hx          Review of Systems   Constitution: Negative for decreased  "appetite, diaphoresis, fever, weakness, malaise/fatigue, weight gain and weight loss.   HENT: Negative for congestion, hearing loss, nosebleeds and tinnitus.    Eyes: Negative for blurred vision, double vision, vision loss in left eye, vision loss in right eye and visual disturbance.   Cardiovascular:        As noted in HPI   Respiratory:        As noted HPI   Endocrine: Negative for cold intolerance and heat intolerance.   Hematologic/Lymphatic: Negative for bleeding problem. Does not bruise/bleed easily.   Skin: Negative for color change, flushing, itching and rash.   Musculoskeletal: Positive for joint pain and myalgias. Negative for arthritis, back pain, joint swelling and muscle weakness.   Gastrointestinal: Negative for bloating, abdominal pain, constipation, diarrhea, dysphagia, heartburn, hematemesis, hematochezia, melena, nausea and vomiting.   Genitourinary: Negative for bladder incontinence, dysuria, frequency, nocturia and urgency.   Neurological: Negative for dizziness, focal weakness, headaches, light-headedness, loss of balance, numbness, paresthesias and vertigo.   Psychiatric/Behavioral: Negative for depression, memory loss and substance abuse.       Procedures      ECG 12 Lead  Date/Time: 12/2/2019 11:34 AM  Performed by: Avery Galan MD  Authorized by: Avery Galan MD   Comparison: compared with previous ECG   Comparison to previous ECG: T wave abnormality is new  Rhythm: sinus rhythm  Rate: normal  Conduction: 1st degree AV block  T flattening: II, III, aVF, V3, V4, V5 and V6  QRS axis: left                  Objective:    /60 (BP Location: Right arm)   Pulse 70   Ht 172.7 cm (68\")   Wt 99.3 kg (219 lb)   BMI 33.30 kg/m²        Physical Exam  Physical Exam   Constitutional: He is oriented to person, place, and time. He appears well-developed and well-nourished. No distress.   HENT:   Head: Normocephalic.   Eyes: Conjunctivae are normal. Pupils are equal, round, and reactive " to light. No scleral icterus.   Neck: Normal carotid pulses, no hepatojugular reflux and no JVD present. Carotid bruit is not present. No tracheal deviation, no edema and no erythema present. No thyromegaly present.   Cardiovascular: Normal rate, regular rhythm, S1 normal, S2 normal and intact distal pulses.  No extrasystoles are present. PMI is not displaced. Exam reveals no gallop, no distant heart sounds and no friction rub.   Murmur heard.   Systolic murmur is present with a grade of 2/6 at the upper right sternal border.  Pulses:       Carotid pulses are 2+ on the right side, and 2+ on the left side.       Radial pulses are 2+ on the right side, and 2+ on the left side.        Femoral pulses are 2+ on the right side, and 2+ on the left side.       Dorsalis pedis pulses are 2+ on the right side, and 2+ on the left side.        Posterior tibial pulses are 2+ on the right side, and 2+ on the left side.   Pulmonary/Chest: Effort normal and breath sounds normal. No respiratory distress. He has no decreased breath sounds. He has no wheezes. He has no rhonchi. He has no rales. He exhibits no tenderness.   Abdominal: Soft. Bowel sounds are normal. He exhibits no distension and no mass. There is no hepatosplenomegaly. There is no tenderness. There is no rebound and no guarding.   Musculoskeletal: He exhibits no edema, tenderness or deformity.   Neurological: He is alert and oriented to person, place, and time.   Skin: Skin is warm and dry. No rash noted. He is not diaphoretic. No cyanosis or erythema. No pallor. Nails show no clubbing.   Psychiatric: He has a normal mood and affect. His speech is normal and behavior is normal. Judgment and thought content normal.           Assessment:   1. 84-year-old gentleman with history of severe coronary artery disease preserved left ventricular systolic function.  Status post drug-eluting stents placed to the right coronary artery and the left anterior descending.  Concerned about  the jaw pain and new EKG changes.  He will return for a perfusion stress test will have to do this as a non-walking protocol due to his pacemaker and atrial fibrillation if normal follow him clinically if mildly abnormal increased medical therapy.  2.  Hyperlipidemia now on target dose rosuvastatin continue the same.  3.  Hypertension blood pressure adequately controlled.  4.  History of prostate cancer.  5.   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 He does have first-degree AV block but again stable.  6.  Paroxysmal atrial fibrillation, asymptomatic, noted on his pacemaker.  But a large percent of his rhythm was in atrial fibrillation. The patient's CHADS2-VASc score is 5.  Continue the apixaban and beta-blocker.  7.  Aortic stenosis.  Mild echocardiogram August 2018 continue to follow him clinically.          Plan:

## 2019-12-05 ENCOUNTER — HOSPITAL ENCOUNTER (OUTPATIENT)
Dept: CARDIOLOGY | Facility: HOSPITAL | Age: 84
Discharge: HOME | End: 2019-12-05
Admitting: INTERNAL MEDICINE

## 2019-12-05 ENCOUNTER — TELEPHONE (OUTPATIENT)
Dept: CARDIOLOGY | Facility: CLINIC | Age: 84
End: 2019-12-05

## 2019-12-05 VITALS — BODY MASS INDEX: 33.18 KG/M2 | WEIGHT: 218.92 LBS | HEIGHT: 68 IN

## 2019-12-05 DIAGNOSIS — M10.00 IDIOPATHIC GOUT, UNSPECIFIED CHRONICITY, UNSPECIFIED SITE: Primary | ICD-10-CM

## 2019-12-05 DIAGNOSIS — E78.5 HYPERLIPIDEMIA, UNSPECIFIED HYPERLIPIDEMIA TYPE: ICD-10-CM

## 2019-12-05 DIAGNOSIS — I10 ESSENTIAL HYPERTENSION: ICD-10-CM

## 2019-12-05 LAB
BH CV NUCLEAR PRIOR STUDY: 2
BH CV STRESS BP STAGE 1: NORMAL
BH CV STRESS COMMENTS STAGE 1: NORMAL
BH CV STRESS DOSE REGADENOSON STAGE 1: 0.4
BH CV STRESS DURATION MIN STAGE 1: 0
BH CV STRESS DURATION SEC STAGE 1: 10
BH CV STRESS HR STAGE 1: 97
BH CV STRESS PROTOCOL 1: NORMAL
BH CV STRESS RECOVERY BP: NORMAL MMHG
BH CV STRESS RECOVERY HR: 69 BPM
BH CV STRESS STAGE 1: 1
LV EF NUC BP: 56 %
MAXIMAL PREDICTED HEART RATE: 137 BPM
PERCENT MAX PREDICTED HR: 70.8 %
STRESS BASELINE BP: NORMAL MMHG
STRESS BASELINE HR: 64 BPM
STRESS PERCENT HR: 83 %
STRESS POST EXERCISE DUR SEC: 10 SEC
STRESS POST PEAK BP: NORMAL MMHG
STRESS POST PEAK HR: 97 BPM
STRESS TARGET HR: 116 BPM

## 2019-12-05 PROCEDURE — 78492 MYOCRD IMG PET MLT RST&STRS: CPT

## 2019-12-05 PROCEDURE — 93017 CV STRESS TEST TRACING ONLY: CPT

## 2019-12-05 PROCEDURE — 78492 MYOCRD IMG PET MLT RST&STRS: CPT | Performed by: INTERNAL MEDICINE

## 2019-12-05 PROCEDURE — 0 RUBIDIUM CHLORIDE: Performed by: INTERNAL MEDICINE

## 2019-12-05 PROCEDURE — 93018 CV STRESS TEST I&R ONLY: CPT | Performed by: INTERNAL MEDICINE

## 2019-12-05 PROCEDURE — 93016 CV STRESS TEST SUPVJ ONLY: CPT | Performed by: INTERNAL MEDICINE

## 2019-12-05 PROCEDURE — 25010000002 REGADENOSON 0.4 MG/5ML SOLUTION: Performed by: INTERNAL MEDICINE

## 2019-12-05 PROCEDURE — A9555 RB82 RUBIDIUM: HCPCS | Performed by: INTERNAL MEDICINE

## 2019-12-05 RX ADMIN — RUBIDIUM CHLORIDE RB-82 1 DOSE: 150 INJECTION, SOLUTION INTRAVENOUS at 08:20

## 2019-12-05 RX ADMIN — REGADENOSON 0.4 MG: 0.08 INJECTION, SOLUTION INTRAVENOUS at 08:34

## 2019-12-05 RX ADMIN — RUBIDIUM CHLORIDE RB-82 1 DOSE: 150 INJECTION, SOLUTION INTRAVENOUS at 08:34

## 2019-12-05 NOTE — TELEPHONE ENCOUNTER
Reading Physician Reading Date Result Priority   Rashida Sanz MD 12/5/2019 Routine      Result Text     · Left ventricular ejection fraction is normal (Calculated EF = 56%).  · Myocardial perfusion imaging indicates a small-sized, mildly severe area of ischemia located in the apex.  · Impressions are consistent with a low risk study.  · LVEF with stress is 64%.  · Findings consistent with an indeterminate ECG stress test.

## 2019-12-07 LAB
ALBUMIN SERPL-MCNC: 4.6 G/DL (ref 3.5–5.2)
ALBUMIN/GLOB SERPL: 2 G/DL
ALP SERPL-CCNC: 62 U/L (ref 39–117)
ALT SERPL-CCNC: 19 U/L (ref 1–41)
AST SERPL-CCNC: 24 U/L (ref 1–40)
BASOPHILS # BLD AUTO: 0.06 10*3/MM3 (ref 0–0.2)
BASOPHILS NFR BLD AUTO: 1.2 % (ref 0–1.5)
BILIRUB SERPL-MCNC: 0.5 MG/DL (ref 0.2–1.2)
BUN SERPL-MCNC: 17 MG/DL (ref 8–23)
BUN/CREAT SERPL: 14.5 (ref 7–25)
CALCIUM SERPL-MCNC: 9.6 MG/DL (ref 8.6–10.5)
CHLORIDE SERPL-SCNC: 101 MMOL/L (ref 98–107)
CHOLEST SERPL-MCNC: 104 MG/DL (ref 0–200)
CO2 SERPL-SCNC: 33.6 MMOL/L (ref 22–29)
CREAT SERPL-MCNC: 1.17 MG/DL (ref 0.76–1.27)
EOSINOPHIL # BLD AUTO: 0.18 10*3/MM3 (ref 0–0.4)
EOSINOPHIL NFR BLD AUTO: 3.5 % (ref 0.3–6.2)
ERYTHROCYTE [DISTWIDTH] IN BLOOD BY AUTOMATED COUNT: 12.8 % (ref 12.3–15.4)
GLOBULIN SER CALC-MCNC: 2.3 GM/DL
GLUCOSE SERPL-MCNC: 103 MG/DL (ref 65–99)
HCT VFR BLD AUTO: 49.7 % (ref 37.5–51)
HDLC SERPL-MCNC: 40 MG/DL (ref 40–60)
HGB BLD-MCNC: 16.5 G/DL (ref 13–17.7)
IMM GRANULOCYTES # BLD AUTO: 0.02 10*3/MM3 (ref 0–0.05)
IMM GRANULOCYTES NFR BLD AUTO: 0.4 % (ref 0–0.5)
LDLC SERPL CALC-MCNC: 44 MG/DL (ref 0–100)
LYMPHOCYTES # BLD AUTO: 1.27 10*3/MM3 (ref 0.7–3.1)
LYMPHOCYTES NFR BLD AUTO: 24.4 % (ref 19.6–45.3)
MCH RBC QN AUTO: 32.7 PG (ref 26.6–33)
MCHC RBC AUTO-ENTMCNC: 33.2 G/DL (ref 31.5–35.7)
MCV RBC AUTO: 98.4 FL (ref 79–97)
MONOCYTES # BLD AUTO: 0.84 10*3/MM3 (ref 0.1–0.9)
MONOCYTES NFR BLD AUTO: 16.2 % (ref 5–12)
NEUTROPHILS # BLD AUTO: 2.83 10*3/MM3 (ref 1.7–7)
NEUTROPHILS NFR BLD AUTO: 54.3 % (ref 42.7–76)
NRBC BLD AUTO-RTO: 0.2 /100 WBC (ref 0–0.2)
PLATELET # BLD AUTO: 167 10*3/MM3 (ref 140–450)
POTASSIUM SERPL-SCNC: 4.1 MMOL/L (ref 3.5–5.2)
PROT SERPL-MCNC: 6.9 G/DL (ref 6–8.5)
RBC # BLD AUTO: 5.05 10*6/MM3 (ref 4.14–5.8)
SODIUM SERPL-SCNC: 143 MMOL/L (ref 136–145)
TRIGL SERPL-MCNC: 98 MG/DL (ref 0–150)
TSH SERPL DL<=0.005 MIU/L-ACNC: 2.19 UIU/ML (ref 0.27–4.2)
URATE SERPL-MCNC: 6.5 MG/DL (ref 3.4–7)
VLDLC SERPL CALC-MCNC: 19.6 MG/DL
WBC # BLD AUTO: 5.2 10*3/MM3 (ref 3.4–10.8)

## 2019-12-13 ENCOUNTER — OFFICE VISIT (OUTPATIENT)
Dept: FAMILY MEDICINE CLINIC | Facility: CLINIC | Age: 84
End: 2019-12-13

## 2019-12-13 VITALS
OXYGEN SATURATION: 96 % | WEIGHT: 220 LBS | HEART RATE: 90 BPM | BODY MASS INDEX: 33.34 KG/M2 | RESPIRATION RATE: 16 BRPM | SYSTOLIC BLOOD PRESSURE: 120 MMHG | TEMPERATURE: 98.2 F | HEIGHT: 68 IN | DIASTOLIC BLOOD PRESSURE: 76 MMHG

## 2019-12-13 DIAGNOSIS — I25.110 CORONARY ARTERY DISEASE INVOLVING NATIVE CORONARY ARTERY OF NATIVE HEART WITH UNSTABLE ANGINA PECTORIS (HCC): ICD-10-CM

## 2019-12-13 DIAGNOSIS — M10.00 IDIOPATHIC GOUT, UNSPECIFIED CHRONICITY, UNSPECIFIED SITE: ICD-10-CM

## 2019-12-13 DIAGNOSIS — I10 ESSENTIAL HYPERTENSION: ICD-10-CM

## 2019-12-13 DIAGNOSIS — Z00.00 MEDICARE ANNUAL WELLNESS VISIT, SUBSEQUENT: Primary | ICD-10-CM

## 2019-12-13 PROCEDURE — G0439 PPPS, SUBSEQ VISIT: HCPCS | Performed by: NURSE PRACTITIONER

## 2019-12-13 PROCEDURE — 99213 OFFICE O/P EST LOW 20 MIN: CPT | Performed by: NURSE PRACTITIONER

## 2019-12-13 RX ORDER — BUDESONIDE AND FORMOTEROL FUMARATE DIHYDRATE 160; 4.5 UG/1; UG/1
2 AEROSOL RESPIRATORY (INHALATION)
COMMUNITY
End: 2021-12-03

## 2019-12-13 RX ORDER — CETIRIZINE HYDROCHLORIDE 10 MG/1
10 TABLET ORAL DAILY PRN
COMMUNITY

## 2019-12-13 NOTE — PROGRESS NOTES
The ABCs of the Annual Wellness Visit  Subsequent Medicare Wellness Visit    Chief Complaint   Patient presents with   • Medicare Wellness-subsequent       Subjective   History of Present Illness:  Guero Garay is a 84 y.o. male who presents for a Subsequent Medicare Wellness Visit along with continued management concerning hyperlipidemia and gout.  He is followed by Dr. Galan with cardiology concerning his coronary artery disease and hypertension.  He denies any new problems or concerns.    HEALTH RISK ASSESSMENT    Recent Hospitalizations: NO      Current Medical Providers:  Patient Care Team:  Head, BUNNY Ferguson as PCP - General (Nurse Practitioner)  Alejandro Purvis MD as PCP - Claims Attributed  Avery Galan MD as Consulting Physician (Cardiology)  Alejandro Purvis MD as Consulting Physician (Urology)    Smoking Status:  Social History     Tobacco Use   Smoking Status Never Smoker   Smokeless Tobacco Never Used   Tobacco Comment    caffeine - 1 every 2-3 days       Alcohol Consumption:  Social History     Substance and Sexual Activity   Alcohol Use Yes   • Alcohol/week: 21.0 standard drinks   • Types: 21 Cans of beer per week    Comment: 2-3 beers daily       Depression Screen:   PHQ-2/PHQ-9 Depression Screening 12/13/2019   Little interest or pleasure in doing things 0   Feeling down, depressed, or hopeless 0   Trouble falling or staying asleep, or sleeping too much 3   Feeling tired or having little energy 0   Poor appetite or overeating 0   Feeling bad about yourself - or that you are a failure or have let yourself or your family down 0   Trouble concentrating on things, such as reading the newspaper or watching television 0   Moving or speaking so slowly that other people could have noticed. Or the opposite - being so fidgety or restless that you have been moving around a lot more than usual 0   Thoughts that you would be better off dead, or of hurting yourself in some way 0   Total Score  3   If you checked off any problems, how difficult have these problems made it for you to do your work, take care of things at home, or get along with other people? Not difficult at all       Fall Risk Screen:  SHY Fall Risk Assessment was completed, and patient is at LOW risk for falls.Assessment completed on:12/13/2019    Health Habits and Functional and Cognitive Screening:  Functional & Cognitive Status 12/13/2019   Do you have difficulty preparing food and eating? No   Do you have difficulty bathing yourself, getting dressed or grooming yourself? No   Do you have difficulty using the toilet? No   Do you have difficulty moving around from place to place? No   Do you have trouble with steps or getting out of a bed or a chair? No   Current Diet Well Balanced Diet   Dental Exam Up to date   Eye Exam Up to date   Exercise (times per week) 0 times per week   Current Exercise Activities Include -   Do you need help using the phone?  No   Are you deaf or do you have serious difficulty hearing?  Yes   Do you need help with transportation? No   Do you need help shopping? No   Do you need help preparing meals?  No   Do you need help with housework?  No   Do you need help with laundry? No   Do you need help taking your medications? No   Do you need help managing money? No   Do you ever drive or ride in a car without wearing a seat belt? No   Have you felt unusual stress, anger or loneliness in the last month? No   Who do you live with? Spouse   If you need help, do you have trouble finding someone available to you? No   Do you have difficulty concentrating, remembering or making decisions? No         Does the patient have evidence of cognitive impairment? No    Asprin use counseling:Taking ASA appropriately as indicated    Age-appropriate Screening Schedule:  Refer to the list below for future screening recommendations based on patient's age, sex and/or medical conditions. Orders for these recommended tests are listed  in the plan section. The patient has been provided with a written plan.    Health Maintenance   Topic Date Due   • ZOSTER VACCINE (2 of 3) 12/21/2020 (Originally 10/7/2009)   • LIPID PANEL  12/06/2020   • TDAP/TD VACCINES (2 - Td) 11/25/2028   • PNEUMOCOCCAL VACCINE (65+ HIGH RISK)  Completed   • INFLUENZA VACCINE  Completed          The following portions of the patient's history were reviewed and updated as appropriate: allergies, current medications, past family history, past medical history, past social history, past surgical history and problem list.    Outpatient Medications Prior to Visit   Medication Sig Dispense Refill   • allopurinol (ZYLOPRIM) 100 MG tablet TAKE 1 TABLET BY MOUTH DAILY 90 tablet 0   • apixaban (ELIQUIS) 2.5 MG tablet tablet Take 1 tablet by mouth Every 12 (Twelve) Hours. 180 tablet 3   • aspirin 81 MG chewable tablet Chew 81 mg Daily.     • atenolol-chlorthalidone (TENORETIC) 50-25 MG per tablet Take 1 tablet by mouth Daily. 90 tablet 1   • budesonide-formoterol (SYMBICORT) 160-4.5 MCG/ACT inhaler Inhale 2 puffs 2 (Two) Times a Day.     • cetirizine (zyrTEC) 10 MG tablet Take 10 mg by mouth Daily.     • Cranberry 250 MG capsule Take 250 mg by mouth Daily.     • furosemide (LASIX) 20 MG tablet TAKE 1 TABLET BY MOUTH EVERY DAY 90 tablet 1   • melatonin 5 MG tablet tablet Take 5 mg by mouth At Night As Needed.     • metoprolol tartrate (LOPRESSOR) 50 MG tablet TAKE 1 TABLET BY MOUTH TWICE DAILY 180 tablet 1   • Multiple Vitamins-Minerals (MULTIVITAMIN WITH MINERALS) tablet tablet Take 1 tablet by mouth Daily.     • potassium chloride (MICRO-K) 10 MEQ CR capsule Take 1 capsule by mouth 2 (Two) Times a Day. 180 capsule 1   • rosuvastatin (CRESTOR) 20 MG tablet Take 1 tablet by mouth Daily. 90 tablet 3   • Testosterone Cypionate (DEPOTESTOTERONE CYPIONATE) 200 MG/ML injection 1 mL 1 (One) Time Per Week.  1   • Fluticasone Furoate-Vilanterol (BREO ELLIPTA) 100-25 MCG/INH aerosol powder  Inhale 1  "puff Every Morning.       No facility-administered medications prior to visit.        Patient Active Problem List   Diagnosis   • Gout   • Hypertension   • Hyperlipidemia   • Osteoarthrosis   • History of prostate cancer   • Tinnitus   • Benign neoplasm of colon   • Osteoarthritis of right shoulder   • Coronary artery disease involving native coronary artery of native heart with unstable angina pectoris (CMS/HCC)   • Syncopal episodes   • Screening for colon cancer   • Pyelonephritis, acute   • Acute kidney injury superimposed on chronic kidney disease (CMS/HCC)   • Dyspnea on exertion   • Generalized weakness   • Elevated d-dimer   • Dehydration   • Mild persistent asthma without complication   • Heme positive stool   • Elevated LFTs   • Alcohol use   • Urine retention   • Chronic diastolic congestive heart failure (CMS/HCC)       Advanced Care Planning:  Patient has an advance directive - a copy has been provided and is visible in patient header    Review of Systems   Constitutional: Negative.    HENT: Negative.    Respiratory: Negative.    Cardiovascular: Negative.    Gastrointestinal: Negative.    Endocrine: Negative.    Genitourinary: Negative.    Musculoskeletal: Negative.    Skin: Negative.    Neurological: Negative.    Hematological: Negative.    Psychiatric/Behavioral: Negative.        Compared to one year ago, the patient feels his physical health is the same.  Compared to one year ago, the patient feels his mental health is the same.    Reviewed chart for potential of high risk medication in the elderly: yes  Reviewed chart for potential of harmful drug interactions in the elderly:yes    Objective         Vitals:    12/13/19 1013   BP: 120/76   BP Location: Left arm   Patient Position: Sitting   Cuff Size: Adult   Pulse: 90   Resp: 16   Temp: 98.2 °F (36.8 °C)   SpO2: 96%   Weight: 99.8 kg (220 lb)   Height: 172.7 cm (68\")       Body mass index is 33.45 kg/m².  Discussed the patient's BMI with him. The " BMI is above average; BMI management plan is completed.    Physical Exam   Constitutional: He is oriented to person, place, and time. Vital signs are normal. He appears well-developed.   HENT:   Head: Normocephalic and atraumatic.   Right Ear: Tympanic membrane normal. Decreased hearing is noted.   Left Ear: Tympanic membrane normal. Decreased hearing is noted.   Mouth/Throat: Oropharynx is clear and moist.   Wears hearing aides   Eyes: Pupils are equal, round, and reactive to light. EOM are normal.   Neck: Normal range of motion. Neck supple.   Cardiovascular: Normal rate and intact distal pulses. An irregularly irregular rhythm present.   Murmur heard.  Pulmonary/Chest: Effort normal and breath sounds normal. He has no wheezes. He has no rhonchi. He has no rales.   Abdominal: Soft. Bowel sounds are normal. There is no hepatosplenomegaly. There is no tenderness.   Musculoskeletal: Normal range of motion. He exhibits no edema or tenderness.   Neurological: He is alert and oriented to person, place, and time. He has normal strength.   Skin: Skin is warm and dry. No rash noted. No cyanosis or erythema.   Psychiatric: He has a normal mood and affect. His behavior is normal.       Lab Results   Component Value Date     (H) 12/06/2019    CHLPL 104 12/06/2019    TRIG 98 12/06/2019    HDL 40 12/06/2019    LDL 44 12/06/2019    VLDL 19.6 12/06/2019        Assessment/Plan   Medicare Risks and Personalized Health Plan  CMS Preventative Services Quick Reference  Cardiovascular risk  Immunizations Discussed/Encouraged (specific immunizations; Shingrix )  Obesity/Overweight     The above risks/problems have been discussed with the patient.  Pertinent information has been shared with the patient in the After Visit Summary.  Follow up plans and orders are seen below in the Assessment/Plan Section.      Summary:  Guero Garay has been doing well since he was last seen.  Reviewed recent labs along with patient which all  remained stable.  He denies any new problems or concerns.  He leaves for [place] at the end of the month and will not return until March.  He is followed by cardiology every 6 months.  He plans to obtain PCP in [place] in case needed for problems while there.  No current changes in medications.  Instructed needs to return in 1 year for next recheck appointment along with fasting labs and AWV.  In the meantime instructed to contact us sooner for any problems or concerns.    Diagnoses and all orders for this visit:    1. Medicare annual wellness visit, subsequent (Primary)    2. Coronary artery disease involving native coronary artery of native heart with unstable angina pectoris (CMS/HCC)  -     CBC & Differential; Future  -     Comprehensive Metabolic Panel; Future  -     Lipid Panel; Future  -     TSH; Future    3. Essential hypertension  -     CBC & Differential; Future  -     Comprehensive Metabolic Panel; Future  -     Lipid Panel; Future  -     TSH; Future    4. Idiopathic gout, unspecified chronicity, unspecified site  -     Uric acid; Future      Follow Up:  Return in about 1 year (around 12/13/2020) for Recheck, Medicare Wellness.     An After Visit Summary and PPPS were given to the patient.     Patient Counseling:  --Nutrition: Stressed importance of moderation in sodium/caffeine intake, saturated fat and cholesterol.  Discussed caloric balance, sufficient intake of fresh fruits, vegetables, fiber, calcium, iron.  --Discussed the new recommendation against daily use of baby aspirin for primary prevention in low risk patients.  --Exercise: Stressed the importance of regular exercise.   --Substance Abuse: Discussed cessation/primary prevention of tobacco, alcohol, or other drug use; driving or other dangerous activities under the influence.    --Dental health: Discussed importance of regular tooth brushing, flossing, and    dental visits.  -- suggested having eyes and vision checked if needed or past  due.  --Immunizations reviewed.  --Discussed benefits of screening colonoscopy.    Yesenia Fraga, APRN

## 2020-01-06 RX ORDER — APIXABAN 2.5 MG/1
TABLET, FILM COATED ORAL
Qty: 60 TABLET | Refills: 3 | Status: SHIPPED | OUTPATIENT
Start: 2020-01-06 | End: 2020-04-29

## 2020-01-31 RX ORDER — FUROSEMIDE 20 MG/1
TABLET ORAL
Qty: 90 TABLET | Refills: 1 | Status: SHIPPED | OUTPATIENT
Start: 2020-01-31 | End: 2020-04-29

## 2020-01-31 RX ORDER — METOPROLOL TARTRATE 50 MG/1
TABLET, FILM COATED ORAL
Qty: 180 TABLET | Refills: 1 | Status: SHIPPED | OUTPATIENT
Start: 2020-01-31 | End: 2020-04-29

## 2020-02-05 ENCOUNTER — TELEPHONE (OUTPATIENT)
Dept: CARDIOLOGY | Facility: CLINIC | Age: 85
End: 2020-02-05

## 2020-02-05 NOTE — TELEPHONE ENCOUNTER
Pt called stating he just realized he is taking   Metoprolol Tart 50 mg BID and Tenorectic 50-25 mg daily.  He would like to know which medication you would like him to be on.  He states his BP is running okay, today it was 127/88.  He does complain of some dizziness when bending over.  Please advise/jlf    # [phone]

## 2020-02-29 DIAGNOSIS — M10.00 IDIOPATHIC GOUT, UNSPECIFIED CHRONICITY, UNSPECIFIED SITE: ICD-10-CM

## 2020-02-29 DIAGNOSIS — I10 ESSENTIAL HYPERTENSION: ICD-10-CM

## 2020-03-02 RX ORDER — POTASSIUM CHLORIDE 750 MG/1
CAPSULE, EXTENDED RELEASE ORAL
Qty: 180 CAPSULE | Refills: 1 | Status: SHIPPED | OUTPATIENT
Start: 2020-03-02 | End: 2020-06-02

## 2020-03-02 RX ORDER — ALLOPURINOL 100 MG/1
100 TABLET ORAL DAILY
Qty: 90 TABLET | Refills: 0 | Status: SHIPPED | OUTPATIENT
Start: 2020-03-02 | End: 2020-06-02

## 2020-03-06 ENCOUNTER — CLINICAL SUPPORT NO REQUIREMENTS (OUTPATIENT)
Dept: CARDIOLOGY | Facility: CLINIC | Age: 85
End: 2020-03-06

## 2020-03-06 DIAGNOSIS — R00.1 BRADYCARDIA, SINUS: Primary | ICD-10-CM

## 2020-03-06 PROCEDURE — 93296 REM INTERROG EVL PM/IDS: CPT | Performed by: INTERNAL MEDICINE

## 2020-03-06 PROCEDURE — 93294 REM INTERROG EVL PM/LDLS PM: CPT | Performed by: INTERNAL MEDICINE

## 2020-03-12 ENCOUNTER — TELEPHONE (OUTPATIENT)
Dept: CARDIOLOGY | Facility: CLINIC | Age: 85
End: 2020-03-12

## 2020-03-12 RX ORDER — CHLORTHALIDONE 25 MG/1
25 TABLET ORAL DAILY
Qty: 30 TABLET | Refills: 11 | Status: SHIPPED | OUTPATIENT
Start: 2020-03-12 | End: 2020-10-27

## 2020-03-12 NOTE — TELEPHONE ENCOUNTER
03/12/20  11:36 AM  Guero Garay  1935  Home Phone [phone]   Mobile [phone]     Guero Carrizales called this morning. He said he has been having some trouble with HTN since Dr. Galan stopped his atenolol-chlorthalidone 50-25 mg daily.    2-24-20 138/80, HR 82  3-2-20   118/75, HR 76  3-11-20  154/92, HR 94 (irregular) He also had left-sided jaw pain, which was what prompted him to take his B/P.  3-12-20 170/104, HR 82    Cardiac-related medications:  Potassium 10 MEQ BID  Furosemide 20 mg daily  Metoprolol 50 mg BID  Eliquis 2.5 mg q 12 hours  Aspirin 81 mg daily    He is currently in [place], but wants to know if you have any advice for him?    Thank you!    Beth Hager RN  [place] [facility] [place]

## 2020-03-12 NOTE — TELEPHONE ENCOUNTER
would restart chlorthalidone alone at 50 mg.  He is on metoprolol 50 mg twice daily and for that reason he does not need the atenolol component.  Please get a pharmacy where we can send him a script.

## 2020-03-12 NOTE — TELEPHONE ENCOUNTER
Order sent to pt's pharmacy. Pt notified and verbalized understanding.    Thank you!    Beth Hager RN  Triage [facility]

## 2020-03-12 NOTE — TELEPHONE ENCOUNTER
Pharmacy is the [facility] in his list. Just to clarify: Chlorthalidone 50 mg once daily and not 25 mg daily?    Thank you!    Beth Hager RN  [place] [facility] [place]

## 2020-04-03 ENCOUNTER — NURSE TRIAGE (OUTPATIENT)
Dept: CALL CENTER | Facility: HOSPITAL | Age: 85
End: 2020-04-03

## 2020-04-03 VITALS — WEIGHT: 210 LBS | BODY MASS INDEX: 31.83 KG/M2 | HEIGHT: 68 IN

## 2020-04-03 NOTE — TELEPHONE ENCOUNTER
"He was outside working experienced lightheadedness and was dizzy, his HR was up to 102 and o2 sat was in 80's has asthma and allergies, came in sat down now sats 94% bp normal , HR 90's, told to follow up with PCP, if bp runs less than 90 to be seen, and if sats in 80's again to be seen this weekend. He says has had this before and usually passes. Told to see PCP on Monday    Reason for Disposition  • [1] MODERATE longstanding difficulty breathing (e.g., speaks in phrases, SOB even at rest, pulse 100-120) AND [2] SAME as normal    Additional Information  • Negative: [1] Breathing stopped AND [2] hasn't returned  • Negative: Choking on something  • Negative: Severe difficulty breathing (e.g., struggling for each breath, speaks in single words)  • Negative: Bluish (or gray) lips or face now  • Negative: Difficult to awaken or acting confused (e.g., disoriented, slurred speech)  • Negative: Passed out (i.e., lost consciousness, collapsed and was not responding)  • Negative: Wheezing started suddenly after medicine, an allergic food or bee sting  • Negative: Stridor  • Negative: Slow, shallow and weak breathing  • Negative: Sounds like a life-threatening emergency to the triager  • Negative: Chest pain  • Negative: [1] Wheezing (high pitched whistling sound) AND [2] previous asthma attacks or use of asthma medicines  • Negative: [1] Difficulty breathing AND [2] only present when coughing  • Negative: [1] Difficulty breathing AND [2] only from stuffy or runny nose  • Negative: [1] MODERATE difficulty breathing (e.g., speaks in phrases, SOB even at rest, pulse 100-120) AND [2] NEW-onset or WORSE than normal  • Negative: Wheezing can be heard across the room  • Negative: Drooling or spitting out saliva (because can't swallow)  • Negative: History of prior \"blood clot\" in leg or lungs (i.e., deep vein thrombosis, pulmonary embolism)  • Negative: History of inherited increased risk of blood clots (e.g., Factor 5 Leiden, " "Anti-thrombin 3, Protein C or Protein S deficiency, Prothrombin mutation)  • Negative: Recent illness requiring prolonged bedrest (i.e., immobilization)  • Negative: Hip or leg fracture in past 2 months (e.g., had cast on leg or ankle)  • Negative: Major surgery in the past month  • Negative: Recent long-distance travel with prolonged time in car, bus, plane, or train (i.e., within past 2 weeks; 6 or  more hours duration)  • Negative: Extra heart beats OR irregular heart beating   (i.e., \"palpitations\")  • Negative: Fever > 103 F (39.4 C)  • Negative: [1] Fever > 101 F (38.3 C) AND [2] age > 60  • Negative: [1] Fever > 100.0 F (37.8 C) AND [2] bedridden (e.g., nursing home patient, CVA, chronic illness, recovering from surgery)  • Negative: [1] Fever > 100.0 F (37.8 C) AND [2] diabetes mellitus or weak immune system (e.g., HIV positive, cancer chemo, splenectomy, organ transplant, chronic steroids)  • Negative: [1] Periods where breathing stops and then resumes normally AND [2] bedridden (e.g., nursing home patient, CVA)  • Negative: Pregnant or postpartum (< 1 month since delivery)  • Negative: Patient sounds very sick or weak to the triager  • Negative: [1] MILD difficulty breathing (e.g., minimal/no SOB at rest, SOB with walking, pulse <100) AND [2] NEW-onset or WORSE than normal  • Negative: [1] Longstanding difficulty breathing (e.g., CHF, COPD, emphysema) AND [2] WORSE than normal  • Negative: [1] Longstanding difficulty breathing AND [2] not responding to usual therapy  • Negative: [1] Continuous (nonstop) coughing AND [2] keeps from working or sleeping  • Negative: [1] MILD longstanding difficulty breathing AND [2]  SAME as normal    Answer Assessment - Initial Assessment Questions  1. RESPIRATORY STATUS: \"Describe your breathing?\" (e.g., wheezing, shortness of breath, unable to speak, severe coughing)       Sob has asthma earlier better now  2. ONSET: \"When did this breathing problem begin?\"       When " "being outside earlier working  3. PATTERN \"Does the difficult breathing come and go, or has it been constant since it started?\"       Comes and goes   4. SEVERITY: \"How bad is your breathing?\" (e.g., mild, moderate, severe)     - MILD: No SOB at rest, mild SOB with walking, speaks normally in sentences, can lay down, no retractions, pulse < 100.     - MODERATE: SOB at rest, SOB with minimal exertion and prefers to sit, cannot lie down flat, speaks in phrases, mild retractions, audible wheezing, pulse 100-120.     - SEVERE: Very SOB at rest, speaks in single words, struggling to breathe, sitting hunched forward, retractions, pulse > 120       Mild earlier moderate  5. RECURRENT SYMPTOM: \"Have you had difficulty breathing before?\" If so, ask: \"When was the last time?\" and \"What happened that time?\"       Has had before has asthma, was worst today now back to baseline  6. CARDIAC HISTORY: \"Do you have any history of heart disease?\" (e.g., heart attack, angina, bypass surgery, angioplasty)       Has stents,  7. LUNG HISTORY: \"Do you have any history of lung disease?\"  (e.g., pulmonary embolus, asthma, emphysema)      asthma  8. CAUSE: \"What do you think is causing the breathing problem?\"       Allergies and being outside  9. OTHER SYMPTOMS: \"Do you have any other symptoms? (e.g., dizziness, runny nose, cough, chest pain, fever)      Dizziness, no chest pain  10. PREGNANCY: \"Is there any chance you are pregnant?\" \"When was your last menstrual period?\"        no  11. TRAVEL: \"Have you traveled out of the country in the last month?\" (e.g., travel history, exposures)        no    Protocols used: BREATHING DIFFICULTY-ADULT-AH      "

## 2020-04-28 DIAGNOSIS — E78.2 MIXED HYPERLIPIDEMIA: ICD-10-CM

## 2020-04-28 RX ORDER — ROSUVASTATIN CALCIUM 20 MG/1
20 TABLET, COATED ORAL DAILY
Qty: 90 TABLET | Refills: 3 | Status: SHIPPED | OUTPATIENT
Start: 2020-04-28 | End: 2020-12-04

## 2020-04-29 RX ORDER — APIXABAN 2.5 MG/1
TABLET, FILM COATED ORAL
Qty: 60 TABLET | Refills: 3 | OUTPATIENT
Start: 2020-04-29

## 2020-04-29 RX ORDER — METOPROLOL TARTRATE 50 MG/1
TABLET, FILM COATED ORAL
Qty: 180 TABLET | Refills: 1 | Status: SHIPPED | OUTPATIENT
Start: 2020-04-29 | End: 2020-09-10

## 2020-04-29 RX ORDER — APIXABAN 2.5 MG/1
TABLET, FILM COATED ORAL
Qty: 180 TABLET | Refills: 1 | Status: SHIPPED | OUTPATIENT
Start: 2020-04-29 | End: 2020-09-10

## 2020-04-29 RX ORDER — FUROSEMIDE 20 MG/1
TABLET ORAL
Qty: 90 TABLET | Refills: 1 | Status: SHIPPED | OUTPATIENT
Start: 2020-04-29 | End: 2020-08-06

## 2020-06-02 DIAGNOSIS — I10 ESSENTIAL HYPERTENSION: ICD-10-CM

## 2020-06-02 DIAGNOSIS — M10.00 IDIOPATHIC GOUT, UNSPECIFIED CHRONICITY, UNSPECIFIED SITE: ICD-10-CM

## 2020-06-02 RX ORDER — ALLOPURINOL 100 MG/1
100 TABLET ORAL DAILY
Qty: 90 TABLET | Refills: 0 | Status: SHIPPED | OUTPATIENT
Start: 2020-06-02 | End: 2020-09-08

## 2020-06-02 RX ORDER — POTASSIUM CHLORIDE 750 MG/1
CAPSULE, EXTENDED RELEASE ORAL
Qty: 180 CAPSULE | Refills: 1 | Status: SHIPPED | OUTPATIENT
Start: 2020-06-02 | End: 2020-12-04 | Stop reason: SDUPTHER

## 2020-06-17 ENCOUNTER — OFFICE VISIT (OUTPATIENT)
Dept: CARDIOLOGY | Facility: CLINIC | Age: 85
End: 2020-06-17

## 2020-06-17 ENCOUNTER — HOSPITAL ENCOUNTER (OUTPATIENT)
Dept: CARDIOLOGY | Facility: HOSPITAL | Age: 85
Discharge: HOME | End: 2020-06-17
Admitting: INTERNAL MEDICINE

## 2020-06-17 ENCOUNTER — TELEPHONE (OUTPATIENT)
Dept: CARDIOLOGY | Facility: CLINIC | Age: 85
End: 2020-06-17

## 2020-06-17 VITALS
SYSTOLIC BLOOD PRESSURE: 132 MMHG | DIASTOLIC BLOOD PRESSURE: 80 MMHG | BODY MASS INDEX: 32.95 KG/M2 | HEART RATE: 65 BPM | WEIGHT: 217.4 LBS | HEIGHT: 68 IN

## 2020-06-17 DIAGNOSIS — E78.2 MIXED HYPERLIPIDEMIA: ICD-10-CM

## 2020-06-17 DIAGNOSIS — Z95.0 PACEMAKER: ICD-10-CM

## 2020-06-17 DIAGNOSIS — I35.0 NONRHEUMATIC AORTIC VALVE STENOSIS: ICD-10-CM

## 2020-06-17 DIAGNOSIS — R00.1 BRADYCARDIA, SINUS: ICD-10-CM

## 2020-06-17 DIAGNOSIS — I50.32 CHRONIC DIASTOLIC CONGESTIVE HEART FAILURE (HCC): ICD-10-CM

## 2020-06-17 DIAGNOSIS — I25.110 CORONARY ARTERY DISEASE INVOLVING NATIVE CORONARY ARTERY OF NATIVE HEART WITH UNSTABLE ANGINA PECTORIS (HCC): Primary | ICD-10-CM

## 2020-06-17 LAB
AORTIC ARCH: 2.7 CM
AORTIC DIMENSIONLESS INDEX: 0.3 (DI)
ASCENDING AORTA: 3.5 CM
BH CV ECHO MEAS - ACS: 1.2 CM
BH CV ECHO MEAS - AO MAX PG (FULL): 21.8 MMHG
BH CV ECHO MEAS - AO MAX PG: 24.1 MMHG
BH CV ECHO MEAS - AO MEAN PG (FULL): 12.4 MMHG
BH CV ECHO MEAS - AO MEAN PG: 14 MMHG
BH CV ECHO MEAS - AO ROOT AREA (BSA CORRECTED): 1.8
BH CV ECHO MEAS - AO ROOT AREA: 10.9 CM^2
BH CV ECHO MEAS - AO ROOT DIAM: 3.7 CM
BH CV ECHO MEAS - AO V2 MAX: 245.4 CM/SEC
BH CV ECHO MEAS - AO V2 MEAN: 179.7 CM/SEC
BH CV ECHO MEAS - AO V2 VTI: 58.4 CM
BH CV ECHO MEAS - ASC AORTA: 3.5 CM
BH CV ECHO MEAS - AVA(I,A): 1.1 CM^2
BH CV ECHO MEAS - AVA(I,D): 1.1 CM^2
BH CV ECHO MEAS - AVA(V,A): 1 CM^2
BH CV ECHO MEAS - AVA(V,D): 1 CM^2
BH CV ECHO MEAS - BSA(HAYCOCK): 2.2 M^2
BH CV ECHO MEAS - BSA: 2.1 M^2
BH CV ECHO MEAS - BZI_BMI: 33 KILOGRAMS/M^2
BH CV ECHO MEAS - BZI_METRIC_HEIGHT: 172.7 CM
BH CV ECHO MEAS - BZI_METRIC_WEIGHT: 98.4 KG
BH CV ECHO MEAS - EDV(MOD-SP2): 69 ML
BH CV ECHO MEAS - EDV(MOD-SP4): 69 ML
BH CV ECHO MEAS - EDV(TEICH): 122.8 ML
BH CV ECHO MEAS - EF(CUBED): 55 %
BH CV ECHO MEAS - EF(MOD-BP): 50 %
BH CV ECHO MEAS - EF(MOD-SP2): 52.2 %
BH CV ECHO MEAS - EF(MOD-SP4): 47.8 %
BH CV ECHO MEAS - EF(TEICH): 46.5 %
BH CV ECHO MEAS - ESV(MOD-SP2): 33 ML
BH CV ECHO MEAS - ESV(MOD-SP4): 36 ML
BH CV ECHO MEAS - ESV(TEICH): 65.6 ML
BH CV ECHO MEAS - FS: 23.4 %
BH CV ECHO MEAS - IVS/LVPW: 0.95
BH CV ECHO MEAS - IVSD: 1.2 CM
BH CV ECHO MEAS - LAT PEAK E' VEL: 4.9 CM/SEC
BH CV ECHO MEAS - LV DIASTOLIC VOL/BSA (35-75): 32.6 ML/M^2
BH CV ECHO MEAS - LV MASS(C)D: 241.5 GRAMS
BH CV ECHO MEAS - LV MASS(C)DI: 114.1 GRAMS/M^2
BH CV ECHO MEAS - LV MAX PG: 2.3 MMHG
BH CV ECHO MEAS - LV MEAN PG: 1.6 MMHG
BH CV ECHO MEAS - LV SYSTOLIC VOL/BSA (12-30): 17 ML/M^2
BH CV ECHO MEAS - LV V1 MAX: 76.3 CM/SEC
BH CV ECHO MEAS - LV V1 MEAN: 62.6 CM/SEC
BH CV ECHO MEAS - LV V1 VTI: 19 CM
BH CV ECHO MEAS - LVIDD: 5.1 CM
BH CV ECHO MEAS - LVIDS: 3.9 CM
BH CV ECHO MEAS - LVLD AP2: 7.7 CM
BH CV ECHO MEAS - LVLD AP4: 7.4 CM
BH CV ECHO MEAS - LVLS AP2: 6.2 CM
BH CV ECHO MEAS - LVLS AP4: 6.5 CM
BH CV ECHO MEAS - LVOT AREA (M): 3.1 CM^2
BH CV ECHO MEAS - LVOT AREA: 3.2 CM^2
BH CV ECHO MEAS - LVOT DIAM: 2 CM
BH CV ECHO MEAS - LVPWD: 1.2 CM
BH CV ECHO MEAS - MED PEAK E' VEL: 5.8 CM/SEC
BH CV ECHO MEAS - MV A DUR: 0.13 SEC
BH CV ECHO MEAS - MV A MAX VEL: 102.4 CM/SEC
BH CV ECHO MEAS - MV DEC SLOPE: 197.7 CM/SEC^2
BH CV ECHO MEAS - MV DEC TIME: 0.28 SEC
BH CV ECHO MEAS - MV E MAX VEL: 61.3 CM/SEC
BH CV ECHO MEAS - MV E/A: 0.6
BH CV ECHO MEAS - MV MAX PG: 4.2 MMHG
BH CV ECHO MEAS - MV MEAN PG: 1.6 MMHG
BH CV ECHO MEAS - MV P1/2T MAX VEL: 56.5 CM/SEC
BH CV ECHO MEAS - MV P1/2T: 83.7 MSEC
BH CV ECHO MEAS - MV V2 MAX: 102.1 CM/SEC
BH CV ECHO MEAS - MV V2 MEAN: 57.2 CM/SEC
BH CV ECHO MEAS - MV V2 VTI: 24.6 CM
BH CV ECHO MEAS - MVA P1/2T LCG: 3.9 CM^2
BH CV ECHO MEAS - MVA(P1/2T): 2.6 CM^2
BH CV ECHO MEAS - MVA(VTI): 2.5 CM^2
BH CV ECHO MEAS - PA ACC TIME: 0.13 SEC
BH CV ECHO MEAS - PA MAX PG (FULL): 6.2 MMHG
BH CV ECHO MEAS - PA MAX PG: 7.6 MMHG
BH CV ECHO MEAS - PA PR(ACCEL): 22 MMHG
BH CV ECHO MEAS - PA V2 MAX: 137.9 CM/SEC
BH CV ECHO MEAS - PULM A REVS DUR: 0.12 SEC
BH CV ECHO MEAS - PULM A REVS VEL: 30.9 CM/SEC
BH CV ECHO MEAS - PULM DIAS VEL: 55.7 CM/SEC
BH CV ECHO MEAS - PULM S/D: 0.72
BH CV ECHO MEAS - PULM SYS VEL: 40 CM/SEC
BH CV ECHO MEAS - PVA(V,A): 1.6 CM^2
BH CV ECHO MEAS - PVA(V,D): 1.6 CM^2
BH CV ECHO MEAS - QP/QS: 0.81
BH CV ECHO MEAS - RAP SYSTOLE: 3 MMHG
BH CV ECHO MEAS - RV MAX PG: 1.4 MMHG
BH CV ECHO MEAS - RV MEAN PG: 0.9 MMHG
BH CV ECHO MEAS - RV V1 MAX: 59.6 CM/SEC
BH CV ECHO MEAS - RV V1 MEAN: 45.5 CM/SEC
BH CV ECHO MEAS - RV V1 VTI: 13.1 CM
BH CV ECHO MEAS - RVOT AREA: 3.8 CM^2
BH CV ECHO MEAS - RVOT DIAM: 2.2 CM
BH CV ECHO MEAS - RVSP: 25 MMHG
BH CV ECHO MEAS - SI(AO): 301.1 ML/M^2
BH CV ECHO MEAS - SI(CUBED): 34.1 ML/M^2
BH CV ECHO MEAS - SI(LVOT): 29 ML/M^2
BH CV ECHO MEAS - SI(MOD-SP2): 17 ML/M^2
BH CV ECHO MEAS - SI(MOD-SP4): 15.6 ML/M^2
BH CV ECHO MEAS - SI(TEICH): 27 ML/M^2
BH CV ECHO MEAS - SUP REN AO DIAM: 2.4 CM
BH CV ECHO MEAS - SV(AO): 637.2 ML
BH CV ECHO MEAS - SV(CUBED): 72.2 ML
BH CV ECHO MEAS - SV(LVOT): 61.4 ML
BH CV ECHO MEAS - SV(MOD-SP2): 36 ML
BH CV ECHO MEAS - SV(MOD-SP4): 33 ML
BH CV ECHO MEAS - SV(RVOT): 49.5 ML
BH CV ECHO MEAS - SV(TEICH): 57.1 ML
BH CV ECHO MEAS - TAPSE (>1.6): 1.6 CM2
BH CV ECHO MEAS - TR MAX VEL: 233.5 CM/SEC
BH CV ECHO MEASUREMENTS AVERAGE E/E' RATIO: 11.46
BH CV XLRA - RV BASE: 3 CM
BH CV XLRA - RV LENGTH: 5.9 CM
BH CV XLRA - RV MID: 2.5 CM
BH CV XLRA - TDI S': 9 CM/SEC
LEFT ATRIUM VOLUME INDEX: 26 ML/M2
SINUS: 3.8 CM
STJ: 3 CM

## 2020-06-17 PROCEDURE — 99214 OFFICE O/P EST MOD 30 MIN: CPT | Performed by: INTERNAL MEDICINE

## 2020-06-17 PROCEDURE — 93356 MYOCRD STRAIN IMG SPCKL TRCK: CPT | Performed by: INTERNAL MEDICINE

## 2020-06-17 PROCEDURE — 93356 MYOCRD STRAIN IMG SPCKL TRCK: CPT

## 2020-06-17 PROCEDURE — 93306 TTE W/DOPPLER COMPLETE: CPT

## 2020-06-17 PROCEDURE — 93306 TTE W/DOPPLER COMPLETE: CPT | Performed by: INTERNAL MEDICINE

## 2020-06-17 PROCEDURE — 93000 ELECTROCARDIOGRAM COMPLETE: CPT | Performed by: INTERNAL MEDICINE

## 2020-06-17 NOTE — PROGRESS NOTES
Date of Office Visit: 20  Encounter Provider: Avery Galan MD  Place of Service: [facility] CARDIOLOGY  Patient Name: Guero Garay  :1935  Referral Provider:No ref. provider found      Chief Complaint   Patient presents with   • Atrial Fibrillation     History of Present Illness  Mr Garay is a pleasant 86 yo gentleman with a history of aortic stenosis, hypertension, hyperlipidemia, and coronary artery disease.  He presented to our office in 2017 with progressive angina.  He then had a perfusion stress test that showed a large area of anterior and septal ischemia echocardiogram at that time showed mild aortic stenosis he then underwent cardiac catheterization 2017 he was found have preserved left ventricular ejection fraction severe disease of the mid left anterior descending and right coronary artery.  Had a 4.0 x 15 mm drug-eluting stent placed in the left anterior descending and a 4.0 x 28 mm drug-eluting stent dilated to 4.5 mm in the right coronary artery.  He was hypokalemic on admission started on potassium, he was also started on on rosuvastatin.    He then presented in 2017 with dizziness and syncope and bradycardia.  He ended up having a permanent pacemaker implanted.  He also had a tick bite but was negative for [place] spotted fever. He's coming in early because on his pacemaker interrogation he was found to have episodes of atrial fibrillation fairly prolonged and now occupying 10.6% burden.   He was started on eliquis.  He also had some shortness of breath and was scheduled for outpatient perfusion stress test.  Echocardiogram on 8/10/2018 showed normal left ventricular systolic function with a small LV cavity, mild concentric hypertrophy, mild calcification of the aortic valve mild aortic valve stenosis.  His renal function and liver function studies were abnormal four days prior.  His blood pressure was low in the office  visit on 8/10/2018 and 84/60 and he was arranged for admission to the hospital. VQ scan was showed no findings suggestive of PE. He was found to have urinary retention and required Tran catheter as well as urology following.  He had recently been treated for urosepsis and acute pyelonephritis.  CT of the abdomen was normal as well as ultrasound of the liver which was normal.  Elevated liver enzymes were felt to be from antibiotic patient was recently on.  He had a heme positive stool and was to have an outpatient colonoscopy.  Outpatient Perfusion stress test completed on 8/17/2018 showed no evidence of ischemia and was a low risk study.  He now comes in for follow-up.  Memory loss he was having some atypical jaw pain slight EKG changes perfusion stress test showed a small area of mild ischemia in the apical wall treated medically but he now is back and he is having more shortness of breath now with activity.  No rest symptoms.  No orthopnea or PND.  No near syncope or syncope but he did have an episode of his heart getting out of rhythm last Saturday heart rate 130.    Coronary Artery Disease   Pertinent negatives include no dizziness, muscle weakness or weight gain. There is no history of past myocardial infarction.         Past Medical History:   Diagnosis Date   • Acute kidney injury superimposed on chronic kidney disease (CMS/HCC)    • Arthralgia    • Asthma     mild persistent without complication   • CAD (coronary artery disease), native coronary artery    • Cancer (CMS/HCC)     Prostate   • Chest pain, unspecified    • Chronic diastolic congestive heart failure (CMS/HCC)    • Colon polyp    • Dyspnea on exertion    • Erectile dysfunction    • Essential hypertension    • Generalized weakness    • Gout    • History of prostate cancer    • Hyperkalemia    • Jaw pain     both sides   • Mixed hyperlipidemia    • Osteoarthrosis    • PAF (paroxysmal atrial fibrillation) (CMS/HCC)    • Shoulder pain    • SOB  (shortness of breath)    • Tinnitus    • Urine retention          Past Surgical History:   Procedure Laterality Date   • CARDIAC CATHETERIZATION N/A 4/27/2017    Procedure: Coronary angiography;  Surgeon: Marvel Brito MD;  Location[facility] C[facility] LOC[facility];  Service:    • CARDIAC CATHETERIZATION N/A 4/27/2017    Procedure: Left Heart Cath;  Surgeon: Marvel Brito MD;  Location:  [facility] CATH INVASIVE LOCATION;  Service:    • CARDIAC CATHETERIZATION N/A 4/27/2017    Procedure: Stent GIN coronary;  Surgeon: Marvel Brito MD;  Location[facility] C[facility] LOC[facility];  Service:    • CARDIAC ELECTROPHYSIOLOGY PROCEDURE N/A 6/15/2017    Procedure: Pacemaker DC new   [employer];  Surgeon: Gustavo Valladares MD;  Location: [facility] CATH [facility]IVE LOC[facility];  Service:    • COLONOSCOPY  2013   • COLONOSCOPY N/A 12/12/2018    Procedure: COLONOSCOPY with polypectomy;  Surgeon: Kwadwo Powers MD;  Location: [facility] OR;  Service: Gastroenterology   • INGUINAL HERNIA REPAIR Right    • JOINT REPLACEMENT      left knee, left and right shoulder   • NECK SURGERY      SPURS   • NH RECONSTR TOTAL SHOULDER IMPLANT Right 2/16/2017    Procedure: RIGHT TOTAL SHOULDER ARTHROPLASTY;  Surgeon: Marquez Galvan MD;  Location:  [facility] OR [facility] [place];  Service: Orthopedics   • PROSTATECTOMY     • REPLACEMENT TOTAL KNEE Left    • TONSILLECTOMY     • TOTAL SHOULDER REPLACEMENT Bilateral          Current Outpatient Medications on File Prior to Visit   Medication Sig Dispense Refill   • allopurinol (ZYLOPRIM) 100 MG tablet TAKE 1 TABLET BY MOUTH DAILY 90 tablet 0   • aspirin 81 MG chewable tablet Chew 81 mg Daily.     • budesonide-formoterol (SYMBICORT) 160-4.5 MCG/ACT inhaler Inhale 2 puffs 2 (Two) Times a Day.     • cetirizine (zyrTEC) 10 MG tablet Take 10 mg by mouth Daily.     • Cranberry 250 MG capsule Take 250 mg by mouth Daily.     • ELIQUIS 2.5 MG tablet tablet TAKE 1 TABLET BY MOUTH EVERY 12 HOURS 180 tablet 1   • Fluticasone  Furoate-Vilanterol (BREO ELLIPTA) 100-25 MCG/INH aerosol powder  Inhale 1 puff Every Morning.     • furosemide (LASIX) 20 MG tablet TAKE 1 TABLET BY MOUTH EVERY DAY 90 tablet 1   • melatonin 5 MG tablet tablet Take 5 mg by mouth At Night As Needed.     • metoprolol tartrate (LOPRESSOR) 50 MG tablet TAKE 1 TABLET BY MOUTH TWICE DAILY 180 tablet 1   • Multiple Vitamins-Minerals (MULTIVITAMIN WITH MINERALS) tablet tablet Take 1 tablet by mouth Daily.     • potassium chloride (MICRO-K) 10 MEQ CR capsule TAKE 1 CAPSULE BY MOUTH TWICE DAILY 180 capsule 1   • rosuvastatin (CRESTOR) 20 MG tablet TAKE 1 TABLET BY MOUTH DAILY 90 tablet 3   • Testosterone Cypionate (DEPOTESTOTERONE CYPIONATE) 200 MG/ML injection 1 mL 1 (One) Time Per Week.  1   • chlorthalidone (HYGROTON) 25 MG tablet Take 1 tablet by mouth Daily. 30 tablet 11     No current facility-administered medications on file prior to visit.          Social History     Socioeconomic History   • Marital status:      Spouse name: Not on file   • Number of children: Not on file   • Years of education: Not on file   • Highest education level: Not on file   Tobacco Use   • Smoking status: Never Smoker   • Smokeless tobacco: Never Used   • Tobacco comment: caffeine - 1 every 2-3 days   Substance and Sexual Activity   • Alcohol use: Yes     Alcohol/week: 21.0 standard drinks     Types: 21 Cans of beer per week     Comment: 2-3 beers daily   • Drug use: No   • Sexual activity: Defer   Lifestyle   • Physical activity:     Days per week: 0 days     Minutes per session: 0 min   • Stress: Not at all       Family History   Problem Relation Age of Onset   • Cancer Mother         lung   • Cancer Father         lung   • Diabetes Father    • Colon cancer Neg Hx    • Colon polyps Neg Hx          Review of Systems   Constitution: Negative for decreased appetite, diaphoresis, fever, malaise/fatigue, weight gain and weight loss.   HENT: Negative for congestion, hearing loss,  "nosebleeds and tinnitus.    Eyes: Negative for blurred vision, double vision, vision loss in left eye, vision loss in right eye and visual disturbance.   Cardiovascular:        As noted in HPI   Respiratory:        As noted HPI   Endocrine: Negative for cold intolerance and heat intolerance.   Hematologic/Lymphatic: Negative for bleeding problem. Does not bruise/bleed easily.   Skin: Negative for color change, flushing, itching and rash.   Musculoskeletal: Negative for arthritis, back pain, joint pain, joint swelling, muscle weakness and myalgias.   Gastrointestinal: Negative for bloating, abdominal pain, constipation, diarrhea, dysphagia, heartburn, hematemesis, hematochezia, melena, nausea and vomiting.   Genitourinary: Negative for bladder incontinence, dysuria, frequency, nocturia and urgency.   Neurological: Negative for dizziness, focal weakness, headaches, light-headedness, loss of balance, numbness, paresthesias, vertigo and weakness.   Psychiatric/Behavioral: Negative for depression, memory loss and substance abuse.       Procedures      ECG 12 Lead  Date/Time: 6/17/2020 1:23 PM  Performed by: Avery Galan MD  Authorized by: Avery Galan MD   Comparison: compared with previous ECG   Similar to previous ECG  Rhythm: sinus rhythm  Rate: normal  Conduction: 1st degree AV block  T flattening: II, III and aVF  QRS axis: left                  Objective:    /80 (BP Location: Right arm)   Pulse 65   Ht 172.7 cm (68\")   Wt 98.6 kg (217 lb 6.4 oz)   BMI 33.06 kg/m²        Physical Exam  Physical Exam   Constitutional: He is oriented to person, place, and time. He appears well-developed and well-nourished. No distress.   HENT:   Head: Normocephalic.   Eyes: Pupils are equal, round, and reactive to light. Conjunctivae are normal. No scleral icterus.   Neck: Normal carotid pulses, no hepatojugular reflux and no JVD present. Carotid bruit is not present. No tracheal deviation, no edema and no " erythema present. No thyromegaly present.   Cardiovascular: Normal rate, regular rhythm, S1 normal, S2 normal and intact distal pulses.  No extrasystoles are present. PMI is not displaced. Exam reveals no gallop, no distant heart sounds and no friction rub.   Murmur heard.   Systolic murmur is present with a grade of 2/6.  Pulses:       Carotid pulses are 2+ on the right side, and 2+ on the left side.       Radial pulses are 2+ on the right side, and 2+ on the left side.        Femoral pulses are 2+ on the right side, and 2+ on the left side.       Dorsalis pedis pulses are 2+ on the right side, and 2+ on the left side.        Posterior tibial pulses are 2+ on the right side, and 2+ on the left side.   Pulmonary/Chest: Effort normal and breath sounds normal. No respiratory distress. He has no decreased breath sounds. He has no wheezes. He has no rhonchi. He has no rales. He exhibits no tenderness.   Abdominal: Soft. Bowel sounds are normal. He exhibits no distension and no mass. There is no hepatosplenomegaly. There is no tenderness. There is no rebound and no guarding.   Musculoskeletal: He exhibits no edema, tenderness or deformity.   Neurological: He is alert and oriented to person, place, and time.   Skin: Skin is warm and dry. No rash noted. He is not diaphoretic. No cyanosis or erythema. No pallor. Nails show no clubbing.   Psychiatric: He has a normal mood and affect. His speech is normal and behavior is normal. Judgment and thought content normal.           Assessment:   1. 84-year-old gentleman with history of severe coronary artery disease preserved left ventricular systolic function.  Status post drug-eluting stents placed to the right coronary artery and the left anterior descending.    Perfusion stress test December 2019 for some atypical jaw pain showed a small area of mild apical ischemia.  Treated medically.  2.  Hyperlipidemia now on target dose rosuvastatin continue the same.  3.  Hypertension  blood pressure adequately controlled.  4.  History of prostate cancer.  5.   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 He does have first-degree AV block but again stable.  6.  Paroxysmal atrial fibrillation, asymptomatic, noted on his pacemaker.  But a large percent of his rhythm was in atrial fibrillation. The patient's CHADS2-VASc score is 5.  Continue the apixaban and beta-blocker.  7.  Aortic stenosis.  Mild echocardiogram August 2018 continue to follow him clinically.   8.  Progressive dyspnea may be some atypical chest tightness.  This could be an anginal equivalent especially given his abnormal perfusion stress test.  But in addition he could have worsening aortic stenosis.  He is could have an echocardiogram today and then will need to get him set up for cardiac catheterization.         Plan:

## 2020-06-17 NOTE — TELEPHONE ENCOUNTER
Reading Physician Reading Date Result Priority   Avery Galan MD 6/17/2020 STAT      Result Text     · Left ventricular systolic function is normal. Calculated EF = 50%. Estimated EF was in agreement with the calculated EF. Global Longitudinal LV strain = -18%. Strain data was reviewed and speckle tracking was considered accurate. Normal left ventricular cavity size noted. All left ventricular wall segments contract normally.  · Left ventricular wall thickness is consistent with mild concentric hypertrophy.  · Left ventricular diastolic dysfunction is noted (grade I a w/high LAP) consistent with impaired relaxation.  · There is moderate calcification of the aortic valve.  · Moderate to severe aortic valve stenosis is present.  · Mild tricuspid valve regurgitation is present. Estimated right ventricular systolic pressure from tricuspid regurgitation is normal (<35 mmHg).  · All of the aortic valve gradient is low. Stroke-volume is low, planimeter eyes aortic valve area is severely stenotic. This may represent low-flow aortic stenosis. On the positive side global longitudinal strain is normal.

## 2020-06-22 ENCOUNTER — TELEPHONE (OUTPATIENT)
Dept: CARDIOLOGY | Facility: CLINIC | Age: 85
End: 2020-06-22

## 2020-06-23 ENCOUNTER — TELEPHONE (OUTPATIENT)
Dept: CARDIOLOGY | Facility: CLINIC | Age: 85
End: 2020-06-23

## 2020-06-23 ENCOUNTER — TRANSCRIBE ORDERS (OUTPATIENT)
Dept: CARDIOLOGY | Facility: CLINIC | Age: 85
End: 2020-06-23

## 2020-06-23 DIAGNOSIS — Z13.6 SCREENING FOR ISCHEMIC HEART DISEASE: ICD-10-CM

## 2020-06-23 DIAGNOSIS — Z01.810 PRE-OPERATIVE CARDIOVASCULAR EXAMINATION: Primary | ICD-10-CM

## 2020-06-23 NOTE — TELEPHONE ENCOUNTER
Pt is scheduled for a hearth cath with you on 6/30. He is on Eliquis. Do you want him to hold it? Thank you

## 2020-06-23 NOTE — TELEPHONE ENCOUNTER
Patient called and left a message that he has questions about procedure.  I called him to see what his concerns were but could not reach him.  I asked that he please call back and leave his questions on the voicemail so that I can run them by Dr. Galan./ KIANNA

## 2020-06-25 ENCOUNTER — TRANSCRIBE ORDERS (OUTPATIENT)
Dept: SLEEP MEDICINE | Facility: HOSPITAL | Age: 85
End: 2020-06-25

## 2020-06-25 ENCOUNTER — TELEPHONE (OUTPATIENT)
Dept: CARDIOLOGY | Facility: CLINIC | Age: 85
End: 2020-06-25

## 2020-06-25 ENCOUNTER — LAB (OUTPATIENT)
Dept: LAB | Facility: HOSPITAL | Age: 85
End: 2020-06-25

## 2020-06-25 DIAGNOSIS — Z01.818 OTHER SPECIFIED PRE-OPERATIVE EXAMINATION: Primary | ICD-10-CM

## 2020-06-25 DIAGNOSIS — Z01.810 PRE-OPERATIVE CARDIOVASCULAR EXAMINATION: ICD-10-CM

## 2020-06-25 DIAGNOSIS — Z13.6 SCREENING FOR ISCHEMIC HEART DISEASE: ICD-10-CM

## 2020-06-25 LAB
ANION GAP SERPL CALCULATED.3IONS-SCNC: 13.1 MMOL/L (ref 5–15)
BASOPHILS # BLD AUTO: 0.05 10*3/MM3 (ref 0–0.2)
BASOPHILS NFR BLD AUTO: 0.8 % (ref 0–1.5)
BUN BLD-MCNC: 30 MG/DL (ref 8–23)
BUN/CREAT SERPL: 20.5 (ref 7–25)
CALCIUM SPEC-SCNC: 10.2 MG/DL (ref 8.6–10.5)
CHLORIDE SERPL-SCNC: 104 MMOL/L (ref 98–107)
CO2 SERPL-SCNC: 26.9 MMOL/L (ref 22–29)
CREAT BLD-MCNC: 1.46 MG/DL (ref 0.76–1.27)
DEPRECATED RDW RBC AUTO: 44.4 FL (ref 37–54)
EOSINOPHIL # BLD AUTO: 0.11 10*3/MM3 (ref 0–0.4)
EOSINOPHIL NFR BLD AUTO: 1.7 % (ref 0.3–6.2)
ERYTHROCYTE [DISTWIDTH] IN BLOOD BY AUTOMATED COUNT: 12.3 % (ref 12.3–15.4)
GFR SERPL CREATININE-BSD FRML MDRD: 46 ML/MIN/1.73
GLUCOSE BLD-MCNC: 93 MG/DL (ref 65–99)
HCT VFR BLD AUTO: 45.5 % (ref 37.5–51)
HGB BLD-MCNC: 15.1 G/DL (ref 13–17.7)
IMM GRANULOCYTES # BLD AUTO: 0.03 10*3/MM3 (ref 0–0.05)
IMM GRANULOCYTES NFR BLD AUTO: 0.5 % (ref 0–0.5)
LYMPHOCYTES # BLD AUTO: 1.47 10*3/MM3 (ref 0.7–3.1)
LYMPHOCYTES NFR BLD AUTO: 23.3 % (ref 19.6–45.3)
MCH RBC QN AUTO: 32.1 PG (ref 26.6–33)
MCHC RBC AUTO-ENTMCNC: 33.2 G/DL (ref 31.5–35.7)
MCV RBC AUTO: 96.8 FL (ref 79–97)
MONOCYTES # BLD AUTO: 0.85 10*3/MM3 (ref 0.1–0.9)
MONOCYTES NFR BLD AUTO: 13.4 % (ref 5–12)
NEUTROPHILS # BLD AUTO: 3.81 10*3/MM3 (ref 1.7–7)
NEUTROPHILS NFR BLD AUTO: 60.3 % (ref 42.7–76)
NRBC BLD AUTO-RTO: 0 /100 WBC (ref 0–0.2)
PLATELET # BLD AUTO: 163 10*3/MM3 (ref 140–450)
PMV BLD AUTO: 10.1 FL (ref 6–12)
POTASSIUM BLD-SCNC: 3.8 MMOL/L (ref 3.5–5.2)
RBC # BLD AUTO: 4.7 10*6/MM3 (ref 4.14–5.8)
SODIUM BLD-SCNC: 144 MMOL/L (ref 136–145)
WBC NRBC COR # BLD: 6.32 10*3/MM3 (ref 3.4–10.8)

## 2020-06-25 PROCEDURE — 36415 COLL VENOUS BLD VENIPUNCTURE: CPT

## 2020-06-25 PROCEDURE — 85025 COMPLETE CBC W/AUTO DIFF WBC: CPT

## 2020-06-25 PROCEDURE — 80048 BASIC METABOLIC PNL TOTAL CA: CPT

## 2020-06-25 NOTE — TELEPHONE ENCOUNTER
Patient wanted to know when to hold Eliquis and when to have his labs done.  I gave this to Traci Kuhn and she said she has since spoken with the patient and gave him instructions./ KIANNA

## 2020-06-25 NOTE — TELEPHONE ENCOUNTER
Pt is scheduled for a hearth cath with Dr. Sellers on 6/30. Dr. Sellers is out until Monday. Pt is on Eliquis. How long should he hold it? Thank you

## 2020-06-27 ENCOUNTER — LAB (OUTPATIENT)
Dept: LAB | Facility: HOSPITAL | Age: 85
End: 2020-06-27

## 2020-06-27 DIAGNOSIS — Z01.818 OTHER SPECIFIED PRE-OPERATIVE EXAMINATION: ICD-10-CM

## 2020-06-27 PROCEDURE — U0004 COV-19 TEST NON-CDC HGH THRU: HCPCS

## 2020-06-27 PROCEDURE — C9803 HOPD COVID-19 SPEC COLLECT: HCPCS

## 2020-06-29 LAB
REF LAB TEST METHOD: NORMAL
SARS-COV-2 RNA RESP QL NAA+PROBE: NOT DETECTED

## 2020-06-30 ENCOUNTER — HOSPITAL ENCOUNTER (OUTPATIENT)
Facility: HOSPITAL | Age: 85
Setting detail: HOSPITAL OUTPATIENT SURGERY
Discharge: HOME | End: 2020-06-30
Attending: INTERNAL MEDICINE | Admitting: INTERNAL MEDICINE

## 2020-06-30 VITALS
SYSTOLIC BLOOD PRESSURE: 121 MMHG | OXYGEN SATURATION: 94 % | HEIGHT: 69 IN | HEART RATE: 66 BPM | WEIGHT: 211 LBS | DIASTOLIC BLOOD PRESSURE: 75 MMHG | TEMPERATURE: 97 F | RESPIRATION RATE: 16 BRPM | BODY MASS INDEX: 31.25 KG/M2

## 2020-06-30 DIAGNOSIS — I25.110 CORONARY ARTERY DISEASE INVOLVING NATIVE CORONARY ARTERY OF NATIVE HEART WITH UNSTABLE ANGINA PECTORIS (HCC): ICD-10-CM

## 2020-06-30 PROCEDURE — C1769 GUIDE WIRE: HCPCS | Performed by: INTERNAL MEDICINE

## 2020-06-30 PROCEDURE — 25010000002 MIDAZOLAM PER 1 MG: Performed by: INTERNAL MEDICINE

## 2020-06-30 PROCEDURE — 25010000002 FENTANYL CITRATE (PF) 100 MCG/2ML SOLUTION: Performed by: INTERNAL MEDICINE

## 2020-06-30 PROCEDURE — 93458 L HRT ARTERY/VENTRICLE ANGIO: CPT | Performed by: INTERNAL MEDICINE

## 2020-06-30 PROCEDURE — C1760 CLOSURE DEV, VASC: HCPCS | Performed by: INTERNAL MEDICINE

## 2020-06-30 PROCEDURE — C1894 INTRO/SHEATH, NON-LASER: HCPCS | Performed by: INTERNAL MEDICINE

## 2020-06-30 PROCEDURE — 0 IOPAMIDOL PER 1 ML: Performed by: INTERNAL MEDICINE

## 2020-06-30 RX ORDER — SODIUM CHLORIDE 9 MG/ML
100 INJECTION, SOLUTION INTRAVENOUS CONTINUOUS
Status: DISCONTINUED | OUTPATIENT
Start: 2020-06-30 | End: 2020-06-30 | Stop reason: HOSPADM

## 2020-06-30 RX ORDER — MIDAZOLAM HYDROCHLORIDE 1 MG/ML
INJECTION INTRAMUSCULAR; INTRAVENOUS AS NEEDED
Status: DISCONTINUED | OUTPATIENT
Start: 2020-06-30 | End: 2020-06-30 | Stop reason: HOSPADM

## 2020-06-30 RX ORDER — FENTANYL CITRATE 50 UG/ML
INJECTION, SOLUTION INTRAMUSCULAR; INTRAVENOUS AS NEEDED
Status: DISCONTINUED | OUTPATIENT
Start: 2020-06-30 | End: 2020-06-30 | Stop reason: HOSPADM

## 2020-06-30 RX ORDER — SODIUM CHLORIDE 9 MG/ML
75 INJECTION, SOLUTION INTRAVENOUS CONTINUOUS
Status: DISCONTINUED | OUTPATIENT
Start: 2020-06-30 | End: 2020-06-30 | Stop reason: HOSPADM

## 2020-06-30 RX ORDER — LIDOCAINE HYDROCHLORIDE 20 MG/ML
INJECTION, SOLUTION INFILTRATION; PERINEURAL AS NEEDED
Status: DISCONTINUED | OUTPATIENT
Start: 2020-06-30 | End: 2020-06-30 | Stop reason: HOSPADM

## 2020-06-30 RX ORDER — SODIUM CHLORIDE 0.9 % (FLUSH) 0.9 %
3 SYRINGE (ML) INJECTION EVERY 12 HOURS SCHEDULED
Status: DISCONTINUED | OUTPATIENT
Start: 2020-06-30 | End: 2020-06-30 | Stop reason: HOSPADM

## 2020-06-30 RX ORDER — ACETAMINOPHEN 325 MG/1
650 TABLET ORAL EVERY 4 HOURS PRN
Status: DISCONTINUED | OUTPATIENT
Start: 2020-06-30 | End: 2020-06-30 | Stop reason: HOSPADM

## 2020-06-30 RX ORDER — SODIUM CHLORIDE 0.9 % (FLUSH) 0.9 %
10 SYRINGE (ML) INJECTION AS NEEDED
Status: DISCONTINUED | OUTPATIENT
Start: 2020-06-30 | End: 2020-06-30 | Stop reason: HOSPADM

## 2020-06-30 RX ORDER — LIDOCAINE HYDROCHLORIDE 10 MG/ML
0.1 INJECTION, SOLUTION EPIDURAL; INFILTRATION; INTRACAUDAL; PERINEURAL ONCE AS NEEDED
Status: DISCONTINUED | OUTPATIENT
Start: 2020-06-30 | End: 2020-06-30 | Stop reason: HOSPADM

## 2020-06-30 RX ADMIN — SODIUM CHLORIDE 75 ML/HR: 9 INJECTION, SOLUTION INTRAVENOUS at 08:25

## 2020-07-07 ENCOUNTER — TELEPHONE (OUTPATIENT)
Dept: CARDIOLOGY | Facility: CLINIC | Age: 85
End: 2020-07-07

## 2020-07-07 NOTE — TELEPHONE ENCOUNTER
Called and spoke with patient he is aware that he will be coming in tomorrow for a site check only. He stated that he will be here at 9:30

## 2020-07-07 NOTE — TELEPHONE ENCOUNTER
Patient called stating that he has a lump where his incision is from the heart cath. He denied any redness, tenderness but it is swollen. Should patient come for a site check? If so when would patient to come in?    He can be reached at [phone]    Thanks

## 2020-07-08 ENCOUNTER — TELEPHONE (OUTPATIENT)
Dept: CARDIOLOGY | Facility: CLINIC | Age: 85
End: 2020-07-08

## 2020-07-08 DIAGNOSIS — T82.848A PAIN DUE TO VASCULAR PROSTHETIC DEVICES, IMPLANTS AND GRAFTS, INITIAL ENCOUNTER (HCC): ICD-10-CM

## 2020-07-08 DIAGNOSIS — R10.31 GROIN PAIN, RIGHT: Primary | ICD-10-CM

## 2020-07-08 NOTE — TELEPHONE ENCOUNTER
Patient came into the office today for site check after cardiac catheterization on 6/30.  He had a femoral approach due to prior issues with radial approach is.  He states that his cath site was fine but 2 days post procedure he did have some swelling and bruising.  He is on anticoagulation for anticoagulation.    Today the site looks fairly well-healed.  There is a small grape size not there but is not pulsatile nor do I appreciate a significant bruit.  It is tender to palpation.  I would like to obtain a duplex to make sure there is no vascular injury and can follow-up on that.  Otherwise I think it will likely heal up on itself and may just be some residual scar tissue from the procedure.

## 2020-07-15 NOTE — TELEPHONE ENCOUNTER
Patient called and left  stating that he does not think it's necessary to do a duplex this upcoming Monday. He stated that the lump is not completely gone, but now it's down to a pebble size. He denies any pain or tenderness.    Patient can be reached at [phone]    Thanks

## 2020-07-20 ENCOUNTER — APPOINTMENT (OUTPATIENT)
Dept: CARDIOLOGY | Facility: HOSPITAL | Age: 85
End: 2020-07-20

## 2020-08-06 RX ORDER — FUROSEMIDE 20 MG/1
TABLET ORAL
Qty: 90 TABLET | Refills: 1 | Status: SHIPPED | OUTPATIENT
Start: 2020-08-06 | End: 2021-03-08 | Stop reason: SDUPTHER

## 2020-09-05 DIAGNOSIS — M10.00 IDIOPATHIC GOUT, UNSPECIFIED CHRONICITY, UNSPECIFIED SITE: ICD-10-CM

## 2020-09-08 RX ORDER — ALLOPURINOL 100 MG/1
100 TABLET ORAL DAILY
Qty: 90 TABLET | Refills: 0 | Status: SHIPPED | OUTPATIENT
Start: 2020-09-08 | End: 2020-12-04

## 2020-09-10 RX ORDER — METOPROLOL TARTRATE 50 MG/1
TABLET, FILM COATED ORAL
Qty: 180 TABLET | Refills: 1 | Status: SHIPPED | OUTPATIENT
Start: 2020-09-10 | End: 2021-04-20 | Stop reason: SDUPTHER

## 2020-09-10 RX ORDER — APIXABAN 2.5 MG/1
TABLET, FILM COATED ORAL
Qty: 180 TABLET | Refills: 1 | Status: SHIPPED | OUTPATIENT
Start: 2020-09-10 | End: 2021-03-08 | Stop reason: SDUPTHER

## 2020-10-27 RX ORDER — CHLORTHALIDONE 25 MG/1
25 TABLET ORAL DAILY
Qty: 30 TABLET | Refills: 11 | Status: SHIPPED | OUTPATIENT
Start: 2020-10-27 | End: 2021-04-27 | Stop reason: SDUPTHER

## 2020-12-04 DIAGNOSIS — E78.2 MIXED HYPERLIPIDEMIA: ICD-10-CM

## 2020-12-04 DIAGNOSIS — M10.00 IDIOPATHIC GOUT, UNSPECIFIED CHRONICITY, UNSPECIFIED SITE: ICD-10-CM

## 2020-12-04 DIAGNOSIS — I10 ESSENTIAL HYPERTENSION: ICD-10-CM

## 2020-12-04 RX ORDER — ALLOPURINOL 100 MG/1
100 TABLET ORAL DAILY
Qty: 90 TABLET | OUTPATIENT
Start: 2020-12-04

## 2020-12-04 RX ORDER — ROSUVASTATIN CALCIUM 20 MG/1
20 TABLET, COATED ORAL DAILY
Qty: 30 TABLET | Refills: 0 | Status: SHIPPED | OUTPATIENT
Start: 2020-12-04 | End: 2021-04-27 | Stop reason: SDUPTHER

## 2020-12-04 RX ORDER — ALLOPURINOL 100 MG/1
100 TABLET ORAL DAILY
Qty: 30 TABLET | Refills: 0 | Status: SHIPPED | OUTPATIENT
Start: 2020-12-04

## 2020-12-04 RX ORDER — POTASSIUM CHLORIDE 750 MG/1
10 CAPSULE, EXTENDED RELEASE ORAL 2 TIMES DAILY
Qty: 180 CAPSULE | Refills: 1 | Status: SHIPPED | OUTPATIENT
Start: 2020-12-04 | End: 2021-04-27 | Stop reason: SDUPTHER

## 2020-12-06 DIAGNOSIS — E78.2 MIXED HYPERLIPIDEMIA: ICD-10-CM

## 2020-12-07 RX ORDER — ROSUVASTATIN CALCIUM 20 MG/1
20 TABLET, COATED ORAL DAILY
Qty: 90 TABLET | OUTPATIENT
Start: 2020-12-07

## 2020-12-09 DIAGNOSIS — E78.2 MIXED HYPERLIPIDEMIA: ICD-10-CM

## 2020-12-09 RX ORDER — ROSUVASTATIN CALCIUM 20 MG/1
20 TABLET, COATED ORAL DAILY
Qty: 90 TABLET | OUTPATIENT
Start: 2020-12-09

## 2020-12-10 ENCOUNTER — CLINICAL SUPPORT NO REQUIREMENTS (OUTPATIENT)
Dept: CARDIOLOGY | Facility: CLINIC | Age: 85
End: 2020-12-10

## 2020-12-10 ENCOUNTER — OFFICE VISIT (OUTPATIENT)
Dept: CARDIOLOGY | Facility: CLINIC | Age: 85
End: 2020-12-10

## 2020-12-10 VITALS
SYSTOLIC BLOOD PRESSURE: 104 MMHG | WEIGHT: 213.6 LBS | BODY MASS INDEX: 31.64 KG/M2 | DIASTOLIC BLOOD PRESSURE: 60 MMHG | HEIGHT: 69 IN | HEART RATE: 93 BPM

## 2020-12-10 DIAGNOSIS — I35.0 NONRHEUMATIC AORTIC VALVE STENOSIS: ICD-10-CM

## 2020-12-10 DIAGNOSIS — E78.2 MIXED HYPERLIPIDEMIA: ICD-10-CM

## 2020-12-10 DIAGNOSIS — I10 ESSENTIAL HYPERTENSION: ICD-10-CM

## 2020-12-10 DIAGNOSIS — I50.32 CHRONIC DIASTOLIC CONGESTIVE HEART FAILURE (HCC): ICD-10-CM

## 2020-12-10 DIAGNOSIS — I25.110 CORONARY ARTERY DISEASE INVOLVING NATIVE CORONARY ARTERY OF NATIVE HEART WITH UNSTABLE ANGINA PECTORIS (HCC): Primary | ICD-10-CM

## 2020-12-10 DIAGNOSIS — I48.0 PAROXYSMAL ATRIAL FIBRILLATION (HCC): ICD-10-CM

## 2020-12-10 DIAGNOSIS — I48.0 PAROXYSMAL ATRIAL FIBRILLATION (HCC): Primary | ICD-10-CM

## 2020-12-10 PROCEDURE — 99214 OFFICE O/P EST MOD 30 MIN: CPT | Performed by: INTERNAL MEDICINE

## 2020-12-10 PROCEDURE — 93000 ELECTROCARDIOGRAM COMPLETE: CPT | Performed by: INTERNAL MEDICINE

## 2020-12-10 NOTE — PROGRESS NOTES
Date of Office Visit: 12/10/20  Encounter Provider: Avery Galan MD  Place of Service: [facility] CARDIOLOGY  Patient Name: Guero Garay  :1935  Referral Provider:No ref. provider found      Chief Complaint   Patient presents with   • Follow-up     History of Present Illness  Mr Garay is a pleasant 86 yo gentleman with a history of aortic stenosis, hypertension, hyperlipidemia, and coronary artery disease.  He presented to our office in 2017 with progressive angina.  He then had a perfusion stress test that showed a large area of anterior and septal ischemia echocardiogram at that time showed mild aortic stenosis he then underwent cardiac catheterization 2017 he was found have preserved left ventricular ejection fraction severe disease of the mid left anterior descending and right coronary artery.  Had a 4.0 x 15 mm drug-eluting stent placed in the left anterior descending and a 4.0 x 28 mm drug-eluting stent dilated to 4.5 mm in the right coronary artery.  He was hypokalemic on admission started on potassium, he was also started on on rosuvastatin.    He then presented in 2017 with dizziness and syncope and bradycardia.  He ended up having a permanent pacemaker implanted.  He also had a tick bite but was negative for [place] spotted fever. He's coming in early because on his pacemaker interrogation he was found to have episodes of atrial fibrillation fairly prolonged and now occupying 10.6% burden.   He was started on eliquis.  He also had some shortness of breath and was scheduled for outpatient perfusion stress test.  Echocardiogram on 8/10/2018 showed normal left ventricular systolic function with a small LV cavity, mild concentric hypertrophy, mild calcification of the aortic valve mild aortic valve stenosis.  His renal function and liver function studies were abnormal four days prior.  His blood pressure was low in the office visit on  8/10/2018 and 84/60 and he was arranged for admission to the hospital. VQ scan was showed no findings suggestive of PE. He was found to have urinary retention and required Tran catheter as well as urology following.  He had recently been treated for urosepsis and acute pyelonephritis.  CT of the abdomen was normal as well as ultrasound of the liver which was normal.  Elevated liver enzymes were felt to be from antibiotic patient was recently on.  He had a heme positive stool and was to have an outpatient colonoscopy.  Outpatient Perfusion stress test completed on 8/17/2018 showed no evidence of ischemia and was a low risk study.    In June 2020 he presented with increased dyspnea.  Echocardiogram suggested moderate to severe aortic stenosis normal LV systolic function.  He underwent cardiac catheterization that showed patent stents no significant coronary disease and just mild aortic stenosis.  He now comes in for follow-up.  He still having these atypical symptoms.  Some jaw pain occasionally but no exertional chest pain or pressure.  Some palpitations but no near syncope or syncope.  He brought in a number of blood pressure recordings and for the most part they are low he is maybe had 1 or 2 that were in the 150 systolic range all of his diastolics have been in range.  No orthopnea or PND.    Coronary Artery Disease  Pertinent negatives include no dizziness, muscle weakness or weight gain. There is no history of past myocardial infarction.         Past Medical History:   Diagnosis Date   • Acute kidney injury superimposed on chronic kidney disease (CMS/HCC)    • Arthralgia    • Asthma     mild persistent without complication   • CAD (coronary artery disease), native coronary artery    • Cancer (CMS/HCC)     Prostate   • Chest pain, unspecified    • Chronic diastolic congestive heart failure (CMS/HCC)    • Colon polyp    • Dyspnea on exertion    • Erectile dysfunction    • Essential hypertension    • Generalized  weakness    • Gout    • History of prostate cancer    • Hyperkalemia    • Jaw pain     both sides   • Mixed hyperlipidemia    • Osteoarthrosis    • PAF (paroxysmal atrial fibrillation) (CMS/HCC)    • Shoulder pain    • SOB (shortness of breath)    • Tinnitus    • Urine retention          Past Surgical History:   Procedure Laterality Date   • CARDIAC CATHETERIZATION N/A 4/27/2017    Procedure: Coronary angiography;  Surgeon: Marvel Brito MD;  [facility]AS[facility] LOC[facility];  Service:    • CARDIAC CATHETERIZATION N/A 4/27/2017    Procedure: Left Heart Cath;  Surgeon: Marvel Brito MD;  [facility] C[facility];  Service:    • CARDIAC CATHETERIZATION N/A 4/27/2017    Procedure: Stent GIN coronary;  Surgeon: Marvel Brito MD;  Location: [facility] CATH INVASIVE LOCATION;  Service:    • CARDIAC CATHETERIZATION N/A 6/30/2020    Procedure: Coronary angiography;  Surgeon: Jared Sellers MD;  Location: [facility] CATH INVASIVE LOCATION;  Service: Cardiovascular;  Laterality: N/A;   • CARDIAC CATHETERIZATION N/A 6/30/2020    Procedure: Left Heart Cath;  Surgeon: Jared Sellers MD;  Location: [facility] CATH INVASIVE LOCATION;  Service: Cardiovascular;  Laterality: N/A;   • CARDIAC ELECTROPHYSIOLOGY PROCEDURE N/A 6/15/2017    Procedure: Pacemaker DC new   [employer];  Surgeon: Gustavo Valladares MD;  Location: [facility] CATH INVASIVE LOCATION;  Service:    • COLONOSCOPY  2013   • COLONOSCOPY N/A 12/12/2018    Procedure: COLONOSCOPY with polypectomy;  Surgeon: Kwadwo Powers MD;  Location: [facility] OR;  Service: Gastroenterology   • INGUINAL HERNIA REPAIR Right    • JOINT REPLACEMENT      left knee, left and right shoulder   • NECK SURGERY      SPURS   • [place] RECONSTR TOTAL SHOULDER IMPLANT Right 2/16/2017    Procedure: RIGHT TOTAL SHOULDER ARTHROPLASTY;  Surgeon: Marquez Galvan MD;  [facility];  Service: Orthopedics   • PROSTATECTOMY     • REPLACEMENT TOTAL KNEE Left    •  "TONSILLECTOMY     • TOTAL SHOULDER REPLACEMENT Bilateral          Current Outpatient Medications on File Prior to Visit   Medication Sig Dispense Refill   • allopurinol (ZYLOPRIM) 100 MG tablet TAKE 1 TABLET BY MOUTH DAILY 30 tablet 0   • aspirin 81 MG chewable tablet Chew 81 mg Daily.     • budesonide-formoterol (SYMBICORT) 160-4.5 MCG/ACT inhaler Inhale 2 puffs 2 (Two) Times a Day.     • cetirizine (zyrTEC) 10 MG tablet Take 10 mg by mouth Daily.     • chlorthalidone (HYGROTON) 25 MG tablet TAKE 1 TABLET BY MOUTH DAILY 30 tablet 11   • Cranberry 250 MG capsule Take 250 mg by mouth Daily.     • ELIQUIS 2.5 MG tablet tablet TAKE 1 TABLET BY MOUTH EVERY 12 HOURS 180 tablet 1   • Fluticasone Furoate-Vilanterol (BREO ELLIPTA) 100-25 MCG/INH aerosol powder  Inhale 1 puff Every Morning.     • furosemide (LASIX) 20 MG tablet TAKE 1 TABLET BY MOUTH EVERY DAY 90 tablet 1   • melatonin 5 MG tablet tablet Take 5 mg by mouth At Night As Needed.     • metoprolol tartrate (LOPRESSOR) 50 MG tablet TAKE 1 TABLET BY MOUTH TWICE DAILY 180 tablet 1   • Multiple Vitamins-Minerals (MULTIVITAMIN WITH MINERALS) tablet tablet Take 1 tablet by mouth Daily.     • potassium chloride (MICRO-K) 10 MEQ CR capsule Take 1 capsule by mouth 2 (Two) Times a Day. 180 capsule 1   • rosuvastatin (CRESTOR) 20 MG tablet TAKE 1 TABLET BY MOUTH DAILY 30 tablet 0   • Testosterone Cypionate (DEPOTESTOTERONE CYPIONATE) 200 MG/ML injection 1 mL 1 (One) Time Per Week.  1     No current facility-administered medications on file prior to visit.          Social History     Socioeconomic History   • Marital status:      Spouse name: Not on file   • Number of children: Not on file   • Years of education: Not on file   • Highest education level: Not on file   Tobacco Use   • Smoking status: Never Smoker   • Smokeless tobacco: Never Used   • Tobacco comment: caffeine use- \"occ\" soda, decaf tea   Substance and Sexual Activity   • Alcohol use: Yes     " Alcohol/week: 21.0 standard drinks     Types: 21 Cans of beer per week     Comment: 2-3 beers daily   • Drug use: No   • Sexual activity: Defer   Lifestyle   • Physical activity     Days per week: 3 days     Minutes per session: 30 min   • Stress: Not at all       Family History   Problem Relation Age of Onset   • Cancer Mother         lung   • Cancer Father         lung   • Diabetes Father    • Colon cancer Neg Hx    • Colon polyps Neg Hx          Review of Systems   Constitution: Negative for decreased appetite, diaphoresis, fever, malaise/fatigue, weight gain and weight loss.   HENT: Negative for congestion, hearing loss, nosebleeds and tinnitus.    Eyes: Negative for blurred vision, double vision, vision loss in left eye, vision loss in right eye and visual disturbance.   Cardiovascular:        As noted in HPI   Respiratory:        As noted HPI   Endocrine: Negative for cold intolerance and heat intolerance.   Hematologic/Lymphatic: Negative for bleeding problem. Does not bruise/bleed easily.   Skin: Negative for color change, flushing, itching and rash.   Musculoskeletal: Negative for arthritis, back pain, joint pain, joint swelling, muscle weakness and myalgias.   Gastrointestinal: Negative for bloating, abdominal pain, constipation, diarrhea, dysphagia, heartburn, hematemesis, hematochezia, melena, nausea and vomiting.   Genitourinary: Negative for bladder incontinence, dysuria, frequency, nocturia and urgency.   Neurological: Negative for dizziness, focal weakness, headaches, light-headedness, loss of balance, numbness, paresthesias, vertigo and weakness.   Psychiatric/Behavioral: Negative for depression, memory loss and substance abuse.       Procedures      ECG 12 Lead    Date/Time: 12/10/2020 2:07 PM  Performed by: Avery Galan MD  Authorized by: Avery Galan MD   Comparison: compared with previous ECG   Similar to previous ECG  Rhythm: sinus rhythm  Rate: normal  Conduction: 1st degree AV  "block  Q wave noted on lead: Old inferior MI.    QRS axis: normal                  Objective:    /60 (BP Location: Right arm)   Pulse 93   Ht 175.3 cm (69\")   Wt 96.9 kg (213 lb 9.6 oz)   BMI 31.54 kg/m²        Physical Exam  Vitals signs reviewed.   Constitutional:       General: Not in acute distress.     Appearance: Well-developed. Not diaphoretic.   Eyes:      General: No scleral icterus.     Conjunctiva/sclera: Conjunctivae normal.      Pupils: Pupils are equal, round, and reactive to light.   HENT:      Head: Normocephalic.   Neck:      Musculoskeletal: No edema or erythema.      Thyroid: No thyromegaly.      Vascular: Normal carotid pulses. No carotid bruit, hepatojugular reflux or JVD.      Trachea: No tracheal deviation.   Pulmonary:      Effort: Pulmonary effort is normal. No respiratory distress.      Breath sounds: Normal breath sounds. No decreased breath sounds. No wheezing. No rhonchi. No rales.   Chest:      Chest wall: Not tender to palpatation.   Cardiovascular:      PMI at left midclavicular line. Normal rate. Regular rhythm. S1 with normal intensity. S2 with normal intensity.      Murmurs: There is no murmur.      No gallop. No click. No rub.   Pulses:     Intact distal pulses.   Edema:     Peripheral edema absent.   Abdominal:      General: Bowel sounds are normal. There is no distension.      Palpations: Abdomen is soft. There is no abdominal mass.      Tenderness: There is no abdominal tenderness. There is no guarding or rebound.   Musculoskeletal:         General: No tenderness or deformity.      Extremities: No clubbing present.  Skin:     General: Skin is warm and dry. There is no cyanosis.      Coloration: Skin is not pale.      Findings: No erythema or rash.   Neurological:      Mental Status: Alert and oriented to person, place, and time.   Psychiatric:         Speech: Speech normal.         Behavior: Behavior normal.         Thought Content: Thought content normal.         " Judgment: Judgment normal.             Assessment:   1.  84-year-old gentleman with history of severe coronary artery disease preserved left ventricular systolic function.  Status post drug-eluting stents placed to the right coronary artery and the left anterior descending.  Cardiac catheterization June 2020 showed no real significant disease continue to follow and treat him medically.  He will return for follow-up in 6 months.  2.  Hyperlipidemia now on target dose rosuvastatin continue the same.  3.  Hypertension blood pressure adequately controlled.  If anything on the low side we told him to not start the amlodipine he has an appointment with a new PCP we will discuss that further with them.  4.  History of prostate cancer.  5.   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 He does have first-degree AV block but again stable.  6.  Paroxysmal atrial fibrillation, asymptomatic, noted on his pacemaker.  But a large percent of his rhythm was in atrial fibrillation. The patient's CHADS2-VASc score is 5.  Continue the apixaban and beta-blocker.  7.  Aortic stenosis.  Echocardiogram in June 2020 appeared worse however catheterization this just mild.           Plan:

## 2020-12-13 ENCOUNTER — RESULTS ENCOUNTER (OUTPATIENT)
Dept: FAMILY MEDICINE CLINIC | Facility: CLINIC | Age: 85
End: 2020-12-13

## 2020-12-13 DIAGNOSIS — I25.110 CORONARY ARTERY DISEASE INVOLVING NATIVE CORONARY ARTERY OF NATIVE HEART WITH UNSTABLE ANGINA PECTORIS (HCC): ICD-10-CM

## 2020-12-13 DIAGNOSIS — M10.00 IDIOPATHIC GOUT, UNSPECIFIED CHRONICITY, UNSPECIFIED SITE: ICD-10-CM

## 2020-12-13 DIAGNOSIS — I10 ESSENTIAL HYPERTENSION: ICD-10-CM

## 2021-02-01 DIAGNOSIS — M10.00 IDIOPATHIC GOUT, UNSPECIFIED CHRONICITY, UNSPECIFIED SITE: ICD-10-CM

## 2021-02-01 RX ORDER — ALLOPURINOL 100 MG/1
100 TABLET ORAL DAILY
Qty: 30 TABLET | Refills: 0 | OUTPATIENT
Start: 2021-02-01

## 2021-03-08 RX ORDER — FUROSEMIDE 20 MG/1
20 TABLET ORAL DAILY
Qty: 90 TABLET | Refills: 2 | Status: SHIPPED | OUTPATIENT
Start: 2021-03-08 | End: 2021-04-27 | Stop reason: SDUPTHER

## 2021-04-20 RX ORDER — METOPROLOL TARTRATE 50 MG/1
50 TABLET, FILM COATED ORAL 2 TIMES DAILY
Qty: 180 TABLET | Refills: 1 | Status: SHIPPED | OUTPATIENT
Start: 2021-04-20 | End: 2021-04-27 | Stop reason: SDUPTHER

## 2021-04-24 ENCOUNTER — HOSPITAL ENCOUNTER (EMERGENCY)
Facility: HOSPITAL | Age: 86
Discharge: HOME | End: 2021-04-25
Attending: EMERGENCY MEDICINE | Admitting: EMERGENCY MEDICINE

## 2021-04-24 ENCOUNTER — APPOINTMENT (OUTPATIENT)
Dept: GENERAL RADIOLOGY | Facility: HOSPITAL | Age: 86
End: 2021-04-24

## 2021-04-24 DIAGNOSIS — I48.91 ATRIAL FIBRILLATION, UNSPECIFIED TYPE (HCC): ICD-10-CM

## 2021-04-24 DIAGNOSIS — I10 ESSENTIAL HYPERTENSION: Primary | ICD-10-CM

## 2021-04-24 LAB
BASOPHILS # BLD AUTO: 0.08 10*3/MM3 (ref 0–0.2)
BASOPHILS NFR BLD AUTO: 1.1 % (ref 0–1.5)
DEPRECATED RDW RBC AUTO: 48.1 FL (ref 37–54)
EOSINOPHIL # BLD AUTO: 0.2 10*3/MM3 (ref 0–0.4)
EOSINOPHIL NFR BLD AUTO: 2.8 % (ref 0.3–6.2)
ERYTHROCYTE [DISTWIDTH] IN BLOOD BY AUTOMATED COUNT: 13.2 % (ref 12.3–15.4)
HCT VFR BLD AUTO: 53.5 % (ref 37.5–51)
HGB BLD-MCNC: 17.1 G/DL (ref 13–17.7)
IMM GRANULOCYTES # BLD AUTO: 0.02 10*3/MM3 (ref 0–0.05)
IMM GRANULOCYTES NFR BLD AUTO: 0.3 % (ref 0–0.5)
LYMPHOCYTES # BLD AUTO: 1.64 10*3/MM3 (ref 0.7–3.1)
LYMPHOCYTES NFR BLD AUTO: 22.6 % (ref 19.6–45.3)
MCH RBC QN AUTO: 31.4 PG (ref 26.6–33)
MCHC RBC AUTO-ENTMCNC: 32 G/DL (ref 31.5–35.7)
MCV RBC AUTO: 98.2 FL (ref 79–97)
MONOCYTES # BLD AUTO: 1.05 10*3/MM3 (ref 0.1–0.9)
MONOCYTES NFR BLD AUTO: 14.5 % (ref 5–12)
NEUTROPHILS NFR BLD AUTO: 4.26 10*3/MM3 (ref 1.7–7)
NEUTROPHILS NFR BLD AUTO: 58.7 % (ref 42.7–76)
NRBC BLD AUTO-RTO: 0 /100 WBC (ref 0–0.2)
PLATELET # BLD AUTO: 145 10*3/MM3 (ref 140–450)
PMV BLD AUTO: 9.6 FL (ref 6–12)
RBC # BLD AUTO: 5.45 10*6/MM3 (ref 4.14–5.8)
WBC # BLD AUTO: 7.25 10*3/MM3 (ref 3.4–10.8)

## 2021-04-24 PROCEDURE — 71045 X-RAY EXAM CHEST 1 VIEW: CPT

## 2021-04-24 PROCEDURE — 93010 ELECTROCARDIOGRAM REPORT: CPT | Performed by: INTERNAL MEDICINE

## 2021-04-24 PROCEDURE — 99284 EMERGENCY DEPT VISIT MOD MDM: CPT | Performed by: EMERGENCY MEDICINE

## 2021-04-24 PROCEDURE — 80053 COMPREHEN METABOLIC PANEL: CPT | Performed by: EMERGENCY MEDICINE

## 2021-04-24 PROCEDURE — 93005 ELECTROCARDIOGRAM TRACING: CPT | Performed by: EMERGENCY MEDICINE

## 2021-04-24 PROCEDURE — 93005 ELECTROCARDIOGRAM TRACING: CPT

## 2021-04-24 PROCEDURE — 85025 COMPLETE CBC W/AUTO DIFF WBC: CPT | Performed by: EMERGENCY MEDICINE

## 2021-04-24 PROCEDURE — 99283 EMERGENCY DEPT VISIT LOW MDM: CPT

## 2021-04-25 VITALS
TEMPERATURE: 98.5 F | HEART RATE: 86 BPM | HEIGHT: 68 IN | WEIGHT: 211 LBS | DIASTOLIC BLOOD PRESSURE: 94 MMHG | RESPIRATION RATE: 20 BRPM | OXYGEN SATURATION: 93 % | BODY MASS INDEX: 31.98 KG/M2 | SYSTOLIC BLOOD PRESSURE: 165 MMHG

## 2021-04-25 LAB
ALBUMIN SERPL-MCNC: 4.2 G/DL (ref 3.5–5.2)
ALBUMIN/GLOB SERPL: 1.5 G/DL
ALP SERPL-CCNC: 62 U/L (ref 39–117)
ALT SERPL W P-5'-P-CCNC: 18 U/L (ref 1–41)
ANION GAP SERPL CALCULATED.3IONS-SCNC: 11.6 MMOL/L (ref 5–15)
AST SERPL-CCNC: 24 U/L (ref 1–40)
BILIRUB SERPL-MCNC: 0.3 MG/DL (ref 0–1.2)
BUN SERPL-MCNC: 24 MG/DL (ref 8–23)
BUN/CREAT SERPL: 16.3 (ref 7–25)
CALCIUM SPEC-SCNC: 10 MG/DL (ref 8.6–10.5)
CHLORIDE SERPL-SCNC: 105 MMOL/L (ref 98–107)
CO2 SERPL-SCNC: 22.4 MMOL/L (ref 22–29)
CREAT SERPL-MCNC: 1.47 MG/DL (ref 0.76–1.27)
GFR SERPL CREATININE-BSD FRML MDRD: 46 ML/MIN/1.73
GLOBULIN UR ELPH-MCNC: 2.8 GM/DL
GLUCOSE SERPL-MCNC: 107 MG/DL (ref 65–99)
POTASSIUM SERPL-SCNC: 4 MMOL/L (ref 3.5–5.2)
PROT SERPL-MCNC: 7 G/DL (ref 6–8.5)
QT INTERVAL: 449 MS
SODIUM SERPL-SCNC: 139 MMOL/L (ref 136–145)

## 2021-04-25 NOTE — DISCHARGE INSTRUCTIONS
Please follow-up with your primary care physician as discussed.  Please drink plenty of fluids.  Please return to the ER if you develop chest pain, persistent shortness of breath or for any other concerns.

## 2021-04-25 NOTE — ED PROVIDER NOTES
Subjective   History of Present Illness  86-year-old male presents to the emergency room out of concern for having elevated blood pressure readings today.  He says that he also noticed that when he checked his blood pressure his heart rate was up in the 100-140 range.  He says that he got out of breath when he was moving 350 pounds of fertilizer to be spread out on his property but he did not have any chest pain and felt fine when he got finished with the job.  He is not sure what his pulse was at that time.  Per nursing notes his pulse was in the 180s when he got here and trended down to the 70s soon after.  He says that he takes his medications for his A. fib at nighttime and that he took his metoprolol tonight at around 10-10 30.    Review of Systems   Constitutional: Negative for fatigue and fever.   Respiratory: Positive for shortness of breath. Negative for chest tightness.    Cardiovascular: Negative for chest pain, palpitations and leg swelling.   Gastrointestinal: Negative for abdominal pain, nausea and vomiting.   Neurological: Positive for light-headedness. Negative for dizziness and weakness.       Past Medical History:   Diagnosis Date   • Acute kidney injury superimposed on chronic kidney disease (CMS/HCC)    • Arthralgia    • Asthma     mild persistent without complication   • CAD (coronary artery disease), native coronary artery    • Cancer (CMS/HCC)     Prostate   • Chest pain, unspecified    • Chronic diastolic congestive heart failure (CMS/HCC)    • Colon polyp    • Dyspnea on exertion    • Erectile dysfunction    • Essential hypertension    • Generalized weakness    • Gout    • History of prostate cancer    • Hyperkalemia    • Jaw pain     both sides   • Mixed hyperlipidemia    • Osteoarthrosis    • PAF (paroxysmal atrial fibrillation) (CMS/HCC)    • Shoulder pain    • SOB (shortness of breath)    • Tinnitus    • Urine retention        Allergies   Allergen Reactions   • Sulfa Antibiotics Other  (See Comments)     Pat was shaky and could hardly walk       Past Surgical History:   Procedure Laterality Date   • CARDIAC CATHETERIZATION N/A 4/27/2017    Procedure: Coronary angiography;  Surgeon: Marvel Brito MD;  Location: [facility] CATH INVASIVE LOCATION;  Service:    • CARDIAC CATHETERIZATION N/A 4/27/2017    Procedure: Left Heart Cath;  Surgeon: Marvel Brito MD;  Location: [facility] CATH INVASIVE LOCATION;  Service:    • CARDIAC CATHETERIZATION N/A 4/27/2017    Procedure: Stent GIN coronary;  Surgeon: Marvel Brito MD;  Location: [facility] CATH INVASIVE LOCATION;  Service:    • CARDIAC CATHETERIZATION N/A 6/30/2020    Procedure: Coronary angiography;  Surgeon: Jared Sellers MD;  Location[facility] C[facility] INVASIVE LOC[facility];  Service: Cardiovascular;  Laterality: N/A;   • CARDIAC CATHETERIZATION N/A 6/30/2020    Procedure: Left Heart Cath;  Surgeon: Jared Sellers MD;  [facility] C[facility];  Service: Cardiovascular;  Laterality: N/A;   • CARDIAC ELECTROPHYSIOLOGY PROCEDURE N/A 6/15/2017    Procedure: Pacemaker DC new   [employer];  Surgeon: Gustavo Valladares MD;  Location: [facility] CATH INVASIVE LOCATION;  Service:    • COLONOSCOPY  2013   • COLONOSCOPY N/A 12/12/2018    Procedure: COLONOSCOPY with polypectomy;  Surgeon: Kwadwo Powers MD;  Location: [facility];  Service: Gastroenterology   • INGUINAL HERNIA REPAIR Right    • JOINT REPLACEMENT      left knee, left and right shoulder   • NECK SURGERY      SPURS   • ME RECONSTR TOTAL SHOULDER IMPLANT Right 2/16/2017    Procedure: RIGHT TOTAL SHOULDER ARTHROPLASTY;  Surgeon: Marquez Galvan MD;  Location: [facility];  Service: Orthopedics   • PROSTATECTOMY     • REPLACEMENT TOTAL KNEE Left    • TONSILLECTOMY     • TOTAL SHOULDER REPLACEMENT Bilateral        Family History   Problem Relation Age of Onset   • Cancer Mother         lung   • Cancer Father         lung   • Diabetes Father    • Colon cancer Neg Hx    • Colon polyps  "Neg Hx        Social History     Socioeconomic History   • Marital status:      Spouse name: Not on file   • Number of children: Not on file   • Years of education: Not on file   • Highest education level: Not on file   Tobacco Use   • Smoking status: Never Smoker   • Smokeless tobacco: Never Used   • Tobacco comment: caffeine use- \"occ\" soda, decaf tea   Substance and Sexual Activity   • Alcohol use: Yes     Alcohol/week: 21.0 standard drinks     Types: 21 Cans of beer per week     Comment: 2-3 beers daily   • Drug use: No   • Sexual activity: Defer           Objective    ED Triage Vitals [04/24/21 2302]   Temp Heart Rate Resp BP SpO2   98.5 °F (36.9 °C) 84 24 159/95 95 %      Temp src Heart Rate Source Patient Position BP Location FiO2 (%)   Oral Monitor Sitting Right arm --       Physical Exam  INITIAL VITAL SIGNS: Reviewed by me.  Pulse ox normal  GENERAL: Alert and interactive. No acute distress.  HEAD: Head is normocephalic.  EYES: EOMI. PERRL. No scleral icterus. No conjunctival injection.  ENT: Moist mucous membranes.   NECK: Supple. Full range of motion.  RESPIRATORY: No tachypnea. Clear breath sounds bilaterally. No wheezing. No rales. No rhonchi.  CV: Regular rate and rhythm. No murmurs. No rubs or gallops.  ABDOMEN: Soft, non-distended, non-tender. No guarding. No rebound. No masses.   BACK:  No obvious deformity.  EXTREMITIES: No deformity. No clubbing or cyanosis. No edema.   SKIN: Warm and dry. No diaphoresis. No obvious rashes.   NEUROLOGIC: Alert and oriented. Face is symmetric. Speech is normal. Moves all extremities equally. Motor and sensory distally intact.     Procedures      EKG           EKG time/Interp time: 2302/305  Rhythm/Rate: A. fib rate of 83  P waves and [place]  QRS, axis: Normal QRS duration left axis deviation  ST and T waves: No ST elevations or depressions no significant change from prior EKG    Independently interpreted by me contemporaneously with treatment        ED " Course  ED Course as of Apr 25 0016   Sun Apr 25, 2021   0012 Well-appearing 86-year-old male who came here out of concern for having some elevated blood pressure readings as well as some tachycardia while checking his blood pressure today also notes that he had some shortness of breath today but the way he describes it it was while doing fairly significant exercise and went away afterwards.  He was tachycardic at triage but that went down to a regular rate and he notes that he took his metoprolol not too long before coming here.  I suspect some of his elevated heart rate has to do with his A. fib but he is not in A. fib with RVR he is already fully anticoagulated and is not having any symptoms right now.  His blood pressure here is little bit elevated at 159/95 but nothing concerning.  Discussed with him that he may need to discuss with his primary care physician adjusting his blood pressure medications as well as his rate control medications.  Chest x-ray is clear no elevation white blood cell count, slight elevation in BUN and creatinine keeping with some mild dehydration and this not significantly different than some of his labs in the past.  Encouraged him to follow-up with his primary care physician.    [RO]      ED Course User Index  [RO] Magnus Cole MD                                           [facility]    Final diagnoses:   Essential hypertension   Atrial fibrillation, unspecified type (CMS/[facility])       ED Disposition  ED Disposition     ED Disposition Condition Comment    Discharge Stable           Kwdawo Dunham MD  [address] [address]  [phone]    Call   To schedule follow-up appointment for medication adjustment if needed         Medication List      No changes were made to your prescriptions during this visit.          Magnus Cole MD  04/25/21 0016

## 2021-04-26 ENCOUNTER — TELEPHONE (OUTPATIENT)
Dept: CARDIOLOGY | Facility: CLINIC | Age: 86
End: 2021-04-26

## 2021-04-26 NOTE — TELEPHONE ENCOUNTER
"Pt stated he was in the ER at [place] and they have advised he follow up with us.  His bp is \"all over the place\"    Highest 200/114  174/126  Today  175/126  123/84  162/98    I have scheduled him for further evaluation tomorrow.  I have asked him to bring his bp cuff to the apt so we can verify for accuracy.  Thanks  Trista Sarabia RN  Triage nurse    "

## 2021-04-27 ENCOUNTER — OFFICE VISIT (OUTPATIENT)
Dept: CARDIOLOGY | Facility: CLINIC | Age: 86
End: 2021-04-27

## 2021-04-27 VITALS
SYSTOLIC BLOOD PRESSURE: 100 MMHG | BODY MASS INDEX: 32.89 KG/M2 | HEIGHT: 68 IN | WEIGHT: 217 LBS | HEART RATE: 83 BPM | DIASTOLIC BLOOD PRESSURE: 60 MMHG

## 2021-04-27 DIAGNOSIS — E78.2 MIXED HYPERLIPIDEMIA: ICD-10-CM

## 2021-04-27 DIAGNOSIS — I50.32 CHRONIC DIASTOLIC CONGESTIVE HEART FAILURE (HCC): ICD-10-CM

## 2021-04-27 DIAGNOSIS — I25.110 CORONARY ARTERY DISEASE INVOLVING NATIVE CORONARY ARTERY OF NATIVE HEART WITH UNSTABLE ANGINA PECTORIS (HCC): ICD-10-CM

## 2021-04-27 DIAGNOSIS — I10 ESSENTIAL HYPERTENSION: ICD-10-CM

## 2021-04-27 DIAGNOSIS — Z95.0 PACEMAKER: ICD-10-CM

## 2021-04-27 DIAGNOSIS — I48.0 PAROXYSMAL ATRIAL FIBRILLATION (HCC): Primary | ICD-10-CM

## 2021-04-27 PROCEDURE — 93000 ELECTROCARDIOGRAM COMPLETE: CPT | Performed by: NURSE PRACTITIONER

## 2021-04-27 PROCEDURE — 99214 OFFICE O/P EST MOD 30 MIN: CPT | Performed by: NURSE PRACTITIONER

## 2021-04-27 RX ORDER — ROSUVASTATIN CALCIUM 20 MG/1
20 TABLET, COATED ORAL DAILY
Qty: 90 TABLET | Refills: 3 | Status: SHIPPED | OUTPATIENT
Start: 2021-04-27

## 2021-04-27 RX ORDER — METOPROLOL TARTRATE 50 MG/1
50 TABLET, FILM COATED ORAL 2 TIMES DAILY
Qty: 180 TABLET | Refills: 3 | Status: SHIPPED | OUTPATIENT
Start: 2021-04-27 | End: 2022-07-06

## 2021-04-27 RX ORDER — AMLODIPINE BESYLATE 5 MG/1
5 TABLET ORAL DAILY
Qty: 30 TABLET | Refills: 5 | Status: SHIPPED | OUTPATIENT
Start: 2021-04-27 | End: 2021-06-16

## 2021-04-27 RX ORDER — FUROSEMIDE 20 MG/1
20 TABLET ORAL DAILY
Qty: 90 TABLET | Refills: 3 | Status: SHIPPED | OUTPATIENT
Start: 2021-04-27 | End: 2022-01-10

## 2021-04-27 RX ORDER — CHLORTHALIDONE 25 MG/1
25 TABLET ORAL DAILY PRN
Qty: 30 TABLET | Refills: 5 | Status: SHIPPED | OUTPATIENT
Start: 2021-04-27 | End: 2021-12-03

## 2021-04-27 RX ORDER — POTASSIUM CHLORIDE 750 MG/1
10 CAPSULE, EXTENDED RELEASE ORAL 2 TIMES DAILY
Qty: 180 CAPSULE | Refills: 3 | Status: SHIPPED | OUTPATIENT
Start: 2021-04-27 | End: 2022-06-13

## 2021-04-27 NOTE — PROGRESS NOTES
Date of Office Visit: 21  Encounter Provider: BUNNY Solitario  Place of Service: [facility] CARDIOLOGY  Patient Name: Guero Garay  :1935    Chief Complaint   Patient presents with   • Coronary artery disease involving native coronary artery of    • Paroxysmal atrial fibrillation (CMS/HCC)   • Follow-up   :     HPI: Guero Garay is a 86 y.o. male  with history of  coronary artery disease, atrial fibrillation, hypertension, aortic stenosis, chronic diastolic heart failure, asthma, hyperlipidemia, chronic kidney disease, and history of prostate cancer.   He was followed by Dr. Galan. I will visit with him in follow up today and have reviewed his medical record.     In 2017 he presented with progressive angina.  He had a perfusion stress test which showed a large area of anterior septal ischemia.  Echocardiogram showed mild aortic stenosis.  He underwent cardiac catheterization on 2017 and was found to have preserved left ventricular ejection fraction, severe disease of the mid LAD and right coronary artery.  He had a 4.0 x 15 mm drug-eluting stent placed in the LAD and a 4.0 x 28 mm drug-eluting stent dilated to 4.5 mm in the right coronary artery.  He was hypokalemic and was started on potassium.  He was also started on rosuvastatin.     He then presented in 2017 with dizziness and syncope as well as bradycardia.  He ended up having a permanent pacemaker implanted.  He also had a tick bite but was negative for [place] spotted fever.      By 2018 she presented as his pacemaker interrogation showed that he was having episodes of atrial fibrillation fairly prolonged acute thallium 10.6% burden.  He also had some shortness of breath and was scheduled for outpatient perfusion stress test.  Echocardiogram on 8/10/2018 showed normal left ventricular systolic function with a small LV cavity, mild concentric hypertrophy, mild calcification of  the aortic valve amount aortic valve stenosis.  His renal function and liver function studies were abnormal four days prior.  His blood pressure was low in the office visit on 8/10/2018 and 84/60 and he was arranged for admission to the hospital. VQ scan was showed no findings suggestive of PE. He was found to have urinary retention and required Tran catheter as well as urology following.  He had recently been treated for urosepsis and acute pyelonephritis.  CT of the abdomen was normal as well as ultrasound of the liver which was normal.  Elevated liver enzymes were felt to be from antibiotic patient was recently on.  He had a heme positive stool and was to have an outpatient colonoscopy. Perfusion stress test  8/17/2018 showed no evidence of ischemia.  Repeat stress 12/2019 showed small and mildly severe area of ischemia in the apex. Follow up echo 06/2020 showed EF 50%, mild LVH, grade 1 diastolic dysfunction, moderate to severe aortic valve stenosis.  Cardiac catheterization showed Patent stent within the proximal LAD and proximal right coronary artery with no significant in-stent restenosis otherwise luminal irregularities throughout, Mild aortic stenosis with a peak to peak gradient of 4 mmHg across the aortic valve, and Normal left ventricular filling pressures of 50 mmHg.      He presents for emergency department follow-up.  Blood pressure was 159/95.  He also reported some shortness of breath as well as labile heart rate.  His heart rate was stable.  Chest x-ray unremarkable.  He has brought a list of his home blood pressures which are mainly 140-160 systolic.  We checked his home blood pressure cuff and initially his home cuff was 50 mmHg higher systolic and 31 mmHg higher diastolic however with recheck systolic value was within 10 points and diastolic value was 17 points higher on home cuff.  He denies near-syncope or syncope.  He is walking a mile 3 to 4 days a week.  He usually does 5 days when he is  "in [place] which is 6 months out of the year.        Allergies   Allergen Reactions   • Sulfa Antibiotics Other (See Comments)     Pat was shaky and could hardly walk           Family and social history reviewed.     ROS  All other systems were reviewed and are negative          Objective:     Vitals:    04/27/21 0958   BP: 100/60   BP Location: Left arm   Patient Position: Sitting   Pulse: 83   Weight: 98.4 kg (217 lb)   Height: 172.7 cm (68\")     Body mass index is 32.99 kg/m².    PHYSICAL EXAM:  Constitutional:       General: Not in acute distress.     Appearance: Well-developed. Not diaphoretic.   HENT:      Head: Normocephalic.   Pulmonary:      Effort: Pulmonary effort is normal. No respiratory distress.      Breath sounds: Normal breath sounds. No wheezing. No rhonchi. No rales.   Cardiovascular:      Normal rate. Regular rhythm.   Pulses:     Radial: 2+ bilaterally.  Skin:     General: Skin is warm and dry. There is no cyanosis.      Findings: No rash.   Neurological:      Mental Status: Alert and oriented to person, place, and time.   Psychiatric:         Behavior: Behavior normal.         Thought Content: Thought content normal.         Judgment: Judgment normal.           ECG 12 Lead    Date/Time: 4/27/2021 5:30 PM  Performed by: Sofie Lee APRN  Authorized by: Sofie Lee APRN   Comparison: compared with previous ECG   Similar to previous ECG  Rhythm: paced  Rate: normal  Pacing: ventricular paced rhythm            Current Outpatient Medications   Medication Sig Dispense Refill   • allopurinol (ZYLOPRIM) 100 MG tablet TAKE 1 TABLET BY MOUTH DAILY 30 tablet 0   • apixaban (Eliquis) 2.5 MG tablet tablet Take 1 tablet by mouth Every 12 (Twelve) Hours. 180 tablet 3   • aspirin 81 MG chewable tablet Chew 81 mg Daily.     • budesonide-formoterol (SYMBICORT) 160-4.5 MCG/ACT inhaler Inhale 2 puffs 2 (Two) Times a Day.     • cetirizine (zyrTEC) 10 MG tablet Take 10 mg by mouth Daily.     • chlorthalidone " (HYGROTON) 25 MG tablet Take 1 tablet by mouth Daily As Needed (for blood pressure over 180). 30 tablet 5   • Cranberry 250 MG capsule Take 250 mg by mouth Daily.     • Fluticasone Furoate-Vilanterol (BREO ELLIPTA) 100-25 MCG/INH aerosol powder  Inhale 1 puff Every Morning.     • furosemide (LASIX) 20 MG tablet Take 1 tablet by mouth Daily. 90 tablet 3   • melatonin 5 MG tablet tablet Take 5 mg by mouth At Night As Needed.     • metoprolol tartrate (LOPRESSOR) 50 MG tablet Take 1 tablet by mouth 2 (Two) Times a Day. 180 tablet 3   • Multiple Vitamins-Minerals (MULTIVITAMIN WITH MINERALS) tablet tablet Take 1 tablet by mouth Daily.     • potassium chloride (MICRO-K) 10 MEQ CR capsule Take 1 capsule by mouth 2 (Two) Times a Day. 180 capsule 3   • rosuvastatin (CRESTOR) 20 MG tablet Take 1 tablet by mouth Daily. 90 tablet 3   • Testosterone Cypionate (DEPOTESTOTERONE CYPIONATE) 200 MG/ML injection 1 mL 1 (One) Time Per Week.  1   • amLODIPine (NORVASC) 5 MG tablet Take 1 tablet by mouth Daily. 30 tablet 5     No current facility-administered medications for this visit.     Assessment:       Diagnosis Plan   1. Paroxysmal atrial fibrillation (CMS/HCC)     2. Chronic diastolic congestive heart failure (CMS/HCC)     3. Essential hypertension  potassium chloride (MICRO-K) 10 MEQ CR capsule   4. Coronary artery disease involving native coronary artery of native heart with unstable angina pectoris (CMS/HCC)     5. Pacemaker     6. Mixed hyperlipidemia  rosuvastatin (CRESTOR) 20 MG tablet        No orders of the defined types were placed in this encounter.        Plan:       1. 86 year old gentleman with paroxysmal atrial fibrillation. CHADS2-VASc score is 5 .  Anticoagulated with Eliquis  2.  Chronic diastolic congestive heart failure     3.  Coronary artery disease status post drug-eluting stent placement to the LAD and right coronary artery in April 2017.repeat cath 07/2020 showed patent stents, no significant restenosis  and luminal irregularities throughout   4.   Hypertension with labile blood pressure.  We are going to continue using his home cuff and he is to start amlodipine 5 mg daily due to episodes of hypertension which are frequent.  He is to call with any questions or concerns otherwise keep his 6-week appointment  5. History of prostate cancer-follows with urology  6.  Hyperlipidemia on target dose rosuvastatin  7.  History of symptomatic bradycardia and syncope status post permanent pacemaker  8. Aortic stenosis mild on cath 07/2020  9. CKD- cr 1.47 , BUN 24 and GFR 46 on 4/24/2021  10. Positional lightheadedness encouraged changing position slowly and staying hydrated.        It has been a pleasure to participate in this patient's care.      Thank you,  BUNNY Solitario      **I used Dragon to dictate this note:**

## 2021-05-07 ENCOUNTER — TELEPHONE (OUTPATIENT)
Dept: GASTROENTEROLOGY | Facility: CLINIC | Age: 86
End: 2021-05-07

## 2021-06-04 ENCOUNTER — PREP FOR SURGERY (OUTPATIENT)
Dept: OTHER | Facility: HOSPITAL | Age: 86
End: 2021-06-04

## 2021-06-04 DIAGNOSIS — Z12.11 ENCOUNTER FOR SCREENING FOR MALIGNANT NEOPLASM OF COLON: Primary | ICD-10-CM

## 2021-06-04 DIAGNOSIS — Z80.0 FAMILY HISTORY OF ESOPHAGEAL CANCER: ICD-10-CM

## 2021-06-04 DIAGNOSIS — Z86.010 PERSONAL HISTORY OF COLONIC POLYPS: ICD-10-CM

## 2021-06-10 NOTE — TELEPHONE ENCOUNTER
Called and spoke with patient.  Scheduled at [place] on 12/20/2021 at 11am - arrive 10am.  Will mail instructions.

## 2021-06-11 PROBLEM — Z86.010 PERSONAL HISTORY OF COLONIC POLYPS: Status: ACTIVE | Noted: 2021-06-11

## 2021-06-11 PROBLEM — Z80.0 FAMILY HISTORY OF ESOPHAGEAL CANCER: Status: ACTIVE | Noted: 2021-06-11

## 2021-06-11 PROBLEM — Z12.11 ENCOUNTER FOR SCREENING FOR MALIGNANT NEOPLASM OF COLON: Status: ACTIVE | Noted: 2021-06-11

## 2021-06-11 PROBLEM — Z86.0100 PERSONAL HISTORY OF COLONIC POLYPS: Status: ACTIVE | Noted: 2021-06-11

## 2021-06-16 ENCOUNTER — CLINICAL SUPPORT NO REQUIREMENTS (OUTPATIENT)
Dept: CARDIOLOGY | Facility: CLINIC | Age: 86
End: 2021-06-16

## 2021-06-16 ENCOUNTER — OFFICE VISIT (OUTPATIENT)
Dept: CARDIOLOGY | Facility: CLINIC | Age: 86
End: 2021-06-16

## 2021-06-16 VITALS
BODY MASS INDEX: 32.74 KG/M2 | SYSTOLIC BLOOD PRESSURE: 148 MMHG | HEART RATE: 62 BPM | RESPIRATION RATE: 10 BRPM | HEIGHT: 68 IN | WEIGHT: 216 LBS | DIASTOLIC BLOOD PRESSURE: 90 MMHG

## 2021-06-16 DIAGNOSIS — G47.10 HYPERSOMNIA: ICD-10-CM

## 2021-06-16 DIAGNOSIS — I48.0 PAROXYSMAL ATRIAL FIBRILLATION (HCC): Primary | ICD-10-CM

## 2021-06-16 DIAGNOSIS — I35.0 AORTIC STENOSIS, SEVERE: Primary | ICD-10-CM

## 2021-06-16 PROCEDURE — 99214 OFFICE O/P EST MOD 30 MIN: CPT | Performed by: INTERNAL MEDICINE

## 2021-06-16 PROCEDURE — 93280 PM DEVICE PROGR EVAL DUAL: CPT | Performed by: INTERNAL MEDICINE

## 2021-06-16 RX ORDER — AMLODIPINE BESYLATE 10 MG/1
10 TABLET ORAL DAILY
Qty: 30 TABLET | Refills: 11 | Status: SHIPPED | OUTPATIENT
Start: 2021-06-16 | End: 2022-03-09

## 2021-06-16 NOTE — PROGRESS NOTES
[facility]      Patient Name: Guero Garay  :1935  Age: 86 y.o.  Encounter Provider:  Preston Gutierrez Jr, MD      Chief Complaint:   Chief Complaint   Patient presents with   • Follow-up     PM was checked today. Pt also had ER visit [facility] 21         HPI  Guero Garay is a 86 y.o. male is a very pleasant gentleman who has a past medical history of coronary artery disease, atrial fibrillation, sick sinus syndrome status post pacemaker implantation, hypertension, aortic stenosis, chronic diastolic heart failure and chronic kidney disease who presents for initial evaluation with me.  He is followed for long time with Dr. Galan.  He was last seen by BUNNY Ndiaye in office in April.  Coronary artery disease status post PCI in .  Last cardiac catheterization in 2020 showed patent stents and otherwise nonobstructive coronary artery disease.  Atrial fibrillation with history of high burden in 2018 on pacemaker check however today his AT/AF burden is less than 0.1%.  At last visit he was noting labile blood pressure and he was started on amlodipine 5 mg.  He still noting high blood pressure and was seen in the [facility] ER on .  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  He notes mild chronic dyspnea on exertion which is unchanged over the past few years.  He denies orthopnea, PND or edema.  No palpitations, dizziness or syncope.  He admits to snoring and hypersomnia.  No previous sleep test.  Social and family history reviewed and not pertinent to this clinic visit.      The following portions of the patient's history were reviewed and updated as appropriate: allergies, current medications, past family history, past medical history, past social history, past surgical history and problem list.      Review of Systems   Constitutional: Positive for malaise/fatigue. Negative for chills and fever.   HENT: Negative for  "hoarse voice and sore throat.    Eyes: Negative for double vision and photophobia.   Cardiovascular: Positive for dyspnea on exertion. Negative for chest pain, leg swelling, near-syncope, orthopnea, palpitations, paroxysmal nocturnal dyspnea and syncope.   Respiratory: Positive for snoring. Negative for cough and wheezing.    Skin: Negative for poor wound healing and rash.   Musculoskeletal: Negative for arthritis and joint swelling.   Gastrointestinal: Negative for bloating, abdominal pain, hematemesis and hematochezia.   Neurological: Negative for dizziness and focal weakness.   Psychiatric/Behavioral: Negative for depression and suicidal ideas.       OBJECTIVE:   Vital Signs  Vitals:    06/16/21 0958   BP: 148/90   Pulse: 62   Resp: 10     Estimated body mass index is 32.84 kg/m² as calculated from the following:    Height as of this encounter: 172.7 cm (68\").    Weight as of this encounter: 98 kg (216 lb).    Vitals reviewed.   Constitutional:       Appearance: Healthy appearance. Not in distress.   Neck:      Vascular: No JVR. JVD normal.   Pulmonary:      Effort: Pulmonary effort is normal.      Breath sounds: Normal breath sounds. No wheezing. No rhonchi. No rales.   Chest:      Chest wall: Not tender to palpatation.   Cardiovascular:      PMI at left midclavicular line. Normal rate. Regular rhythm. Normal S1. Normal S2.      Murmurs: There is a systolic murmur.      No gallop. No click. No rub.   Pulses:     Intact distal pulses.   Edema:     Peripheral edema absent.   Abdominal:      General: Bowel sounds are normal.      Palpations: Abdomen is soft.      Tenderness: There is no abdominal tenderness.   Musculoskeletal: Normal range of motion.         General: No tenderness. Skin:     General: Skin is warm and dry.   Neurological:      General: No focal deficit present.      Mental Status: Alert and oriented to person, place and time.         Procedures          ASSESSMENT:     Resistant " hypertension  Paroxysmal atrial fibrillation  Sick sinus syndrome  Hypersomnia  Chronic diastolic heart failure  Coronary artery disease  Aortic stenosis      PLAN OF CARE:     1. Hypertension -poorly controlled at this point time.  We had a long discussion about sodium restricted diet.  We will increase amlodipine to 10 mg.  Twice daily blood pressure log to be sent in after 2 weeks.  He will need a sleep study.  2. Hypersomnia -Home sleep study  3. Paroxysmal atrial fibrillation -low AT AF burden.  Continue Eliquis and metoprolol.  4. Sick sinus syndrome -normal pacemaker function on interrogation today.  5. Aortic stenosis -significant murmur noted today with moderate to severe aortic stenosis diagnosed on echocardiogram last year.  Will need repeat echo.  6. Chronic diastolic heart failure -euvolemic today.  It is imperative that we are aggressive with afterload reduction.  Sodium restricted diet is counseled.  7. Coronary artery disease -continue aspirin and statin.  Check lipid profile.    Return to clinic 6 months             Discharge Medications          Accurate as of June 16, 2021 10:28 AM. If you have any questions, ask your nurse or doctor.            Continue These Medications      Instructions Start Date   allopurinol 100 MG tablet  Commonly known as: ZYLOPRIM   100 mg, Oral, Daily      amLODIPine 5 MG tablet  Commonly known as: NORVASC   5 mg, Oral, Daily      apixaban 2.5 MG tablet tablet  Commonly known as: Eliquis   2.5 mg, Oral, Every 12 Hours      aspirin 81 MG chewable tablet   81 mg, Oral, Daily      Breo Ellipta 100-25 MCG/INH inhaler  Generic drug: Fluticasone Furoate-Vilanterol   1 puff, Inhalation, Every Morning      budesonide-formoterol 160-4.5 MCG/ACT inhaler  Commonly known as: SYMBICORT   2 puffs, Inhalation, 2 Times Daily - RT      cetirizine 10 MG tablet  Commonly known as: zyrTEC   10 mg, Oral, Daily      chlorthalidone 25 MG tablet  Commonly known as: HYGROTON   25 mg, Oral, Daily  PRN      Cranberry 250 MG capsule   250 mg, Oral, Daily      furosemide 20 MG tablet  Commonly known as: LASIX   20 mg, Oral, Daily      melatonin 5 MG tablet tablet   5 mg, Oral, Nightly PRN      metoprolol tartrate 50 MG tablet  Commonly known as: LOPRESSOR   50 mg, Oral, 2 Times Daily      multivitamin with minerals tablet tablet   1 tablet, Oral, Daily      potassium chloride 10 MEQ CR capsule  Commonly known as: MICRO-K   10 mEq, Oral, 2 Times Daily      rosuvastatin 20 MG tablet  Commonly known as: CRESTOR   20 mg, Oral, Daily      Testosterone Cypionate 200 MG/ML injection  Commonly known as: DEPOTESTOTERONE CYPIONATE   1 mL, Weekly             Thank you for allowing me to participate in the care of your patient,      Sincerely,   Preston Gutierrez Jr, MD  [facility]  06/16/21  10:28 EDT

## 2021-06-25 ENCOUNTER — HOSPITAL ENCOUNTER (OUTPATIENT)
Dept: CARDIOLOGY | Facility: HOSPITAL | Age: 86
Discharge: HOME | End: 2021-06-25
Admitting: INTERNAL MEDICINE

## 2021-06-25 VITALS
SYSTOLIC BLOOD PRESSURE: 112 MMHG | BODY MASS INDEX: 32.58 KG/M2 | DIASTOLIC BLOOD PRESSURE: 86 MMHG | HEIGHT: 68 IN | WEIGHT: 215 LBS | HEART RATE: 65 BPM

## 2021-06-25 DIAGNOSIS — I35.0 AORTIC STENOSIS, SEVERE: ICD-10-CM

## 2021-06-25 LAB
AORTIC ARCH: 2.4 CM
ASCENDING AORTA: 3.4 CM
BH CV ECHO MEAS - ACS: 1.7 CM
BH CV ECHO MEAS - AO ARCH DIAM (PROXIMAL TRANS.): 2.4 CM
BH CV ECHO MEAS - AO MAX PG (FULL): 21.8 MMHG
BH CV ECHO MEAS - AO MAX PG: 23.2 MMHG
BH CV ECHO MEAS - AO MEAN PG (FULL): 15 MMHG
BH CV ECHO MEAS - AO MEAN PG: 16 MMHG
BH CV ECHO MEAS - AO ROOT AREA (BSA CORRECTED): 1.8
BH CV ECHO MEAS - AO ROOT AREA: 10.8 CM^2
BH CV ECHO MEAS - AO ROOT DIAM: 3.7 CM
BH CV ECHO MEAS - AO V2 MAX: 241 CM/SEC
BH CV ECHO MEAS - AO V2 MEAN: 191 CM/SEC
BH CV ECHO MEAS - AO V2 VTI: 59.4 CM
BH CV ECHO MEAS - ASC AORTA: 3.4 CM
BH CV ECHO MEAS - AVA(I,A): 0.58 CM^2
BH CV ECHO MEAS - AVA(I,D): 0.58 CM^2
BH CV ECHO MEAS - AVA(V,A): 0.78 CM^2
BH CV ECHO MEAS - AVA(V,D): 0.78 CM^2
BH CV ECHO MEAS - BSA(HAYCOCK): 2.2 M^2
BH CV ECHO MEAS - BSA: 2.1 M^2
BH CV ECHO MEAS - BZI_BMI: 32.7 KILOGRAMS/M^2
BH CV ECHO MEAS - BZI_METRIC_HEIGHT: 172.7 CM
BH CV ECHO MEAS - BZI_METRIC_WEIGHT: 97.5 KG
BH CV ECHO MEAS - EDV(MOD-SP2): 53 ML
BH CV ECHO MEAS - EDV(MOD-SP4): 40 ML
BH CV ECHO MEAS - EDV(TEICH): 97.3 ML
BH CV ECHO MEAS - EF(CUBED): 69.4 %
BH CV ECHO MEAS - EF(MOD-BP): 57 %
BH CV ECHO MEAS - EF(MOD-SP2): 54.7 %
BH CV ECHO MEAS - EF(MOD-SP4): 57.5 %
BH CV ECHO MEAS - EF(TEICH): 61 %
BH CV ECHO MEAS - ESV(MOD-SP2): 24 ML
BH CV ECHO MEAS - ESV(MOD-SP4): 17 ML
BH CV ECHO MEAS - ESV(TEICH): 37.9 ML
BH CV ECHO MEAS - FS: 32.6 %
BH CV ECHO MEAS - IVS/LVPW: 1
BH CV ECHO MEAS - IVSD: 1.2 CM
BH CV ECHO MEAS - LAT PEAK E' VEL: 14.8 CM/SEC
BH CV ECHO MEAS - LV DIASTOLIC VOL/BSA (35-75): 19 ML/M^2
BH CV ECHO MEAS - LV MASS(C)D: 205 GRAMS
BH CV ECHO MEAS - LV MASS(C)DI: 97.3 GRAMS/M^2
BH CV ECHO MEAS - LV MAX PG: 1.4 MMHG
BH CV ECHO MEAS - LV MEAN PG: 1 MMHG
BH CV ECHO MEAS - LV SYSTOLIC VOL/BSA (12-30): 8.1 ML/M^2
BH CV ECHO MEAS - LV V1 MAX: 60.2 CM/SEC
BH CV ECHO MEAS - LV V1 MEAN: 38.4 CM/SEC
BH CV ECHO MEAS - LV V1 VTI: 10.9 CM
BH CV ECHO MEAS - LVIDD: 4.6 CM
BH CV ECHO MEAS - LVIDS: 3.1 CM
BH CV ECHO MEAS - LVLD AP2: 6.4 CM
BH CV ECHO MEAS - LVLD AP4: 6.9 CM
BH CV ECHO MEAS - LVLS AP2: 5.4 CM
BH CV ECHO MEAS - LVLS AP4: 5.8 CM
BH CV ECHO MEAS - LVOT AREA (M): 3.1 CM^2
BH CV ECHO MEAS - LVOT AREA: 3.1 CM^2
BH CV ECHO MEAS - LVOT DIAM: 2 CM
BH CV ECHO MEAS - LVPWD: 1.2 CM
BH CV ECHO MEAS - MED PEAK E' VEL: 9.5 CM/SEC
BH CV ECHO MEAS - MV DEC SLOPE: 815 CM/SEC^2
BH CV ECHO MEAS - MV DEC TIME: 0.14 SEC
BH CV ECHO MEAS - MV E MAX VEL: 114 CM/SEC
BH CV ECHO MEAS - MV MAX PG: 7.6 MMHG
BH CV ECHO MEAS - MV MEAN PG: 2 MMHG
BH CV ECHO MEAS - MV P1/2T MAX VEL: 147 CM/SEC
BH CV ECHO MEAS - MV P1/2T: 52.8 MSEC
BH CV ECHO MEAS - MV V2 MAX: 138 CM/SEC
BH CV ECHO MEAS - MV V2 MEAN: 68.1 CM/SEC
BH CV ECHO MEAS - MV V2 VTI: 29.3 CM
BH CV ECHO MEAS - MVA P1/2T LCG: 1.5 CM^2
BH CV ECHO MEAS - MVA(P1/2T): 4.2 CM^2
BH CV ECHO MEAS - MVA(VTI): 1.2 CM^2
BH CV ECHO MEAS - PA MAX PG (FULL): 3.3 MMHG
BH CV ECHO MEAS - PA MAX PG: 4 MMHG
BH CV ECHO MEAS - PA V2 MAX: 100 CM/SEC
BH CV ECHO MEAS - PVA(V,A): 1.9 CM^2
BH CV ECHO MEAS - PVA(V,D): 1.9 CM^2
BH CV ECHO MEAS - QP/QS: 1.2
BH CV ECHO MEAS - RAP SYSTOLE: 8 MMHG
BH CV ECHO MEAS - RV MAX PG: 0.7 MMHG
BH CV ECHO MEAS - RV MEAN PG: 0 MMHG
BH CV ECHO MEAS - RV V1 MAX: 41.8 CM/SEC
BH CV ECHO MEAS - RV V1 MEAN: 27.9 CM/SEC
BH CV ECHO MEAS - RV V1 VTI: 9.3 CM
BH CV ECHO MEAS - RVOT AREA: 4.5 CM^2
BH CV ECHO MEAS - RVOT DIAM: 2.4 CM
BH CV ECHO MEAS - RVSP: 33 MMHG
BH CV ECHO MEAS - SI(AO): 303 ML/M^2
BH CV ECHO MEAS - SI(CUBED): 32 ML/M^2
BH CV ECHO MEAS - SI(LVOT): 16.2 ML/M^2
BH CV ECHO MEAS - SI(MOD-SP2): 13.8 ML/M^2
BH CV ECHO MEAS - SI(MOD-SP4): 10.9 ML/M^2
BH CV ECHO MEAS - SI(TEICH): 28.2 ML/M^2
BH CV ECHO MEAS - SUP REN AO DIAM: 2.4 CM
BH CV ECHO MEAS - SV(AO): 638.7 ML
BH CV ECHO MEAS - SV(CUBED): 67.5 ML
BH CV ECHO MEAS - SV(LVOT): 34.2 ML
BH CV ECHO MEAS - SV(MOD-SP2): 29 ML
BH CV ECHO MEAS - SV(MOD-SP4): 23 ML
BH CV ECHO MEAS - SV(RVOT): 41.8 ML
BH CV ECHO MEAS - SV(TEICH): 59.4 ML
BH CV ECHO MEAS - TAPSE (>1.6): 1.7 CM
BH CV ECHO MEAS - TR MAX VEL: 250 CM/SEC
BH CV ECHO MEASUREMENTS AVERAGE E/E' RATIO: 9.38
BH CV XLRA - RV BASE: 3.9 CM
BH CV XLRA - RV LENGTH: 6.5 CM
BH CV XLRA - RV MID: 2.9 CM
BH CV XLRA - TDI S': 12 CM/SEC
LEFT ATRIUM VOLUME INDEX: 18 ML/M2
LEFT ATRIUM VOLUME: 37 CM3
LV EF 2D ECHO EST: 57 %
MAXIMAL PREDICTED HEART RATE: 135 BPM
SINUS: 3.3 CM
STJ: 3.4 CM
STRESS TARGET HR: 115 BPM

## 2021-06-25 PROCEDURE — 93306 TTE W/DOPPLER COMPLETE: CPT

## 2021-06-25 PROCEDURE — 93306 TTE W/DOPPLER COMPLETE: CPT | Performed by: INTERNAL MEDICINE

## 2021-07-02 ENCOUNTER — APPOINTMENT (OUTPATIENT)
Dept: SLEEP MEDICINE | Facility: HOSPITAL | Age: 86
End: 2021-07-02

## 2021-07-19 ENCOUNTER — APPOINTMENT (OUTPATIENT)
Dept: SLEEP MEDICINE | Facility: HOSPITAL | Age: 86
End: 2021-07-19

## 2021-07-19 ENCOUNTER — HOSPITAL ENCOUNTER (OUTPATIENT)
Dept: SLEEP MEDICINE | Facility: HOSPITAL | Age: 86
Discharge: HOME | End: 2021-07-19
Admitting: INTERNAL MEDICINE

## 2021-07-19 DIAGNOSIS — G47.10 HYPERSOMNIA: ICD-10-CM

## 2021-07-19 PROCEDURE — 95806 SLEEP STUDY UNATT&RESP EFFT: CPT

## 2021-07-19 PROCEDURE — 95806 SLEEP STUDY UNATT&RESP EFFT: CPT | Performed by: INTERNAL MEDICINE

## 2021-07-22 ENCOUNTER — OFFICE VISIT (OUTPATIENT)
Dept: SLEEP MEDICINE | Facility: HOSPITAL | Age: 86
End: 2021-07-22

## 2021-07-22 VITALS
SYSTOLIC BLOOD PRESSURE: 131 MMHG | HEART RATE: 88 BPM | BODY MASS INDEX: 33.04 KG/M2 | OXYGEN SATURATION: 96 % | WEIGHT: 218 LBS | DIASTOLIC BLOOD PRESSURE: 81 MMHG | HEIGHT: 68 IN

## 2021-07-22 DIAGNOSIS — G47.33 OSA (OBSTRUCTIVE SLEEP APNEA): Primary | ICD-10-CM

## 2021-07-22 DIAGNOSIS — IMO0002 SLEEP-RELATED HYPOVENTILATION: ICD-10-CM

## 2021-07-22 DIAGNOSIS — G47.14 HYPERSOMNIA DUE TO MEDICAL CONDITION: ICD-10-CM

## 2021-07-22 PROCEDURE — G0463 HOSPITAL OUTPT CLINIC VISIT: HCPCS

## 2021-07-22 PROCEDURE — 99204 OFFICE O/P NEW MOD 45 MIN: CPT | Performed by: INTERNAL MEDICINE

## 2021-07-22 NOTE — PROGRESS NOTES
[facility] New Patient[facility]ultation       Reason for Consultation: RAQUEL    Patient Care Team:  Kwadwo Dunham MD as PCP - General (Family Medicine)  Alejandro Purvis MD as Consulting Physician (Urology)  Preston Gutierrez Jr., MD as Consulting Physician (Cardiology)  Sg Orr MD as Consulting Physician (Sleep Medicine)    Chief complaint: RAQUEL    History of present illness:    Thank you for asking me to see your patient.  The patient is a 86 y.o. male who has a past medical history of coronary artery disease, atrial fibrillation, sick sinus syndrome status post pacemaker implantation, hypertension, aortic stenosis, chronic diastolic heart failure and chronic kidney disease who presented to cardiology for for initial evaluation.  He was followed for long time with Dr. Galan.  He was last seen by BUNNY Ndiaye in April.  Coronary artery disease status post PCI in 2017.  Last cardiac catheterization in June 2020 showed patent stents and otherwise nonobstructive coronary artery disease.  Atrial fibrillation with history of high burden in August 2018 on pacemaker check however today his AT/AF burden is less than 0.1%.  At last visit he was noting labile blood pressure and he was started on amlodipine 5 mg.  He still noting high blood pressure and was seen in the [facility] ER on April 24.  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  He notes mild chronic dyspnea on exertion which is unchanged over the past few years.  He denies orthopnea, PND or edema.  No palpitations, dizziness or syncope.  He admits to snoring and hypersomnia.  No previous sleep test.    Cardiology requested a home sleep study.    Home sleep study performed 7/19/2021.  Moderate obstructive sleep apnea with AHI 26.5 events per hour noted.  Low oxygen saturation of 79% noted with sleep-related hypoxia present for 157 minutes.  The patient snored 45.6% of total monitoring time.   Sleep evaluation requested.    The patient reports going to bed at 11:30 PM and awakening between 7 and 8:30 AM.  He feels okay upon arising.  However, he will take 2 naps daily.  The patient does have complaints of hypersomnolence with an abnormal [employer] Sleepiness Scale of 11-13.  The patient reports jerking his legs at night and he also grinds his teeth.  He will have sudden episodes of sleep during the daytime.  He has problems falling asleep and difficulty staying asleep.  His sleep is generally restless and he reports frequent awakenings and he will use the restroom 2 or 3 times during the nighttime.     Review of Systems:    A complete review of systems was done and all were negative with the exception of some shortness of breath with exertion and wheezing    History:  Past Medical History:   Diagnosis Date   • Acute kidney injury superimposed on chronic kidney disease (CMS/HCC)    • Arthralgia    • Asthma     mild persistent without complication   • CAD (coronary artery disease), native coronary artery    • Cancer (CMS/HCC)     Prostate   • Chest pain, unspecified    • Chronic diastolic congestive heart failure (CMS/HCC)    • Colon polyp    • Dyspnea on exertion    • Erectile dysfunction    • Essential hypertension    • Generalized weakness    • Gout    • History of prostate cancer    • Hyperkalemia    • Jaw pain     both sides   • Mixed hyperlipidemia    • RAQUEL (obstructive sleep apnea) 07/19/2021    Home sleep study.  Weight 216 pounds.  Moderate obstructive sleep apnea with AHI 26.5 events per hour.  Low oxygen saturation 79% and sleep-related hypoxia present for 157 minutes.  The patient snored 45.6% of total monitoring time.   • Osteoarthrosis    • PAF (paroxysmal atrial fibrillation) (CMS/HCC)    • Shoulder pain    • SOB (shortness of breath)    • Tinnitus    • Urine retention    ,   Past Surgical History:   Procedure Laterality Date   • CARDIAC CATHETERIZATION N/A 4/27/2017    Procedure: Coronary  angiography;  Surgeon: Marvel Brito MD;  Location: [facility] CATH INVASIVE LOCATION;  Service:    • CARDIAC CATHETERIZATION N/A 4/27/2017    Procedure: Left Heart Cath;  Surgeon: Marvel Brito MD;  Location: [facility] CATH INVASIVE LOCATION;  Service:    • CARDIAC CATHETERIZATION N/A 4/27/2017    Procedure: Stent GIN coronary;  Surgeon: Marvel Brito MD;  Location: [facility] CATH INVASIVE LOCATION;  Service:    • CARDIAC CATHETERIZATION N/A 6/30/2020    Procedure: Coronary angiography;  Surgeon: Jared Sellers MD;  Location: [facility] CATH INVASIVE LOCATION;  Service: Cardiovascular;  Laterality: N/A;   • CARDIAC CATHETERIZATION N/A 6/30/2020    Procedure: Left Heart Cath;  Surgeon: Jared Sellers MD;  Location: [facility] CATH INVASIVE LOCATION;  Service: Cardiovascular;  Laterality: N/A;   • CARDIAC ELECTROPHYSIOLOGY PROCEDURE N/A 6/15/2017    Procedure: Pacemaker DC new   [employer];  Surgeon: Gustavo Valladares MD;  Location: [facility] CATH INVASIVE LOCATION;  Service:    • COLONOSCOPY  2013   • COLONOSCOPY N/A 12/12/2018    Procedure: COLONOSCOPY with polypectomy;  Surgeon: Kwadwo Powers MD;  Location: [facility];  Service: Gastroenterology   • INGUINAL HERNIA REPAIR Right    • JOINT REPLACEMENT      left knee, left and right shoulder   • NECK SURGERY      SPURS   • ND RECONSTR TOTAL SHOULDER IMPLANT Right 2/16/2017    Procedure: RIGHT TOTAL SHOULDER ARTHROPLASTY;  Surgeon: Marquez Galvan MD;  Location: [facility];  Service: Orthopedics   • PROSTATECTOMY     • REPLACEMENT TOTAL KNEE Left    • TONSILLECTOMY     • TOTAL SHOULDER REPLACEMENT Bilateral    ,   Family History   Problem Relation Age of Onset   • Cancer Mother         lung   • Cancer Father         lung   • Diabetes Father    • Colon cancer Neg Hx    • Colon polyps Neg Hx     and   Social History     Socioeconomic History   • Marital status:      Spouse name: Not on file   • Number of children: Not on file   • Years of education:  "Not on file   • Highest education level: Not on file   Tobacco Use   • Smoking status: Never Smoker   • Smokeless tobacco: Never Used   • Tobacco comment: caffeine use- \"occ\" soda, decaf tea   Substance and Sexual Activity   • Alcohol use: Yes     Alcohol/week: 21.0 standard drinks     Types: 21 Cans of beer per week     Comment: 2-3 beers daily   • Drug use: No   • Sexual activity: Defer     E-cigarette/Vaping     E-cigarette/Vaping Substances     E-cigarette/Vaping Devices        Social History: 1 coffee and tea and soda daily    Allergies:  Sulfa antibiotics     Medication Review: His list was reviewed    Vital Signs:    Vitals:    07/22/21 1100   BP: 131/81   Pulse: 88   SpO2: 96%   Weight: 98.9 kg (218 lb)   Height: 172.7 cm (68\")      Body mass index is 33.15 kg/m².         Physical Exam:    Constitutional:  Well developed 86 y.o. male that appears in no apparent distress.  Awake & oriented times 3.  Normal mood with normal recent and remote memory and normal judgement.  Eyes:  Conjunctivae normal.  Oropharynx: Moist mucous membranes without exudate and a large tongue and class III Mallampati airway, patient wearing a facemask  Neck: Trachea midline  Respiratory: Effort is not labored  Cardiovascular: Radial pulse regular  Musculoskeletal: Gait appears normal, no digital clubbing evident, trace pre-tibial edema    Results Review: I reviewed the results of the home sleep study with him    Impression:   Moderate severity obstructive sleep apnea with sleep-related hypoxia by home sleep study 7/19/2021.  The patient has complaints of hypersomnolence.    Plan:  Good sleep hygiene measures should be maintained.  Weight loss would be beneficial in this patient who is obese (Body mass index is 33.15 kg/m².)    Pathophysiology of RAQUEL described to the patient.  Cardiovascular complications of untreated RAQUEL also reviewed.      The patient is familiar with obstructive sleep apnea.  The patient's wife has obstructive sleep " apnea and uses CPAP.    After reviewing all with the patient, I would recommend and the patient is agreeable to proceed with auto CPAP between 7 and 20 cm water pressure.  An appropriate interface should be selected.  After several weeks, overnight oximetry on room air on auto CPAP should be performed.  The patient will be set up with the same DME as his wife.    The patient lives closer to [facility].  The patient will follow up with physician at [facility].    Thank you for requesting me to assist in this patient's care.    Sg Orr MD  Sleep Medicine  07/23/21  07:54 EDT

## 2021-07-23 ENCOUNTER — TELEPHONE (OUTPATIENT)
Dept: SLEEP MEDICINE | Facility: HOSPITAL | Age: 86
End: 2021-07-23

## 2021-07-23 PROBLEM — G47.33 OSA (OBSTRUCTIVE SLEEP APNEA): Status: ACTIVE | Noted: 2021-07-19

## 2021-07-23 PROBLEM — G47.14 HYPERSOMNIA DUE TO MEDICAL CONDITION: Status: ACTIVE | Noted: 2021-07-23

## 2021-07-23 PROBLEM — IMO0002 SLEEP-RELATED HYPOVENTILATION: Status: ACTIVE | Noted: 2021-07-23

## 2021-07-23 NOTE — TELEPHONE ENCOUNTER
Faxed order and notes to [facility].  Patient already scheduled in [place] for compliance f/u.

## 2021-09-19 ENCOUNTER — TELEPHONE (OUTPATIENT)
Dept: SLEEP MEDICINE | Facility: HOSPITAL | Age: 86
End: 2021-09-19

## 2021-09-19 NOTE — TELEPHONE ENCOUNTER
Overnight oximetry on room air on CPAP performed 9/13/2021.  Total valid sampling time 8 hours and 22 minutes.  Lowest oxygen saturation 85%.  Time with oxygen saturation less than 88% only 13 minutes and 36 seconds.    Home sleep study performed 7/19/2021.  Moderate RAQUEL with AHI 26.5 events per hour noted.  Low oxygen saturation 79% and sleep-related hypoxia present for 157 minutes.    The patient will be followed up 9/28/2021 at [facility] [place].  Further recommendations to be made at that time.

## 2021-09-28 ENCOUNTER — OFFICE VISIT (OUTPATIENT)
Dept: SLEEP MEDICINE | Facility: HOSPITAL | Age: 86
End: 2021-09-28

## 2021-09-28 ENCOUNTER — TRANSCRIBE ORDERS (OUTPATIENT)
Dept: ADMINISTRATIVE | Facility: HOSPITAL | Age: 86
End: 2021-09-28

## 2021-09-28 VITALS
SYSTOLIC BLOOD PRESSURE: 105 MMHG | HEART RATE: 62 BPM | BODY MASS INDEX: 33.8 KG/M2 | WEIGHT: 223 LBS | HEIGHT: 68 IN | DIASTOLIC BLOOD PRESSURE: 70 MMHG

## 2021-09-28 DIAGNOSIS — G47.33 OBSTRUCTIVE SLEEP APNEA: Primary | ICD-10-CM

## 2021-09-28 DIAGNOSIS — E66.9 OBESITY (BMI 30-39.9): ICD-10-CM

## 2021-09-28 DIAGNOSIS — Z01.818 OTHER SPECIFIED PRE-OPERATIVE EXAMINATION: Primary | ICD-10-CM

## 2021-09-28 DIAGNOSIS — G47.34 NOCTURNAL HYPOXIA: ICD-10-CM

## 2021-09-28 LAB — SARS-COV-2 RNA PNL SPEC NAA+PROBE: NOT DETECTED

## 2021-09-28 PROCEDURE — 87635 SARS-COV-2 COVID-19 AMP PRB: CPT | Performed by: INTERNAL MEDICINE

## 2021-09-28 PROCEDURE — G0463 HOSPITAL OUTPT CLINIC VISIT: HCPCS

## 2021-09-28 PROCEDURE — C9803 HOPD COVID-19 SPEC COLLECT: HCPCS

## 2021-09-28 RX ORDER — ZOLPIDEM TARTRATE 5 MG/1
TABLET ORAL
Qty: 2 TABLET | Refills: 0 | Status: SHIPPED | OUTPATIENT
Start: 2021-09-28 | End: 2021-12-03

## 2021-09-28 NOTE — PROGRESS NOTES
[facility]  [address]  3RD FLO[address]  [facility] [address]  [phone]    Referring Physician: Dr. Dunham  PCP: Kwadwo Dunham MD    Reason for consult:  Sleep disorders of RAQUEL    Guero Garay is a 86 y.o.male was seen in the [facility] today. Previously seen by Dr. Orr at [facility] - 7/22/21. He had HST and was setup with CPAP. Here for review. He sleeps from 11pm to 7am He uses nasal mask. Fits well. Some air leaks. He sleeps some better with device. Does not notice improvement in daytime alertness. He had ISIAH showing desat. Denies lung problems.  [place] Sleepiness Score: 16. Caffeine intake 0.5 per day. Alcohol intake 14-18 per week.    Guero Garay  has a past medical history of Acute kidney injury superimposed on chronic kidney disease (CMS/HCC), Arthralgia, Asthma, CAD (coronary artery disease), native coronary artery, Cancer (CMS/HCC), Chest pain, unspecified, Chronic diastolic congestive heart failure (CMS/HCC), Colon polyp, Dyspnea on exertion, Erectile dysfunction, Essential hypertension, Generalized weakness, Gout, History of prostate cancer, Hyperkalemia, Jaw pain, Mixed hyperlipidemia, RAQUEL (obstructive sleep apnea) (07/19/2021), Osteoarthrosis, PAF (paroxysmal atrial fibrillation) (CMS/[facility]), Shoulder pain, SOB (shortness of breath), Tinnitus, and Urine retention.     Current Medications:    Current Outpatient Medications:   •  allopurinol (ZYLOPRIM) 100 MG tablet, TAKE 1 TABLET BY MOUTH DAILY, Disp: 30 tablet, Rfl: 0  •  amLODIPine (NORVASC) 10 MG tablet, Take 1 tablet by mouth Daily., Disp: 30 tablet, Rfl: 11  •  apixaban (Eliquis) 2.5 MG tablet tablet, Take 1 tablet by mouth Every 12 (Twelve) Hours., Disp: 180 tablet, Rfl: 3  •  aspirin 81 MG chewable tablet, Chew 81 mg Daily., Disp: , Rfl:   •  budesonide-formoterol (SYMBICORT) 160-4.5 MCG/ACT inhaler, Inhale 2 puffs 2 (Two) Times a Day., Disp: , Rfl:   •  cetirizine (zyrTEC) 10 MG tablet,  "Take 10 mg by mouth Daily., Disp: , Rfl:   •  chlorthalidone (HYGROTON) 25 MG tablet, Take 1 tablet by mouth Daily As Needed (for blood pressure over 180)., Disp: 30 tablet, Rfl: 5  •  Cranberry 250 MG capsule, Take 250 mg by mouth Daily., Disp: , Rfl:   •  Fluticasone Furoate-Vilanterol (BREO ELLIPTA) 100-25 MCG/INH aerosol powder , Inhale 1 puff Every Morning., Disp: , Rfl:   •  furosemide (LASIX) 20 MG tablet, Take 1 tablet by mouth Daily., Disp: 90 tablet, Rfl: 3  •  melatonin 5 MG tablet tablet, Take 5 mg by mouth At Night As Needed., Disp: , Rfl:   •  metoprolol tartrate (LOPRESSOR) 50 MG tablet, Take 1 tablet by mouth 2 (Two) Times a Day., Disp: 180 tablet, Rfl: 3  •  Multiple Vitamins-Minerals (MULTIVITAMIN WITH MINERALS) tablet tablet, Take 1 tablet by mouth Daily., Disp: , Rfl:   •  potassium chloride (MICRO-K) 10 MEQ CR capsule, Take 1 capsule by mouth 2 (Two) Times a Day., Disp: 180 capsule, Rfl: 3  •  rosuvastatin (CRESTOR) 20 MG tablet, Take 1 tablet by mouth Daily., Disp: 90 tablet, Rfl: 3  •  Testosterone Cypionate (DEPOTESTOTERONE CYPIONATE) 200 MG/ML injection, 1 mL 1 (One) Time Per Week., Disp: , Rfl: 1  •  zolpidem (Ambien) 5 MG tablet, Bring to sleep lab DO NOT use at home. PLEASE take if not asleep within 30 mins of start of study., Disp: 2 tablet, Rfl: 0   also listed in Sleep Questionnaire.    FH: family history includes Cancer in his father and mother; Diabetes in his father.  SH:  reports that he has never smoked. He has never used smokeless tobacco. He reports current alcohol use of about 21.0 standard drinks of alcohol per week. He reports that he does not use drugs.    Pertinent Positive Review of Systems of nasal congestion, dry mouth  Rest of Review of Systems was negative as recorded in Sleep Questionnaire.        Vital Signs: /70   Pulse 62   Ht 172.7 cm (68\")   Wt 101 kg (223 lb)   BMI 33.91 kg/m²     Body mass index is 33.91 kg/m².       Tongue: Large       " Dentition: good       Pharynx: Posterior pharyngeal pillars are unable to see   Mallampatti: IV (only hard palate visible)        General: Alert. Cooperative. Well developed. No acute distress.             Head:  Normocephalic. Symmetrical. Atraumatic.              Eyes: Sclera clear. No icterus. PERRLA. Normal EOM.             Ears: No deformities. Normal hearing.             Nose: No septal deviation. No drainage.          Throat: No oral lesions. No thrush. Moist mucous membranes.    Chest Wall:  Normal shape. Symmetric expansion with respiration. No tenderness.             Neck:  Trachea midline.           Lungs:  Clear to auscultation bilaterally. No wheezes. No rhonchi. No rales. Respirations regular, even and unlabored.            Heart:  Regular rhythm and normal rate. Normal S1 and S2. No murmur.     Abdomen:  Soft, non-tender and non-distended. Normal bowel sounds. No masses.  Extremities:  Moves all extremities well. No edema.           Pulses: Pulses palpable and equal bilaterally.               Skin: Dry. Intact. No bleeding. No rash.           Neuro: Moves all 4 extremities and cranial nerves grossly intact.  Psychiatric: Normal mood and affect.    Study:  7/21/21:  The patient had 86 obstructive events and 173 partial obstructive events. AHI for total monitoring time is moderately abnormal at 26.5 events per hour.    Download:  Excellent compliance and set up with average usage 8 hours 10 minutes.  AHI 9.3.  Average pressure 12.7 with auto CPAP 7-20.    DME: [facility]    Impression:  1. Obstructive sleep apnea    2. Nocturnal hypoxia    3. Obesity (BMI 30-39.9)          Orders Placed This Encounter   Procedures   • COVID-19,[facility] [employer] IN-HOUSE,Nasal Swab No Transport Media 3-4 HR TAT - Swab, Nasal Cavity     Order Specific Question:   Previously tested for COVID-19?     Answer:   Yes     Order Specific Question:   Employed in healthcare setting?     Answer:   No     Order Specific Question:   Symptomatic  for COVID-19 as defined by CDC?     Answer:   No     Order Specific Question:   Hospitalized for COVID-19?     Answer:   No     Order Specific Question:   Admitted to ICU for COVID-19?     Answer:   No     Order Specific Question:   Resident in a congregate (group) care setting?     Answer:   No     Order Specific Question:   Release to patient     Answer:   Immediate   • Polysomnography 4 or More Parameters With CPAP     Standing Status:   Future     Standing Expiration Date:   9/28/2022     Scheduling Instructions:      Please do titration with CPAP and/or BIPAP and/or BIPAP-ST      If patient has Central Sleep Apnea with index > 5, DO NOT PERFORM ASV TITRATION      It is permitted to use zolpidem 5-10 mg if awake over 30 minutes prior to 1 AM.     Order Specific Question:   May take own meds     Answer:   Yes     Order Specific Question:   Details     Answer:   O2 Implementation per Protocol     New Medications Ordered This Visit   Medications   • zolpidem (Ambien) 5 MG tablet     Sig: Bring to sleep lab DO NOT use at home. PLEASE take if not asleep within 30 mins of start of study.     Dispense:  2 tablet     Refill:  0         Plan:  Guero has persistent elevated AHI.  His overnight oximetry on CPAP shows significant desaturation for at least 30 minutes.  I discussed this with him and will proceed with titration study for ahi and desat.  He is planning to go to [place] for a month and will return only in November.  We may need to defer his titration study till then.  His AHI is improved but still not normalized.  So far he has not noticed any improvement in daytime alertness.    Excessive alcohol intake noted.  Patient advised to cut down to 1 drink a night.    This patient has typical features of obstructive sleep apnea with hypersomnolence snoring and apneas along with elevated BMI and classic oropharyngeal structure.  Likelihood of obstructive sleep apnea is high and a polysomnogram study will therefore be  requested.      Possible diagnosis and pathophysiology of obstructive sleep apnea was discussed with the patient.  Health risks of untreated obstructive sleep apnea including cardiovascular risks were discussed.  Patient was cautioned about activities requiring full concentration especially driving at night or for longer distances, and until hypersomnolence is corrected.    Results of sleep testing will be reviewed to see if patient is a candidate for CPAP machine.  Alternatives to therapy include oral mandibular advancing device (OMAD) were discussed. However OMAD is usually only beneficial in mild obstructive sleep apnea.  The benefit of weight loss in reducing severity of obstructive sleep apnea was discussed.  Patient would benefit from adhering to a strict diet to achieve ideal BMI.     Patient will follow up in this clinic after study.    Thank you for allowing me to participate in your patient's care.    Electronically signed by Kirt Ivy MD, 09/28/21, 9:31 AM EDT.    Part of this note may be an electronic transcription/translation of spoken language to printed text using the Dragon Dictation System.

## 2021-09-30 ENCOUNTER — HOSPITAL ENCOUNTER (OUTPATIENT)
Dept: SLEEP MEDICINE | Facility: HOSPITAL | Age: 86
Discharge: HOME | End: 2021-09-30
Admitting: INTERNAL MEDICINE

## 2021-09-30 DIAGNOSIS — G47.34 NOCTURNAL HYPOXIA: ICD-10-CM

## 2021-09-30 DIAGNOSIS — G47.33 OBSTRUCTIVE SLEEP APNEA: ICD-10-CM

## 2021-09-30 PROCEDURE — 95811 POLYSOM 6/>YRS CPAP 4/> PARM: CPT

## 2021-10-26 RX ORDER — PRAMIPEXOLE DIHYDROCHLORIDE 0.25 MG/1
0.25 TABLET ORAL NIGHTLY
Qty: 30 TABLET | Refills: 2 | Status: SHIPPED | OUTPATIENT
Start: 2021-10-26 | End: 2021-11-04 | Stop reason: SDUPTHER

## 2021-10-27 ENCOUNTER — TELEPHONE (OUTPATIENT)
Dept: SLEEP MEDICINE | Facility: HOSPITAL | Age: 86
End: 2021-10-27

## 2021-10-27 NOTE — TELEPHONE ENCOUNTER
Called patient to discuss sleep study results. Advised Dr. Ivy made a pressure change to fixed 19. Advised patient if he can't handle the pressure to give us a call. Patient understood. Patient made a follow up appt for 12/7/2021 - to discuss the iron studies for the PLMD, advised going to [place] and will not return until after April.

## 2021-11-04 ENCOUNTER — TELEPHONE (OUTPATIENT)
Dept: SLEEP MEDICINE | Facility: HOSPITAL | Age: 86
End: 2021-11-04

## 2021-11-04 RX ORDER — PRAMIPEXOLE DIHYDROCHLORIDE 0.25 MG/1
0.25 TABLET ORAL NIGHTLY
Qty: 30 TABLET | Refills: 2 | Status: SHIPPED | OUTPATIENT
Start: 2021-11-04 | End: 2021-12-07 | Stop reason: SDUPTHER

## 2021-11-04 NOTE — TELEPHONE ENCOUNTER
Received pressure change request from Kirt Blake MD [place], Britney A  Change his CPAP to Auto 7-20     Only med I see prescribed is Ambien for sleep study - he can pick it up when he gets back to [place].   Made pressure change in [facility].

## 2021-12-01 ENCOUNTER — TELEPHONE (OUTPATIENT)
Dept: GASTROENTEROLOGY | Facility: CLINIC | Age: 86
End: 2021-12-01

## 2021-12-01 NOTE — TELEPHONE ENCOUNTER
Pt wife called stated by needs to cancel Colonoscopy on 12/20 due pt having knee surgery on 13th.   Wife unsure of rescheduling due to pt will be 87 in february. Please reach out

## 2021-12-03 ENCOUNTER — TELEPHONE (OUTPATIENT)
Dept: CARDIOLOGY | Facility: CLINIC | Age: 86
End: 2021-12-03

## 2021-12-03 ENCOUNTER — OFFICE VISIT (OUTPATIENT)
Dept: CARDIOLOGY | Facility: CLINIC | Age: 86
End: 2021-12-03

## 2021-12-03 VITALS
WEIGHT: 214.8 LBS | SYSTOLIC BLOOD PRESSURE: 124 MMHG | HEIGHT: 68 IN | HEART RATE: 76 BPM | BODY MASS INDEX: 32.55 KG/M2 | DIASTOLIC BLOOD PRESSURE: 72 MMHG

## 2021-12-03 DIAGNOSIS — I48.0 PAROXYSMAL ATRIAL FIBRILLATION (HCC): ICD-10-CM

## 2021-12-03 DIAGNOSIS — G47.33 OBSTRUCTIVE SLEEP APNEA: ICD-10-CM

## 2021-12-03 DIAGNOSIS — I50.32 CHRONIC DIASTOLIC CONGESTIVE HEART FAILURE (HCC): Primary | ICD-10-CM

## 2021-12-03 DIAGNOSIS — I10 ESSENTIAL HYPERTENSION: ICD-10-CM

## 2021-12-03 DIAGNOSIS — I25.110 CORONARY ARTERY DISEASE INVOLVING NATIVE CORONARY ARTERY OF NATIVE HEART WITH UNSTABLE ANGINA PECTORIS (HCC): ICD-10-CM

## 2021-12-03 PROCEDURE — 99214 OFFICE O/P EST MOD 30 MIN: CPT | Performed by: INTERNAL MEDICINE

## 2021-12-03 RX ORDER — TOPIRAMATE 50 MG/1
50 TABLET, FILM COATED ORAL 2 TIMES DAILY
COMMUNITY
Start: 2021-10-08

## 2021-12-03 NOTE — TELEPHONE ENCOUNTER
Faxed office visit a long with a clearance form from  office. Received confirmation went through.     Fax # [phone] or Telephone # [phone]    Thanks

## 2021-12-03 NOTE — PROGRESS NOTES
[facility]      Patient Name: Guero Garya  :1935  Age: 86 y.o.  Encounter Provider:  Preston Gutierrez Jr, MD      Chief Complaint:   Chief Complaint   Patient presents with   • Aortic stenosis,severe     6 month f/u   • Surgical Clearance         HPI  Guero Garay is a 86 y.o. male is a very pleasant gentleman who has a past medical history of coronary artery disease, atrial fibrillation, sick sinus syndrome status post pacemaker implantation, hypertension, aortic stenosis, chronic diastolic heart failure and chronic kidney disease who presents for follow-up evaluation with me.      Last clinic visit note: He is followed in the past with Dr. Galan.  He was last seen by BUNNY Ndiaye in office in April.  Coronary artery disease status post PCI in .  Last cardiac catheterization in 2020 showed patent stents and otherwise nonobstructive coronary artery disease.  Atrial fibrillation with history of high burden in 2018 on pacemaker check however today his AT/AF burden is less than 0.1%.  At last visit he was noting labile blood pressure and he was started on amlodipine 5 mg.  He still noting high blood pressure and was seen in the [facility] ER on .  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  He notes mild chronic dyspnea on exertion which is unchanged over the past few years.  He denies orthopnea, PND or edema.  No palpitations, dizziness or syncope.  He admits to snoring and hypersomnia.  No previous sleep test.  Social and family history reviewed and not pertinent to this clinic visit.    We made some changes to medical therapy but most importantly got him home sleep study which confirmed the suspicion of sleep apnea.  He is compliant with medical therapy and we have been able to reduce his blood pressure medications and have him under much better control now.  He still does a significant amount around the farm with no  "chest pain.  He has chronic stable mild dyspnea on exertion.  No orthopnea, PND or edema.  No palpitations, dizziness or syncope.  Social and family history were reviewed and are not pertinent to this clinic visit.    The following portions of the patient's history were reviewed and updated as appropriate: allergies, current medications, past family history, past medical history, past social history, past surgical history and problem list.      Review of Systems   Constitutional: Positive for malaise/fatigue. Negative for chills and fever.   HENT: Negative for hoarse voice and sore throat.    Eyes: Negative for double vision and photophobia.   Cardiovascular: Positive for dyspnea on exertion. Negative for chest pain, leg swelling, near-syncope, orthopnea, palpitations, paroxysmal nocturnal dyspnea and syncope.   Respiratory: Positive for snoring. Negative for cough and wheezing.    Skin: Negative for poor wound healing and rash.   Musculoskeletal: Negative for arthritis and joint swelling.   Gastrointestinal: Negative for bloating, abdominal pain, hematemesis and hematochezia.   Neurological: Negative for dizziness and focal weakness.   Psychiatric/Behavioral: Negative for depression and suicidal ideas.     ROS was reviewed, updated and amended when necessary.    OBJECTIVE:   Vital Signs  Vitals:    12/03/21 0835   BP: 124/72   Pulse: 76     Estimated body mass index is 32.66 kg/m² as calculated from the following:    Height as of this encounter: 172.7 cm (68\").    Weight as of this encounter: 97.4 kg (214 lb 12.8 oz).    Vitals reviewed.   Constitutional:       Appearance: Healthy appearance. Not in distress.   Neck:      Vascular: No JVR. JVD normal.   Pulmonary:      Effort: Pulmonary effort is normal.      Breath sounds: Normal breath sounds. No wheezing. No rhonchi. No rales.   Chest:      Chest wall: Not tender to palpatation.   Cardiovascular:      PMI at left midclavicular line. Normal rate. Regular rhythm. " Normal S1. Normal S2.      Murmurs: There is a systolic murmur.      No gallop. No click. No rub.   Pulses:     Intact distal pulses.   Edema:     Peripheral edema absent.   Abdominal:      General: Bowel sounds are normal.      Palpations: Abdomen is soft.      Tenderness: There is no abdominal tenderness.   Musculoskeletal: Normal range of motion.         General: No tenderness. Skin:     General: Skin is warm and dry.   Neurological:      General: No focal deficit present.      Mental Status: Alert and oriented to person, place and time.     Physical exam was reviewed, updated and amended and necessary.    Procedures          ASSESSMENT:     Resistant hypertension  Paroxysmal atrial fibrillation  Sick sinus syndrome  Hypersomnia  Chronic diastolic heart failure  Coronary artery disease  Aortic stenosis      PLAN OF CARE:     1. Hypertension -much better controlled at this point time.  We had a long discussion about sodium restricted diet.  Continue current medical regimen.  Continue CPAP therapy.  He follows with sleep medicine in [place] with Dr. Ivy.  2. Hypersomnia -confirmed sleep apnea on CPAP  3. Paroxysmal atrial fibrillation -low AT AF burden.  Continue Eliquis and metoprolol.  4. Sick sinus syndrome -normal pacemaker function  5. Aortic stenosis -repeat echo in June showed no more than mild aortic stenosis.  This is possibly underestimated but murmur on exam today is not suspicious for anything more than mild to moderate aortic stenosis.  6. Chronic diastolic heart failure -euvolemic today.  It is imperative that we are aggressive with afterload reduction.  Sodium restricted diet is counseled.  7. Coronary artery disease -continue aspirin and statin.  Check lipid profile.    Preoperative risk assessment shows good functional capacity with no angina or heart failure.  The patient is at intermediate risk for perioperative Mace with no indication for further testing.  Preoperative beta-blocker should be  continued.  He can come off of apixaban with no need for bridge recommend stopping 48 hours prior to surgery and restarting when appropriate from a postoperative bleeding standpoint.  Return to clinic 6 months             Discharge Medications          Accurate as of December 3, 2021  8:51 AM. If you have any questions, ask your nurse or doctor.            Changes to Medications      Instructions Start Date   amLODIPine 10 MG tablet  Commonly known as: NORVASC  What changed: how much to take   10 mg, Oral, Daily         Continue These Medications      Instructions Start Date   allopurinol 100 MG tablet  Commonly known as: ZYLOPRIM   100 mg, Oral, Daily      apixaban 2.5 MG tablet tablet  Commonly known as: Eliquis   2.5 mg, Oral, Every 12 Hours      aspirin 81 MG chewable tablet   81 mg, Oral, Daily      Breo Ellipta 100-25 MCG/INH inhaler  Generic drug: Fluticasone Furoate-Vilanterol   1 puff, Inhalation, Every Morning      cetirizine 10 MG tablet  Commonly known as: zyrTEC   10 mg, Oral, Daily      Cranberry 250 MG capsule   250 mg, Oral, Daily      furosemide 20 MG tablet  Commonly known as: LASIX   20 mg, Oral, Daily      melatonin 5 MG tablet tablet   5 mg, Oral, Nightly PRN      metoprolol tartrate 50 MG tablet  Commonly known as: LOPRESSOR   50 mg, Oral, 2 Times Daily      multivitamin with minerals tablet tablet   1 tablet, Oral, Daily      potassium chloride 10 MEQ CR capsule  Commonly known as: MICRO-K   10 mEq, Oral, 2 Times Daily      pramipexole 0.25 MG tablet  Commonly known as: Mirapex   0.25 mg, Oral, Nightly, Take 1 hour prior to bed-time.      rosuvastatin 20 MG tablet  Commonly known as: CRESTOR   20 mg, Oral, Daily      Testosterone Cypionate 200 MG/ML injection  Commonly known as: DEPOTESTOTERONE CYPIONATE   1 mL, Weekly      topiramate 50 MG tablet  Commonly known as: TOPAMAX   50 mg, Oral, 2 Times Daily         Stop These Medications    budesonide-formoterol 160-4.5 MCG/ACT inhaler  Commonly  known as: SYMBICORT  Stopped by: Preston Gutierrez Jr, MD     chlorthalidone 25 MG tablet  Commonly known as: HYGROTON  Stopped by: Preston Gutierrez Jr, MD     zolpidem 5 MG tablet  Commonly known as: Ambien  Stopped by: Preston Gutierrez Jr, MD            Thank you for allowing me to participate in the care of your patient,      Sincerely,   Preston Gutierrez Jr, MD  [facility]  12/03/21  08:51 EST

## 2021-12-07 ENCOUNTER — TELEPHONE (OUTPATIENT)
Dept: SLEEP MEDICINE | Facility: HOSPITAL | Age: 86
End: 2021-12-07

## 2021-12-07 ENCOUNTER — OFFICE VISIT (OUTPATIENT)
Dept: SLEEP MEDICINE | Facility: HOSPITAL | Age: 86
End: 2021-12-07

## 2021-12-07 VITALS
BODY MASS INDEX: 32.43 KG/M2 | SYSTOLIC BLOOD PRESSURE: 118 MMHG | HEART RATE: 63 BPM | DIASTOLIC BLOOD PRESSURE: 79 MMHG | HEIGHT: 68 IN | WEIGHT: 214 LBS

## 2021-12-07 DIAGNOSIS — E61.1 IRON DEFICIENCY: Primary | ICD-10-CM

## 2021-12-07 DIAGNOSIS — G47.33 OBSTRUCTIVE SLEEP APNEA: ICD-10-CM

## 2021-12-07 DIAGNOSIS — G47.61 PERIODIC LIMB MOVEMENT DISORDER (PLMD): ICD-10-CM

## 2021-12-07 DIAGNOSIS — E66.9 OBESITY (BMI 30-39.9): ICD-10-CM

## 2021-12-07 PROCEDURE — G0463 HOSPITAL OUTPT CLINIC VISIT: HCPCS

## 2021-12-07 RX ORDER — PRAMIPEXOLE DIHYDROCHLORIDE 0.25 MG/1
0.25 TABLET ORAL NIGHTLY
Qty: 30 TABLET | Refills: 5 | Status: SHIPPED | OUTPATIENT
Start: 2021-12-07 | End: 2022-06-08 | Stop reason: SDUPTHER

## 2021-12-07 NOTE — TELEPHONE ENCOUNTER
Received pressure change from Dr. Ivy, while in clinic for this patient. Changed pressure to Auto 10 - 20. Change made in [facility].

## 2021-12-09 ENCOUNTER — HOSPITAL ENCOUNTER (OUTPATIENT)
Dept: GENERAL RADIOLOGY | Facility: HOSPITAL | Age: 86
Discharge: HOME | End: 2021-12-09

## 2021-12-09 ENCOUNTER — PRE-ADMISSION TESTING (OUTPATIENT)
Dept: PREADMISSION TESTING | Facility: HOSPITAL | Age: 86
End: 2021-12-09

## 2021-12-09 VITALS
HEART RATE: 16 BPM | BODY MASS INDEX: 32.46 KG/M2 | RESPIRATION RATE: 16 BRPM | SYSTOLIC BLOOD PRESSURE: 122 MMHG | WEIGHT: 214.2 LBS | OXYGEN SATURATION: 98 % | DIASTOLIC BLOOD PRESSURE: 75 MMHG | HEIGHT: 68 IN | TEMPERATURE: 97.6 F

## 2021-12-09 LAB
ABO GROUP BLD: NORMAL
ALBUMIN SERPL-MCNC: 4.6 G/DL (ref 3.5–5.2)
ALBUMIN/GLOB SERPL: 2.1 G/DL
ALP SERPL-CCNC: 73 U/L (ref 39–117)
ALT SERPL W P-5'-P-CCNC: 17 U/L (ref 1–41)
ANION GAP SERPL CALCULATED.3IONS-SCNC: 9.5 MMOL/L (ref 5–15)
APTT PPP: 34.6 SECONDS (ref 22.7–35.4)
AST SERPL-CCNC: 21 U/L (ref 1–40)
BACTERIA UR QL AUTO: NORMAL /HPF
BASOPHILS # BLD AUTO: 0.07 10*3/MM3 (ref 0–0.2)
BASOPHILS NFR BLD AUTO: 1.4 % (ref 0–1.5)
BILIRUB SERPL-MCNC: 0.5 MG/DL (ref 0–1.2)
BILIRUB UR QL STRIP: NEGATIVE
BLD GP AB SCN SERPL QL: NEGATIVE
BUN SERPL-MCNC: 24 MG/DL (ref 8–23)
BUN/CREAT SERPL: 18.3 (ref 7–25)
CALCIUM SPEC-SCNC: 10.1 MG/DL (ref 8.6–10.5)
CHLORIDE SERPL-SCNC: 106 MMOL/L (ref 98–107)
CLARITY UR: CLEAR
CO2 SERPL-SCNC: 27.5 MMOL/L (ref 22–29)
COLOR UR: YELLOW
CREAT SERPL-MCNC: 1.31 MG/DL (ref 0.76–1.27)
DEPRECATED RDW RBC AUTO: 47.6 FL (ref 37–54)
EOSINOPHIL # BLD AUTO: 0.22 10*3/MM3 (ref 0–0.4)
EOSINOPHIL NFR BLD AUTO: 4.4 % (ref 0.3–6.2)
ERYTHROCYTE [DISTWIDTH] IN BLOOD BY AUTOMATED COUNT: 12.6 % (ref 12.3–15.4)
GFR SERPL CREATININE-BSD FRML MDRD: 52 ML/MIN/1.73
GLOBULIN UR ELPH-MCNC: 2.2 GM/DL
GLUCOSE SERPL-MCNC: 89 MG/DL (ref 65–99)
GLUCOSE UR STRIP-MCNC: NEGATIVE MG/DL
HCT VFR BLD AUTO: 50.9 % (ref 37.5–51)
HGB BLD-MCNC: 16.4 G/DL (ref 13–17.7)
HGB UR QL STRIP.AUTO: NEGATIVE
HYALINE CASTS UR QL AUTO: NORMAL /LPF
IMM GRANULOCYTES # BLD AUTO: 0.01 10*3/MM3 (ref 0–0.05)
IMM GRANULOCYTES NFR BLD AUTO: 0.2 % (ref 0–0.5)
INR PPP: 1.1 (ref 0.9–1.1)
KETONES UR QL STRIP: NEGATIVE
LEUKOCYTE ESTERASE UR QL STRIP.AUTO: NEGATIVE
LYMPHOCYTES # BLD AUTO: 1.56 10*3/MM3 (ref 0.7–3.1)
LYMPHOCYTES NFR BLD AUTO: 31.1 % (ref 19.6–45.3)
MCH RBC QN AUTO: 32.2 PG (ref 26.6–33)
MCHC RBC AUTO-ENTMCNC: 32.2 G/DL (ref 31.5–35.7)
MCV RBC AUTO: 100 FL (ref 79–97)
MONOCYTES # BLD AUTO: 0.6 10*3/MM3 (ref 0.1–0.9)
MONOCYTES NFR BLD AUTO: 12 % (ref 5–12)
NEUTROPHILS NFR BLD AUTO: 2.55 10*3/MM3 (ref 1.7–7)
NEUTROPHILS NFR BLD AUTO: 50.9 % (ref 42.7–76)
NITRITE UR QL STRIP: NEGATIVE
NRBC BLD AUTO-RTO: 0 /100 WBC (ref 0–0.2)
PH UR STRIP.AUTO: <=5 [PH] (ref 5–8)
PLATELET # BLD AUTO: 171 10*3/MM3 (ref 140–450)
PMV BLD AUTO: 10.2 FL (ref 6–12)
POTASSIUM SERPL-SCNC: 4.3 MMOL/L (ref 3.5–5.2)
PROT SERPL-MCNC: 6.8 G/DL (ref 6–8.5)
PROT UR QL STRIP: NEGATIVE
PROTHROMBIN TIME: 14 SECONDS (ref 11.7–14.2)
QT INTERVAL: 459 MS
RBC # BLD AUTO: 5.09 10*6/MM3 (ref 4.14–5.8)
RBC # UR STRIP: NORMAL /HPF
REF LAB TEST METHOD: NORMAL
RH BLD: NEGATIVE
SODIUM SERPL-SCNC: 143 MMOL/L (ref 136–145)
SP GR UR STRIP: 1.01 (ref 1–1.03)
SQUAMOUS #/AREA URNS HPF: NORMAL /HPF
T&S EXPIRATION DATE: NORMAL
UROBILINOGEN UR QL STRIP: NORMAL
WBC # UR STRIP: NORMAL /HPF
WBC NRBC COR # BLD: 5.01 10*3/MM3 (ref 3.4–10.8)

## 2021-12-09 PROCEDURE — 85610 PROTHROMBIN TIME: CPT

## 2021-12-09 PROCEDURE — 93005 ELECTROCARDIOGRAM TRACING: CPT

## 2021-12-09 PROCEDURE — 86850 RBC ANTIBODY SCREEN: CPT

## 2021-12-09 PROCEDURE — 80053 COMPREHEN METABOLIC PANEL: CPT

## 2021-12-09 PROCEDURE — 86900 BLOOD TYPING SEROLOGIC ABO: CPT

## 2021-12-09 PROCEDURE — 36415 COLL VENOUS BLD VENIPUNCTURE: CPT

## 2021-12-09 PROCEDURE — 73560 X-RAY EXAM OF KNEE 1 OR 2: CPT

## 2021-12-09 PROCEDURE — 71046 X-RAY EXAM CHEST 2 VIEWS: CPT

## 2021-12-09 PROCEDURE — 93010 ELECTROCARDIOGRAM REPORT: CPT | Performed by: INTERNAL MEDICINE

## 2021-12-09 PROCEDURE — 85730 THROMBOPLASTIN TIME PARTIAL: CPT

## 2021-12-09 PROCEDURE — 86901 BLOOD TYPING SEROLOGIC RH(D): CPT

## 2021-12-09 PROCEDURE — 85025 COMPLETE CBC W/AUTO DIFF WBC: CPT

## 2021-12-09 PROCEDURE — 81001 URINALYSIS AUTO W/SCOPE: CPT

## 2021-12-09 RX ORDER — ASPIRIN 81 MG/1
81 TABLET ORAL DAILY
COMMUNITY

## 2021-12-09 RX ORDER — ACETAMINOPHEN 500 MG
500 TABLET ORAL NIGHTLY
COMMUNITY

## 2021-12-09 ASSESSMENT — KOOS JR
KOOS JR SCORE: 1
KOOS JR SCORE: 91.975

## 2021-12-09 NOTE — DISCHARGE INSTRUCTIONS

## 2021-12-10 ENCOUNTER — LAB (OUTPATIENT)
Dept: LAB | Facility: HOSPITAL | Age: 86
End: 2021-12-10

## 2021-12-10 LAB — SARS-COV-2 ORF1AB RESP QL NAA+PROBE: NOT DETECTED

## 2021-12-10 PROCEDURE — C9803 HOPD COVID-19 SPEC COLLECT: HCPCS

## 2021-12-10 PROCEDURE — U0004 COV-19 TEST NON-CDC HGH THRU: HCPCS

## 2021-12-10 PROCEDURE — U0005 INFEC AGEN DETEC AMPLI PROBE: HCPCS

## 2021-12-13 ENCOUNTER — LAB (OUTPATIENT)
Dept: LAB | Facility: HOSPITAL | Age: 86
End: 2021-12-13

## 2021-12-13 ENCOUNTER — TRANSCRIBE ORDERS (OUTPATIENT)
Dept: ADMINISTRATIVE | Facility: HOSPITAL | Age: 86
End: 2021-12-13

## 2021-12-13 DIAGNOSIS — Z01.818 PREOP EXAMINATION: ICD-10-CM

## 2021-12-13 DIAGNOSIS — Z01.818 PREOP EXAMINATION: Primary | ICD-10-CM

## 2021-12-13 LAB — SARS-COV-2 RNA PNL SPEC NAA+PROBE: NOT DETECTED

## 2021-12-13 PROCEDURE — 87635 SARS-COV-2 COVID-19 AMP PRB: CPT

## 2021-12-14 ENCOUNTER — ANESTHESIA (OUTPATIENT)
Dept: PERIOP | Facility: HOSPITAL | Age: 86
End: 2021-12-14

## 2021-12-14 ENCOUNTER — ANESTHESIA EVENT (OUTPATIENT)
Dept: PERIOP | Facility: HOSPITAL | Age: 86
End: 2021-12-14

## 2021-12-14 ENCOUNTER — HOSPITAL ENCOUNTER (INPATIENT)
Facility: HOSPITAL | Age: 86
LOS: 1 days | Discharge: HOME HEALTH | End: 2021-12-17
Attending: ORTHOPAEDIC SURGERY | Admitting: ORTHOPAEDIC SURGERY

## 2021-12-14 ENCOUNTER — APPOINTMENT (OUTPATIENT)
Dept: GENERAL RADIOLOGY | Facility: HOSPITAL | Age: 86
End: 2021-12-14

## 2021-12-14 PROBLEM — Z96.659 TOTAL KNEE REPLACEMENT STATUS: Status: ACTIVE | Noted: 2021-12-14

## 2021-12-14 LAB
ANION GAP SERPL CALCULATED.3IONS-SCNC: 9.3 MMOL/L (ref 5–15)
BUN SERPL-MCNC: 22 MG/DL (ref 8–23)
BUN/CREAT SERPL: 14.8 (ref 7–25)
CALCIUM SPEC-SCNC: 9 MG/DL (ref 8.6–10.5)
CHLORIDE SERPL-SCNC: 107 MMOL/L (ref 98–107)
CO2 SERPL-SCNC: 22.7 MMOL/L (ref 22–29)
CREAT SERPL-MCNC: 1.49 MG/DL (ref 0.76–1.27)
DEPRECATED RDW RBC AUTO: 43.2 FL (ref 37–54)
ERYTHROCYTE [DISTWIDTH] IN BLOOD BY AUTOMATED COUNT: 12.4 % (ref 12.3–15.4)
GFR SERPL CREATININE-BSD FRML MDRD: 45 ML/MIN/1.73
GLUCOSE SERPL-MCNC: 199 MG/DL (ref 65–99)
HCT VFR BLD AUTO: 41.8 % (ref 37.5–51)
HGB BLD-MCNC: 14 G/DL (ref 13–17.7)
MCH RBC QN AUTO: 32.6 PG (ref 26.6–33)
MCHC RBC AUTO-ENTMCNC: 33.5 G/DL (ref 31.5–35.7)
MCV RBC AUTO: 97.2 FL (ref 79–97)
PLATELET # BLD AUTO: 150 10*3/MM3 (ref 140–450)
PMV BLD AUTO: 9.9 FL (ref 6–12)
POTASSIUM SERPL-SCNC: 4.1 MMOL/L (ref 3.5–5.2)
RBC # BLD AUTO: 4.3 10*6/MM3 (ref 4.14–5.8)
SODIUM SERPL-SCNC: 139 MMOL/L (ref 136–145)
WBC NRBC COR # BLD: 7.97 10*3/MM3 (ref 3.4–10.8)

## 2021-12-14 PROCEDURE — 80048 BASIC METABOLIC PNL TOTAL CA: CPT | Performed by: ORTHOPAEDIC SURGERY

## 2021-12-14 PROCEDURE — 0 CEFAZOLIN IN DEXTROSE 2-4 GM/100ML-% SOLUTION: Performed by: NURSE ANESTHETIST, CERTIFIED REGISTERED

## 2021-12-14 PROCEDURE — 25010000002 DEXAMETHASONE PER 1 MG: Performed by: NURSE ANESTHETIST, CERTIFIED REGISTERED

## 2021-12-14 PROCEDURE — C1776 JOINT DEVICE (IMPLANTABLE): HCPCS | Performed by: ORTHOPAEDIC SURGERY

## 2021-12-14 PROCEDURE — 85027 COMPLETE CBC AUTOMATED: CPT | Performed by: ORTHOPAEDIC SURGERY

## 2021-12-14 PROCEDURE — C1889 IMPLANT/INSERT DEVICE, NOC: HCPCS | Performed by: ORTHOPAEDIC SURGERY

## 2021-12-14 PROCEDURE — C1713 ANCHOR/SCREW BN/BN,TIS/BN: HCPCS | Performed by: ORTHOPAEDIC SURGERY

## 2021-12-14 PROCEDURE — 25010000002 CLONIDINE PER 1 MG: Performed by: ORTHOPAEDIC SURGERY

## 2021-12-14 PROCEDURE — 0SRC0J9 REPLACEMENT OF RIGHT KNEE JOINT WITH SYNTHETIC SUBSTITUTE, CEMENTED, OPEN APPROACH: ICD-10-PCS | Performed by: ORTHOPAEDIC SURGERY

## 2021-12-14 PROCEDURE — 25010000002 FENTANYL CITRATE (PF) 50 MCG/ML SOLUTION: Performed by: NURSE ANESTHETIST, CERTIFIED REGISTERED

## 2021-12-14 PROCEDURE — 73560 X-RAY EXAM OF KNEE 1 OR 2: CPT

## 2021-12-14 PROCEDURE — 0 CEFAZOLIN IN DEXTROSE 2-4 GM/100ML-% SOLUTION: Performed by: ORTHOPAEDIC SURGERY

## 2021-12-14 PROCEDURE — 25010000002 PHENYLEPHRINE 10 MG/ML SOLUTION: Performed by: NURSE ANESTHETIST, CERTIFIED REGISTERED

## 2021-12-14 PROCEDURE — 25010000002 NEOSTIGMINE 5 MG/10ML SOLUTION: Performed by: NURSE ANESTHETIST, CERTIFIED REGISTERED

## 2021-12-14 PROCEDURE — 25010000002 ONDANSETRON PER 1 MG: Performed by: NURSE ANESTHETIST, CERTIFIED REGISTERED

## 2021-12-14 PROCEDURE — 25010000002 PROPOFOL 10 MG/ML EMULSION: Performed by: NURSE ANESTHETIST, CERTIFIED REGISTERED

## 2021-12-14 PROCEDURE — 25010000002 KETOROLAC TROMETHAMINE PER 15 MG: Performed by: ORTHOPAEDIC SURGERY

## 2021-12-14 PROCEDURE — 94799 UNLISTED PULMONARY SVC/PX: CPT

## 2021-12-14 PROCEDURE — 25010000002 EPINEPHRINE 1 MG/ML SOLUTION 30 ML VIAL: Performed by: ORTHOPAEDIC SURGERY

## 2021-12-14 PROCEDURE — G0378 HOSPITAL OBSERVATION PER HR: HCPCS

## 2021-12-14 PROCEDURE — 25010000002 ROPIVACAINE PER 1 MG: Performed by: ORTHOPAEDIC SURGERY

## 2021-12-14 DEVICE — PALACOS® R IS A FAST-CURING, RADIOPAQUE, POLY(METHYL METHACRYLATE)-BASED BONE CEMENT.PALACOS ® R CONTAINS THE X-RAY CONTRAST MEDIUM ZIRCONIUM DIOXIDE. TO IMPROVE VISIBILITY IN THE SURGICAL FIELD PALACOS ® R HAS BEEN COLOURED WITH CHLOROPHYLL (E141). THE BONE CEMENT IS PREPARED DIRECTLY BEFORE USE BY MIXING A POLYMER POWDER COMPONENT WITH A LIQUID MONOMER COMPONENT. A DUCTILE DOUGH FORMS WHICH CURES WITHIN A FEW MINUTES.
Type: IMPLANTABLE DEVICE | Site: KNEE | Status: FUNCTIONAL
Brand: PALACOS®

## 2021-12-14 DEVICE — JOURNEY 7.5 ROUND RESURF PAT 35MM STANDARD
Type: IMPLANTABLE DEVICE | Site: KNEE | Status: FUNCTIONAL
Brand: JOURNEY

## 2021-12-14 DEVICE — JOURNEY II BCS XLPE ARTICULAR                                    INSERT SIZE 5-6 RIGHT 9MM
Type: IMPLANTABLE DEVICE | Site: KNEE | Status: FUNCTIONAL
Brand: JOURNEY

## 2021-12-14 DEVICE — JOURNEY II BCS FEMORAL OXINIUM                                    RIGHT SIZE 9
Type: IMPLANTABLE DEVICE | Site: KNEE | Status: FUNCTIONAL
Brand: JOURNEY

## 2021-12-14 DEVICE — VIOLET ANTIBACTERIAL POLYDIOXANONE, KNOTLESS TISSUE CONTROL DEVICE
Type: IMPLANTABLE DEVICE | Site: KNEE | Status: FUNCTIONAL
Brand: STRATAFIX

## 2021-12-14 DEVICE — JOURNEY TIBIAL BASEPLATE NONPOROUS                                    RT SZ 6
Type: IMPLANTABLE DEVICE | Site: KNEE | Status: FUNCTIONAL
Brand: JOURNEY

## 2021-12-14 DEVICE — DEV CONTRL TISS STRATAFIX SPIRAL PDS PLS CT1 2-0 45CM: Type: IMPLANTABLE DEVICE | Site: KNEE | Status: FUNCTIONAL

## 2021-12-14 DEVICE — IMPLANTABLE DEVICE: Type: IMPLANTABLE DEVICE | Site: KNEE | Status: FUNCTIONAL

## 2021-12-14 RX ORDER — DIPHENHYDRAMINE HCL 25 MG
25 CAPSULE ORAL
Status: DISCONTINUED | OUTPATIENT
Start: 2021-12-14 | End: 2021-12-14 | Stop reason: HOSPADM

## 2021-12-14 RX ORDER — SODIUM CHLORIDE 9 MG/ML
100 INJECTION, SOLUTION INTRAVENOUS CONTINUOUS
Status: DISCONTINUED | OUTPATIENT
Start: 2021-12-14 | End: 2021-12-17 | Stop reason: HOSPADM

## 2021-12-14 RX ORDER — FENTANYL CITRATE 50 UG/ML
50 INJECTION, SOLUTION INTRAMUSCULAR; INTRAVENOUS
Status: DISCONTINUED | OUTPATIENT
Start: 2021-12-14 | End: 2021-12-14 | Stop reason: HOSPADM

## 2021-12-14 RX ORDER — NEOSTIGMINE METHYLSULFATE 0.5 MG/ML
INJECTION, SOLUTION INTRAVENOUS AS NEEDED
Status: DISCONTINUED | OUTPATIENT
Start: 2021-12-14 | End: 2021-12-14 | Stop reason: SURG

## 2021-12-14 RX ORDER — PROMETHAZINE HYDROCHLORIDE 25 MG/1
25 TABLET ORAL ONCE AS NEEDED
Status: DISCONTINUED | OUTPATIENT
Start: 2021-12-14 | End: 2021-12-14 | Stop reason: HOSPADM

## 2021-12-14 RX ORDER — CHOLECALCIFEROL (VITAMIN D3) 125 MCG
5 CAPSULE ORAL NIGHTLY PRN
Status: DISCONTINUED | OUTPATIENT
Start: 2021-12-14 | End: 2021-12-17 | Stop reason: HOSPADM

## 2021-12-14 RX ORDER — HYDRALAZINE HYDROCHLORIDE 20 MG/ML
5 INJECTION INTRAMUSCULAR; INTRAVENOUS
Status: DISCONTINUED | OUTPATIENT
Start: 2021-12-14 | End: 2021-12-14 | Stop reason: HOSPADM

## 2021-12-14 RX ORDER — SODIUM CHLORIDE 0.9 % (FLUSH) 0.9 %
3 SYRINGE (ML) INJECTION EVERY 12 HOURS SCHEDULED
Status: DISCONTINUED | OUTPATIENT
Start: 2021-12-14 | End: 2021-12-14 | Stop reason: HOSPADM

## 2021-12-14 RX ORDER — ONDANSETRON 2 MG/ML
4 INJECTION INTRAMUSCULAR; INTRAVENOUS EVERY 6 HOURS PRN
Status: DISCONTINUED | OUTPATIENT
Start: 2021-12-14 | End: 2021-12-17 | Stop reason: HOSPADM

## 2021-12-14 RX ORDER — DOCUSATE SODIUM 100 MG/1
100 CAPSULE, LIQUID FILLED ORAL 2 TIMES DAILY PRN
Status: DISCONTINUED | OUTPATIENT
Start: 2021-12-14 | End: 2021-12-17 | Stop reason: HOSPADM

## 2021-12-14 RX ORDER — MIDAZOLAM HYDROCHLORIDE 1 MG/ML
0.5 INJECTION INTRAMUSCULAR; INTRAVENOUS
Status: DISCONTINUED | OUTPATIENT
Start: 2021-12-14 | End: 2021-12-14 | Stop reason: HOSPADM

## 2021-12-14 RX ORDER — CELECOXIB 200 MG/1
200 CAPSULE ORAL ONCE
Status: DISCONTINUED | OUTPATIENT
Start: 2021-12-14 | End: 2021-12-14 | Stop reason: HOSPADM

## 2021-12-14 RX ORDER — ASPIRIN 81 MG/1
81 TABLET ORAL DAILY
Status: DISCONTINUED | OUTPATIENT
Start: 2021-12-15 | End: 2021-12-17 | Stop reason: HOSPADM

## 2021-12-14 RX ORDER — ACETAMINOPHEN 500 MG
1000 TABLET ORAL ONCE
Status: COMPLETED | OUTPATIENT
Start: 2021-12-14 | End: 2021-12-14

## 2021-12-14 RX ORDER — LABETALOL HYDROCHLORIDE 5 MG/ML
5 INJECTION, SOLUTION INTRAVENOUS
Status: DISCONTINUED | OUTPATIENT
Start: 2021-12-14 | End: 2021-12-14 | Stop reason: HOSPADM

## 2021-12-14 RX ORDER — ONDANSETRON 2 MG/ML
4 INJECTION INTRAMUSCULAR; INTRAVENOUS ONCE AS NEEDED
Status: DISCONTINUED | OUTPATIENT
Start: 2021-12-14 | End: 2021-12-14 | Stop reason: HOSPADM

## 2021-12-14 RX ORDER — FAMOTIDINE 10 MG/ML
20 INJECTION, SOLUTION INTRAVENOUS ONCE
Status: COMPLETED | OUTPATIENT
Start: 2021-12-14 | End: 2021-12-14

## 2021-12-14 RX ORDER — FLUMAZENIL 0.1 MG/ML
0.2 INJECTION INTRAVENOUS AS NEEDED
Status: DISCONTINUED | OUTPATIENT
Start: 2021-12-14 | End: 2021-12-14 | Stop reason: HOSPADM

## 2021-12-14 RX ORDER — CEFAZOLIN SODIUM 2 G/100ML
2 INJECTION, SOLUTION INTRAVENOUS ONCE
Status: COMPLETED | OUTPATIENT
Start: 2021-12-14 | End: 2021-12-14

## 2021-12-14 RX ORDER — CEFAZOLIN SODIUM 2 G/100ML
INJECTION, SOLUTION INTRAVENOUS AS NEEDED
Status: DISCONTINUED | OUTPATIENT
Start: 2021-12-14 | End: 2021-12-14 | Stop reason: SURG

## 2021-12-14 RX ORDER — SODIUM CHLORIDE, SODIUM LACTATE, POTASSIUM CHLORIDE, CALCIUM CHLORIDE 600; 310; 30; 20 MG/100ML; MG/100ML; MG/100ML; MG/100ML
INJECTION, SOLUTION INTRAVENOUS CONTINUOUS PRN
Status: DISCONTINUED | OUTPATIENT
Start: 2021-12-14 | End: 2021-12-14 | Stop reason: SURG

## 2021-12-14 RX ORDER — OXYCODONE HYDROCHLORIDE AND ACETAMINOPHEN 5; 325 MG/1; MG/1
2 TABLET ORAL EVERY 4 HOURS PRN
Status: DISCONTINUED | OUTPATIENT
Start: 2021-12-14 | End: 2021-12-17 | Stop reason: HOSPADM

## 2021-12-14 RX ORDER — TRANEXAMIC ACID 100 MG/ML
INJECTION, SOLUTION INTRAVENOUS AS NEEDED
Status: DISCONTINUED | OUTPATIENT
Start: 2021-12-14 | End: 2021-12-14 | Stop reason: SURG

## 2021-12-14 RX ORDER — HYDROCODONE BITARTRATE AND ACETAMINOPHEN 7.5; 325 MG/1; MG/1
1 TABLET ORAL ONCE AS NEEDED
Status: DISCONTINUED | OUTPATIENT
Start: 2021-12-14 | End: 2021-12-14 | Stop reason: HOSPADM

## 2021-12-14 RX ORDER — DIPHENHYDRAMINE HCL 25 MG
50 CAPSULE ORAL EVERY 6 HOURS PRN
Status: DISCONTINUED | OUTPATIENT
Start: 2021-12-14 | End: 2021-12-17 | Stop reason: HOSPADM

## 2021-12-14 RX ORDER — CEFAZOLIN SODIUM 2 G/100ML
2 INJECTION, SOLUTION INTRAVENOUS EVERY 8 HOURS
Status: COMPLETED | OUTPATIENT
Start: 2021-12-14 | End: 2021-12-15

## 2021-12-14 RX ORDER — DIPHENHYDRAMINE HYDROCHLORIDE 50 MG/ML
12.5 INJECTION INTRAMUSCULAR; INTRAVENOUS
Status: DISCONTINUED | OUTPATIENT
Start: 2021-12-14 | End: 2021-12-14 | Stop reason: HOSPADM

## 2021-12-14 RX ORDER — NALOXONE HCL 0.4 MG/ML
0.1 VIAL (ML) INJECTION
Status: DISCONTINUED | OUTPATIENT
Start: 2021-12-14 | End: 2021-12-17 | Stop reason: HOSPADM

## 2021-12-14 RX ORDER — PROPOFOL 10 MG/ML
VIAL (ML) INTRAVENOUS AS NEEDED
Status: DISCONTINUED | OUTPATIENT
Start: 2021-12-14 | End: 2021-12-14 | Stop reason: SURG

## 2021-12-14 RX ORDER — ROCURONIUM BROMIDE 10 MG/ML
INJECTION, SOLUTION INTRAVENOUS AS NEEDED
Status: DISCONTINUED | OUTPATIENT
Start: 2021-12-14 | End: 2021-12-14 | Stop reason: SURG

## 2021-12-14 RX ORDER — OXYCODONE HYDROCHLORIDE 5 MG/1
5 TABLET ORAL ONCE
Status: COMPLETED | OUTPATIENT
Start: 2021-12-14 | End: 2021-12-14

## 2021-12-14 RX ORDER — FENTANYL CITRATE 50 UG/ML
INJECTION, SOLUTION INTRAMUSCULAR; INTRAVENOUS AS NEEDED
Status: DISCONTINUED | OUTPATIENT
Start: 2021-12-14 | End: 2021-12-14 | Stop reason: SURG

## 2021-12-14 RX ORDER — DIPHENHYDRAMINE HYDROCHLORIDE 50 MG/ML
25 INJECTION INTRAMUSCULAR; INTRAVENOUS EVERY 6 HOURS PRN
Status: DISCONTINUED | OUTPATIENT
Start: 2021-12-14 | End: 2021-12-17 | Stop reason: HOSPADM

## 2021-12-14 RX ORDER — ONDANSETRON 2 MG/ML
INJECTION INTRAMUSCULAR; INTRAVENOUS AS NEEDED
Status: DISCONTINUED | OUTPATIENT
Start: 2021-12-14 | End: 2021-12-14 | Stop reason: SURG

## 2021-12-14 RX ORDER — SODIUM CHLORIDE, SODIUM LACTATE, POTASSIUM CHLORIDE, CALCIUM CHLORIDE 600; 310; 30; 20 MG/100ML; MG/100ML; MG/100ML; MG/100ML
9 INJECTION, SOLUTION INTRAVENOUS CONTINUOUS
Status: DISCONTINUED | OUTPATIENT
Start: 2021-12-14 | End: 2021-12-17 | Stop reason: HOSPADM

## 2021-12-14 RX ORDER — DEXAMETHASONE SODIUM PHOSPHATE 10 MG/ML
INJECTION INTRAMUSCULAR; INTRAVENOUS AS NEEDED
Status: DISCONTINUED | OUTPATIENT
Start: 2021-12-14 | End: 2021-12-14 | Stop reason: SURG

## 2021-12-14 RX ORDER — EPHEDRINE SULFATE 50 MG/ML
INJECTION, SOLUTION INTRAVENOUS AS NEEDED
Status: DISCONTINUED | OUTPATIENT
Start: 2021-12-14 | End: 2021-12-14 | Stop reason: SURG

## 2021-12-14 RX ORDER — SODIUM CHLORIDE 0.9 % (FLUSH) 0.9 %
3-10 SYRINGE (ML) INJECTION AS NEEDED
Status: DISCONTINUED | OUTPATIENT
Start: 2021-12-14 | End: 2021-12-14 | Stop reason: HOSPADM

## 2021-12-14 RX ORDER — OXYCODONE AND ACETAMINOPHEN 7.5; 325 MG/1; MG/1
1 TABLET ORAL EVERY 4 HOURS PRN
Status: DISCONTINUED | OUTPATIENT
Start: 2021-12-14 | End: 2021-12-14 | Stop reason: HOSPADM

## 2021-12-14 RX ORDER — METOPROLOL TARTRATE 50 MG/1
50 TABLET, FILM COATED ORAL 2 TIMES DAILY
Status: DISCONTINUED | OUTPATIENT
Start: 2021-12-14 | End: 2021-12-17 | Stop reason: HOSPADM

## 2021-12-14 RX ORDER — LIDOCAINE HYDROCHLORIDE 10 MG/ML
0.5 INJECTION, SOLUTION EPIDURAL; INFILTRATION; INTRACAUDAL; PERINEURAL ONCE AS NEEDED
Status: DISCONTINUED | OUTPATIENT
Start: 2021-12-14 | End: 2021-12-14 | Stop reason: HOSPADM

## 2021-12-14 RX ORDER — LIDOCAINE HYDROCHLORIDE 20 MG/ML
INJECTION, SOLUTION INFILTRATION; PERINEURAL AS NEEDED
Status: DISCONTINUED | OUTPATIENT
Start: 2021-12-14 | End: 2021-12-14 | Stop reason: SURG

## 2021-12-14 RX ORDER — GLYCOPYRROLATE 0.2 MG/ML
INJECTION INTRAMUSCULAR; INTRAVENOUS AS NEEDED
Status: DISCONTINUED | OUTPATIENT
Start: 2021-12-14 | End: 2021-12-14 | Stop reason: SURG

## 2021-12-14 RX ORDER — FAMOTIDINE 20 MG/1
40 TABLET, FILM COATED ORAL DAILY
Status: DISCONTINUED | OUTPATIENT
Start: 2021-12-15 | End: 2021-12-17 | Stop reason: HOSPADM

## 2021-12-14 RX ORDER — PHENYLEPHRINE HYDROCHLORIDE 10 MG/ML
INJECTION INTRAVENOUS AS NEEDED
Status: DISCONTINUED | OUTPATIENT
Start: 2021-12-14 | End: 2021-12-14 | Stop reason: SURG

## 2021-12-14 RX ORDER — OXYCODONE HYDROCHLORIDE AND ACETAMINOPHEN 5; 325 MG/1; MG/1
1 TABLET ORAL EVERY 4 HOURS PRN
Status: DISCONTINUED | OUTPATIENT
Start: 2021-12-14 | End: 2021-12-17 | Stop reason: HOSPADM

## 2021-12-14 RX ORDER — PROMETHAZINE HYDROCHLORIDE 25 MG/1
25 SUPPOSITORY RECTAL ONCE AS NEEDED
Status: DISCONTINUED | OUTPATIENT
Start: 2021-12-14 | End: 2021-12-14 | Stop reason: HOSPADM

## 2021-12-14 RX ORDER — ONDANSETRON 4 MG/1
4 TABLET, FILM COATED ORAL EVERY 6 HOURS PRN
Status: DISCONTINUED | OUTPATIENT
Start: 2021-12-14 | End: 2021-12-17 | Stop reason: HOSPADM

## 2021-12-14 RX ORDER — IBUPROFEN 600 MG/1
600 TABLET ORAL ONCE AS NEEDED
Status: DISCONTINUED | OUTPATIENT
Start: 2021-12-14 | End: 2021-12-14 | Stop reason: HOSPADM

## 2021-12-14 RX ORDER — MAGNESIUM HYDROXIDE 1200 MG/15ML
LIQUID ORAL AS NEEDED
Status: DISCONTINUED | OUTPATIENT
Start: 2021-12-14 | End: 2021-12-14 | Stop reason: HOSPADM

## 2021-12-14 RX ORDER — HYDROMORPHONE HYDROCHLORIDE 1 MG/ML
0.5 INJECTION, SOLUTION INTRAMUSCULAR; INTRAVENOUS; SUBCUTANEOUS
Status: DISCONTINUED | OUTPATIENT
Start: 2021-12-14 | End: 2021-12-14 | Stop reason: HOSPADM

## 2021-12-14 RX ORDER — NALOXONE HCL 0.4 MG/ML
0.2 VIAL (ML) INJECTION AS NEEDED
Status: DISCONTINUED | OUTPATIENT
Start: 2021-12-14 | End: 2021-12-14 | Stop reason: HOSPADM

## 2021-12-14 RX ORDER — EPHEDRINE SULFATE 50 MG/ML
5 INJECTION, SOLUTION INTRAVENOUS ONCE AS NEEDED
Status: DISCONTINUED | OUTPATIENT
Start: 2021-12-14 | End: 2021-12-14 | Stop reason: HOSPADM

## 2021-12-14 RX ORDER — AMLODIPINE BESYLATE 5 MG/1
5 TABLET ORAL DAILY
Status: DISCONTINUED | OUTPATIENT
Start: 2021-12-15 | End: 2021-12-17 | Stop reason: HOSPADM

## 2021-12-14 RX ORDER — FUROSEMIDE 20 MG/1
20 TABLET ORAL DAILY
Status: DISCONTINUED | OUTPATIENT
Start: 2021-12-14 | End: 2021-12-17 | Stop reason: HOSPADM

## 2021-12-14 RX ADMIN — GLYCOPYRROLATE 0.4 MG: 0.2 INJECTION INTRAMUSCULAR; INTRAVENOUS at 14:11

## 2021-12-14 RX ADMIN — METOPROLOL TARTRATE 50 MG: 50 TABLET, FILM COATED ORAL at 20:06

## 2021-12-14 RX ADMIN — PROPOFOL 150 MG: 10 INJECTION, EMULSION INTRAVENOUS at 12:55

## 2021-12-14 RX ADMIN — PHENYLEPHRINE HYDROCHLORIDE 100 MCG: 10 INJECTION, SOLUTION INTRAVENOUS at 13:05

## 2021-12-14 RX ADMIN — FENTANYL CITRATE 50 MCG: 0.05 INJECTION, SOLUTION INTRAMUSCULAR; INTRAVENOUS at 12:55

## 2021-12-14 RX ADMIN — SODIUM CHLORIDE 100 ML/HR: 9 INJECTION, SOLUTION INTRAVENOUS at 19:55

## 2021-12-14 RX ADMIN — NEOSTIGMINE METHYLSULFATE 2 MG: 0.5 INJECTION INTRAVENOUS at 14:11

## 2021-12-14 RX ADMIN — ACETAMINOPHEN 1000 MG: 500 TABLET ORAL at 11:58

## 2021-12-14 RX ADMIN — SODIUM CHLORIDE, POTASSIUM CHLORIDE, SODIUM LACTATE AND CALCIUM CHLORIDE: 600; 310; 30; 20 INJECTION, SOLUTION INTRAVENOUS at 14:05

## 2021-12-14 RX ADMIN — CEFAZOLIN SODIUM 2 G: 2 INJECTION, SOLUTION INTRAVENOUS at 13:10

## 2021-12-14 RX ADMIN — FENTANYL CITRATE 25 MCG: 0.05 INJECTION, SOLUTION INTRAMUSCULAR; INTRAVENOUS at 13:19

## 2021-12-14 RX ADMIN — DEXAMETHASONE SODIUM PHOSPHATE 8 MG: 10 INJECTION INTRAMUSCULAR; INTRAVENOUS at 12:59

## 2021-12-14 RX ADMIN — EPHEDRINE SULFATE 10 MG: 50 INJECTION INTRAVENOUS at 13:16

## 2021-12-14 RX ADMIN — OXYCODONE 5 MG: 5 TABLET ORAL at 11:58

## 2021-12-14 RX ADMIN — TRANEXAMIC ACID 1000 MG: 1 INJECTION, SOLUTION INTRAVENOUS at 12:59

## 2021-12-14 RX ADMIN — LIDOCAINE HYDROCHLORIDE 80 MG: 20 INJECTION, SOLUTION INFILTRATION; PERINEURAL at 12:55

## 2021-12-14 RX ADMIN — CEFAZOLIN SODIUM 2 G: 2 INJECTION, SOLUTION INTRAVENOUS at 20:06

## 2021-12-14 RX ADMIN — FENTANYL CITRATE 25 MCG: 0.05 INJECTION, SOLUTION INTRAMUSCULAR; INTRAVENOUS at 13:23

## 2021-12-14 RX ADMIN — FUROSEMIDE 20 MG: 20 TABLET ORAL at 19:00

## 2021-12-14 RX ADMIN — CEFAZOLIN SODIUM 2 G: 2 INJECTION, SOLUTION INTRAVENOUS at 12:33

## 2021-12-14 RX ADMIN — ONDANSETRON 4 MG: 2 INJECTION INTRAMUSCULAR; INTRAVENOUS at 14:08

## 2021-12-14 RX ADMIN — FAMOTIDINE 20 MG: 10 INJECTION INTRAVENOUS at 11:51

## 2021-12-14 RX ADMIN — ROCURONIUM BROMIDE 40 MG: 50 INJECTION INTRAVENOUS at 12:55

## 2021-12-14 RX ADMIN — APIXABAN 2.5 MG: 2.5 TABLET, FILM COATED ORAL at 19:00

## 2021-12-14 RX ADMIN — SODIUM CHLORIDE, POTASSIUM CHLORIDE, SODIUM LACTATE AND CALCIUM CHLORIDE 9 ML/HR: 600; 310; 30; 20 INJECTION, SOLUTION INTRAVENOUS at 11:48

## 2021-12-14 RX ADMIN — SODIUM CHLORIDE, POTASSIUM CHLORIDE, SODIUM LACTATE AND CALCIUM CHLORIDE: 600; 310; 30; 20 INJECTION, SOLUTION INTRAVENOUS at 12:00

## 2021-12-14 NOTE — ANESTHESIA PROCEDURE NOTES
Airway  Urgency: elective    Date/Time: 12/14/2021 12:57 PM  Airway not difficult    General Information and Staff    Patient location during procedure: OR  Anesthesiologist: Baron Foley MD  CRNA: Marvel Tello CRNA    Indications and Patient Condition  Indications for airway management: airway protection    Preoxygenated: yes  MILS not maintained throughout  Mask difficulty assessment: 1 - vent by mask    Final Airway Details  Final airway type: endotracheal airway      Successful airway: ETT  Cuffed: yes   Successful intubation technique: direct laryngoscopy  Facilitating devices/methods: anterior pressure/BURP  Endotracheal tube insertion site: oral  Blade: Basilio  Blade size: 3  ETT size (mm): 7.5  Cormack-Lehane Classification: grade IIb - view of arytenoids or posterior of glottis only  Placement verified by: chest auscultation and capnometry   Cuff volume (mL): 8  Measured from: lips  ETT/EBT  to lips (cm): 22  Number of attempts at approach: 1  Assessment: lips, teeth, and gum same as pre-op and atraumatic intubation    Additional Comments  Pre O2, SIAI

## 2021-12-14 NOTE — ANESTHESIA PREPROCEDURE EVALUATION
Anesthesia Evaluation     no history of anesthetic complications:  NPO Solid Status: > 8 hours             Airway   Mallampati: II  Dental      Pulmonary    (+) asthma,sleep apnea on CPAP,   (-) not a smoker    ROS comment: Positive RAQUEL screen/Positive RAQUEL diagnosis and 2 or more mitigating factors used (recovery in non-supine position and multimodal analgesia)    Cardiovascular     (+) hypertension, valvular problems/murmurs (mild AS) AS, CAD, dysrhythmias Paroxysmal Atrial Fib, angina, CHF Diastolic >=55%, VALENZUELA, hyperlipidemia,       Neuro/Psych  (+) syncope,     GI/Hepatic/Renal/Endo    (+) obesity,   renal disease CRI,     Musculoskeletal     Abdominal    Substance History      OB/GYN          Other                        Anesthesia Plan    ASA 4     general       Anesthetic plan, all risks, benefits, and alternatives have been provided, discussed and informed consent has been obtained with: patient.

## 2021-12-14 NOTE — ANESTHESIA POSTPROCEDURE EVALUATION
"Patient: Guero Garay    Procedure Summary     Date: 12/14/21 Room / Location: [facility] OR  /  [facility]    Anesthesia Start: 1238 Anesthesia Stop: 1428    Procedure: RIGHT TOTAL KNEE ARTHROPLASTY (Right Knee) Diagnosis:     Surgeons: Jace Altamirano II, MD Provider: Baron Foley MD    Anesthesia Type: general ASA Status: 4          Anesthesia Type: general    Vitals  Vitals Value Taken Time   /70 12/14/21 1501   Temp 36.6 °C (97.8 °F) 12/14/21 1500   Pulse 72 12/14/21 1515   Resp 14 12/14/21 1500   SpO2 96 % 12/14/21 1516   Vitals shown include unvalidated device data.        Post Anesthesia Care and Evaluation      Comments: Patient discharged before being evaluated by an Anesthesiologist. No apparent complications per the record.  This case was not medically directed. I am completing this chart for medical records purposes; I personally have no medical involvement with this patient.    /70   Pulse 76   Temp 36.6 °C (97.8 °F) (Oral)   Resp 14   Ht 172.7 cm (68\")   Wt 96.1 kg (211 lb 14.4 oz)   SpO2 95%   BMI 32.22 kg/m²           "

## 2021-12-15 PROCEDURE — 97530 THERAPEUTIC ACTIVITIES: CPT

## 2021-12-15 PROCEDURE — 0 CEFAZOLIN IN DEXTROSE 2-4 GM/100ML-% SOLUTION: Performed by: ORTHOPAEDIC SURGERY

## 2021-12-15 PROCEDURE — 97161 PT EVAL LOW COMPLEX 20 MIN: CPT

## 2021-12-15 PROCEDURE — G0378 HOSPITAL OBSERVATION PER HR: HCPCS

## 2021-12-15 RX ADMIN — TRANEXAMIC ACID 1000 MG: 100 INJECTION, SOLUTION INTRAVENOUS at 09:07

## 2021-12-15 RX ADMIN — APIXABAN 2.5 MG: 2.5 TABLET, FILM COATED ORAL at 20:25

## 2021-12-15 RX ADMIN — METOPROLOL TARTRATE 50 MG: 50 TABLET, FILM COATED ORAL at 20:24

## 2021-12-15 RX ADMIN — FAMOTIDINE 40 MG: 20 TABLET, FILM COATED ORAL at 09:07

## 2021-12-15 RX ADMIN — CEFAZOLIN SODIUM 2 G: 2 INJECTION, SOLUTION INTRAVENOUS at 04:19

## 2021-12-16 PROCEDURE — 97116 GAIT TRAINING THERAPY: CPT

## 2021-12-16 PROCEDURE — 97110 THERAPEUTIC EXERCISES: CPT

## 2021-12-16 PROCEDURE — 97530 THERAPEUTIC ACTIVITIES: CPT

## 2021-12-16 RX ORDER — POLYETHYLENE GLYCOL 3350 17 G/17G
17 POWDER, FOR SOLUTION ORAL DAILY
Status: DISCONTINUED | OUTPATIENT
Start: 2021-12-16 | End: 2021-12-17 | Stop reason: HOSPADM

## 2021-12-16 RX ORDER — BISACODYL 10 MG
10 SUPPOSITORY, RECTAL RECTAL DAILY
Status: DISCONTINUED | OUTPATIENT
Start: 2021-12-16 | End: 2021-12-17 | Stop reason: HOSPADM

## 2021-12-16 RX ADMIN — POLYETHYLENE GLYCOL 3350 17 G: 17 POWDER, FOR SOLUTION ORAL at 16:38

## 2021-12-16 RX ADMIN — APIXABAN 2.5 MG: 2.5 TABLET, FILM COATED ORAL at 20:50

## 2021-12-16 RX ADMIN — AMLODIPINE BESYLATE 5 MG: 5 TABLET ORAL at 08:50

## 2021-12-16 RX ADMIN — FAMOTIDINE 40 MG: 20 TABLET, FILM COATED ORAL at 08:50

## 2021-12-16 RX ADMIN — METOPROLOL TARTRATE 50 MG: 50 TABLET, FILM COATED ORAL at 20:50

## 2021-12-16 RX ADMIN — DOCUSATE SODIUM 100 MG: 100 CAPSULE, LIQUID FILLED ORAL at 10:54

## 2021-12-16 RX ADMIN — FUROSEMIDE 20 MG: 20 TABLET ORAL at 08:50

## 2021-12-16 RX ADMIN — Medication 10 MG: at 18:33

## 2021-12-16 RX ADMIN — OXYCODONE AND ACETAMINOPHEN 1 TABLET: 5; 325 TABLET ORAL at 11:53

## 2021-12-16 RX ADMIN — METOPROLOL TARTRATE 50 MG: 50 TABLET, FILM COATED ORAL at 08:50

## 2021-12-17 ENCOUNTER — APPOINTMENT (OUTPATIENT)
Dept: LAB | Facility: HOSPITAL | Age: 86
End: 2021-12-17

## 2021-12-17 ENCOUNTER — TRANSCRIBE ORDERS (OUTPATIENT)
Dept: HOME HEALTH SERVICES | Facility: HOME HEALTHCARE | Age: 86
End: 2021-12-17

## 2021-12-17 ENCOUNTER — READMISSION MANAGEMENT (OUTPATIENT)
Dept: CALL CENTER | Facility: HOSPITAL | Age: 86
End: 2021-12-17

## 2021-12-17 ENCOUNTER — HOME HEALTH ADMISSION (OUTPATIENT)
Dept: HOME HEALTH SERVICES | Facility: HOME HEALTHCARE | Age: 86
End: 2021-12-17

## 2021-12-17 VITALS
DIASTOLIC BLOOD PRESSURE: 75 MMHG | HEIGHT: 68 IN | WEIGHT: 211.9 LBS | TEMPERATURE: 97.3 F | SYSTOLIC BLOOD PRESSURE: 111 MMHG | RESPIRATION RATE: 16 BRPM | BODY MASS INDEX: 32.12 KG/M2 | HEART RATE: 90 BPM | OXYGEN SATURATION: 94 %

## 2021-12-17 DIAGNOSIS — Z96.651 AFTERCARE FOLLOWING RIGHT KNEE JOINT REPLACEMENT SURGERY: Primary | ICD-10-CM

## 2021-12-17 DIAGNOSIS — Z47.1 AFTERCARE FOLLOWING RIGHT KNEE JOINT REPLACEMENT SURGERY: Primary | ICD-10-CM

## 2021-12-17 PROCEDURE — 97110 THERAPEUTIC EXERCISES: CPT

## 2021-12-17 PROCEDURE — 97116 GAIT TRAINING THERAPY: CPT

## 2021-12-17 RX ORDER — OXYCODONE HYDROCHLORIDE AND ACETAMINOPHEN 5; 325 MG/1; MG/1
1 TABLET ORAL EVERY 4 HOURS PRN
Qty: 50 TABLET | Refills: 0 | Status: SHIPPED | OUTPATIENT
Start: 2021-12-17 | End: 2022-07-27

## 2021-12-17 RX ADMIN — POLYETHYLENE GLYCOL 3350 17 G: 17 POWDER, FOR SOLUTION ORAL at 08:33

## 2021-12-17 RX ADMIN — FUROSEMIDE 20 MG: 20 TABLET ORAL at 08:33

## 2021-12-17 RX ADMIN — AMLODIPINE BESYLATE 5 MG: 5 TABLET ORAL at 08:33

## 2021-12-17 RX ADMIN — ASPIRIN 81 MG: 81 TABLET, COATED ORAL at 08:33

## 2021-12-17 RX ADMIN — APIXABAN 2.5 MG: 2.5 TABLET, FILM COATED ORAL at 08:33

## 2021-12-17 RX ADMIN — METOPROLOL TARTRATE 50 MG: 50 TABLET, FILM COATED ORAL at 08:33

## 2021-12-17 RX ADMIN — FAMOTIDINE 40 MG: 20 TABLET, FILM COATED ORAL at 08:33

## 2021-12-18 ENCOUNTER — HOME CARE VISIT (OUTPATIENT)
Dept: HOME HEALTH SERVICES | Facility: HOME HEALTHCARE | Age: 86
End: 2021-12-18

## 2021-12-18 VITALS
SYSTOLIC BLOOD PRESSURE: 100 MMHG | TEMPERATURE: 99 F | RESPIRATION RATE: 16 BRPM | OXYGEN SATURATION: 95 % | DIASTOLIC BLOOD PRESSURE: 66 MMHG | HEART RATE: 62 BPM

## 2021-12-18 PROCEDURE — G0151 HHCP-SERV OF PT,EA 15 MIN: HCPCS

## 2021-12-18 NOTE — OUTREACH NOTE
Prep Survey      Responses   [facility] patient discharged from? [place]   Is LACE score < 7 ? No   Emergency Room discharge w/ pulse ox? No   Eligibility [facility]   Date of Admission 12/14/21   Date of Discharge 12/17/21   Discharge Disposition Home or Self Care   Discharge diagnosis TOTAL KNEE ARTHROPLASTY   Does the patient have one of the following disease processes/diagnoses(primary or secondary)? Total Joint Replacement   Does the patient have Home health ordered? Yes   What is the Home health agency?   [facility] .   Is there a DME ordered? No   Prep survey completed? Yes          Phyllis Diaz RN

## 2021-12-18 NOTE — HOME HEALTH
"REASON FOR REFERRAL: 86  year old (male) presents with c/o pain, difficutly with transfers and ambulation following recent right knee surgery    DIAGNOSIS: aftercare R TKA       SURGICAL PROCEDURE:    R TKA  12/14/21 Dr Reginaldo Altamirano    PERTINENT MEDICAL HISTORY:   Gout , Hypertension, Hyperlipidemia , Osteoarthrosis, tinnitus , Benign neoplasm of colon , Osteoarthritis of right shoulder , Coronary artery disease involving native coronary artery of native heart with unstable angina pectoris , syncopal episodes Pyelonephritis, acute , Acute kidney injury superimposed on chronic kidney disease , Dyspnea on exertion , Generalized weakness , Mild persistent asthma without complication , Elevated LFTs , Alcohol use, Urine retention , Chronic diastolic congestive heart failure , RAQUEL (obstructive sleep apnea)     PRIOR LEVEL OF FUNCTION: I all mobility and self care     SUBJECTIVE: wife states that patient had a very difficult time getting in/out of the house.  Feels like he was discharged home too soon.  Also adamant that patient will be transitioning to outpatient PT on 12/27 as scheduled with plans to fly to [place] at end of that week.  Plans to continue outpatient PT in [place].  Wife also concerned re: home being disorganized due to beds, therapy items relocated to aid with function but not in regular place.  Wife states that patient has been managing own meds however patient states that he feels like he has the beginning stages of Alzheimer's.  Patient also c/o resting tremors in hands that meds are \"not helping\"    DATE OF NEXT APPOINTMENT WITH DOCTOR: 12/27/21 outpatient PT to begin; 12/22 PCP follow up;   ____________    PLAN FOR NEXT VISIT:   MEDICAL NECESSITY FOR ONGOING SKILLED THERAPY: Face to Face with Dr. Reginaldo Altamirano   on  12 / 14 /2021  who ordered skilled physical therapy for treatment of: weakness, knee pain, transfer deficits,and gait deficits following recent R TKA. Requires instruction in appropriate " progression of exercises; education in fall prevention/pain/edema management; gait training to reduce reliance on assistive device; balance retraining to prevent falls.  Without skilled physical therapy, patient at risk for: chronic pain, falls, increased reliance on caregivers, long term gait deviations    SPECIFIC INTERVENTIONS AND GOALS TO ADDRESS ON NEXT VISIT:  increase AROM greater than  30-80 degrees   R knee  review HEP; progress to standing exercises when appropriate  review pain/edema management techniques  gait training to restore normal mechanics  transfer training in/out of bed, chairs to reduce reliance on wife  review medication use for correct understanding of prescribed use    FREQUENCY AND DURATION:    1 week 1    3 week 1   with transition to outpatient PT planned for 12/2721    ANY OTHER FOLLOW UP NEEDED: none    REASSESSMENT DUE DATE: 30 day 1/17/22

## 2021-12-19 ENCOUNTER — HOME CARE VISIT (OUTPATIENT)
Dept: HOME HEALTH SERVICES | Facility: HOME HEALTHCARE | Age: 86
End: 2021-12-19

## 2021-12-19 VITALS
TEMPERATURE: 98.2 F | OXYGEN SATURATION: 99 % | HEART RATE: 58 BPM | SYSTOLIC BLOOD PRESSURE: 108 MMHG | DIASTOLIC BLOOD PRESSURE: 64 MMHG | RESPIRATION RATE: 19 BRPM

## 2021-12-19 PROCEDURE — G0151 HHCP-SERV OF PT,EA 15 MIN: HCPCS

## 2021-12-19 NOTE — HOME HEALTH
"SUBJECTIVE:  Patient wife states that she contacted family following yesterday PT visit and had them make suggested modifications to chair and bed.  Reports patient is \"doing much better today.\"  Patient continues with flat affect, limited enthusiasm for therapy (although agreeable to participate).  Requires mild increase in time to process multi step commands.   Wife states that she hasn't given patient a pain pill since prior to yesterday PT visit    ASSESSMENT: patient with improved ease of transfers and ambulation over prior visit- especially due to modifications to elevate recliner and bed.  Patient becomes SOA with moderate exertion although vitals remain stable. Would benefit from increased frequency of out of chair activities however patient requires assist for safety and wife verbalizes that she has been experiencing some caregiver exhaustion too.  Unclear if patient will be ready to/allowed to travel to [facility] on an airplane at end of next week as wife intends    DATE OF NEXT APPOINTMENT WITH DOCTOR: 12/27/21 outpatient PT to begin; 12/22 PCP follow up;   ____________   PLAN FOR NEXT VISIT:   MEDICAL NECESSITY FOR ONGOING SKILLED THERAPY: Face to Face with Dr. Reginaldo Altamirano on 12 / 14 /2021 who ordered skilled physical therapy for treatment of: weakness, knee pain, transfer deficits,and gait deficits following recent R TKA. Requires instruction in appropriate progression of exercises; education in fall prevention/pain/edema management; gait training to reduce reliance on assistive device; balance retraining to prevent falls. Without skilled physical therapy, patient at risk for: chronic pain, falls, increased reliance on caregivers, long term gait deviations     SPECIFIC INTERVENTIONS AND GOALS TO ADDRESS ON NEXT VISIT:   increase AROM greater than 28-82 degrees R knee   review HEP; progress to standing exercisesb  review pain/edema management techniques   gait training to restore normal mechanics "   transfer training in/out of car  gait training up/down step to allow for transition to outpatient PT at end of week    FREQUENCY AND DURATION:   1 week 1   3 week 1 with transition to outpatient PT planned for 12/2721  Mansoor Parmar    ANY OTHER FOLLOW UP NEEDED: none   REASSESSMENT DUE DATE: 30 day 1/17/22

## 2021-12-21 ENCOUNTER — TELEPHONE (OUTPATIENT)
Dept: ORTHOPEDIC SURGERY | Facility: HOSPITAL | Age: 86
End: 2021-12-21

## 2021-12-21 ENCOUNTER — HOME CARE VISIT (OUTPATIENT)
Dept: HOME HEALTH SERVICES | Facility: HOME HEALTHCARE | Age: 86
End: 2021-12-21

## 2021-12-21 VITALS
RESPIRATION RATE: 18 BRPM | SYSTOLIC BLOOD PRESSURE: 110 MMHG | TEMPERATURE: 97.7 F | DIASTOLIC BLOOD PRESSURE: 60 MMHG | HEART RATE: 85 BPM | OXYGEN SATURATION: 95 %

## 2021-12-21 PROCEDURE — G0151 HHCP-SERV OF PT,EA 15 MIN: HCPCS

## 2021-12-21 NOTE — TELEPHONE ENCOUNTER
Called and spoke with Mr. Garay to see how he is doing as he is 1 week SP RTK. He says he is doing well. He is moving slow but is getting better every day. He is working with PT. He does have some pain but it is controlled with the pain medication. He isn't having any issues with BM's. Dressing looks good. There is no drainage. He is using the walker. He doesn't have any questions/concerns for me at this time. Mr. Garay was given my contact information should he need anything. He voiced understanding.

## 2021-12-22 ENCOUNTER — READMISSION MANAGEMENT (OUTPATIENT)
Dept: CALL CENTER | Facility: HOSPITAL | Age: 86
End: 2021-12-22

## 2021-12-22 NOTE — OUTREACH NOTE
Total Joint Week 1 Survey      Responses   [facility] patient discharged from? [facility]   Does the patient have one of the following disease processes/diagnoses(primary or secondary)? Total Joint Replacement   Joint surgery performed? Knee   Week 1 attempt successful? Yes   Call start time 1640   Call end time 1645   Has the patient been back in either the hospital or Emergency Department since discharge? No   Discharge diagnosis TOTAL KNEE ARTHROPLASTY-Right   Does the patient have all medications related to this admission filled (includes all antibiotics, pain medications, etc.) Yes   Is the patient taking all medications as directed (includes completed medication regime)? Yes   Is the patient able to teach back alternate methods of pain control? Ice,  Knee-elevation/no pillow under knee,  Correct alignment,  Short, frequent activity   Does the patient have a follow up appointment with their surgeon? Yes   Has the patient kept scheduled appointments due by today? N/A   What is the Home health agency?   [facility].   Has home health visited the patient within 72 hours of discharge? Yes   Psychosocial issues? No   Has the patient began therapy sessions (either in the home or as an out patient)? Yes   If the patient has started attending therapy, what post op day did they begin to attend (either in home or as an out patient)?   HH in the day after d/c   Does the patient have a wound vac in place? N/A   Has the patient fallen since discharge? No   If the patient has fallen, were there any injuries? No   Did the patient receive a copy of their discharge instructions? Yes   Nursing interventions Reviewed instructions with patient   What is the patient's perception of their functional status since discharge? Improving   Is the patient able to teach back signs and symptoms of infection? Increased swelling or redness around incision (not associated with surgical edema),  Temp >100.4 for 24h or longer,  Shortness of  breath or chest pain,  Severe discomfort or pain   Is the patient able to teach back how to prevent infection? Check incision daily,  No tub baths, hot tub or swimming,  Eat well-balanced diet,  No lotion or creams,  Keep incision covered if drainage   Is the patient able to teach back home safety measures? Ability to shower   Did the patient implement home safety suggestions from pre-surgery classes if attended? Yes   If the patient is a current smoker, are they able to teach back resources for cessation? Not a smoker   Is the patient/caregiver able to teach back the hierarchy of who to call/visit for symptoms/problems? PCP, Specialist, Home health nurse, Urgent Care, ED, 911 Yes   Additional teach back comments He is improving, encouraged premedicate prior to PT. Denies any issues with incision.   Week 1 call completed? Yes   Wrap up additional comments Improving.          Teena Gardiner RN

## 2021-12-23 ENCOUNTER — HOME CARE VISIT (OUTPATIENT)
Dept: HOME HEALTH SERVICES | Facility: HOME HEALTHCARE | Age: 86
End: 2021-12-23

## 2021-12-23 PROCEDURE — G0151 HHCP-SERV OF PT,EA 15 MIN: HCPCS

## 2021-12-23 NOTE — HOME HEALTH
Subjective: Patient states that he is doing better. Spouse reports she feels he is much better this afternoon than her was this am. [place] patient was complaining of feeling cold and weak. He has asked for the wheelchair walking back from the bathroom.     Objective: Spouse shown how to place on leg rests of wheelchair and how to swing away per her request. Recommended patient using w/c only if he needed it due to fatigue during ambulation. Patient instructed in protocol exercises he was able to perform 10 to 15 of each. Seated knee flexion and extension. Patient ROM -18 to 90 degrees. Patient instructed in ambulation with rollator walker, up and down 4 steps at staircase leading to second floor of home. Patient was able to transfer sit to stand from recliner with SBA.     Plan: Patient continue therapeutic exercises and advance as tolerated, transfer and gait training with rollator walker, ROM assessment.

## 2021-12-26 VITALS
DIASTOLIC BLOOD PRESSURE: 78 MMHG | TEMPERATURE: 97.5 F | HEART RATE: 82 BPM | OXYGEN SATURATION: 96 % | SYSTOLIC BLOOD PRESSURE: 140 MMHG

## 2021-12-27 ENCOUNTER — HOSPITAL ENCOUNTER (OUTPATIENT)
Dept: PHYSICAL THERAPY | Facility: HOSPITAL | Age: 86
Setting detail: THERAPIES SERIES
Discharge: HOME | End: 2021-12-27

## 2021-12-27 ENCOUNTER — TRANSCRIBE ORDERS (OUTPATIENT)
Dept: ADMINISTRATIVE | Facility: HOSPITAL | Age: 86
End: 2021-12-27

## 2021-12-27 ENCOUNTER — LAB (OUTPATIENT)
Dept: LAB | Facility: HOSPITAL | Age: 86
End: 2021-12-27

## 2021-12-27 ENCOUNTER — TELEPHONE (OUTPATIENT)
Dept: CARDIOLOGY | Facility: CLINIC | Age: 86
End: 2021-12-27

## 2021-12-27 DIAGNOSIS — H53.2 DIPLOPIA: ICD-10-CM

## 2021-12-27 DIAGNOSIS — Z96.651 S/P TOTAL KNEE ARTHROPLASTY, RIGHT: Primary | ICD-10-CM

## 2021-12-27 DIAGNOSIS — H53.2 DIPLOPIA: Primary | ICD-10-CM

## 2021-12-27 LAB
CRP SERPL-MCNC: 8.59 MG/DL (ref 0–0.5)
ERYTHROCYTE [SEDIMENTATION RATE] IN BLOOD: 19 MM/HR (ref 0–20)

## 2021-12-27 PROCEDURE — 97161 PT EVAL LOW COMPLEX 20 MIN: CPT | Performed by: PHYSICAL THERAPIST

## 2021-12-27 PROCEDURE — 85652 RBC SED RATE AUTOMATED: CPT

## 2021-12-27 PROCEDURE — 83519 RIA NONANTIBODY: CPT

## 2021-12-27 PROCEDURE — 86140 C-REACTIVE PROTEIN: CPT

## 2021-12-27 PROCEDURE — 97110 THERAPEUTIC EXERCISES: CPT | Performed by: PHYSICAL THERAPIST

## 2021-12-27 PROCEDURE — 36415 COLL VENOUS BLD VENIPUNCTURE: CPT

## 2021-12-27 NOTE — HOME HEALTH
Subjective: Patient states that he is not having any pain at rest. Spouse stated that patient had a request to ambulate up the steps at end of therapy session as he wants to be able to take a shower later today once rested. Spouse also stated that her daughter would be coming over to assist patient back down the steps.     Objective/Assessment:   Patient lying in bed with right LE elevated on 2 pillows. He was performing ankle pumps. Patient received instruction in protocol exercises and able to increase reps to 15 each. Patient using towel roll under ankle to increase stretch with quad set. He was able to transfer out of bed without assist and use of rollator walker. Patient transferred in w/c with SBA, he removed brakes and showed how he has been rolling chair forward and back with right foot planted to increase knee flexion.Patient also shown how he can cross left foot over right to pull knee back in extension.  Patient also instructed in knee flexion stretch at base of staircase with forward lunch increasing knee flexion. Knee range of motion remained the same as last visit. -18 to 90 degrees as measured in sitting.  Patient was able to ambulate upstairs with the cane, handrail and therapist holding to gait belt.  He did stop to sit in chair placed on landing prior to finishing last few steps to second floor. Patient practiced stepping in and out of walk in shower. He was not able to use the current stool in shower as it was too low. Spouse is going to purchase a shower chair with a back for patient to use today. Patients waterproof bandage intact, he was going to leave on for shower. Patient and spouse instructed that if any water were to get in bandage they were to remove. [place] dressing left with spouse to use as needed. No drainage on bandage. Visible steri strips noted under bandage. Patient did have one visible area along clear adhesive at proximal lateral edge that appeared to be dried previous drainage.  Patient calf supple, no pain or redness noted. Negative Homans sign. Did review signs and symptoms of a blood clot as well as infection.     Plan: Patient to start out patient therapy on Monday at [facility].

## 2021-12-27 NOTE — TELEPHONE ENCOUNTER
Pt LM saying his BP was 66/60's after PT today.   I called bk for additional info.    I asked if he had noticed it being low like that and he said no, not quite as bad.  They put new batteries in the machine and it came up to 88/61, HR 96 irregular.  He had a knee replacement 12/14/21 and says he's lost at least 10 lbs since then.    His BP with PT on 12/21/21 was 110/60, 85 and on 12/23/21 was 140/78, 82.  He did mention some SOA but said that wasn't new for him.    He wants to know if he needs to do anything?    *just an FYI I leave the office at 4 PM in case you need me to call him back.    Thanks,  Ann-Marie

## 2021-12-28 NOTE — THERAPY EVALUATION
Outpatient Physical Therapy Ortho Initial Evaluation  MITCHELL Zamora     Patient Name: Guero Garay  : 1935  MRN: [id number]  Today's Date: 2021      Visit Date: 2021    Patient Active Problem List   Diagnosis   • Gout   • Hypertension   • Hyperlipidemia   • Osteoarthrosis   • History of prostate cancer   • Tinnitus   • Benign neoplasm of colon   • Osteoarthritis of right shoulder   • Coronary artery disease involving native coronary artery of native heart with unstable angina pectoris (HCC)   • Syncopal episodes   • Screening for colon cancer   • Pyelonephritis, acute   • Acute kidney injury superimposed on chronic kidney disease (HCC)   • Dyspnea on exertion   • Generalized weakness   • Elevated d-dimer   • Dehydration   • Mild persistent asthma without complication   • Heme positive stool   • Elevated LFTs   • Alcohol use   • Urine retention   • Chronic diastolic congestive heart failure (HCC)   • Encounter for screening for malignant neoplasm of colon   • Personal history of colonic polyps   • Family history of esophageal cancer   • RAQUEL (obstructive sleep apnea)   • Sleep-related hypoxia   • Hypersomnia due to medical condition   • Total knee replacement status        Past Medical History:   Diagnosis Date   • Acute kidney injury superimposed on chronic kidney disease (HCC)    • Aortic stenosis    • Arthralgia    • Asthma     mild persistent without complication   • CAD (coronary artery disease), native coronary artery    • Cancer (HCC)     Prostate   • Chest pain, unspecified    • Chronic diastolic congestive heart failure (HCC)    • Colon polyp    • Dyspnea on exertion    • Erectile dysfunction    • Essential hypertension    • Generalized weakness    • Gout    • History of prostate cancer    • Hyperkalemia    • Jaw pain     both sides   • Mixed hyperlipidemia    • RAQUEL (obstructive sleep apnea) 2021    Home sleep study.  Weight 216 pounds.  Moderate obstructive sleep apnea with AHI  26.5 events per hour.  Low oxygen saturation 79% and sleep-related hypoxia present for 157 minutes.  The patient snored 45.6% of total monitoring time.   • Osteoarthrosis    • PAF (paroxysmal atrial fibrillation) ([facility])    • Shoulder pain    • SOB (shortness of breath)    • SSS (sick sinus syndrome) ([facility])    • Tinnitus    • Urine retention         Past Surgical History:   Procedure Laterality Date   • CARDIAC CATHETERIZATION N/A 4/27/2017    Procedure: Coronary angiography;  Surgeon: Marvel Brito MD;  Location: [facility] CATH INVASIVE LOCATION;  Service:    • CARDIAC CATHETERIZATION N/A 4/27/2017    Procedure: Left Heart Cath;  Surgeon: Marvel Brito MD;  Location: [facility] CATH INVASIVE LOCATION;  Service:    • CARDIAC CATHETERIZATION N/A 4/27/2017    Procedure: Stent GIN coronary;  Surgeon: Marvel Brito MD;  Location: [facility] INVASIVE LOCATION;  Service:    • CARDIAC CATHETERIZATION N/A 6/30/2020    Procedure: Coronary angiography;  Surgeon: Jared Sellers MD;  Location: [facility] INVASIVE LOCATION;  Service: Cardiovascular;  Laterality: N/A;   • CARDIAC CATHETERIZATION N/A 6/30/2020    Procedure: Left Heart Cath;  Surgeon: Jared Sellers MD;  Location: [facility] CATH INVASIVE LOCATION;  Service: Cardiovascular;  Laterality: N/A;   • CARDIAC ELECTROPHYSIOLOGY PROCEDURE N/A 6/15/2017    Procedure: Pacemaker DC new   [employer];  Surgeon: Gustavo Valladares MD;  Location: [facility] INVASIVE LOCATION;  Service:    • COLONOSCOPY  2013   • COLONOSCOPY N/A 12/12/2018    Procedure: COLONOSCOPY with polypectomy;  Surgeon: Kwadwo Powers MD;  Location: [facility];  Service: Gastroenterology   • CORONARY ANGIOPLASTY WITH STENT PLACEMENT     • INGUINAL HERNIA REPAIR Right    • JOINT REPLACEMENT      left knee, left and right shoulder   • NECK SURGERY      SPURS   • AL RECONSTR TOTAL SHOULDER IMPLANT Right 2/16/2017    Procedure: RIGHT TOTAL SHOULDER ARTHROPLASTY;  Surgeon: Marquez Galvan MD;   Location: [facility];  Service: Orthopedics   • PROSTATECTOMY     • REPLACEMENT TOTAL KNEE Left    • TONSILLECTOMY     • TOTAL KNEE ARTHROPLASTY Right 12/14/2021    Procedure: RIGHT TOTAL KNEE ARTHROPLASTY;  Surgeon: Jace Altamirano II, MD;  Location: [facility] OR;  Service: Orthopedics;  Laterality: Right;   • TOTAL SHOULDER REPLACEMENT Bilateral        Visit Dx:     ICD-10-CM ICD-9-CM   1. S/P total knee arthroplasty, right  Z96.651 V43.65          Patient History     Row Name 12/27/21 0955             History    Chief Complaint Difficulty Walking; Joint stiffness; Joint swelling; Muscle weakness  -      Type of Pain Knee pain  -GC      Brief Description of Current Complaint Pt reports a several year history of right knee pain that did not respond to conservative measures including rehab and injections. He underwent a right TKA on 12/14. He was hospitalized for 3 days before being discharged home. He received home health care until last week. He is now referred for outpatient therapy.  -      Patient/Caregiver Goals Relieve pain; Return to prior level of function; Improve mobility; Improve strength; Decrease swelling  -      Hand Dominance right-handed  -      Occupation/sports/leisure activities Mow grass   Fishing  -      Patient seeing anyone else for problem(s)? --  -GC      What clinical tests have you had for this problem? X-ray  -      Other Clinical Tests end stage OA  -              Pain     Pain Location Knee  right  -GC      Pain at Present 0  no pain at rest  -GC      Pain at Best 0  -GC      Pain at Worst 5  -GC      Pain Frequency Intermittent  -      Pain Description Aching; Discomfort; Sharp; Sore; Tender  -      What Performance Factors Make the Current Problem(s) WORSE? Pt c/o pain when he tries to bend his knee too far  -      What Performance Factors Make the Current Problem(s) BETTER? Pt has no pain at rest, getting off his feet  -      Difficulties with  ADL's? Pt has some difficulty with LE dressing and with stairs  -GC              Fall Risk Assessment    Any falls in the past year: No  -GC              Services    Prior Rehab/Home Health Experiences --  -GC      Are you currently receiving Home Health services --  -GC      Do you plan to receive Home Health services in the near future --  -GC              Daily Activities    Primary Language English  -      How does patient learn best? Listening; Demonstration  -GC      Teaching needs identified Home Exercise Program; Management of Condition  -GC      Patient is concerned about/has problems with Difficulty with self care (i.e. bathing, dressing, toileting:; Flexibility; Performing home management (household chores, shopping, care of dependents); Standing; Walking  -GC      Does patient have problems with the following? None  -GC      Barriers to learning None  -GC      Functional Status mobility issues preventing performance of daily activities  -GC      Pt Participated in POC and Goals Yes  -GC              Safety    Are you being hurt, hit, or frightened by anyone at home or in your life? No  -GC      Are you being neglected by a caregiver No  -GC            User Key  (r) = Recorded By, (t) = Taken By, (c) = Cosigned By    Initials Name Provider Type    GC Sudhir Sanderson, EVGENY Physical Therapist                 PT Ortho     Row Name 12/27/21 0950       Posture/Observations    Posture/Observations Comments Pt has moderate edema right knee. The sugical site is healing nicely  -GC       Knee Palpation    Medial Joint Line Right:; Tender  -GC    Lateral Joint Line Right:; Tender  -GC       Patellar Accessory Motions    Superior glide Right:; Hypomobile  -GC    Inferior glide Right:; Hypomobile  -GC    Medial glide Right:; Hypomobile  -GC    Lateral glide Right:; Hypomobile  -GC       Knee Special Tests    Katie’s sign (DVT) Right:; Negative  -GC       Right Lower Ext    Rt Knee Extension/Flexion AROM 0-6-83 degrees   -GC       MMT Right Lower Ext    Rt Hip Flexion MMT, Gross Movement (4/5) good  -GC    Rt Hip Extension MMT, Gross Movement (4/5) good  -GC    Rt Hip ABduction MMT, Gross Movement (4/5) good  -GC    Rt Hip ADduction MMT, Gross Movement (4/5) good  -GC    Rt Knee Extension MMT, Gross Movement (4/5) good  -GC    Rt Knee Flexion MMT, Gross Movement (4/5) good  -GC    Rt Ankle Plantarflexion MMT, Gross Movement (4+/5) good plus  -GC    Rt Ankle Dorsiflexion MMT, Gross Movement (5/5) normal  -GC       Sensation    Light Touch No apparent deficits  -GC       Lower Extremity Flexibility    Hamstrings Right:; Moderately limited  -GC    Quadriceps Right:; Moderately limited  -GC    Gastrocnemius Right:; Mildly limited  -GC       Transfers    Comment (Transfers) Pt is independent with all bed mobility and transfers  -       Gait/Stairs (Locomotion)    Comment (Gait/Stairs) Pt ambulates with antalgic gait right LE using a standard walker  -          User Key  (r) = Recorded By, (t) = Taken By, (c) = Cosigned By    Initials Name Provider Type    GC Sudhir Sanderson, PT Physical Therapist                            Therapy Education  Given: HEP, Symptoms/condition management, Pain management  Program: New  How Provided: Verbal, Demonstration, Written  Provided to: Patient, Caregiver  Level of Understanding: Teach back education performed, Verbalized, Demonstrated      PT OP Goals     Row Name 12/27/21 0950          PT Short Term Goals    STG Date to Achieve 01/24/22  -     STG 1 Decrease right knee pain to 2-3/10 with activity.  -     STG 2 Increase right knee AROM to 0-115 degrees with testing.  -     STG 3 Increase right LE strength to at least 4+/5 all planes with testing.  -     STG 4 Increase patella mobility to WFL all planes with testing.  -     STG 5 Pt will be independent with his HEP issued by this therapist.  -            Long Term Goals    LTG Date to Achieve 02/21/22  -     LTG 1 Decrease right knee  pain to 0-1/10 with activity.  -     LTG 2 Increase right knee AROM to 0-125 degrees with testing.  -     LTG 3 Increase right LE strength to 5/5 all planes with testing.  -     LTG 4 Pt will ambulate normally on levels and stairs without assistive device.  -     LTG 5 Pt will be independent with all ADLs and have a LEFS score > 60.  -            Time Calculation    PT Goal Re-Cert Due Date 01/24/22  -           User Key  (r) = Recorded By, (t) = Taken By, (c) = Cosigned By    Initials Name Provider Type     Sudhir Sanderson, PT Physical Therapist                 PT Assessment/Plan     Row Name 12/27/21 0927          PT Assessment    Functional Limitations Impaired gait; Limitation in home management; Limitations in community activities; Limitations in functional capacity and performance; Performance in leisure activities; Performance in self-care ADL  -     Impairments Gait; Impaired flexibility; Range of motion; Pain; Muscle strength; Joint mobility  -     Assessment Comments Pt presents approximately 2 weeks s/p right TKA. He has pain that he rates up to 5/10 when he bends his knee. He has moderate edema right knee, decreased right knee ROM, decreased right LE strength, decreased ambulatory status, and decreased function secondary to the above.  -     Rehab Potential Good  -     Patient/caregiver participated in establishment of treatment plan and goals Yes  -     Patient would benefit from skilled therapy intervention Yes  -            PT Plan    PT Frequency 2x/week; 3x/week  -     Predicted Duration of Therapy Intervention (PT) 8 weeks  -     Planned CPT's? PT EVAL LOW COMPLEXITY: 41339; PT THER PROC EA 15 MIN: 96979; PT MANUAL THERAPY EA 15 MIN: 28747; PT HOT OR COLD PACK TREAT MCARE; PT ELECTRICAL STIM UNATTEND:   -     PT Plan Comments Pt is to continue his HEP 1-2x daily  -           User Key  (r) = Recorded By, (t) = Taken By, (c) = Cosigned By    Initials Name  Provider Type    Sudhir Day, EVGENY Physical Therapist                   OP Exercises     Row Name 12/27/21 0950             Exercise 1    Exercise Name 1 Patella mobilizations  -GC      Cueing 1 Verbal; Tactile  -GC      Time 1 3 min  -GC              Exercise 2    Exercise Name 2 Heel Slides  -GC      Cueing 2 Verbal; Tactile  -GC      Time 2 8 min  -GC              Exercise 3    Exercise Name 3 Wall slides  -GC      Cueing 3 Verbal; Tactile  -GC      Time 3 8 min  -GC              Exercise 4    Exercise Name 4 Passive knee FLEX stretch  -GC      Cueing 4 Verbal; Tactile  -GC      Reps 4 10  -GC      Time 4 10 secs  -GC              Exercise 5    Exercise Name 5 Hamstring stretch  -GC      Cueing 5 Verbal; Tactile  -GC      Reps 5 10  -GC      Time 5 10 secs  -GC              Exercise 6    Exercise Name 6 Supine knee EXT on roll  -GC      Cueing 6 Verbal; Tactile  -GC      Time 6 4 min  -GC              Exercise 7    Exercise Name 7 Passive knee EXT stretch  -GC      Cueing 7 Verbal; Tactile  -GC      Reps 7 10  -GC      Time 7 10 secs  -GC              Exercise 8    Exercise Name 8 SLR  -GC      Cueing 8 Verbal; Tactile  -GC      Reps 8 20  -GC              Exercise 9    Exercise Name 9 Hip ABD  -GC      Cueing 9 Verbal; Tactile  -GC      Reps 9 20  -GC              Exercise 10    Exercise Name 10 SAQ  -GC      Cueing 10 Verbal; Tactile  -GC      Reps 10 20  -GC              Exercise 11    Exercise Name 11 LAQ  -GC      Cueing 11 Verbal; Tactile  -GC      Reps 11 20  -GC            User Key  (r) = Recorded By, (t) = Taken By, (c) = Cosigned By    Initials Name Provider Type     Sudhir Sanderson, EVGENY Physical Therapist                              Outcome Measure Options: Lower Extremity Functional Scale (LEFS)  Lower Extremity Functional Index  Any of your usual work, housework or school activities: Quite a bit of difficulty  Your usual hobbies, recreational or sporting activities: Quite a bit of  difficulty  Getting into or out of the bath: Extreme difficulty or unable to perform activity  Walking between rooms: Moderate difficulty  Putting on your shoes or socks: Extreme difficulty or unable to perform activity  Squatting: Extreme difficulty or unable to perform activity  Lifting an object, like a bag of groceries from the floor: Extreme difficulty or unable to perform activity  Performing light activities around your home: Extreme difficulty or unable to perform activity  Performing heavy activities around your home: Extreme difficulty or unable to perform activity  Getting into or out of a car: Quite a bit of difficulty  Walking 2 blocks: Extreme difficulty or unable to perform activity  Walking a mile: Extreme difficulty or unable to perform activity  Going up or down 10 stairs (about 1 flight of stairs): Extreme difficulty or unable to perform activity  Standing for 1 hour: Extreme difficulty or unable to perform activity  Sitting for 1 hour: Moderate difficulty  Running on even ground: Extreme difficulty or unable to perform activity  Running on uneven ground: Extreme difficulty or unable to perform activity  Making sharp turns while running fast: Extreme difficulty or unable to perform activity  Hopping: Extreme difficulty or unable to perform activity  Rolling over in bed: No difficulty  Total: 11      Time Calculation:     Start Time: 0950  Stop Time: 1015  Time Calculation (min): 25 min     Therapy Charges for Today     Code Description Service Date Service Provider Modifiers Qty    92757018542 HC PT EVAL LOW COMPLEXITY 2 12/27/2021 Sudhir Sanderson, PT GP 1    63132284682 HC PT THER PROC EA 15 MIN 12/27/2021 Sudhir Sanderson, PT GP 2          PT G-Codes  Outcome Measure Options: Lower Extremity Functional Scale (LEFS)  Total: 11         Sudhir Sanderson PT  12/28/2021

## 2021-12-29 ENCOUNTER — HOSPITAL ENCOUNTER (OUTPATIENT)
Dept: PHYSICAL THERAPY | Facility: HOSPITAL | Age: 86
Setting detail: THERAPIES SERIES
Discharge: HOME | End: 2021-12-29

## 2021-12-29 ENCOUNTER — READMISSION MANAGEMENT (OUTPATIENT)
Dept: CALL CENTER | Facility: HOSPITAL | Age: 86
End: 2021-12-29

## 2021-12-29 PROCEDURE — 97110 THERAPEUTIC EXERCISES: CPT | Performed by: PHYSICAL THERAPIST

## 2021-12-29 PROCEDURE — 97140 MANUAL THERAPY 1/> REGIONS: CPT | Performed by: PHYSICAL THERAPIST

## 2021-12-29 NOTE — OUTREACH NOTE
Total Joint Week 2 Survey      Responses   [facility] patient discharged from? [place]   Does the patient have one of the following disease processes/diagnoses(primary or secondary)? Total Joint Replacement   Joint surgery performed? Knee   Week 2 attempt successful? Yes   Call start time 1320   Call end time 1323   Discharge diagnosis TOTAL KNEE ARTHROPLASTY-Right   Does the patient have all medications related to this admission filled (includes all antibiotics, pain medications, etc.) Yes   Is the patient taking all medications as directed (includes completed medication regime)? Yes   Is the patient able to teach back alternate methods of pain control? Ice   Does the patient have a follow up appointment with their surgeon? Yes   Has the patient kept scheduled appointments due by today? N/A   What is the Home health agency?   [facility].   Has home health visited the patient within 72 hours of discharge? Yes   Psychosocial issues? No   Has the patient began therapy sessions (either in the home or as an out patient)? Yes   Has the patient fallen since discharge? No   What is the patient's perception of their functional status since discharge? Improving   Is the patient able to teach back signs and symptoms of infection? Incisional drainage,  Temp >100.4 for 24h or longer,  Blisters around incision,  Increased swelling or redness around incision (not associated with surgical edema),  Severe discomfort or pain,  Shortness of breath or chest pain   Is the patient able to teach back how to prevent infection? Check incision daily   Is the patient able to teach back signs and symptoms of DVT? Redness in calf,  Area hot to touch,  Shortness of breath or chest pain,  Severe pain in calf,  Swelling in calf   Is the patient able to teach back home safety measures? Ability to shower   Week 2 call completed? Yes          Nati Perez RN

## 2021-12-29 NOTE — THERAPY TREATMENT NOTE
Outpatient Physical Therapy Ortho Treatment Note  MITCHELL Zamora     Patient Name: Guero Garay  : 1935  MRN: [id number]  Today's Date: 2021      Visit Date: 2021    Visit Dx:  No diagnosis found.    Patient Active Problem List   Diagnosis   • Gout   • Hypertension   • Hyperlipidemia   • Osteoarthrosis   • History of prostate cancer   • Tinnitus   • Benign neoplasm of colon   • Osteoarthritis of right shoulder   • Coronary artery disease involving native coronary artery of native heart with unstable angina pectoris (HCC)   • Syncopal episodes   • Screening for colon cancer   • Pyelonephritis, acute   • Acute kidney injury superimposed on chronic kidney disease (HCC)   • Dyspnea on exertion   • Generalized weakness   • Elevated d-dimer   • Dehydration   • Mild persistent asthma without complication   • Heme positive stool   • Elevated LFTs   • Alcohol use   • Urine retention   • Chronic diastolic congestive heart failure (HCC)   • Encounter for screening for malignant neoplasm of colon   • Personal history of colonic polyps   • Family history of esophageal cancer   • RAQUEL (obstructive sleep apnea)   • Sleep-related hypoxia   • Hypersomnia due to medical condition   • Total knee replacement status        Past Medical History:   Diagnosis Date   • Acute kidney injury superimposed on chronic kidney disease (HCC)    • Aortic stenosis    • Arthralgia    • Asthma     mild persistent without complication   • CAD (coronary artery disease), native coronary artery    • Cancer (HCC)     Prostate   • Chest pain, unspecified    • Chronic diastolic congestive heart failure (HCC)    • Colon polyp    • Dyspnea on exertion    • Erectile dysfunction    • Essential hypertension    • Generalized weakness    • Gout    • History of prostate cancer    • Hyperkalemia    • Jaw pain     both sides   • Mixed hyperlipidemia    • RAQUEL (obstructive sleep apnea) 2021    Home sleep study.  Weight 216 pounds.  Moderate  obstructive sleep apnea with AHI 26.5 events per hour.  Low oxygen saturation 79% and sleep-related hypoxia present for 157 minutes.  The patient snored 45.6% of total monitoring time.   • Osteoarthrosis    • PAF (paroxysmal atrial fibrillation) ([facility])    • Shoulder pain    • SOB (shortness of breath)    • SSS (sick sinus syndrome) ([facility])    • Tinnitus    • Urine retention         Past Surgical History:   Procedure Laterality Date   • CARDIAC CATHETERIZATION N/A 4/27/2017    Procedure: Coronary angiography;  Surgeon: Marvel Brito MD;  Location: [facility] CATH INVASIVE LOCATION;  Service:    • CARDIAC CATHETERIZATION N/A 4/27/2017    Procedure: Left Heart Cath;  Surgeon: Marvel Brito MD;  Location: [facility] CATH INVASIVE LOCATION;  Service:    • CARDIAC CATHETERIZATION N/A 4/27/2017    Procedure: Stent GIN coronary;  Surgeon: Marvel Brito MD;  Location: [facility] CATH INVASIVE LOCATION;  Service:    • CARDIAC CATHETERIZATION N/A 6/30/2020    Procedure: Coronary angiography;  Surgeon: Jared Sellers MD;  Location: [facility] INVASIVE LOCATION;  Service: Cardiovascular;  Laterality: N/A;   • CARDIAC CATHETERIZATION N/A 6/30/2020    Procedure: Left Heart Cath;  Surgeon: Jared Sellers MD;  Location: [facility] CATH INVASIVE LOCATION;  Service: Cardiovascular;  Laterality: N/A;   • CARDIAC ELECTROPHYSIOLOGY PROCEDURE N/A 6/15/2017    Procedure: Pacemaker DC new   [employer];  Surgeon: Gustavo Valladares MD;  Location: [facility] INVASIVE LOCATION;  Service:    • COLONOSCOPY  2013   • COLONOSCOPY N/A 12/12/2018    Procedure: COLONOSCOPY with polypectomy;  Surgeon: Kwadwo Powers MD;  Location: [facility];  Service: Gastroenterology   • CORONARY ANGIOPLASTY WITH STENT PLACEMENT     • INGUINAL HERNIA REPAIR Right    • JOINT REPLACEMENT      left knee, left and right shoulder   • NECK SURGERY      SPURS   • MS RECONSTR TOTAL SHOULDER IMPLANT Right 2/16/2017    Procedure: RIGHT TOTAL SHOULDER ARTHROPLASTY;   Surgeon: Marquez Galvan MD;  Location: [facility];  Service: Orthopedics   • PROSTATECTOMY     • REPLACEMENT TOTAL KNEE Left    • TONSILLECTOMY     • TOTAL KNEE ARTHROPLASTY Right 12/14/2021    Procedure: RIGHT TOTAL KNEE ARTHROPLASTY;  Surgeon: Jace Altamirano II, MD;  Location: [facility];  Service: Orthopedics;  Laterality: Right;   • TOTAL SHOULDER REPLACEMENT Bilateral         PT Ortho     Row Name 12/29/21 1130       Subjective Comments    Subjective Comments Patient and spouse report that he has been having some muscle cramping.  He is doing exercises at home.  -SP       Right Lower Ext    Rt Knee Extension/Flexion AROM 103 degrees PROM flexion after stretching  -SP       Gait/Stairs (Locomotion)    Comment (Gait/Stairs) Patient ambulates stairs with single rail and STC with step to pattern and CGA  -SP    Row Name 12/29/21 1100       Right Lower Ext    Rt Knee Extension/Flexion AROM --  -SP          User Key  (r) = Recorded By, (t) = Taken By, (c) = Cosigned By    Initials Name Provider Type    Nancy Evans, PT Physical Therapist                             PT Assessment/Plan     Row Name 12/29/21 1247 12/29/21 1245       PT Assessment    Assessment Comments Patient demonstrates progressing right knee ROM.  Patient able to safely ambulate stairs with single rail and STC with contact guard assist for safety  -SP Patient with progressing right knee ROM.  Patient able to safely ambulate stairs with single rail  -SP       PT Plan    PT Plan Comments Continue to progress right knee ROM and strengthening  -SP --          User Key  (r) = Recorded By, (t) = Taken By, (c) = Cosigned By    Initials Name Provider Type    Nancy Evans, PT Physical Therapist                   OP Exercises     Row Name 12/29/21 1249 12/29/21 1200 12/29/21 1130       Subjective Comments    Subjective Comments -- -- Patient and spouse report that he has been having some muscle cramping.  He is  doing exercises at home.  -SP       Total Minutes    79130 - PT Therapeutic Exercise Minutes 15  -SP -- --    03525 - PT Manual Therapy Minutes 15  -SP -- --       Exercise 1    Exercise Name 1 -- Patella mobilizations  -SP --    Cueing 1 -- Verbal; Tactile  -SP --    Time 1 -- 3 min  -SP --       Exercise 2    Exercise Name 2 -- Heel Slides  -SP --    Cueing 2 -- Verbal; Tactile  -SP --    Time 2 -- 8 min  -SP --       Exercise 3    Exercise Name 3 -- Wall slides  -SP --    Cueing 3 -- Verbal; Tactile  -SP --    Time 3 -- 8 min  -SP --       Exercise 4    Exercise Name 4 -- Passive knee FLEX stretch  -SP --    Cueing 4 -- Verbal; Tactile  -SP --    Reps 4 -- 10  -SP --    Time 4 -- 10 secs  -SP --       Exercise 5    Exercise Name 5 -- Hamstring stretch  -SP --    Cueing 5 -- Verbal; Tactile  -SP --    Reps 5 -- 10  -SP --    Time 5 -- 10 secs  -SP --       Exercise 6    Exercise Name 6 -- Supine knee EXT on roll  -SP --    Cueing 6 -- Verbal; Tactile  -SP --    Time 6 -- 4 min  -SP --       Exercise 7    Exercise Name 7 -- Passive knee EXT stretch  -SP --    Cueing 7 -- Verbal; Tactile  -SP --    Reps 7 -- 10  -SP --    Time 7 -- 10 secs  -SP --       Exercise 8    Exercise Name 8 -- SLR  -SP --    Cueing 8 -- Verbal; Tactile  -SP --    Reps 8 -- 25  -SP --       Exercise 9    Exercise Name 9 -- Hip ABD  -SP --    Cueing 9 -- Verbal; Tactile  -SP --    Reps 9 -- 25  -SP --       Exercise 10    Exercise Name 10 -- SAQ  -SP --    Cueing 10 -- Verbal; Tactile  -SP --    Reps 10 -- 25  -SP --       Exercise 11    Exercise Name 11 -- LAQ  -SP --    Cueing 11 -- Verbal; Tactile  -SP --    Reps 11 -- 25  -SP --          User Key  (r) = Recorded By, (t) = Taken By, (c) = Cosigned By    Initials Name Provider Type    SP Nuno, Nancy Liliana, PT Physical Therapist                         Manual Rx (last 36 hours)     Manual Treatments     Row Name 12/29/21 1249             Total Minutes    97591 - PT Manual Therapy  Minutes 15  -SP            User Key  (r) = Recorded By, (t) = Taken By, (c) = Cosigned By    Initials Name Provider Type    Nancy Evans, PT Physical Therapist                    Therapy Education  Given: HEP  Program: Reinforced  How Provided: Verbal  Provided to: Patient  Level of Understanding: Verbalized, Demonstrated              Time Calculation:   Start Time: 1130  Stop Time: 1247  Time Calculation (min): 77 min  Timed Charges  84129 - PT Therapeutic Exercise Minutes: 15  11759 - PT Manual Therapy Minutes: 15  Total Minutes  Timed Charges Total Minutes: 30   Total Minutes: 30  Therapy Charges for Today     Code Description Service Date Service Provider Modifiers Qty    00609130038  PT THER PROC EA 15 MIN 12/29/2021 Nancy Nuno, PT GP 1    [id number]  PT MANUAL THERAPY EA 15 MIN 12/29/2021 Nancy Nuno, PT GP 1                    Nancy Nuno, PT  12/29/2021

## 2021-12-31 ENCOUNTER — HOSPITAL ENCOUNTER (OUTPATIENT)
Dept: PHYSICAL THERAPY | Facility: HOSPITAL | Age: 86
Setting detail: THERAPIES SERIES
Discharge: HOME | End: 2021-12-31

## 2021-12-31 DIAGNOSIS — Z96.651 S/P TOTAL KNEE ARTHROPLASTY, RIGHT: Primary | ICD-10-CM

## 2021-12-31 PROCEDURE — 97110 THERAPEUTIC EXERCISES: CPT

## 2021-12-31 PROCEDURE — 97140 MANUAL THERAPY 1/> REGIONS: CPT

## 2022-01-03 ENCOUNTER — HOSPITAL ENCOUNTER (OUTPATIENT)
Dept: PHYSICAL THERAPY | Facility: HOSPITAL | Age: 87
Setting detail: THERAPIES SERIES
Discharge: HOME | End: 2022-01-03

## 2022-01-03 PROCEDURE — 97110 THERAPEUTIC EXERCISES: CPT

## 2022-01-03 PROCEDURE — 97140 MANUAL THERAPY 1/> REGIONS: CPT

## 2022-01-03 NOTE — THERAPY TREATMENT NOTE
Outpatient Physical Therapy Ortho Treatment Note  MITCHELL Zamora     Patient Name: Guero Garya  : 1935  MRN: [id number]  Today's Date: 1/3/2022      Visit Date: 2022    Visit Dx:  No diagnosis found.    Patient Active Problem List   Diagnosis   • Gout   • Hypertension   • Hyperlipidemia   • Osteoarthrosis   • History of prostate cancer   • Tinnitus   • Benign neoplasm of colon   • Osteoarthritis of right shoulder   • Coronary artery disease involving native coronary artery of native heart with unstable angina pectoris (HCC)   • Syncopal episodes   • Screening for colon cancer   • Pyelonephritis, acute   • Acute kidney injury superimposed on chronic kidney disease (HCC)   • Dyspnea on exertion   • Generalized weakness   • Elevated d-dimer   • Dehydration   • Mild persistent asthma without complication   • Heme positive stool   • Elevated LFTs   • Alcohol use   • Urine retention   • Chronic diastolic congestive heart failure (HCC)   • Encounter for screening for malignant neoplasm of colon   • Personal history of colonic polyps   • Family history of esophageal cancer   • RAQUEL (obstructive sleep apnea)   • Sleep-related hypoxia   • Hypersomnia due to medical condition   • Total knee replacement status        Past Medical History:   Diagnosis Date   • Acute kidney injury superimposed on chronic kidney disease (HCC)    • Aortic stenosis    • Arthralgia    • Asthma     mild persistent without complication   • CAD (coronary artery disease), native coronary artery    • Cancer (HCC)     Prostate   • Chest pain, unspecified    • Chronic diastolic congestive heart failure (HCC)    • Colon polyp    • Dyspnea on exertion    • Erectile dysfunction    • Essential hypertension    • Generalized weakness    • Gout    • History of prostate cancer    • Hyperkalemia    • Jaw pain     both sides   • Mixed hyperlipidemia    • RAQUEL (obstructive sleep apnea) 2021    Home sleep study.  Weight 216 pounds.  Moderate  obstructive sleep apnea with AHI 26.5 events per hour.  Low oxygen saturation 79% and sleep-related hypoxia present for 157 minutes.  The patient snored 45.6% of total monitoring time.   • Osteoarthrosis    • PAF (paroxysmal atrial fibrillation) ([facility])    • Shoulder pain    • SOB (shortness of breath)    • SSS (sick sinus syndrome) ([facility])    • Tinnitus    • Urine retention         Past Surgical History:   Procedure Laterality Date   • CARDIAC CATHETERIZATION N/A 4/27/2017    Procedure: Coronary angiography;  Surgeon: Marvel Brito MD;  Location: [facility] CATH INVASIVE LOCATION;  Service:    • CARDIAC CATHETERIZATION N/A 4/27/2017    Procedure: Left Heart Cath;  Surgeon: Marvel Brito MD;  Location: [facility] CATH INVASIVE LOCATION;  Service:    • CARDIAC CATHETERIZATION N/A 4/27/2017    Procedure: Stent GIN coronary;  Surgeon: Marvel Brito MD;  Location: [facility] CATH INVASIVE LOCATION;  Service:    • CARDIAC CATHETERIZATION N/A 6/30/2020    Procedure: Coronary angiography;  Surgeon: Jared Sellers MD;  Location: [facility] INVASIVE LOCATION;  Service: Cardiovascular;  Laterality: N/A;   • CARDIAC CATHETERIZATION N/A 6/30/2020    Procedure: Left Heart Cath;  Surgeon: Jared Sellers MD;  Location: [facility] CATH INVASIVE LOCATION;  Service: Cardiovascular;  Laterality: N/A;   • CARDIAC ELECTROPHYSIOLOGY PROCEDURE N/A 6/15/2017    Procedure: Pacemaker DC new   [employer];  Surgeon: Gustavo Valladares MD;  Location: [facility] INVASIVE LOCATION;  Service:    • COLONOSCOPY  2013   • COLONOSCOPY N/A 12/12/2018    Procedure: COLONOSCOPY with polypectomy;  Surgeon: Kwadwo Powers MD;  Location: [facility];  Service: Gastroenterology   • CORONARY ANGIOPLASTY WITH STENT PLACEMENT     • INGUINAL HERNIA REPAIR Right    • JOINT REPLACEMENT      left knee, left and right shoulder   • NECK SURGERY      SPURS   • CT RECONSTR TOTAL SHOULDER IMPLANT Right 2/16/2017    Procedure: RIGHT TOTAL SHOULDER ARTHROPLASTY;   Surgeon: Marquez Galvan MD;  Location: [facility];  Service: Orthopedics   • PROSTATECTOMY     • REPLACEMENT TOTAL KNEE Left    • TONSILLECTOMY     • TOTAL KNEE ARTHROPLASTY Right 12/14/2021    Procedure: RIGHT TOTAL KNEE ARTHROPLASTY;  Surgeon: Jace Altamirano II, MD;  Location: [facility];  Service: Orthopedics;  Laterality: Right;   • TOTAL SHOULDER REPLACEMENT Bilateral         PT Ortho     Row Name 01/03/22 0930       Right Lower Ext    Rt Knee Extension/Flexion AROM 0-4-110 post stretch  -KM          User Key  (r) = Recorded By, (t) = Taken By, (c) = Cosigned By    Initials Name Provider Type    Fabienne Cordoba PTA Physical Therapy Assistant                             PT Assessment/Plan     Row Name 01/03/22 0930          PT Assessment    Assessment Comments Pt continues to demonstrate difficulty completing steps ascending with (R) LE. Pt measures increased AROM 0-4-110 post stretch.  -KM            PT Plan    PT Plan Comments Continue POC per MD  -KM           User Key  (r) = Recorded By, (t) = Taken By, (c) = Cosigned By    Initials Name Provider Type    Fabienne Cordoba PTA Physical Therapy Assistant                   OP Exercises     Row Name 01/03/22 0930             Subjective Comments    Subjective Comments Pt states his knee is doing well  -KM              Exercise 1    Exercise Name 1 Patella Mobilizations  -KM      Cueing 1 Verbal; Tactile  -KM      Time 1 3 min  -KM              Exercise 2    Exercise Name 2 Heel Slides  -KM      Cueing 2 Verbal; Tactile  -KM      Time 2 8 min  -KM              Exercise 3    Exercise Name 3 Wall slides  -KM      Cueing 3 Verbal; Tactile  -KM      Time 3 8 min  -KM              Exercise 4    Exercise Name 4 Passive knee FLEX stretch  -KM      Cueing 4 Verbal; Tactile  -KM      Reps 4 10  -KM      Time 4 10 secs  -KM              Exercise 5    Exercise Name 5 Hamstring stretch  -KM      Cueing 5 Verbal; Tactile  -KM      Reps 5 10  -KM      Time  "5 10 secs  -KM              Exercise 6    Exercise Name 6 Supine knee EXT on roll  -KM      Cueing 6 Verbal; Tactile  -KM      Time 6 5 min  -KM              Exercise 7    Exercise Name 7 Passive knee EXT stretch  -KM      Cueing 7 Verbal; Tactile  -KM      Reps 7 10  -KM      Time 7 10 secs  -KM              Exercise 8    Exercise Name 8 SLR  -KM      Cueing 8 Verbal; Tactile  -KM      Reps 8 25  -KM              Exercise 9    Exercise Name 9 Hip ABD  -KM      Cueing 9 Verbal; Tactile  -KM      Reps 9 25  -KM              Exercise 10    Exercise Name 10 SAQ  -KM      Cueing 10 Verbal; Tactile  -KM      Reps 10 25  -KM              Exercise 11    Exercise Name 11 LAQ  -KM      Cueing 11 Verbal; Tactile  -KM      Reps 11 25  -KM              Exercise 12    Exercise Name 12 Heel Raises  -KM      Reps 12 25  -KM              Exercise 13    Exercise Name 13 Mini Squats  -KM              Exercise 14    Exercise Name 14 4\" Fwd Step Ups  -KM      Reps 14 10 each  -KM            User Key  (r) = Recorded By, (t) = Taken By, (c) = Cosigned By    Initials Name Provider Type    Fabienne Cordoba PTA Physical Therapy Assistant                                                Time Calculation:   Start Time: 0930  Stop Time: 1040  Time Calculation (min): 70 min  Therapy Charges for Today     Code Description Service Date Service Provider Modifiers Qty    68088536925 HC PT MANUAL THERAPY EA 15 MIN 1/3/2022 Fabienne Saravia PTA GP 1    98080976715 HC PT THER PROC EA 15 MIN 1/3/2022 Fabienne Saravia PTA GP 1                    Fabienne Saravia PTA  1/3/2022     "

## 2022-01-04 LAB
ACHR BIND AB SER-SCNC: <0.03 NMOL/L (ref 0–0.24)
ACHR BLOCK AB SER-ACNC: 13 % (ref 0–25)
ACHR MOD AB/ACHR TOTAL SFR SER: NORMAL %

## 2022-01-06 ENCOUNTER — HOSPITAL ENCOUNTER (OUTPATIENT)
Dept: PHYSICAL THERAPY | Facility: HOSPITAL | Age: 87
Setting detail: THERAPIES SERIES
Discharge: HOME | End: 2022-01-06

## 2022-01-06 DIAGNOSIS — Z96.651 S/P TOTAL KNEE ARTHROPLASTY, RIGHT: Primary | ICD-10-CM

## 2022-01-06 PROCEDURE — 97140 MANUAL THERAPY 1/> REGIONS: CPT

## 2022-01-06 PROCEDURE — 97110 THERAPEUTIC EXERCISES: CPT

## 2022-01-06 NOTE — THERAPY TREATMENT NOTE
Outpatient Physical Therapy Ortho Treatment Note  MITCHELL Zamora     Patient Name: Guero Garay  : 1935  MRN: [id number]  Today's Date: 2022      Visit Date: 2022    Visit Dx:    ICD-10-CM ICD-9-CM   1. S/P total knee arthroplasty, right  Z96.651 V43.65       Patient Active Problem List   Diagnosis   • Gout   • Hypertension   • Hyperlipidemia   • Osteoarthrosis   • History of prostate cancer   • Tinnitus   • Benign neoplasm of colon   • Osteoarthritis of right shoulder   • Coronary artery disease involving native coronary artery of native heart with unstable angina pectoris (HCC)   • Syncopal episodes   • Screening for colon cancer   • Pyelonephritis, acute   • Acute kidney injury superimposed on chronic kidney disease (HCC)   • Dyspnea on exertion   • Generalized weakness   • Elevated d-dimer   • Dehydration   • Mild persistent asthma without complication   • Heme positive stool   • Elevated LFTs   • Alcohol use   • Urine retention   • Chronic diastolic congestive heart failure (HCC)   • Encounter for screening for malignant neoplasm of colon   • Personal history of colonic polyps   • Family history of esophageal cancer   • RAQUEL (obstructive sleep apnea)   • Sleep-related hypoxia   • Hypersomnia due to medical condition   • Total knee replacement status        Past Medical History:   Diagnosis Date   • Acute kidney injury superimposed on chronic kidney disease (HCC)    • Aortic stenosis    • Arthralgia    • Asthma     mild persistent without complication   • CAD (coronary artery disease), native coronary artery    • Cancer (HCC)     Prostate   • Chest pain, unspecified    • Chronic diastolic congestive heart failure (HCC)    • Colon polyp    • Dyspnea on exertion    • Erectile dysfunction    • Essential hypertension    • Generalized weakness    • Gout    • History of prostate cancer    • Hyperkalemia    • Jaw pain     both sides   • Mixed hyperlipidemia    • RAQUEL (obstructive sleep apnea)  07/19/2021    Home sleep study.  Weight 216 pounds.  Moderate obstructive sleep apnea with AHI 26.5 events per hour.  Low oxygen saturation 79% and sleep-related hypoxia present for 157 minutes.  The patient snored 45.6% of total monitoring time.   • Osteoarthrosis    • PAF (paroxysmal atrial fibrillation) ([facility])    • Shoulder pain    • SOB (shortness of breath)    • SSS (sick sinus syndrome) ([facility])    • Tinnitus    • Urine retention         Past Surgical History:   Procedure Laterality Date   • CARDIAC CATHETERIZATION N/A 4/27/2017    Procedure: Coronary angiography;  Surgeon: Marvel Brito MD;  Location: [facility] CATH INVASIVE LOCATION;  Service:    • CARDIAC CATHETERIZATION N/A 4/27/2017    Procedure: Left Heart Cath;  Surgeon: Marvel Brito MD;  Location: [facility] CATH INVASIVE LOCATION;  Service:    • CARDIAC CATHETERIZATION N/A 4/27/2017    Procedure: Stent GIN coronary;  Surgeon: Marvel Brito MD;  Location: [facility] CATH INVASIVE LOCATION;  Service:    • CARDIAC CATHETERIZATION N/A 6/30/2020    Procedure: Coronary angiography;  Surgeon: Jared Sellers MD;  Location: [facility] CATH INVASIVE LOCATION;  Service: Cardiovascular;  Laterality: N/A;   • CARDIAC CATHETERIZATION N/A 6/30/2020    Procedure: Left Heart Cath;  Surgeon: Jared Sellers MD;  Location: [facility] INVASIVE LOCATION;  Service: Cardiovascular;  Laterality: N/A;   • CARDIAC ELECTROPHYSIOLOGY PROCEDURE N/A 6/15/2017    Procedure: Pacemaker DC new   [employer];  Surgeon: Gustavo Valladares MD;  Location: [facility] INVASIVE LOCATION;  Service:    • COLONOSCOPY  2013   • COLONOSCOPY N/A 12/12/2018    Procedure: COLONOSCOPY with polypectomy;  Surgeon: Kwadwo Powers MD;  Location: [facility];  Service: Gastroenterology   • CORONARY ANGIOPLASTY WITH STENT PLACEMENT     • INGUINAL HERNIA REPAIR Right    • JOINT REPLACEMENT      left knee, left and right shoulder   • NECK SURGERY      SPURS   • TX RECONSTR TOTAL SHOULDER IMPLANT Right  2/16/2017    Procedure: RIGHT TOTAL SHOULDER ARTHROPLASTY;  Surgeon: Marquez Galvan MD;  Location: [facility];  Service: Orthopedics   • PROSTATECTOMY     • REPLACEMENT TOTAL KNEE Left    • TONSILLECTOMY     • TOTAL KNEE ARTHROPLASTY Right 12/14/2021    Procedure: RIGHT TOTAL KNEE ARTHROPLASTY;  Surgeon: Jace Altamirano II, MD;  Location: [facility] OR;  Service: Orthopedics;  Laterality: Right;   • TOTAL SHOULDER REPLACEMENT Bilateral                         PT Assessment/Plan     Row Name 01/06/22 0979          PT Assessment    Assessment Comments Pt with much improved tolerance and ability to complete steps; pts is slowly progressing with ROM. Instructed to pt to bring his cane next session to practice ambulating  -KM            PT Plan    PT Plan Comments Continue per POC  -KM           User Key  (r) = Recorded By, (t) = Taken By, (c) = Cosigned By    Initials Name Provider Type    Fabienne Cordoba PTA Physical Therapy Assistant                   OP Exercises     Row Name 01/06/22 0995             Subjective Comments    Subjective Comments Pt states his f/u appointment went well and MD was pleased with his progress.  -KM              Exercise 1    Exercise Name 1 Patella Mobilizations  -KM      Cueing 1 Verbal; Tactile  -KM      Time 1 3 min  -KM              Exercise 2    Exercise Name 2 Heel Slides  -KM      Cueing 2 Verbal; Tactile  -KM      Time 2 8 min  -KM              Exercise 3    Exercise Name 3 Wall slides  -KM      Cueing 3 Verbal; Tactile  -KM      Time 3 8 min  -KM              Exercise 4    Exercise Name 4 Passive knee FLEX stretch  -KM      Cueing 4 Verbal; Tactile  -KM      Reps 4 10  -KM      Time 4 10 secs  -KM              Exercise 5    Exercise Name 5 Hamstring stretch  -KM      Cueing 5 Verbal; Tactile  -KM      Reps 5 10  -KM      Time 5 10 secs  -KM              Exercise 6    Exercise Name 6 Supine knee EXT on roll  -KM      Cueing 6 Verbal; Tactile  -KM      Time 6 5 min   "-KM              Exercise 7    Exercise Name 7 Passive knee EXT stretch  -KM      Cueing 7 Verbal; Tactile  -KM      Reps 7 10  -KM      Time 7 10 secs  -KM              Exercise 8    Exercise Name 8 SLR  -KM      Cueing 8 Verbal; Tactile  -KM      Reps 8 25  -KM              Exercise 9    Exercise Name 9 Hip ABD  -KM      Cueing 9 Verbal; Tactile  -KM      Reps 9 25  -KM              Exercise 10    Exercise Name 10 SAQ  -KM      Cueing 10 Verbal; Tactile  -KM      Reps 10 25  -KM              Exercise 11    Exercise Name 11 LAQ  -KM      Cueing 11 Verbal; Tactile  -KM      Reps 11 25  -KM              Exercise 12    Exercise Name 12 Heel Raises  -KM      Reps 12 25  -KM              Exercise 13    Exercise Name 13 Mini Squats  -KM              Exercise 14    Exercise Name 14 4\" Fwd Step Ups  -KM      Reps 14 10 each  -KM            User Key  (r) = Recorded By, (t) = Taken By, (c) = Cosigned By    Initials Name Provider Type    Fabienne Cordoba PTA Physical Therapy Assistant                                                Time Calculation:   Start Time: 0925  Stop Time: 1015  Time Calculation (min): 50 min  Therapy Charges for Today     Code Description Service Date Service Provider Modifiers Qty    90089041434 HC PT MANUAL THERAPY EA 15 MIN 1/6/2022 Fabienne Saravia PTA GP 1    [id number] HC PT THER PROC EA 15 MIN 1/6/2022 Fabienne Saravia PTA GP 1                    Fabienne Saravia PTA  1/6/2022     "

## 2022-01-10 ENCOUNTER — HOSPITAL ENCOUNTER (OUTPATIENT)
Dept: PHYSICAL THERAPY | Facility: HOSPITAL | Age: 87
Setting detail: THERAPIES SERIES
Discharge: HOME | End: 2022-01-10

## 2022-01-10 DIAGNOSIS — Z96.651 S/P TOTAL KNEE ARTHROPLASTY, RIGHT: Primary | ICD-10-CM

## 2022-01-10 PROCEDURE — 97110 THERAPEUTIC EXERCISES: CPT

## 2022-01-10 PROCEDURE — 97140 MANUAL THERAPY 1/> REGIONS: CPT

## 2022-01-10 RX ORDER — FUROSEMIDE 20 MG/1
20 TABLET ORAL DAILY
Qty: 90 TABLET | Refills: 3 | Status: SHIPPED | OUTPATIENT
Start: 2022-01-10 | End: 2022-12-09

## 2022-01-10 NOTE — THERAPY TREATMENT NOTE
Outpatient Physical Therapy Ortho Treatment Note  MITCHELL Zamora     Patient Name: Guero Garay  : 1935  MRN: [id number]  Today's Date: 1/10/2022      Visit Date: 01/10/2022    Visit Dx:    ICD-10-CM ICD-9-CM   1. S/P total knee arthroplasty, right  Z96.651 V43.65       Patient Active Problem List   Diagnosis   • Gout   • Hypertension   • Hyperlipidemia   • Osteoarthrosis   • History of prostate cancer   • Tinnitus   • Benign neoplasm of colon   • Osteoarthritis of right shoulder   • Coronary artery disease involving native coronary artery of native heart with unstable angina pectoris (HCC)   • Syncopal episodes   • Screening for colon cancer   • Pyelonephritis, acute   • Acute kidney injury superimposed on chronic kidney disease (HCC)   • Dyspnea on exertion   • Generalized weakness   • Elevated d-dimer   • Dehydration   • Mild persistent asthma without complication   • Heme positive stool   • Elevated LFTs   • Alcohol use   • Urine retention   • Chronic diastolic congestive heart failure (HCC)   • Encounter for screening for malignant neoplasm of colon   • Personal history of colonic polyps   • Family history of esophageal cancer   • RAQUEL (obstructive sleep apnea)   • Sleep-related hypoxia   • Hypersomnia due to medical condition   • Total knee replacement status        Past Medical History:   Diagnosis Date   • Acute kidney injury superimposed on chronic kidney disease (HCC)    • Aortic stenosis    • Arthralgia    • Asthma     mild persistent without complication   • CAD (coronary artery disease), native coronary artery    • Cancer (HCC)     Prostate   • Chest pain, unspecified    • Chronic diastolic congestive heart failure (HCC)    • Colon polyp    • Dyspnea on exertion    • Erectile dysfunction    • Essential hypertension    • Generalized weakness    • Gout    • History of prostate cancer    • Hyperkalemia    • Jaw pain     both sides   • Mixed hyperlipidemia    • RAQUEL (obstructive sleep apnea)  07/19/2021    Home sleep study.  Weight 216 pounds.  Moderate obstructive sleep apnea with AHI 26.5 events per hour.  Low oxygen saturation 79% and sleep-related hypoxia present for 157 minutes.  The patient snored 45.6% of total monitoring time.   • Osteoarthrosis    • PAF (paroxysmal atrial fibrillation) ([facility])    • Shoulder pain    • SOB (shortness of breath)    • SSS (sick sinus syndrome) ([facility])    • Tinnitus    • Urine retention         Past Surgical History:   Procedure Laterality Date   • CARDIAC CATHETERIZATION N/A 4/27/2017    Procedure: Coronary angiography;  Surgeon: Marvel Brito MD;  Location: [facility] CATH INVASIVE LOCATION;  Service:    • CARDIAC CATHETERIZATION N/A 4/27/2017    Procedure: Left Heart Cath;  Surgeon: Marvel Brito MD;  Location: [facility] CATH INVASIVE LOCATION;  Service:    • CARDIAC CATHETERIZATION N/A 4/27/2017    Procedure: Stent GIN coronary;  Surgeon: Marvel Brito MD;  Location: [facility] CATH INVASIVE LOCATION;  Service:    • CARDIAC CATHETERIZATION N/A 6/30/2020    Procedure: Coronary angiography;  Surgeon: Jared Sellers MD;  Location: [facility] CATH INVASIVE LOCATION;  Service: Cardiovascular;  Laterality: N/A;   • CARDIAC CATHETERIZATION N/A 6/30/2020    Procedure: Left Heart Cath;  Surgeon: Jared Sellers MD;  Location: [facility] INVASIVE LOCATION;  Service: Cardiovascular;  Laterality: N/A;   • CARDIAC ELECTROPHYSIOLOGY PROCEDURE N/A 6/15/2017    Procedure: Pacemaker DC new   [employer];  Surgeon: Gustavo Valladares MD;  Location: [facility] INVASIVE LOCATION;  Service:    • COLONOSCOPY  2013   • COLONOSCOPY N/A 12/12/2018    Procedure: COLONOSCOPY with polypectomy;  Surgeon: Kwadwo Powers MD;  Location: [facility];  Service: Gastroenterology   • CORONARY ANGIOPLASTY WITH STENT PLACEMENT     • INGUINAL HERNIA REPAIR Right    • JOINT REPLACEMENT      left knee, left and right shoulder   • NECK SURGERY      SPURS   • NM RECONSTR TOTAL SHOULDER IMPLANT Right  2/16/2017    Procedure: RIGHT TOTAL SHOULDER ARTHROPLASTY;  Surgeon: Marquez Galvan MD;  Location: [facility];  Service: Orthopedics   • PROSTATECTOMY     • REPLACEMENT TOTAL KNEE Left    • TONSILLECTOMY     • TOTAL KNEE ARTHROPLASTY Right 12/14/2021    Procedure: RIGHT TOTAL KNEE ARTHROPLASTY;  Surgeon: Jace Altamirano II, MD;  Location: [facility] OR;  Service: Orthopedics;  Laterality: Right;   • TOTAL SHOULDER REPLACEMENT Bilateral         PT Ortho     Row Name 01/10/22 0942       Subjective Comments    Subjective Comments Pt states his knee is doing well  -KM       Right Lower Ext    Rt Knee Extension/Flexion AROM 0-2-112 post stretch  -KM          User Key  (r) = Recorded By, (t) = Taken By, (c) = Cosigned By    Initials Name Provider Type    Fabienne Cordoba PTA Physical Therapy Assistant                             PT Assessment/Plan     Row Name 01/10/22 0942          PT Assessment    Assessment Comments Pt is progressing well with overall function. Practiced ambulated with use of SPC, pt seems hesitant but demonstrates good technique  -KM            PT Plan    PT Plan Comments Continue per POC.  -KM           User Key  (r) = Recorded By, (t) = Taken By, (c) = Cosigned By    Initials Name Provider Type    Fabienne Cordoba PTA Physical Therapy Assistant                   OP Exercises     Row Name 01/10/22 0942             Subjective Comments    Subjective Comments Pt states his knee is doing well  -KM              Exercise 1    Exercise Name 1 Patella Mobilizations  -KM      Cueing 1 Verbal; Tactile  -KM      Time 1 3 min  -KM              Exercise 2    Exercise Name 2 Heel Slides  -KM      Cueing 2 Verbal; Tactile  -KM      Time 2 8 min  -KM              Exercise 3    Exercise Name 3 Wall slides  -KM      Cueing 3 Verbal; Tactile  -KM              Exercise 4    Exercise Name 4 Passive knee FLEX stretch  -KM      Cueing 4 Verbal; Tactile  -KM      Reps 4 10  -KM      Time 4 10 secs  -KM    "           Exercise 5    Exercise Name 5 Hamstring stretch  -KM      Cueing 5 Verbal; Tactile  -KM      Reps 5 10  -KM      Time 5 10 secs  -KM              Exercise 6    Exercise Name 6 Supine knee EXT on roll  -KM      Cueing 6 Verbal; Tactile  -KM      Time 6 5 min  -KM              Exercise 7    Exercise Name 7 Passive knee EXT stretch  -KM      Cueing 7 Verbal; Tactile  -KM      Reps 7 10  -KM      Time 7 10 secs  -KM              Exercise 8    Exercise Name 8 SLR  -KM      Cueing 8 Verbal; Tactile  -KM      Reps 8 25  -KM              Exercise 9    Exercise Name 9 Hip ABD  -KM      Cueing 9 Verbal; Tactile  -KM      Reps 9 25  -KM              Exercise 10    Exercise Name 10 SAQ  -KM      Cueing 10 Verbal; Tactile  -KM      Reps 10 25  -KM              Exercise 11    Exercise Name 11 LAQ  -KM      Cueing 11 Verbal; Tactile  -KM      Reps 11 25  -KM              Exercise 12    Exercise Name 12 Heel Raises  -KM      Reps 12 25  -KM              Exercise 13    Exercise Name 13 Mini Squats  -KM      Reps 13 10  -KM              Exercise 14    Exercise Name 14 6\" Fwd Step Ups  -KM      Reps 14 10 each  -KM            User Key  (r) = Recorded By, (t) = Taken By, (c) = Cosigned By    Initials Name Provider Type    Fabienne Cordoba PTA Physical Therapy Assistant                                                Time Calculation:   Start Time: 0942  Stop Time: 1040  Time Calculation (min): 58 min  Therapy Charges for Today     Code Description Service Date Service Provider Modifiers Qty    94230860096 HC PT MANUAL THERAPY EA 15 MIN 1/10/2022 Fabienne Saravia PTA GP 1    87007466900 HC PT THER PROC EA 15 MIN 1/10/2022 Fabienne Saravia PTA GP 1                    Fabienne Saravia PTA  1/10/2022     "

## 2022-01-12 ENCOUNTER — READMISSION MANAGEMENT (OUTPATIENT)
Dept: CALL CENTER | Facility: HOSPITAL | Age: 87
End: 2022-01-12

## 2022-01-12 ENCOUNTER — HOSPITAL ENCOUNTER (OUTPATIENT)
Dept: PHYSICAL THERAPY | Facility: HOSPITAL | Age: 87
Setting detail: THERAPIES SERIES
Discharge: HOME | End: 2022-01-12

## 2022-01-12 DIAGNOSIS — Z96.651 S/P TOTAL KNEE ARTHROPLASTY, RIGHT: Primary | ICD-10-CM

## 2022-01-12 PROCEDURE — 97140 MANUAL THERAPY 1/> REGIONS: CPT

## 2022-01-12 PROCEDURE — 97110 THERAPEUTIC EXERCISES: CPT

## 2022-01-12 NOTE — THERAPY TREATMENT NOTE
Outpatient Physical Therapy Ortho Treatment Note  MITCHELL Zamora     Patient Name: Guero Garay  : 1935  MRN: [id number]  Today's Date: 2022      Visit Date: 2022    Visit Dx:    ICD-10-CM ICD-9-CM   1. S/P total knee arthroplasty, right  Z96.651 V43.65       Patient Active Problem List   Diagnosis   • Gout   • Hypertension   • Hyperlipidemia   • Osteoarthrosis   • History of prostate cancer   • Tinnitus   • Benign neoplasm of colon   • Osteoarthritis of right shoulder   • Coronary artery disease involving native coronary artery of native heart with unstable angina pectoris (HCC)   • Syncopal episodes   • Screening for colon cancer   • Pyelonephritis, acute   • Acute kidney injury superimposed on chronic kidney disease (HCC)   • Dyspnea on exertion   • Generalized weakness   • Elevated d-dimer   • Dehydration   • Mild persistent asthma without complication   • Heme positive stool   • Elevated LFTs   • Alcohol use   • Urine retention   • Chronic diastolic congestive heart failure (HCC)   • Encounter for screening for malignant neoplasm of colon   • Personal history of colonic polyps   • Family history of esophageal cancer   • RAQUEL (obstructive sleep apnea)   • Sleep-related hypoxia   • Hypersomnia due to medical condition   • Total knee replacement status        Past Medical History:   Diagnosis Date   • Acute kidney injury superimposed on chronic kidney disease (HCC)    • Aortic stenosis    • Arthralgia    • Asthma     mild persistent without complication   • CAD (coronary artery disease), native coronary artery    • Cancer (HCC)     Prostate   • Chest pain, unspecified    • Chronic diastolic congestive heart failure (HCC)    • Colon polyp    • Dyspnea on exertion    • Erectile dysfunction    • Essential hypertension    • Generalized weakness    • Gout    • History of prostate cancer    • Hyperkalemia    • Jaw pain     both sides   • Mixed hyperlipidemia    • RAQUEL (obstructive sleep apnea)  07/19/2021    Home sleep study.  Weight 216 pounds.  Moderate obstructive sleep apnea with AHI 26.5 events per hour.  Low oxygen saturation 79% and sleep-related hypoxia present for 157 minutes.  The patient snored 45.6% of total monitoring time.   • Osteoarthrosis    • PAF (paroxysmal atrial fibrillation) ([facility])    • Shoulder pain    • SOB (shortness of breath)    • SSS (sick sinus syndrome) ([facility])    • Tinnitus    • Urine retention         Past Surgical History:   Procedure Laterality Date   • CARDIAC CATHETERIZATION N/A 4/27/2017    Procedure: Coronary angiography;  Surgeon: Marvel Brito MD;  Location: [facility] CATH INVASIVE LOCATION;  Service:    • CARDIAC CATHETERIZATION N/A 4/27/2017    Procedure: Left Heart Cath;  Surgeon: Marvel Brito MD;  Location: [facility] CATH INVASIVE LOCATION;  Service:    • CARDIAC CATHETERIZATION N/A 4/27/2017    Procedure: Stent GIN coronary;  Surgeon: Marvel Brito MD;  Location: [facility] CATH INVASIVE LOCATION;  Service:    • CARDIAC CATHETERIZATION N/A 6/30/2020    Procedure: Coronary angiography;  Surgeon: Jared Sellers MD;  Location: [facility] CATH INVASIVE LOCATION;  Service: Cardiovascular;  Laterality: N/A;   • CARDIAC CATHETERIZATION N/A 6/30/2020    Procedure: Left Heart Cath;  Surgeon: Jared Sellers MD;  Location: [facility] INVASIVE LOCATION;  Service: Cardiovascular;  Laterality: N/A;   • CARDIAC ELECTROPHYSIOLOGY PROCEDURE N/A 6/15/2017    Procedure: Pacemaker DC new   [employer];  Surgeon: Gustavo Valladares MD;  Location: [facility] INVASIVE LOCATION;  Service:    • COLONOSCOPY  2013   • COLONOSCOPY N/A 12/12/2018    Procedure: COLONOSCOPY with polypectomy;  Surgeon: Kwadwo Powers MD;  Location: [facility];  Service: Gastroenterology   • CORONARY ANGIOPLASTY WITH STENT PLACEMENT     • INGUINAL HERNIA REPAIR Right    • JOINT REPLACEMENT      left knee, left and right shoulder   • NECK SURGERY      SPURS   • [place] RECONSTR TOTAL SHOULDER IMPLANT Right  2/16/2017    Procedure: RIGHT TOTAL SHOULDER ARTHROPLASTY;  Surgeon: Marquez Galvan MD;  Location: [facility];  Service: Orthopedics   • PROSTATECTOMY     • REPLACEMENT TOTAL KNEE Left    • TONSILLECTOMY     • TOTAL KNEE ARTHROPLASTY Right 12/14/2021    Procedure: RIGHT TOTAL KNEE ARTHROPLASTY;  Surgeon: Jace Altamirano II, MD;  Location: [facility] OR;  Service: Orthopedics;  Laterality: Right;   • TOTAL SHOULDER REPLACEMENT Bilateral                         PT Assessment/Plan     Row Name 01/12/22 0935          PT Assessment    Assessment Comments Pt ambulates with improved gait and stability with use of SPC. Pt is progressing well toward goals at this time with improved ROM and strength.  -KM            PT Plan    PT Plan Comments Plan to discontinue PT at this time due to pt leaving for [place], Pt to continue with HEP  -KM           User Key  (r) = Recorded By, (t) = Taken By, (c) = Cosigned By    Initials Name Provider Type    Fabienne Cordoba, JUSTEN Physical Therapy Assistant                   OP Exercises     Row Name 01/12/22 0935             Exercise 1    Exercise Name 1 Patella Mobilizations  -KM      Cueing 1 Verbal; Tactile  -KM      Time 1 3 min  -KM              Exercise 2    Exercise Name 2 Heel Slides  -KM      Cueing 2 Verbal; Tactile  -KM      Time 2 8 min  -KM              Exercise 3    Exercise Name 3 Wall slides  -KM      Cueing 3 Verbal; Tactile  -KM              Exercise 4    Exercise Name 4 Passive knee FLEX stretch  -KM      Cueing 4 Verbal; Tactile  -KM      Reps 4 10  -KM      Time 4 10 secs  -KM              Exercise 5    Exercise Name 5 Hamstring stretch  -KM      Cueing 5 Verbal; Tactile  -KM      Reps 5 10  -KM      Time 5 10 secs  -KM              Exercise 6    Exercise Name 6 Supine knee EXT on roll  -KM      Cueing 6 Verbal; Tactile  -KM      Time 6 5 min  -KM              Exercise 7    Exercise Name 7 Passive knee EXT stretch  -KM      Cueing 7 Verbal; Tactile  -KM    "   Reps 7 10  -KM      Time 7 10 secs  -KM              Exercise 8    Exercise Name 8 SLR  -KM      Cueing 8 Verbal; Tactile  -KM      Reps 8 25  -KM      Time 8 1#  -KM              Exercise 9    Exercise Name 9 Hip ABD  -KM      Cueing 9 Verbal; Tactile  -KM      Reps 9 25  -KM      Time 9 1#  -KM              Exercise 10    Exercise Name 10 SAQ  -KM      Cueing 10 Verbal; Tactile  -KM      Reps 10 25  -KM      Time 10 1#  -KM              Exercise 11    Exercise Name 11 LAQ  -KM      Cueing 11 Verbal; Tactile  -KM      Reps 11 25  -KM      Time 11 1#  -KM              Exercise 12    Exercise Name 12 Heel Raises  -KM      Reps 12 25  -KM              Exercise 13    Exercise Name 13 Mini Squats  -KM      Reps 13 10  -KM              Exercise 14    Exercise Name 14 6\" Fwd Step Ups  -KM      Reps 14 10 each  -KM            User Key  (r) = Recorded By, (t) = Taken By, (c) = Cosigned By    Initials Name Provider Type     Fabienne Saravia PTA Physical Therapy Assistant                                                Time Calculation:   Start Time: 0935  Stop Time: 1026  Time Calculation (min): 51 min  Therapy Charges for Today     Code Description Service Date Service Provider Modifiers Qty    36067614083 HC PT MANUAL THERAPY EA 15 MIN 1/12/2022 Fabienne Saravia PTA GP 1    73308721638 HC PT THER PROC EA 15 MIN 1/12/2022 Fabienne Saravia PTA GP 1                    Fabienne Saravia PTA  1/12/2022     "

## 2022-01-12 NOTE — OUTREACH NOTE
Total Joint Month 1 Survey      Responses   [facility] patient discharged from? [place]   Does the patient have one of the following disease processes/diagnoses(primary or secondary)? Total Joint Replacement   Joint surgery performed? Knee   Month 1 attempt successful? Yes   Call start time 1615   Call end time 1617   Has the patient been back in either the hospital or Emergency Department since discharge? No   Discharge diagnosis TOTAL KNEE ARTHROPLASTY-Right   Is the patient taking all medications as directed (includes completed medication regime)? Yes   Is the patient able to teach back alternate methods of pain control? Ice   Has the patient kept scheduled appointments due by today? Yes   Is the patient still receiving Home Health Services? No   Is the patient still attending therapy sessions(either in the home or as an outpatient)? Yes   Has the patient fallen since discharge? No   What is the patient's perception of their functional status since discharge? Improving   Is the patient able to teach back signs and symptoms of infection? Temp >100.4 for 24h or longer,  Increased swelling or redness around incision (not associated with surgical edema),  Severe discomfort or pain,  Incisional drainage   Is the patient/caregiver able to teach back the hierarchy of who to call/visit for symptoms/problems? PCP, Specialist, Home health nurse, Urgent Care, ED, 911 Yes   Month 1 call completed? Yes          TEMO NEWMAN RN

## 2022-02-11 ENCOUNTER — READMISSION MANAGEMENT (OUTPATIENT)
Dept: CALL CENTER | Facility: HOSPITAL | Age: 87
End: 2022-02-11

## 2022-02-11 NOTE — OUTREACH NOTE
Total Joint Month 2 Survey      Responses   [facility] patient discharged from? [place]   Does the patient have one of the following disease processes/diagnoses(primary or secondary)? Total Joint Replacement   Joint surgery performed? Knee   Month 2 attempt successful? No   Unsuccessful attempts Attempt 1          Mildred Draper RN

## 2022-02-15 ENCOUNTER — READMISSION MANAGEMENT (OUTPATIENT)
Dept: CALL CENTER | Facility: HOSPITAL | Age: 87
End: 2022-02-15

## 2022-02-15 NOTE — OUTREACH NOTE
Total Joint Month 2 Survey      Responses   [facility] patient discharged from? [place]   Does the patient have one of the following disease processes/diagnoses(primary or secondary)? Total Joint Replacement   Joint surgery performed? Knee   Month 2 attempt successful? Yes   Call start time 1138   Call end time 1139   Discharge diagnosis TOTAL KNEE ARTHROPLASTY-Right   Is the patient taking all medications as directed (includes completed medication regime)? Yes   Is the patient able to teach back alternate methods of pain control? Ice   Has the patient kept scheduled appointments due by today? Yes   Is the patient still receiving Home Health Services? No   Is the patient still attending therapy sessions(either in the home or as an outpatient)? Yes   Has the patient fallen since discharge? No   If the patient has fallen, were there any injuries? No   What is the patient's perception of their functional status since discharge? Improving   Is the patient able to teach back signs and symptoms of infection? Temp >100.4 for 24h or longer,  Increased swelling or redness around incision (not associated with surgical edema),  Severe discomfort or pain,  Incisional drainage   If the patient is a current smoker, are they able to teach back resources for cessation? Not a smoker   Is the patient/caregiver able to teach back the hierarchy of who to call/visit for symptoms/problems? PCP, Specialist, Home health nurse, Urgent Care, ED, 911 Yes   Month 2 Call Completed? Yes   Wrap up additional comments doing ok           Elke Petersen RN

## 2022-03-09 RX ORDER — AMLODIPINE BESYLATE 10 MG/1
10 TABLET ORAL DAILY
Qty: 30 TABLET | Refills: 3 | Status: SHIPPED | OUTPATIENT
Start: 2022-03-09 | End: 2022-07-05

## 2022-03-22 ENCOUNTER — READMISSION MANAGEMENT (OUTPATIENT)
Dept: CALL CENTER | Facility: HOSPITAL | Age: 87
End: 2022-03-22

## 2022-03-22 NOTE — OUTREACH NOTE
Total Joint Month 3 Survey    Flowsheet Row Responses   [facility] patient discharged from? [place]   Does the patient have one of the following disease processes/diagnoses(primary or secondary)? Total Joint Replacement   Joint surgery performed? Knee   Month 3 attempt successful? Yes   Call start time 1653   Call end time 1659   Discharge diagnosis TOTAL KNEE ARTHROPLASTY-Right   Is the patient taking all medications as directed (includes completed medication regime)? Yes   Has the patient kept scheduled appointments due by today? Yes   Is the patient still receiving Home Health Services? No   Is the patient still attending therapy sessions(either in the home or as an outpatient)? Yes   Has the patient fallen since discharge? No   What is the patient's perception of their functional status since discharge? Improving   Is the patient able to teach back signs and symptoms of infection? Temp >100.4 for 24h or longer   Is the patient/caregiver able to teach back the hierarchy of who to call/visit for symptoms/problems? PCP, Specialist, Home health nurse, Urgent Care, ED, 911 Yes   Additional teach back comments states going to PT 3x's/week, walks at home, states knee still warm, has discussed with ortho office, but free of signs of infection   Graduated Yes   Is the patient interested in additional calls from an ambulatory ?  NOTE:  applies to high risk patients requiring additional follow-up. No   Did the patient feel the follow up calls were helpful during their recovery period? Yes   Was the number of calls appropriate? Yes          GRICELDA ELLER - Registered Nurse

## 2022-04-11 RX ORDER — APIXABAN 2.5 MG/1
TABLET, FILM COATED ORAL
Qty: 180 TABLET | Refills: 3 | Status: SHIPPED | OUTPATIENT
Start: 2022-04-11 | End: 2023-03-01

## 2022-06-06 ENCOUNTER — CLINICAL SUPPORT NO REQUIREMENTS (OUTPATIENT)
Dept: CARDIOLOGY | Facility: CLINIC | Age: 87
End: 2022-06-06

## 2022-06-06 ENCOUNTER — OFFICE VISIT (OUTPATIENT)
Dept: CARDIOLOGY | Facility: CLINIC | Age: 87
End: 2022-06-06

## 2022-06-06 VITALS
OXYGEN SATURATION: 95 % | DIASTOLIC BLOOD PRESSURE: 73 MMHG | WEIGHT: 218.4 LBS | SYSTOLIC BLOOD PRESSURE: 110 MMHG | HEIGHT: 68 IN | BODY MASS INDEX: 33.1 KG/M2 | HEART RATE: 60 BPM

## 2022-06-06 DIAGNOSIS — I48.0 PAROXYSMAL ATRIAL FIBRILLATION: Primary | ICD-10-CM

## 2022-06-06 DIAGNOSIS — I35.0 AORTIC STENOSIS, SEVERE: Primary | ICD-10-CM

## 2022-06-06 DIAGNOSIS — I35.0 AORTIC STENOSIS, MILD: ICD-10-CM

## 2022-06-06 DIAGNOSIS — R00.1 BRADYCARDIA, SINUS: ICD-10-CM

## 2022-06-06 PROCEDURE — 93280 PM DEVICE PROGR EVAL DUAL: CPT | Performed by: INTERNAL MEDICINE

## 2022-06-06 PROCEDURE — 99214 OFFICE O/P EST MOD 30 MIN: CPT | Performed by: NURSE PRACTITIONER

## 2022-06-06 NOTE — PROGRESS NOTES
Date of Office Visit: 22  Encounter Provider: BUNNY Solitario  Place of Service: [facility] CARDIOLOGY  Patient Name: Guero Garay  :1935    Chief Complaint   Patient presents with   • Follow-up   • Coronary Artery Disease   • Congestive Heart Failure   :     HPI: Guero Garay is a 87 y.o. male  with coronary artery disease, atrial fibrillation, hypertension, aortic stenosis, chronic diastolic heart failure, asthma, hyperlipidemia, chronic kidney disease, and history of prostate cancer.   He was followed by Dr. Galan and is now followed by Dr. Preston Gutierrez. . I will visit with him in follow up today and have reviewed his medical record.      In 2017 he presented with progressive angina.  He had a perfusion stress test which showed a large area of anterior septal ischemia.  Echocardiogram showed mild aortic stenosis.  He underwent cardiac catheterization on 2017 and was found to have preserved left ventricular ejection fraction, severe disease of the mid LAD and right coronary artery.  He had a 4.0 x 15 mm drug-eluting stent placed in the LAD and a 4.0 x 28 mm drug-eluting stent dilated to 4.5 mm in the right coronary artery.  He was hypokalemic and was started on potassium.  He was also started on rosuvastatin.     He then presented in 2017 with dizziness and syncope as well as bradycardia.  He ended up having a permanent pacemaker implanted.  He also had a tick bite but was negative for [place] spotted fever.      By 2018 she presented as his pacemaker interrogation showed that he was having episodes of atrial fibrillation fairly prolonged acute thallium 10.6% burden.  He also had some shortness of breath and was scheduled for outpatient perfusion stress test.  Echocardiogram on 8/10/2018 showed normal left ventricular systolic function with a small LV cavity, mild concentric hypertrophy, mild calcification of the aortic valve  amount aortic valve stenosis.  His renal function and liver function studies were abnormal four days prior.  His blood pressure was low in the office visit on 8/10/2018 and 84/60 and he was arranged for admission to the hospital. VQ scan was showed no findings suggestive of PE. He was found to have urinary retention and required Tran catheter as well as urology following.  He had recently been treated for urosepsis and acute pyelonephritis.  CT of the abdomen was normal as well as ultrasound of the liver which was normal.  Elevated liver enzymes were felt to be from antibiotic patient was recently on.  He had a heme positive stool and was to have an outpatient colonoscopy. Perfusion stress test  8/17/2018 showed no evidence of ischemia.  Repeat stress 12/2019 showed small and mildly severe area of ischemia in the apex. Follow up echo 06/2020 showed EF 50%, mild LVH, grade 1 diastolic dysfunction, moderate to severe aortic valve stenosis.  Cardiac catheterization showed Patent stent within the proximal LAD and proximal right coronary artery with no significant in-stent restenosis otherwise luminal irregularities throughout, Mild aortic stenosis with a peak to peak gradient of 4 mmHg across the aortic valve, and Normal left ventricular filling pressures of 50 mmHg. Repeat echo 06/2021 showed normal LVEF, mild aortic valve stenosis, and normal RVSP.    He presents today for reassessment.  He had his knee done about 6 months ago and still has some limitation.  He reportedly compensates with his left knee.  He has shortness of breath with steps such as not new.  This is just mild on occasion.  He denies chest pain or worsening edema.  He denies palpitations.  He has no bleeding issues such as blood in the urine or stool.  He bruises easily.          Allergies   Allergen Reactions   • Sulfa Antibiotics Other (See Comments)     UNKNOWN           Family and social history reviewed.     Review of Systems   Respiratory:  "Positive for wheezing.    Hematologic/Lymphatic: Bruises/bleeds easily.   Musculoskeletal: Positive for joint pain.     All other systems were reviewed and are negative          Objective:     Vitals:    06/06/22 0910   BP: 110/73   BP Location: Left arm   Patient Position: Sitting   Cuff Size: Adult   Pulse: 60   SpO2: 95%   Weight: 99.1 kg (218 lb 6.4 oz)   Height: 172.7 cm (68\")     Body mass index is 33.21 kg/m².    PHYSICAL EXAM:  Cardiovascular:      Murmurs: There is a grade 2/6 systolic murmur at the URSB.   Edema:     Pretibial: bilateral 1+ edema of the pretibial area.        Procedures      Current Outpatient Medications   Medication Sig Dispense Refill   • acetaminophen (TYLENOL) 500 MG tablet Take 500 mg by mouth Every Night.     • allopurinol (ZYLOPRIM) 100 MG tablet TAKE 1 TABLET BY MOUTH DAILY (Patient taking differently: Take 100 mg by mouth Every Night.) 30 tablet 0   • amLODIPine (NORVASC) 10 MG tablet TAKE 1 TABLET BY MOUTH DAILY 30 tablet 3   • aspirin 81 MG EC tablet Take 81 mg by mouth Daily. PT TO STOP PER MD INSTRUCTION     • cetirizine (zyrTEC) 10 MG tablet Take 10 mg by mouth Daily As Needed.     • Chlorhexidine Gluconate (HIBICLENS EX) Apply 1 each topically Take As Directed. AS DIRECTED PREOP     • Cranberry 250 MG capsule Take 250 mg by mouth Daily.     • Eliquis 2.5 MG tablet tablet TAKE 1 TABLET BY MOUTH EVERY 12 HOURS 180 tablet 3   • Fluticasone Furoate-Vilanterol (BREO ELLIPTA) 100-25 MCG/INH inhaler Inhale 1 puff Every Morning.     • furosemide (LASIX) 20 MG tablet TAKE 1 TABLET BY MOUTH DAILY 90 tablet 3   • melatonin 5 MG tablet tablet Take 5 mg by mouth At Night As Needed.     • metoprolol tartrate (LOPRESSOR) 50 MG tablet Take 1 tablet by mouth 2 (Two) Times a Day. 180 tablet 3   • Multiple Vitamins-Minerals (MULTIVITAMIN WITH MINERALS) tablet tablet Take 1 tablet by mouth Daily.     • mupirocin (BACTROBAN) 2 % ointment Apply 1 application topically to the appropriate area as " directed Take As Directed. AS DIRECTED PREOP     • oxyCODONE-acetaminophen (PERCOCET) 5-325 MG per tablet Take 1 tablet by mouth Every 4 (Four) Hours As Needed for Severe Pain . 50 tablet 0   • potassium chloride (MICRO-K) 10 MEQ CR capsule Take 1 capsule by mouth 2 (Two) Times a Day. 180 capsule 3   • rosuvastatin (CRESTOR) 20 MG tablet Take 1 tablet by mouth Daily. 90 tablet 3   • Testosterone Cypionate (DEPOTESTOTERONE CYPIONATE) 200 MG/ML injection 1 mL 1 (One) Time Per Week.  1   • topiramate (TOPAMAX) 50 MG tablet Take 50 mg by mouth 2 (Two) Times a Day.     • pramipexole (Mirapex) 0.25 MG tablet Take 1 tablet by mouth Every Night for 180 days. Take 1 hour prior to bed-time. (Patient taking differently: Take 0.25 mg by mouth Daily.) 30 tablet 5     No current facility-administered medications for this visit.     Assessment:       Diagnosis Plan   1. Aortic stenosis, severe     2. Aortic stenosis, mild  Adult Transthoracic Echo Complete W/ Cont if Necessary Per Protocol        Orders Placed This Encounter   Procedures   • Adult Transthoracic Echo Complete W/ Cont if Necessary Per Protocol     Standing Status:   Future     Standing Expiration Date:   6/6/2023     Order Specific Question:   Reason for exam?     Answer:   Valvular Function     Order Specific Question:   Reason for exam?     Answer:   Dyspnea     Order Specific Question:   Valvular Function specification?     Answer:   Native Valvular Stenosis     Order Specific Question:   Valvular stenosis severity?     Answer:   Mild     Comments:   AORTIC STENOSIS          Plan:       1. 87 year old gentleman with paroxysmal atrial fibrillation. CHADS2-VASc score is 5 .  Anticoagulated with Eliquis  2.  Chronic diastolic congestive heart failure. Appears euvolemic.     3.  Coronary artery disease status post drug-eluting stent placement to the LAD and right coronary artery in April 2017.repeat cath 07/2020 showed patent stents, no significant restenosis and  luminal irregularities throughout. No angina  4.   Hypertension his blood pressure appears well controlled   5. History of prostate cancer-follows with urology  6.  Hyperlipidemia on target dose rosuvastatin  7.  History of symptomatic bradycardia and syncope status post permanent pacemaker interrogated today and functioning well   8. Aortic stenosis mild on cath 07/2020 and still mild on 06/2021. He will have repeat echo in 6 months when he returns   9. CKD- cr 1.49 12/2021  10. RAQUEL on therapy     Call with new or worsening symptoms            It has been a pleasure to participate in this patient's care.      Thank you,  BUNNY Solitario      **I used Dragon to dictate this note:**

## 2022-06-07 ENCOUNTER — HOSPITAL ENCOUNTER (OUTPATIENT)
Dept: PHYSICAL THERAPY | Facility: HOSPITAL | Age: 87
Setting detail: THERAPIES SERIES
Discharge: HOME | End: 2022-06-07

## 2022-06-07 DIAGNOSIS — Z96.651 S/P TOTAL KNEE ARTHROPLASTY, RIGHT: Primary | ICD-10-CM

## 2022-06-07 PROCEDURE — 97110 THERAPEUTIC EXERCISES: CPT | Performed by: PHYSICAL THERAPIST

## 2022-06-07 PROCEDURE — 97161 PT EVAL LOW COMPLEX 20 MIN: CPT | Performed by: PHYSICAL THERAPIST

## 2022-06-07 NOTE — THERAPY EVALUATION
Outpatient Physical Therapy Ortho Initial Evaluation  MITCHELL Zamora     Patient Name: Guero Garay  : 1935  MRN: [id number]  Today's Date: 2022      Visit Date: 2022    Patient Active Problem List   Diagnosis   • Gout   • Hypertension   • Hyperlipidemia   • Osteoarthrosis   • History of prostate cancer   • Tinnitus   • Benign neoplasm of colon   • Osteoarthritis of right shoulder   • Coronary artery disease involving native coronary artery of native heart with unstable angina pectoris (HCC)   • Syncopal episodes   • Screening for colon cancer   • Pyelonephritis, acute   • Acute kidney injury superimposed on chronic kidney disease (HCC)   • Dyspnea on exertion   • Generalized weakness   • Elevated d-dimer   • Dehydration   • Mild persistent asthma without complication   • Heme positive stool   • Elevated LFTs   • Alcohol use   • Urine retention   • Chronic diastolic congestive heart failure (HCC)   • Encounter for screening for malignant neoplasm of colon   • Personal history of colonic polyps   • Family history of esophageal cancer   • RAQUEL (obstructive sleep apnea)   • Sleep-related hypoxia   • Hypersomnia due to medical condition   • Total knee replacement status        Past Medical History:   Diagnosis Date   • Acute kidney injury superimposed on chronic kidney disease (HCC)    • Aortic stenosis    • Arthralgia    • Asthma     mild persistent without complication   • CAD (coronary artery disease), native coronary artery    • Cancer (HCC)     Prostate   • Chest pain, unspecified    • Chronic diastolic congestive heart failure (HCC)    • Colon polyp    • Dyspnea on exertion    • Erectile dysfunction    • Essential hypertension    • Generalized weakness    • Gout    • History of prostate cancer    • Hyperkalemia    • Jaw pain     both sides   • Mixed hyperlipidemia    • RAQUEL (obstructive sleep apnea) 2021    Home sleep study.  Weight 216 pounds.  Moderate obstructive sleep apnea with AHI  26.5 events per hour.  Low oxygen saturation 79% and sleep-related hypoxia present for 157 minutes.  The patient snored 45.6% of total monitoring time.   • Osteoarthrosis    • PAF (paroxysmal atrial fibrillation) ([facility])    • Shoulder pain    • SOB (shortness of breath)    • SSS (sick sinus syndrome) ([facility])    • Tinnitus    • Urine retention         Past Surgical History:   Procedure Laterality Date   • CARDIAC CATHETERIZATION N/A 4/27/2017    Procedure: Coronary angiography;  Surgeon: Marvel Brito MD;  Location: [facility] CATH INVASIVE LOCATION;  Service:    • CARDIAC CATHETERIZATION N/A 4/27/2017    Procedure: Left Heart Cath;  Surgeon: Marvel Brito MD;  Location: [facility] CATH INVASIVE LOCATION;  Service:    • CARDIAC CATHETERIZATION N/A 4/27/2017    Procedure: Stent GIN coronary;  Surgeon: Marvel Brito MD;  Location: [facility] INVASIVE LOCATION;  Service:    • CARDIAC CATHETERIZATION N/A 6/30/2020    Procedure: Coronary angiography;  Surgeon: Jared Sellers MD;  Location: [facility] INVASIVE LOCATION;  Service: Cardiovascular;  Laterality: N/A;   • CARDIAC CATHETERIZATION N/A 6/30/2020    Procedure: Left Heart Cath;  Surgeon: Jared Sellers MD;  Location: [facility] CATH INVASIVE LOCATION;  Service: Cardiovascular;  Laterality: N/A;   • CARDIAC ELECTROPHYSIOLOGY PROCEDURE N/A 6/15/2017    Procedure: Pacemaker DC new   [employer];  Surgeon: Gustavo Valladares MD;  Location: [facility] INVASIVE LOCATION;  Service:    • COLONOSCOPY  2013   • COLONOSCOPY N/A 12/12/2018    Procedure: COLONOSCOPY with polypectomy;  Surgeon: Kwadwo Powers MD;  Location: [facility];  Service: Gastroenterology   • CORONARY ANGIOPLASTY WITH STENT PLACEMENT     • INGUINAL HERNIA REPAIR Right    • JOINT REPLACEMENT      left knee, left and right shoulder   • NECK SURGERY      SPURS   • [place] RECONSTR TOTAL SHOULDER IMPLANT Right 2/16/2017    Procedure: RIGHT TOTAL SHOULDER ARTHROPLASTY;  Surgeon: Marquez Galvan MD;   Location: [facility];  Service: Orthopedics   • PROSTATECTOMY     • REPLACEMENT TOTAL KNEE Left    • TONSILLECTOMY     • TOTAL KNEE ARTHROPLASTY Right 12/14/2021    Procedure: RIGHT TOTAL KNEE ARTHROPLASTY;  Surgeon: Jace Altamirano II, MD;  Location: [facility] OR;  Service: Orthopedics;  Laterality: Right;   • TOTAL SHOULDER REPLACEMENT Bilateral        Visit Dx:     ICD-10-CM ICD-9-CM   1. S/P total knee arthroplasty, right  Z96.651 V43.65          Patient History     Row Name 06/07/22 0815 06/06/22 0926          History    Chief Complaint Difficulty Walking;Difficulty with daily activities;Pain  -GC --     Type of Pain Knee pain  right  -GC Knee pain  -GC (r) patient (t)     Brief Description of Current Complaint Pt has a several year hsitory of right knee pain that did not respond to conservative care. He underwent a right TKA 12/14/2021. He had home health and outpatient therapy both before going to [place]. He says he continued his rehab while in [place] and he says he was doing well. He then returned home here and his knee has begun to hurt more, especially when going up/down stairs. He followed up with Dr. Altamirano who has referred him back to therapy.  -GC --     Patient/Caregiver Goals Relieve pain;Improve mobility;Improve strength  -GC Relieve pain;Improve strength  -GC (r) patient (t)     Hand Dominance right-handed  -GC right-handed  -GC (r) patient (t)     Occupation/sports/leisure activities pt enjoys fishing and yard work  -GC --     What clinical tests have you had for this problem? X-ray  -GC --            Pain     Pain Location Knee  right  -GC --     Pain at Present 3  -GC --     Pain at Best 0  -GC --     Pain at Worst 6;7  -GC --     Pain Frequency Intermittent  -GC --     Pain Description Aching;Discomfort;Sore;Tender  -GC --     What Performance Factors Make the Current Problem(s) WORSE? Pt c/o pain with walking, going up/down stairs  -GC --     What Performance Factors Make the  Current Problem(s) BETTER? Pt has no pain at rest  -GC --     Difficulties with ADL's? Pt has difficulty being on his feet, walking  -GC --            Fall Risk Assessment    Any falls in the past year: -- No  -GC (r) patient (t)            Services    Are you currently receiving Home Health services -- No  -GC (r) patient (t)     Do you plan to receive Home Health services in the near future -- No  -GC (r) patient (t)            Daily Activities    Primary Language English  -GC English  -GC (r) patient (t)     How does patient learn best? Listening;Demonstration  -GC Listening;Demonstration  -GC (r) patient (t)     Teaching needs identified Home Exercise Program;Management of Condition  -GC --     Patient is concerned about/has problems with Difficulty with self care (i.e. bathing, dressing, toileting:;Flexibility;Performing home management (household chores, shopping, care of dependents);Standing;Walking  -GC --     Does patient have problems with the following? None  -GC --     Barriers to learning None  -GC --     Functional Status mobility issues preventing performance of daily activities  -GC --     Pt Participated in POC and Goals Yes  -GC --            Safety    Are you being hurt, hit, or frightened by anyone at home or in your life? No  -GC No  -GC (r) patient (t)     Are you being neglected by a caregiver No  -GC No  -GC (r) patient (t)     Have you had any of the following issues with -- N/A  -GC (r) patient (t)           User Key  (r) = Recorded By, (t) = Taken By, (c) = Cosigned By    Initials Name Provider Type    GC Sudhir Sanderson, PT Physical Therapist    patient Guero Garay --                 PT Ortho     Row Name 06/07/22 8543       Posture/Observations    Posture/Observations Comments Pt has mild edema right knee. He has mild VMO atrophy noted. There is considerable PF crepitation with LAQ movement  -GC       Patellar Accessory Motions    Superior glide Right:;WNL  -GC    Inferior glide  Right:;WNL  -GC    Medial glide Right:;WNL  -GC    Lateral glide Right:;WNL  -GC       Right Lower Ext    Rt Knee Extension/Flexion AROM 0-4-126 degrees  -GC       MMT Right Lower Ext    Rt Hip Flexion MMT, Gross Movement (4+/5) good plus  -GC    Rt Hip Extension MMT, Gross Movement (4+/5) good plus  -GC    Rt Hip ABduction MMT, Gross Movement (5/5) normal  -GC    Rt Hip ADduction MMT, Gross Movement (4+/5) good plus  -GC    Rt Knee Extension MMT, Gross Movement (4/5) good  -GC    Rt Knee Flexion MMT, Gross Movement (4+/5) good plus  -GC    Rt Ankle Plantarflexion MMT, Gross Movement (5/5) normal  -GC    Rt Ankle Dorsiflexion MMT, Gross Movement (5/5) normal  -GC       Lower Extremity Flexibility    Hamstrings Right:;Mildly limited  -GC    Quadriceps Right:;WNL  -GC    Gastrocnemius Right:;WNL  -GC       Transfers    Comment, (Transfers) Pt is independent with all bed mobility and trasnfers  -GC       Gait/Stairs (Locomotion)    Comment, (Gait/Stairs) Pt ambulates with mild antalgic gait right LE  -GC          User Key  (r) = Recorded By, (t) = Taken By, (c) = Cosigned By    Initials Name Provider Type    Sudhir Day PT Physical Therapist                            Therapy Education  Given: HEP, Symptoms/condition management, Pain management  Program: New  How Provided: Verbal, Demonstration, Written  Provided to: Patient  Level of Understanding: Teach back education performed, Verbalized, Demonstrated      PT OP Goals     Row Name 06/07/22 0815          PT Short Term Goals    STG Date to Achieve 06/21/22  -     STG 1 Decrease right knee pain to 2-3/10 with activity.  -     STG 2 Increase right hamstring flexibility to WFL with testing.  -     STG 3 Increase right knee EXt ROM to 0 degrees with testing.  -     STG 4 Pt will be independent with his HEP issued by this therapist.  -            Long Term Goals    LTG Date to Achieve 07/05/22  -     LTG 1 Decrease right knee pain to 0-1/10 with  activity.  -     LTG 2 Increase right LE strength to 5/5 all planes with testing.  -     LTG 3 Pt will be indepednent with all ADLs without pain.  -            Time Calculation    PT Goal Re-Cert Due Date 07/05/22  -           User Key  (r) = Recorded By, (t) = Taken By, (c) = Cosigned By    Initials Name Provider Type     Sudhir Sanderson, PT Physical Therapist                 PT Assessment/Plan     Row Name 06/07/22 0815          PT Assessment    Functional Limitations Limitation in home management;Limitations in community activities;Limitations in functional capacity and performance;Performance in leisure activities  -     Impairments Range of motion;Pain;Muscle strength;Impaired flexibility  -     Assessment Comments Pt is now approximately 6 months s/p right TKA. He went through the initial rehabilitation and did fairly well, but recently he has experienced increased knee pain with walking, especially on stairs. He rates this pain up to 6-7/10 at times. He has decreased right knee EXT ROM, decreased tight LE strength, decreased right hamstring flexibility, and decreased function secodnary to the above.  -     Rehab Potential Good  -     Patient/caregiver participated in establishment of treatment plan and goals Yes  -     Patient would benefit from skilled therapy intervention Yes  -            PT Plan    PT Frequency 1x/week;2x/week  -     Predicted Duration of Therapy Intervention (PT) 4 weeks  -     Planned CPT's? PT EVAL LOW COMPLEXITY: 91717;PT THER PROC EA 15 MIN: 38410  -     PT Plan Comments Pt is to continue his HEP 2x daily.  -           User Key  (r) = Recorded By, (t) = Taken By, (c) = Cosigned By    Initials Name Provider Type     Sudhir Sanderson, PT Physical Therapist                   OP Exercises     Row Name 06/07/22 0815             Exercise 1    Exercise Name 1 Hamstring stretch  -      Cueing 1 Verbal;Tactile  -      Reps 1 10  -      Time 1 10 secs  -               Exercise 2    Exercise Name 2 QS with Russian Stim  -GC      Cueing 2 Verbal;Tactile  -GC      Time 2 10 min 10/10  -GC              Exercise 3    Exercise Name 3 SAQ  -GC      Cueing 3 Verbal;Tactile  -GC      Reps 3 50  -GC              Exercise 4    Exercise Name 4 LAQ  -GC      Cueing 4 Verbal;Tactile  -GC      Reps 4 50  -GC              Exercise 5    Exercise Name 5 TKE vs theraband  -GC      Cueing 5 Verbal;Demo  -GC      Reps 5 50  -GC      Time 5 silver  -GC            User Key  (r) = Recorded By, (t) = Taken By, (c) = Cosigned By    Initials Name Provider Type    GC Sudhir Sanderson, PT Physical Therapist                                        Time Calculation:     Start Time: 0815  Stop Time: 0902  Time Calculation (min): 47 min     Therapy Charges for Today     Code Description Service Date Service Provider Modifiers Qty    54436696643 HC PT EVAL LOW COMPLEXITY 2 6/7/2022 Sudhir Sanderson, PT GP 1    07306955690 HC PT THER PROC EA 15 MIN 6/7/2022 Sudhir Sanderson, PT GP 1                    Sudhir Sanderson PT  6/7/2022

## 2022-06-08 ENCOUNTER — OFFICE VISIT (OUTPATIENT)
Dept: SLEEP MEDICINE | Facility: HOSPITAL | Age: 87
End: 2022-06-08

## 2022-06-08 VITALS
BODY MASS INDEX: 33.59 KG/M2 | DIASTOLIC BLOOD PRESSURE: 79 MMHG | OXYGEN SATURATION: 96 % | HEIGHT: 67 IN | SYSTOLIC BLOOD PRESSURE: 123 MMHG | WEIGHT: 214 LBS | HEART RATE: 68 BPM

## 2022-06-08 DIAGNOSIS — E66.9 OBESITY (BMI 30-39.9): ICD-10-CM

## 2022-06-08 DIAGNOSIS — G47.61 PERIODIC LIMB MOVEMENT DISORDER (PLMD): ICD-10-CM

## 2022-06-08 DIAGNOSIS — G47.33 OBSTRUCTIVE SLEEP APNEA: Primary | ICD-10-CM

## 2022-06-08 PROCEDURE — G0463 HOSPITAL OUTPT CLINIC VISIT: HCPCS

## 2022-06-08 RX ORDER — PRAMIPEXOLE DIHYDROCHLORIDE 0.25 MG/1
0.25 TABLET ORAL NIGHTLY
Qty: 30 TABLET | Refills: 5 | Status: SHIPPED | OUTPATIENT
Start: 2022-06-08 | End: 2022-12-14 | Stop reason: ALTCHOICE

## 2022-06-08 NOTE — PROGRESS NOTES
[facility]  Telephone: [phone] / Fax: [phone] [place]  Telephone: [phone] / Fax: [phone] Elena Ritchie    PCP: Kwadwo Dunham MD    Reason for visit: RAQUEL f/u    Guero Garay is a 87 y.o.male  was last seen at [facility] sleep lab 6 months ago. He had interval right knee replacement. He is unable to sleep well through the night due to pain/restlessness. He has Oxycodone prescribed for pain. It helps him sleep. He uses FFM and it fits well. He has not been replacing the mask cushion in regular intervals. He washes the mask/hose weekly. He takes the Pramipexole right at bedtime. He does not drink caffeine. His sleep schedule is 11:30pm-8am. ESS is 13.    SH- no tobacco, 14-18 alc per week, 0 caffeine    ROS- +SOA, +wheezing, rest is negative.    DME [employer]    Current Medications:    Current Outpatient Medications:   •  acetaminophen (TYLENOL) 500 MG tablet, Take 500 mg by mouth Every Night., Disp: , Rfl:   •  allopurinol (ZYLOPRIM) 100 MG tablet, TAKE 1 TABLET BY MOUTH DAILY (Patient taking differently: Take 100 mg by mouth Every Night.), Disp: 30 tablet, Rfl: 0  •  amLODIPine (NORVASC) 10 MG tablet, TAKE 1 TABLET BY MOUTH DAILY, Disp: 30 tablet, Rfl: 3  •  aspirin 81 MG EC tablet, Take 81 mg by mouth Daily. PT TO STOP PER MD INSTRUCTION, Disp: , Rfl:   •  cetirizine (zyrTEC) 10 MG tablet, Take 10 mg by mouth Daily As Needed., Disp: , Rfl:   •  Chlorhexidine Gluconate (HIBICLENS EX), Apply 1 each topically Take As Directed. AS DIRECTED PREOP, Disp: , Rfl:   •  Cranberry 250 MG capsule, Take 250 mg by mouth Daily., Disp: , Rfl:   •  Eliquis 2.5 MG tablet tablet, TAKE 1 TABLET BY MOUTH EVERY 12 HOURS, Disp: 180 tablet, Rfl: 3  •  Fluticasone Furoate-Vilanterol (BREO ELLIPTA) 100-25 MCG/INH inhaler, Inhale 1 puff Every Morning., Disp: , Rfl:   •  furosemide (LASIX) 20 MG tablet, TAKE 1 TABLET BY MOUTH DAILY, Disp: 90 tablet, Rfl: 3  •  melatonin 5 MG tablet tablet, Take  5 mg by mouth At Night As Needed., Disp: , Rfl:   •  metoprolol tartrate (LOPRESSOR) 50 MG tablet, Take 1 tablet by mouth 2 (Two) Times a Day., Disp: 180 tablet, Rfl: 3  •  Multiple Vitamins-Minerals (MULTIVITAMIN WITH MINERALS) tablet tablet, Take 1 tablet by mouth Daily., Disp: , Rfl:   •  mupirocin (BACTROBAN) 2 % ointment, Apply 1 application topically to the appropriate area as directed Take As Directed. AS DIRECTED PREOP, Disp: , Rfl:   •  oxyCODONE-acetaminophen (PERCOCET) 5-325 MG per tablet, Take 1 tablet by mouth Every 4 (Four) Hours As Needed for Severe Pain ., Disp: 50 tablet, Rfl: 0  •  potassium chloride (MICRO-K) 10 MEQ CR capsule, Take 1 capsule by mouth 2 (Two) Times a Day., Disp: 180 capsule, Rfl: 3  •  pramipexole (Mirapex) 0.25 MG tablet, Take 1 tablet by mouth Every Night for 180 days. Take 1 hour prior to bed-time. (Patient taking differently: Take 0.25 mg by mouth Daily.), Disp: 30 tablet, Rfl: 5  •  rosuvastatin (CRESTOR) 20 MG tablet, Take 1 tablet by mouth Daily., Disp: 90 tablet, Rfl: 3  •  Testosterone Cypionate (DEPOTESTOTERONE CYPIONATE) 200 MG/ML injection, 1 mL 1 (One) Time Per Week., Disp: , Rfl: 1  •  topiramate (TOPAMAX) 50 MG tablet, Take 50 mg by mouth 2 (Two) Times a Day., Disp: , Rfl:    also entered in Sleep Questionnaire    Patient  has a past medical history of Acute kidney injury superimposed on chronic kidney disease (HCC), Aortic stenosis, Arthralgia, Asthma, CAD (coronary artery disease), native coronary artery, Cancer ([facility]), Chest pain, unspecified, Chronic diastolic congestive heart failure (HCC), Colon polyp, Dyspnea on exertion, Erectile dysfunction, Essential hypertension, Generalized weakness, Gout, History of prostate cancer, Hyperkalemia, Jaw pain, Mixed hyperlipidemia, RAQUEL (obstructive sleep apnea) (07/19/2021), Osteoarthrosis, PAF (paroxysmal atrial fibrillation) ([facility]), Shoulder pain, SOB (shortness of breath), SSS (sick sinus syndrome) ([facility]), Tinnitus, and  "Urine retention.    I have reviewed the Past Medical History, Past Surgical History, Social History and Family History.    Vital Signs /79 (BP Location: Right arm, Patient Position: Sitting)   Pulse 68   Ht 170.2 cm (67\")   Wt 97.1 kg (214 lb)   SpO2 96%   BMI 33.52 kg/m²  Body mass index is 33.52 kg/m².    General Alert and oriented. No acute distress noted   Pharynx/Throat Class IV  Mallampati airway, large tongue, no evidence of redundant lateral pharyngeal tissue. No oral lesions. No thrush. Moist mucous membranes.   Head Normocephalic. Symmetrical. Atraumatic.    Nose No septal deviation. No drainage   Chest Wall Normal shape. Symmetric expansion with respiration. No tenderness.   Neck Trachea midline, no thyromegaly or adenopathy    Lungs Clear to auscultation bilaterally. No wheezes. No rhonchi. No rales. Respirations regular, even and unlabored.   Heart Regular rhythm and normal rate. Normal S1 and S2. No murmur   Abdomen Soft, non-tender and non-distended. Normal bowel sounds. No masses.   Extremities Moves all extremities well. No edema   Psychiatric Normal mood and affect.     Testing:  · Download 3/9/22-6/6/22 92% use with average nightly use of 5 hours and 39 min on auto CPAP 10-16cm H2O, AHI 4.9, leak of 28 L/min.    Diagnostic data available to date is as below and was reviewed on current visit:  7/21/21: The patient had 86 obstructive events and 173 partial obstructive events. AHI for total monitoring time is moderately abnormal at 26.5 events per hour.    9/30/21  Overnight titration study from 10:44 PM to 4:57 AM.  Sleep efficiency 92% with 5.7 hours total sleep time.  Sleep distribution shows absence of slow-wave sleep.  AHI index 4.4.  There was 219 minutes of supine sleep and 59 minutes of REM sleep.  Oxygen saturation remained above 88%.  Arousal index 13.  PLM index extremely high at 145 with PLM arousal index 8.2.  CPAP increased from 7 to 19 cm of water pressure.  Control of " apneas hypopneas was achieved at 19 cmH2O pressure with AHI of 2.3.  No significant desats with adequately controlled sleep apnea.  Patient did achieve some supine sleep as well as supine REM sleep.    Impression:  1. Obstructive sleep apnea    2. Obesity (BMI 30-39.9)    3. Periodic limb movement disorder (PLMD)          Plan:  Order supplies replacement through [employer]. His mask cushion is old and mask leaks excessively. He will remain on the same pressures-10-16cm H2O on the CPAP. Machine works well. He will continue the Pramipexole 0.25mg qhs. I advised him to take Pramipexole 2-3 hr prior to bedtime.     Weight loss will be strongly beneficial to reduce the severity of sleep-disordered breathing.  Caution during activities that require prolonged concentration is strongly advised if sleepiness returns.       Follow up with Dr. Ivy in one year    Thank you for allowing me to participate in your patient's care.      BUNNY Castro  [facility]  Phone: [phone]      Part of this note may be an electronic transcription/translation of spoken language to printed text using the Dragon Dictation System.

## 2022-06-11 DIAGNOSIS — I10 ESSENTIAL HYPERTENSION: ICD-10-CM

## 2022-06-13 RX ORDER — POTASSIUM CHLORIDE 750 MG/1
CAPSULE, EXTENDED RELEASE ORAL
Qty: 180 CAPSULE | Refills: 0 | Status: SHIPPED | OUTPATIENT
Start: 2022-06-13 | End: 2022-07-06

## 2022-06-14 ENCOUNTER — HOSPITAL ENCOUNTER (OUTPATIENT)
Dept: PHYSICAL THERAPY | Facility: HOSPITAL | Age: 87
Setting detail: THERAPIES SERIES
Discharge: HOME | End: 2022-06-14

## 2022-06-14 DIAGNOSIS — Z96.651 S/P TOTAL KNEE ARTHROPLASTY, RIGHT: Primary | ICD-10-CM

## 2022-06-14 PROCEDURE — 97110 THERAPEUTIC EXERCISES: CPT | Performed by: PHYSICAL THERAPIST

## 2022-06-14 NOTE — THERAPY TREATMENT NOTE
Outpatient Physical Therapy Ortho Treatment Note  MITCHELL Zamora     Patient Name: Guero Garay  : 1935  MRN: [id number]  Today's Date: 2022      Visit Date: 2022    Visit Dx:    ICD-10-CM ICD-9-CM   1. S/P total knee arthroplasty, right  Z96.651 V43.65       Patient Active Problem List   Diagnosis   • Gout   • Hypertension   • Hyperlipidemia   • Osteoarthrosis   • History of prostate cancer   • Tinnitus   • Benign neoplasm of colon   • Osteoarthritis of right shoulder   • Coronary artery disease involving native coronary artery of native heart with unstable angina pectoris (HCC)   • Syncopal episodes   • Screening for colon cancer   • Pyelonephritis, acute   • Acute kidney injury superimposed on chronic kidney disease (HCC)   • Dyspnea on exertion   • Generalized weakness   • Elevated d-dimer   • Dehydration   • Mild persistent asthma without complication   • Heme positive stool   • Elevated LFTs   • Alcohol use   • Urine retention   • Chronic diastolic congestive heart failure (HCC)   • Encounter for screening for malignant neoplasm of colon   • Personal history of colonic polyps   • Family history of esophageal cancer   • RAQUEL (obstructive sleep apnea)   • Sleep-related hypoxia   • Hypersomnia due to medical condition   • Total knee replacement status        Past Medical History:   Diagnosis Date   • Acute kidney injury superimposed on chronic kidney disease (HCC)    • Aortic stenosis    • Arthralgia    • Asthma     mild persistent without complication   • CAD (coronary artery disease), native coronary artery    • Cancer (HCC)     Prostate   • Chest pain, unspecified    • Chronic diastolic congestive heart failure (HCC)    • Colon polyp    • Dyspnea on exertion    • Erectile dysfunction    • Essential hypertension    • Generalized weakness    • Gout    • History of prostate cancer    • Hyperkalemia    • Jaw pain     both sides   • Mixed hyperlipidemia    • RAQUEL (obstructive sleep apnea)  07/19/2021    Home sleep study.  Weight 216 pounds.  Moderate obstructive sleep apnea with AHI 26.5 events per hour.  Low oxygen saturation 79% and sleep-related hypoxia present for 157 minutes.  The patient snored 45.6% of total monitoring time.   • Osteoarthrosis    • PAF (paroxysmal atrial fibrillation) ([facility])    • Shoulder pain    • SOB (shortness of breath)    • SSS (sick sinus syndrome) ([facility])    • Tinnitus    • Urine retention         Past Surgical History:   Procedure Laterality Date   • CARDIAC CATHETERIZATION N/A 4/27/2017    Procedure: Coronary angiography;  Surgeon: Marvel Brito MD;  Location: [facility] CATH INVASIVE LOCATION;  Service:    • CARDIAC CATHETERIZATION N/A 4/27/2017    Procedure: Left Heart Cath;  Surgeon: Marvel Brito MD;  Location: [facility] CATH INVASIVE LOCATION;  Service:    • CARDIAC CATHETERIZATION N/A 4/27/2017    Procedure: Stent GIN coronary;  Surgeon: Marvel Brito MD;  Location: [facility] CATH INVASIVE LOCATION;  Service:    • CARDIAC CATHETERIZATION N/A 6/30/2020    Procedure: Coronary angiography;  Surgeon: Jared Sellers MD;  Location: [facility] CATH INVASIVE LOCATION;  Service: Cardiovascular;  Laterality: N/A;   • CARDIAC CATHETERIZATION N/A 6/30/2020    Procedure: Left Heart Cath;  Surgeon: Jared Sellers MD;  Location: [facility] INVASIVE LOCATION;  Service: Cardiovascular;  Laterality: N/A;   • CARDIAC ELECTROPHYSIOLOGY PROCEDURE N/A 6/15/2017    Procedure: Pacemaker DC new   [employer];  Surgeon: Gustavo Valladares MD;  Location: [facility] INVASIVE LOCATION;  Service:    • COLONOSCOPY  2013   • COLONOSCOPY N/A 12/12/2018    Procedure: COLONOSCOPY with polypectomy;  Surgeon: Kwawdo Powers MD;  Location: [facility];  Service: Gastroenterology   • CORONARY ANGIOPLASTY WITH STENT PLACEMENT     • INGUINAL HERNIA REPAIR Right    • JOINT REPLACEMENT      left knee, left and right shoulder   • NECK SURGERY      SPURS   • ME RECONSTR TOTAL SHOULDER IMPLANT Right  2/16/2017    Procedure: RIGHT TOTAL SHOULDER ARTHROPLASTY;  Surgeon: Marquez Galvan MD;  Location: [facility];  Service: Orthopedics   • PROSTATECTOMY     • REPLACEMENT TOTAL KNEE Left    • TONSILLECTOMY     • TOTAL KNEE ARTHROPLASTY Right 12/14/2021    Procedure: RIGHT TOTAL KNEE ARTHROPLASTY;  Surgeon: Jace Altamirano II, MD;  Location: [facility];  Service: Orthopedics;  Laterality: Right;   • TOTAL SHOULDER REPLACEMENT Bilateral                         PT Assessment/Plan     Row Name 06/14/22 1030          PT Assessment    Assessment Comments Pt tolerated his exercise progression well.  -GC            PT Plan    PT Plan Comments Pt is to continue his HEP daily. Will re-ck agian in one week.  -GC           User Key  (r) = Recorded By, (t) = Taken By, (c) = Cosigned By    Initials Name Provider Type    GC Sudhir Sanderson, PT Physical Therapist                   OP Exercises     Row Name 06/14/22 1030             Subjective Comments    Subjective Comments Pt states his knee feels about the same.  -GC              Exercise 1    Exercise Name 1 Hamstring stretch  -GC      Cueing 1 Verbal;Tactile  -GC      Reps 1 10  -GC      Time 1 10 secs  -GC              Exercise 2    Exercise Name 2 QS with Russian Stim  -GC      Cueing 2 Verbal;Tactile  -GC      Time 2 10 min 10/10  -GC              Exercise 3    Exercise Name 3 SAQ  -GC      Cueing 3 Verbal;Tactile  -GC      Reps 3 50  -GC      Time 3 2#  -GC              Exercise 4    Exercise Name 4 LAQ  -GC      Cueing 4 Verbal;Tactile  -GC      Reps 4 50  -GC      Time 4 2#  -GC              Exercise 5    Exercise Name 5 TKE vs theraband  -GC      Cueing 5 Verbal;Demo  -GC      Reps 5 50  -GC      Time 5 gold  -GC            User Key  (r) = Recorded By, (t) = Taken By, (c) = Cosigned By    Initials Name Provider Type    GC Sudhir Sanderson, PT Physical Therapist                                                Time Calculation:   Start Time: 1030  Stop Time:  1112  Time Calculation (min): 42 min  Therapy Charges for Today     Code Description Service Date Service Provider Modifiers Qty    [id number] HC PT THER PROC EA 15 MIN 6/14/2022 Sudhir Sanderson, PT GP 2                    Sudhir Sanderson, PT  6/14/2022

## 2022-06-22 ENCOUNTER — HOSPITAL ENCOUNTER (OUTPATIENT)
Dept: PHYSICAL THERAPY | Facility: HOSPITAL | Age: 87
Setting detail: THERAPIES SERIES
Discharge: HOME | End: 2022-06-22

## 2022-06-22 DIAGNOSIS — Z96.651 S/P TOTAL KNEE ARTHROPLASTY, RIGHT: Primary | ICD-10-CM

## 2022-06-22 PROCEDURE — 97110 THERAPEUTIC EXERCISES: CPT | Performed by: PHYSICAL THERAPIST

## 2022-06-22 NOTE — THERAPY TREATMENT NOTE
Outpatient Physical Therapy Ortho Treatment Note  MITCHELL Zamora     Patient Name: Guero Garay  : 1935  MRN: [id number]  Today's Date: 2022      Visit Date: 2022    Visit Dx:    ICD-10-CM ICD-9-CM   1. S/P total knee arthroplasty, right  Z96.651 V43.65       Patient Active Problem List   Diagnosis   • Gout   • Hypertension   • Hyperlipidemia   • Osteoarthrosis   • History of prostate cancer   • Tinnitus   • Benign neoplasm of colon   • Osteoarthritis of right shoulder   • Coronary artery disease involving native coronary artery of native heart with unstable angina pectoris (HCC)   • Syncopal episodes   • Screening for colon cancer   • Pyelonephritis, acute   • Acute kidney injury superimposed on chronic kidney disease (HCC)   • Dyspnea on exertion   • Generalized weakness   • Elevated d-dimer   • Dehydration   • Mild persistent asthma without complication   • Heme positive stool   • Elevated LFTs   • Alcohol use   • Urine retention   • Chronic diastolic congestive heart failure (HCC)   • Encounter for screening for malignant neoplasm of colon   • Personal history of colonic polyps   • Family history of esophageal cancer   • RAQUEL (obstructive sleep apnea)   • Sleep-related hypoxia   • Hypersomnia due to medical condition   • Total knee replacement status        Past Medical History:   Diagnosis Date   • Acute kidney injury superimposed on chronic kidney disease (HCC)    • Aortic stenosis    • Arthralgia    • Asthma     mild persistent without complication   • CAD (coronary artery disease), native coronary artery    • Cancer (HCC)     Prostate   • Chest pain, unspecified    • Chronic diastolic congestive heart failure (HCC)    • Colon polyp    • Dyspnea on exertion    • Erectile dysfunction    • Essential hypertension    • Generalized weakness    • Gout    • History of prostate cancer    • Hyperkalemia    • Jaw pain     both sides   • Mixed hyperlipidemia    • RAQUEL (obstructive sleep apnea)  07/19/2021    Home sleep study.  Weight 216 pounds.  Moderate obstructive sleep apnea with AHI 26.5 events per hour.  Low oxygen saturation 79% and sleep-related hypoxia present for 157 minutes.  The patient snored 45.6% of total monitoring time.   • Osteoarthrosis    • PAF (paroxysmal atrial fibrillation) ([facility])    • Shoulder pain    • SOB (shortness of breath)    • SSS (sick sinus syndrome) ([facility])    • Tinnitus    • Urine retention         Past Surgical History:   Procedure Laterality Date   • CARDIAC CATHETERIZATION N/A 4/27/2017    Procedure: Coronary angiography;  Surgeon: Marvel Brito MD;  Location: [facility]ASIVE LOC[facility];  Service:    • CARDIAC CATHETERIZATION N/A 4/27/2017    Procedure: Left Heart Cath;  Surgeon: Marvel Brito MD;  Location: [facility] CATH INVASIVE LOCATION;  Service:    • CARDIAC CATHETERIZATION N/A 4/27/2017    Procedure: Stent GIN coronary;  Surgeon: Marvel Brito MD;  Location: [facility] CATH INVASIVE LOCATION;  Service:    • CARDIAC CATHETERIZATION N/A 6/30/2020    Procedure: Coronary angiography;  Surgeon: Jared Sellers MD;  Location: [facility] CATH INVASIVE LOCATION;  Service: Cardiovascular;  Laterality: N/A;   • CARDIAC CATHETERIZATION N/A 6/30/2020    Procedure: Left Heart Cath;  Surgeon: Jared Sellers MD;  Location: [facility] INVASIVE LOCATION;  Service: Cardiovascular;  Laterality: N/A;   • CARDIAC ELECTROPHYSIOLOGY PROCEDURE N/A 6/15/2017    Procedure: Pacemaker DC new   [employer];  Surgeon: Gustavo Valladares MD;  Location: [facility] INVASIVE LOCATION;  Service:    • COLONOSCOPY  2013   • COLONOSCOPY N/A 12/12/2018    Procedure: COLONOSCOPY with polypectomy;  Surgeon: Kwadwo Powers MD;  Location: [facility];  Service: Gastroenterology   • CORONARY ANGIOPLASTY WITH STENT PLACEMENT     • INGUINAL HERNIA REPAIR Right    • JOINT REPLACEMENT      left knee, left and right shoulder   • NECK SURGERY      SPURS   • [facility] RECONSTR TOTAL SHOULDER IMPLANT Right  "2/16/2017    Procedure: RIGHT TOTAL SHOULDER ARTHROPLASTY;  Surgeon: Marquez Galvan MD;  Location: [facility];  Service: Orthopedics   • PROSTATECTOMY     • REPLACEMENT TOTAL KNEE Left    • TONSILLECTOMY     • TOTAL KNEE ARTHROPLASTY Right 12/14/2021    Procedure: RIGHT TOTAL KNEE ARTHROPLASTY;  Surgeon: Jace Altamirano II, MD;  [facility];  Service: Orthopedics;  Laterality: Right;   • TOTAL SHOULDER REPLACEMENT Bilateral                         PT Assessment/Plan     Row Name 06/22/22 1000          PT Assessment    Assessment Comments Pt tolerated CKC exercises well, but was unable to perform a little dip exercise due to pain.  -GC            PT Plan    PT Plan Comments Pt is to continue his HEP daily.  -GC           User Key  (r) = Recorded By, (t) = Taken By, (c) = Cosigned By    Initials Name Provider Type    Sudhir Day, PT Physical Therapist                   OP Exercises     Row Name 06/22/22 1000             Exercise 1    Exercise Name 1 Hamstring stretch  -GC      Cueing 1 Verbal;Tactile  -GC      Reps 1 10  -GC      Time 1 10 secs  -GC              Exercise 2    Exercise Name 2 QS with Russian Stim  -GC      Cueing 2 Verbal;Tactile  -GC      Time 2 10 min 10/10  -GC              Exercise 3    Exercise Name 3 SAQ  -GC      Cueing 3 Verbal;Tactile  -GC      Reps 3 50  -GC      Time 3 3#  -GC              Exercise 4    Exercise Name 4 LAQ  -GC      Cueing 4 Verbal;Tactile  -GC      Reps 4 50  -GC      Time 4 3#  -GC              Exercise 5    Exercise Name 5 TKE vs theraband  -GC      Cueing 5 Verbal;Demo  -GC      Reps 5 50  -GC      Time 5 gold  -GC              Exercise 6    Exercise Name 6 Partial squats/ball squeeze  -GC      Cueing 6 Verbal;Demo  -GC      Reps 6 30  -GC              Exercise 7    Exercise Name 7 Forward step up on 4\"step  -GC      Cueing 7 Verbal;Demo  -GC      Reps 7 10x ea  -GC            User Key  (r) = Recorded By, (t) = Taken By, (c) = Cosigned By "    Initials Name Provider Type     Sudhir Sanderson, PT Physical Therapist                                                Time Calculation:   Start Time: 1000  Stop Time: 1036  Time Calculation (min): 36 min  Therapy Charges for Today     Code Description Service Date Service Provider Modifiers Qty    20980260828  PT THER PROC EA 15 MIN 6/22/2022 Sudhir Sanderson, PT GP 1                    Sudhir Sanderson, PT  6/22/2022

## 2022-06-28 ENCOUNTER — HOSPITAL ENCOUNTER (OUTPATIENT)
Dept: PHYSICAL THERAPY | Facility: HOSPITAL | Age: 87
Setting detail: THERAPIES SERIES
Discharge: HOME | End: 2022-06-28

## 2022-06-28 DIAGNOSIS — Z96.651 S/P TOTAL KNEE ARTHROPLASTY, RIGHT: Primary | ICD-10-CM

## 2022-06-28 PROCEDURE — 97110 THERAPEUTIC EXERCISES: CPT | Performed by: PHYSICAL THERAPIST

## 2022-06-28 NOTE — THERAPY RE-EVALUATION
Outpatient Physical Therapy Ortho Re-Evaluation  MITCHELL Zamora     Patient Name: Guero Garay  : 1935  MRN: [id number]  Today's Date: 2022      Visit Date: 2022    Patient Active Problem List   Diagnosis   • Gout   • Hypertension   • Hyperlipidemia   • Osteoarthrosis   • History of prostate cancer   • Tinnitus   • Benign neoplasm of colon   • Osteoarthritis of right shoulder   • Coronary artery disease involving native coronary artery of native heart with unstable angina pectoris (HCC)   • Syncopal episodes   • Screening for colon cancer   • Pyelonephritis, acute   • Acute kidney injury superimposed on chronic kidney disease (HCC)   • Dyspnea on exertion   • Generalized weakness   • Elevated d-dimer   • Dehydration   • Mild persistent asthma without complication   • Heme positive stool   • Elevated LFTs   • Alcohol use   • Urine retention   • Chronic diastolic congestive heart failure (HCC)   • Encounter for screening for malignant neoplasm of colon   • Personal history of colonic polyps   • Family history of esophageal cancer   • RAQUEL (obstructive sleep apnea)   • Sleep-related hypoxia   • Hypersomnia due to medical condition   • Total knee replacement status        Past Medical History:   Diagnosis Date   • Acute kidney injury superimposed on chronic kidney disease (HCC)    • Aortic stenosis    • Arthralgia    • Asthma     mild persistent without complication   • CAD (coronary artery disease), native coronary artery    • Cancer (HCC)     Prostate   • Chest pain, unspecified    • Chronic diastolic congestive heart failure (HCC)    • Colon polyp    • Dyspnea on exertion    • Erectile dysfunction    • Essential hypertension    • Generalized weakness    • Gout    • History of prostate cancer    • Hyperkalemia    • Jaw pain     both sides   • Mixed hyperlipidemia    • RAQUEL (obstructive sleep apnea) 2021    Home sleep study.  Weight 216 pounds.  Moderate obstructive sleep apnea with AHI 26.5  events per hour.  Low oxygen saturation 79% and sleep-related hypoxia present for 157 minutes.  The patient snored 45.6% of total monitoring time.   • Osteoarthrosis    • PAF (paroxysmal atrial fibrillation) ([facility])    • Shoulder pain    • SOB (shortness of breath)    • SSS (sick sinus syndrome) ([facility])    • Tinnitus    • Urine retention         Past Surgical History:   Procedure Laterality Date   • CARDIAC CATHETERIZATION N/A 4/27/2017    Procedure: Coronary angiography;  Surgeon: Marvel Brito MD;  Location: [facility] CATH INVASIVE LOCATION;  Service:    • CARDIAC CATHETERIZATION N/A 4/27/2017    Procedure: Left Heart Cath;  Surgeon: Marvel Brito MD;  Location: [facility] CATH INVASIVE LOCATION;  Service:    • CARDIAC CATHETERIZATION N/A 4/27/2017    Procedure: Stent GIN coronary;  Surgeon: Marvel Brito MD;  Location: [facility] CATH INVASIVE LOCATION;  Service:    • CARDIAC CATHETERIZATION N/A 6/30/2020    Procedure: Coronary angiography;  Surgeon: Jared Sellers MD;  Location: [facility] CATH INVASIVE LOCATION;  Service: Cardiovascular;  Laterality: N/A;   • CARDIAC CATHETERIZATION N/A 6/30/2020    Procedure: Left Heart Cath;  Surgeon: Jared Sellers MD;  Location: [facility] CATH INVASIVE LOCATION;  Service: Cardiovascular;  Laterality: N/A;   • CARDIAC ELECTROPHYSIOLOGY PROCEDURE N/A 6/15/2017    Procedure: Pacemaker DC new   [employer];  Surgeon: Gustavo Valladares MD;  Location: [facility] CATH INVASIVE LOCATION;  Service:    • COLONOSCOPY  2013   • COLONOSCOPY N/A 12/12/2018    Procedure: COLONOSCOPY with polypectomy;  Surgeon: Kwadwo Powers MD;  Location: [facility];  Service: Gastroenterology   • CORONARY ANGIOPLASTY WITH STENT PLACEMENT     • INGUINAL HERNIA REPAIR Right    • JOINT REPLACEMENT      left knee, left and right shoulder   • NECK SURGERY      SPURS   • [place] RECONSTR TOTAL SHOULDER IMPLANT Right 2/16/2017    Procedure: RIGHT TOTAL SHOULDER ARTHROPLASTY;  Surgeon: Marquez Galvan MD;  Location:   [facility];  Service: Orthopedics   • PROSTATECTOMY     • REPLACEMENT TOTAL KNEE Left    • TONSILLECTOMY     • TOTAL KNEE ARTHROPLASTY Right 12/14/2021    Procedure: RIGHT TOTAL KNEE ARTHROPLASTY;  Surgeon: Jace Altamirano II, MD;  Location: [facility] MAIN OR;  Service: Orthopedics;  Laterality: Right;   • TOTAL SHOULDER REPLACEMENT Bilateral        Visit Dx:     ICD-10-CM ICD-9-CM   1. S/P total knee arthroplasty, right  Z96.651 V43.65              PT Ortho     Row Name 06/28/22 1000       Subjective Comments    Subjective Comments Pt states his knee is feeling alright.  -       Right Lower Ext    Rt Knee Extension/Flexion AROM 0-2-127 degrees  -       MMT Right Lower Ext    Rt Hip Flexion MMT, Gross Movement (5/5) normal  -    Rt Hip Extension MMT, Gross Movement (5/5) normal  -    Rt Hip ABduction MMT, Gross Movement (5/5) normal  -    Rt Hip ADduction MMT, Gross Movement (4+/5) good plus  -GC    Rt Knee Extension MMT, Gross Movement (4+/5) good plus  -    Rt Knee Flexion MMT, Gross Movement (5/5) normal  -       Lower Extremity Flexibility    Hamstrings Right:;Mildly limited  -          User Key  (r) = Recorded By, (t) = Taken By, (c) = Cosigned By    Initials Name Provider Type    Sudhir Day, PT Physical Therapist                                   PT OP Goals     Row Name 06/28/22 1000          PT Short Term Goals    STG Date to Achieve 06/21/22  -     STG 1 Decrease right knee pain to 2-3/10 with activity.  -     STG 1 Progress Partially Met;Ongoing;Progressing  -     STG 1 Progress Comments Pt reports he has no pain at times  -     STG 2 Increase right hamstring flexibility to WFL with testing.  -     STG 3 Increase right knee EXT ROM to 0 degrees with testing.  -     STG 4 Pt will be independent with his HEP issued by this therapist.  -     STG 4 Progress Met  -            Long Term Goals    LTG Date to Achieve 07/05/22  -     LTG 1 Decrease right knee pain  "to 0-1/10 with activity.  -GC     LTG 1 Progress Partially Met;Ongoing;Progressing  -GC     LTG 2 Increase right LE strength to 5/5 all planes with testing.  -GC     LTG 2 Progress Partially Met;Ongoing;Progressing  -GC     LTG 3 Pt will be indepednent with all ADLs without pain.  -GC     LTG 3 Progress Partially Met;Ongoing;Progressing  -GC           User Key  (r) = Recorded By, (t) = Taken By, (c) = Cosigned By    Initials Name Provider Type    Sudhir Day, PT Physical Therapist                 PT Assessment/Plan     Row Name 06/28/22 1000          PT Assessment    Assessment Comments Pt is progressing nicely towards his goals with increasing strength and decreasing c/o pain.  -GC            PT Plan    PT Plan Comments Pt is to continue his HEP daily.  -GC           User Key  (r) = Recorded By, (t) = Taken By, (c) = Cosigned By    Initials Name Provider Type    Sudhir Day, PT Physical Therapist                   OP Exercises     Row Name 06/28/22 1000             Subjective Comments    Subjective Comments Pt states his knee is feeling alright.  -GC              Exercise 1    Exercise Name 1 Hamstring stretch  -GC      Cueing 1 Verbal;Tactile  -GC      Reps 1 10  -GC      Time 1 10 secs  -GC              Exercise 2    Exercise Name 2 QS with Russian Stim  -GC      Cueing 2 Verbal;Tactile  -GC      Time 2 10 min 10/10  -GC              Exercise 3    Exercise Name 3 SAQ  -GC      Cueing 3 Verbal;Tactile  -GC      Reps 3 50  -GC      Time 3 4#  -GC              Exercise 4    Exercise Name 4 LAQ  -GC      Cueing 4 Verbal;Tactile  -GC      Reps 4 50  -GC      Time 4 4#  -GC              Exercise 5    Exercise Name 5 TKE vs theraband  -GC      Cueing 5 Verbal;Demo  -GC      Reps 5 50  -GC      Time 5 gold  -GC              Exercise 6    Exercise Name 6 Partial squats/ball squeeze  -GC      Cueing 6 Verbal;Demo  -GC      Reps 6 30  -GC              Exercise 7    Exercise Name 7 Forward step up on 4\"step  -GC  "     Cueing 7 Verbal;Demo  -GC      Reps 7 10x ea  -GC            User Key  (r) = Recorded By, (t) = Taken By, (c) = Cosigned By    Initials Name Provider Type    Sudhir Day, PT Physical Therapist                                        Time Calculation:     Start Time: 1000  Stop Time: 1043  Time Calculation (min): 43 min     Therapy Charges for Today     Code Description Service Date Service Provider Modifiers Qty    [id number]  PT THER PROC EA 15 MIN 6/28/2022 Sudhir Sanderson, PT GP 2                    Sudhir Sanderson, PT  6/28/2022

## 2022-07-05 DIAGNOSIS — I10 ESSENTIAL HYPERTENSION: ICD-10-CM

## 2022-07-05 RX ORDER — AMLODIPINE BESYLATE 10 MG/1
10 TABLET ORAL DAILY
Qty: 30 TABLET | Refills: 3 | Status: SHIPPED | OUTPATIENT
Start: 2022-07-05 | End: 2022-11-02

## 2022-07-06 RX ORDER — POTASSIUM CHLORIDE 750 MG/1
CAPSULE, EXTENDED RELEASE ORAL
Qty: 180 CAPSULE | Refills: 0 | Status: SHIPPED | OUTPATIENT
Start: 2022-07-06 | End: 2022-09-01

## 2022-07-06 RX ORDER — METOPROLOL TARTRATE 50 MG/1
TABLET, FILM COATED ORAL
Qty: 180 TABLET | Refills: 1 | Status: SHIPPED | OUTPATIENT
Start: 2022-07-06 | End: 2022-12-30

## 2022-07-19 ENCOUNTER — HOSPITAL ENCOUNTER (OUTPATIENT)
Dept: PHYSICAL THERAPY | Facility: HOSPITAL | Age: 87
Setting detail: THERAPIES SERIES
Discharge: HOME | End: 2022-07-19

## 2022-07-19 DIAGNOSIS — Z96.651 S/P TOTAL KNEE ARTHROPLASTY, RIGHT: Primary | ICD-10-CM

## 2022-07-19 PROCEDURE — 97110 THERAPEUTIC EXERCISES: CPT | Performed by: PHYSICAL THERAPIST

## 2022-07-19 NOTE — THERAPY TREATMENT NOTE
Outpatient Physical Therapy Ortho Treatment Note  MITCHELL Zamora     Patient Name: Guero Garay  : 1935  MRN: [id number]  Today's Date: 2022      Visit Date: 2022    Visit Dx:    ICD-10-CM ICD-9-CM   1. S/P total knee arthroplasty, right  Z96.651 V43.65       Patient Active Problem List   Diagnosis   • Gout   • Hypertension   • Hyperlipidemia   • Osteoarthrosis   • History of prostate cancer   • Tinnitus   • Benign neoplasm of colon   • Osteoarthritis of right shoulder   • Coronary artery disease involving native coronary artery of native heart with unstable angina pectoris (HCC)   • Syncopal episodes   • Screening for colon cancer   • Pyelonephritis, acute   • Acute kidney injury superimposed on chronic kidney disease (HCC)   • Dyspnea on exertion   • Generalized weakness   • Elevated d-dimer   • Dehydration   • Mild persistent asthma without complication   • Heme positive stool   • Elevated LFTs   • Alcohol use   • Urine retention   • Chronic diastolic congestive heart failure (HCC)   • Encounter for screening for malignant neoplasm of colon   • Personal history of colonic polyps   • Family history of esophageal cancer   • RAQUEL (obstructive sleep apnea)   • Sleep-related hypoxia   • Hypersomnia due to medical condition   • Total knee replacement status        Past Medical History:   Diagnosis Date   • Acute kidney injury superimposed on chronic kidney disease (HCC)    • Aortic stenosis    • Arthralgia    • Asthma     mild persistent without complication   • CAD (coronary artery disease), native coronary artery    • Cancer (HCC)     Prostate   • Chest pain, unspecified    • Chronic diastolic congestive heart failure (HCC)    • Colon polyp    • Dyspnea on exertion    • Erectile dysfunction    • Essential hypertension    • Generalized weakness    • Gout    • History of prostate cancer    • Hyperkalemia    • Jaw pain     both sides   • Mixed hyperlipidemia    • RAQUEL (obstructive sleep apnea)  07/19/2021    Home sleep study.  Weight 216 pounds.  Moderate obstructive sleep apnea with AHI 26.5 events per hour.  Low oxygen saturation 79% and sleep-related hypoxia present for 157 minutes.  The patient snored 45.6% of total monitoring time.   • Osteoarthrosis    • PAF (paroxysmal atrial fibrillation) ([facility])    • Shoulder pain    • SOB (shortness of breath)    • SSS (sick sinus syndrome) ([facility])    • Tinnitus    • Urine retention         Past Surgical History:   Procedure Laterality Date   • CARDIAC CATHETERIZATION N/A 4/27/2017    Procedure: Coronary angiography;  Surgeon: Marvel Brito MD;  Location: [facility] CATH INVASIVE LOCATION;  Service:    • CARDIAC CATHETERIZATION N/A 4/27/2017    Procedure: Left Heart Cath;  Surgeon: Marvel Brito MD;  Location: [facility] CATH INVASIVE LOCATION;  Service:    • CARDIAC CATHETERIZATION N/A 4/27/2017    Procedure: Stent GIN coronary;  Surgeon: Marvel Brito MD;  Location: [facility] CATH INVASIVE LOCATION;  Service:    • CARDIAC CATHETERIZATION N/A 6/30/2020    Procedure: Coronary angiography;  Surgeon: Jared Sellers MD;  Location: [facility] CATH INVASIVE LOCATION;  Service: Cardiovascular;  Laterality: N/A;   • CARDIAC CATHETERIZATION N/A 6/30/2020    Procedure: Left Heart Cath;  Surgeon: Jared Sellers MD;  Location: [facility] INVASIVE LOCATION;  Service: Cardiovascular;  Laterality: N/A;   • CARDIAC ELECTROPHYSIOLOGY PROCEDURE N/A 6/15/2017    Procedure: Pacemaker DC new   [employer];  Surgeon: Gustavo Valladares MD;  Location: [facility] INVASIVE LOCATION;  Service:    • COLONOSCOPY  2013   • COLONOSCOPY N/A 12/12/2018    Procedure: COLONOSCOPY with polypectomy;  Surgeon: Kwadwo Powers MD;  Location: [facility];  Service: Gastroenterology   • CORONARY ANGIOPLASTY WITH STENT PLACEMENT     • INGUINAL HERNIA REPAIR Right    • JOINT REPLACEMENT      left knee, left and right shoulder   • NECK SURGERY      SPURS   • [place] RECONSTR TOTAL SHOULDER IMPLANT Right  "2/16/2017    Procedure: RIGHT TOTAL SHOULDER ARTHROPLASTY;  Surgeon: Marquez Galvan MD;  Location: [facility];  Service: Orthopedics   • PROSTATECTOMY     • REPLACEMENT TOTAL KNEE Left    • TONSILLECTOMY     • TOTAL KNEE ARTHROPLASTY Right 12/14/2021    Procedure: RIGHT TOTAL KNEE ARTHROPLASTY;  Surgeon: Jace Altamirano II, MD;  Location: [facility] OR;  Service: Orthopedics;  Laterality: Right;   • TOTAL SHOULDER REPLACEMENT Bilateral                         PT Assessment/Plan     Row Name 07/19/22 1020          PT Assessment    Assessment Comments Pt is noted to lack quad strength/control for step down exercise. He still needs additional quad strengthening to improve function.  -GC            PT Plan    PT Plan Comments Pt is to continue his HEP daily.  -GC           User Key  (r) = Recorded By, (t) = Taken By, (c) = Cosigned By    Initials Name Provider Type    GC Sudhir Sanderson, PT Physical Therapist                   OP Exercises     Row Name 07/19/22 1020             Subjective Comments    Subjective Comments Pt states his knee is about the same.  -GC              Exercise 1    Exercise Name 1 Hamstring stretch  -GC      Cueing 1 Verbal;Tactile  -GC      Reps 1 10  -GC      Time 1 10 secs  -GC              Exercise 2    Exercise Name 2 QS with Russian Stim  -GC      Cueing 2 Verbal;Tactile  -GC      Time 2 10 min 10/10  -GC              Exercise 3    Exercise Name 3 SAQ  -GC      Cueing 3 Verbal;Tactile  -GC      Reps 3 50  -GC      Time 3 4#  -GC              Exercise 4    Exercise Name 4 LAQ  -GC      Cueing 4 Verbal;Tactile  -GC      Reps 4 50  -GC      Time 4 4#  -GC              Exercise 5    Exercise Name 5 TKE vs theraband  -GC      Cueing 5 Verbal;Demo  -GC      Reps 5 --  -GC      Time 5 --  -GC              Exercise 6    Exercise Name 6 Partial squats/ball squeeze  -GC      Cueing 6 Verbal;Demo  -GC      Reps 6 30  -GC              Exercise 7    Exercise Name 7 Forward step up on 4\"step " " -GC      Cueing 7 Verbal;Demo  -GC      Reps 7 20x ea  -GC              Exercise 8    Exercise Name 8 step downs from 4\" step  -GC      Cueing 8 Verbal;Demo  -GC      Reps 8 10x  -GC            User Key  (r) = Recorded By, (t) = Taken By, (c) = Cosigned By    Initials Name Provider Type    Sudhir Day, PT Physical Therapist                                                Time Calculation:   Start Time: 1020  Stop Time: 1056  Time Calculation (min): 36 min  Therapy Charges for Today     Code Description Service Date Service Provider Modifiers Qty    [id number]  PT THER PROC EA 15 MIN 7/19/2022 Sudhir Sanderson, PT GP 2                    Sudhir Sanderson, PT  7/19/2022     "

## 2022-07-26 ENCOUNTER — HOSPITAL ENCOUNTER (OUTPATIENT)
Dept: PHYSICAL THERAPY | Facility: HOSPITAL | Age: 87
Setting detail: THERAPIES SERIES
Discharge: HOME | End: 2022-07-26

## 2022-07-26 DIAGNOSIS — Z96.651 S/P TOTAL KNEE ARTHROPLASTY, RIGHT: Primary | ICD-10-CM

## 2022-07-26 PROCEDURE — 97110 THERAPEUTIC EXERCISES: CPT | Performed by: PHYSICAL THERAPIST

## 2022-07-26 NOTE — THERAPY TREATMENT NOTE
Outpatient Physical Therapy Ortho Treatment Note  MITCHELL Zamora     Patient Name: Guero Garay  : 1935  MRN: [id number]  Today's Date: 2022      Visit Date: 2022    Visit Dx:    ICD-10-CM ICD-9-CM   1. S/P total knee arthroplasty, right  Z96.651 V43.65       Patient Active Problem List   Diagnosis   • Gout   • Hypertension   • Hyperlipidemia   • Osteoarthrosis   • History of prostate cancer   • Tinnitus   • Benign neoplasm of colon   • Osteoarthritis of right shoulder   • Coronary artery disease involving native coronary artery of native heart with unstable angina pectoris (HCC)   • Syncopal episodes   • Screening for colon cancer   • Pyelonephritis, acute   • Acute kidney injury superimposed on chronic kidney disease (HCC)   • Dyspnea on exertion   • Generalized weakness   • Elevated d-dimer   • Dehydration   • Mild persistent asthma without complication   • Heme positive stool   • Elevated LFTs   • Alcohol use   • Urine retention   • Chronic diastolic congestive heart failure (HCC)   • Encounter for screening for malignant neoplasm of colon   • Personal history of colonic polyps   • Family history of esophageal cancer   • RAQUEL (obstructive sleep apnea)   • Sleep-related hypoxia   • Hypersomnia due to medical condition   • Total knee replacement status        Past Medical History:   Diagnosis Date   • Acute kidney injury superimposed on chronic kidney disease (HCC)    • Aortic stenosis    • Arthralgia    • Asthma     mild persistent without complication   • CAD (coronary artery disease), native coronary artery    • Cancer (HCC)     Prostate   • Chest pain, unspecified    • Chronic diastolic congestive heart failure (HCC)    • Colon polyp    • Dyspnea on exertion    • Erectile dysfunction    • Essential hypertension    • Generalized weakness    • Gout    • History of prostate cancer    • Hyperkalemia    • Jaw pain     both sides   • Mixed hyperlipidemia    • RAQUEL (obstructive sleep apnea)  07/19/2021    Home sleep study.  Weight 216 pounds.  Moderate obstructive sleep apnea with AHI 26.5 events per hour.  Low oxygen saturation 79% and sleep-related hypoxia present for 157 minutes.  The patient snored 45.6% of total monitoring time.   • Osteoarthrosis    • PAF (paroxysmal atrial fibrillation) ([facility]    • Shoulder pain    • SOB (shortness of breath)    • SSS (sick sinus syndrome) ([facility])    • Tinnitus    • Urine retention         Past Surgical History:   Procedure Laterality Date   • CARDIAC CATHETERIZATION N/A 4/27/2017    Procedure: Coronary angiography;  Surgeon: Marvel Brito MD;  Location: [facility] CATH INVASIVE LOCATION;  Service:    • CARDIAC CATHETERIZATION N/A 4/27/2017    Procedure: Left Heart Cath;  Surgeon: Marvel Brito MD;  Location: [facility] CATH INVASIVE LOCATION;  Service:    • CARDIAC CATHETERIZATION N/A 4/27/2017    Procedure: Stent GIN coronary;  Surgeon: Marvel Brito MD;  Location: [facility] CATH INVASIVE LOCATION;  Service:    • CARDIAC CATHETERIZATION N/A 6/30/2020    Procedure: Coronary angiography;  Surgeon: Jared Sellers MD;  Location: [facility] CATH INVASIVE LOCATION;  Service: Cardiovascular;  Laterality: N/A;   • CARDIAC CATHETERIZATION N/A 6/30/2020    Procedure: Left Heart Cath;  Surgeon: Jared Sellers MD;  Location: [facility] INVASIVE LOCATION;  Service: Cardiovascular;  Laterality: N/A;   • CARDIAC ELECTROPHYSIOLOGY PROCEDURE N/A 6/15/2017    Procedure: Pacemaker DC new   [employer];  Surgeon: Gustavo Valladares MD;  Location: [facility] INVASIVE LOCATION;  Service:    • COLONOSCOPY  2013   • COLONOSCOPY N/A 12/12/2018    Procedure: COLONOSCOPY with polypectomy;  Surgeon: Kwadwo Powers MD;  Location: [facility];  Service: Gastroenterology   • CORONARY ANGIOPLASTY WITH STENT PLACEMENT     • INGUINAL HERNIA REPAIR Right    • JOINT REPLACEMENT      left knee, left and right shoulder   • NECK SURGERY      SPURS   • [place] RECONSTR TOTAL SHOULDER IMPLANT Right  "2/16/2017    Procedure: RIGHT TOTAL SHOULDER ARTHROPLASTY;  Surgeon: Marquez Galvan MD;  Location: [facility];  Service: Orthopedics   • PROSTATECTOMY     • REPLACEMENT TOTAL KNEE Left    • TONSILLECTOMY     • TOTAL KNEE ARTHROPLASTY Right 12/14/2021    Procedure: RIGHT TOTAL KNEE ARTHROPLASTY;  Surgeon: Jace Altamirano II, MD;  Location: [facility] OR;  Service: Orthopedics;  Laterality: Right;   • TOTAL SHOULDER REPLACEMENT Bilateral                         PT Assessment/Plan     Row Name 07/26/22 1300          PT Assessment    Assessment Comments Pt is doing well with decreased knee pain and good tolerance ot his quad program.  -GC            PT Plan    PT Plan Comments Pt is to continue his HEP daily.  -GC           User Key  (r) = Recorded By, (t) = Taken By, (c) = Cosigned By    Initials Name Provider Type    Sudhir Day, PT Physical Therapist                   OP Exercises     Row Name 07/26/22 1300             Subjective Comments    Subjective Comments Pt states his knee is feeling pretty good.  -GC              Exercise 1    Exercise Name 1 Hamstring stretch  -GC      Cueing 1 Verbal;Tactile  -GC      Reps 1 10  -GC      Time 1 10 secs  -GC              Exercise 2    Exercise Name 2 QS with Russian Stim  -GC      Cueing 2 Verbal;Tactile  -GC      Time 2 10 min 10/10  -GC              Exercise 3    Exercise Name 3 SAQ  -GC      Cueing 3 Verbal;Tactile  -GC      Reps 3 50  -GC      Time 3 4#  -GC              Exercise 4    Exercise Name 4 LAQ  -GC      Cueing 4 Verbal;Tactile  -GC      Reps 4 50  -GC      Time 4 4#  -GC              Exercise 5    Exercise Name 5 TKE vs theraband  -GC      Cueing 5 Verbal;Demo  -GC              Exercise 6    Exercise Name 6 Partial squats/ball squeeze  -GC      Cueing 6 Verbal;Demo  -GC      Reps 6 30  -GC              Exercise 7    Exercise Name 7 Forward step up on 4\"step  -GC      Cueing 7 Verbal;Demo  -GC      Reps 7 20x ea  -GC              Exercise 8 " "   Exercise Name 8 step downs from 4\" step-forward, backward, side  -GC      Cueing 8 Verbal;Demo  -GC      Reps 8 10x  -GC            User Key  (r) = Recorded By, (t) = Taken By, (c) = Cosigned By    Initials Name Provider Type    GC Sudhir Sanderson, PT Physical Therapist                                                Time Calculation:   Start Time: 1300  Stop Time: 1336  Time Calculation (min): 36 min  Therapy Charges for Today     Code Description Service Date Service Provider Modifiers Qty    [id number]  PT THER PROC EA 15 MIN 7/26/2022 Sudhir Sanderson, PT GP 2                    Sudhir Sanderson, PT  7/26/2022     "

## 2022-08-02 ENCOUNTER — HOSPITAL ENCOUNTER (OUTPATIENT)
Dept: PHYSICAL THERAPY | Facility: HOSPITAL | Age: 87
Setting detail: THERAPIES SERIES
Discharge: HOME | End: 2022-08-02

## 2022-08-02 DIAGNOSIS — Z96.651 S/P TOTAL KNEE ARTHROPLASTY, RIGHT: Primary | ICD-10-CM

## 2022-08-02 PROCEDURE — 97110 THERAPEUTIC EXERCISES: CPT | Performed by: PHYSICAL THERAPIST

## 2022-08-02 NOTE — THERAPY TREATMENT NOTE
Outpatient Physical Therapy Ortho Treatment Note  MITCHELL Zamora     Patient Name: Guero Garay  : 1935  MRN: [id number]  Today's Date: 2022      Visit Date: 2022    Visit Dx:    ICD-10-CM ICD-9-CM   1. S/P total knee arthroplasty, right  Z96.651 V43.65       Patient Active Problem List   Diagnosis   • Gout   • Hypertension   • Hyperlipidemia   • Osteoarthrosis   • History of prostate cancer   • Tinnitus   • Benign neoplasm of colon   • Osteoarthritis of right shoulder   • Coronary artery disease involving native coronary artery of native heart with unstable angina pectoris (HCC)   • Syncopal episodes   • Screening for colon cancer   • Pyelonephritis, acute   • Acute kidney injury superimposed on chronic kidney disease (HCC)   • Dyspnea on exertion   • Generalized weakness   • Elevated d-dimer   • Dehydration   • Mild persistent asthma without complication   • Heme positive stool   • Elevated LFTs   • Alcohol use   • Urine retention   • Chronic diastolic congestive heart failure (HCC)   • Encounter for screening for malignant neoplasm of colon   • Personal history of colonic polyps   • Family history of esophageal cancer   • RAQUEL (obstructive sleep apnea)   • Sleep-related hypoxia   • Hypersomnia due to medical condition   • Total knee replacement status        Past Medical History:   Diagnosis Date   • Acute kidney injury superimposed on chronic kidney disease (HCC)    • Aortic stenosis    • Arthralgia    • Asthma     mild persistent without complication   • CAD (coronary artery disease), native coronary artery    • Cancer (HCC)     Prostate   • Chest pain, unspecified    • Chronic diastolic congestive heart failure (HCC)    • Colon polyp    • Dyspnea on exertion    • Erectile dysfunction    • Essential hypertension    • Generalized weakness    • Gout    • History of prostate cancer    • Hyperkalemia    • Jaw pain     both sides   • Mixed hyperlipidemia    • RAQUEL (obstructive sleep apnea)  07/19/2021    Home sleep study.  Weight 216 pounds.  Moderate obstructive sleep apnea with AHI 26.5 events per hour.  Low oxygen saturation 79% and sleep-related hypoxia present for 157 minutes.  The patient snored 45.6% of total monitoring time.   • Osteoarthrosis    • PAF (paroxysmal atrial fibrillation) ([facility])    • Shoulder pain    • SOB (shortness of breath)    • SSS (sick sinus syndrome) ([facility])    • Tinnitus    • Urine retention         Past Surgical History:   Procedure Laterality Date   • CARDIAC CATHETERIZATION N/A 4/27/2017    Procedure: Coronary angiography;  Surgeon: Marvel Brito MD;  Location: [facility] CATH INVASIVE LOCATION;  Service:    • CARDIAC CATHETERIZATION N/A 4/27/2017    Procedure: Left Heart Cath;  Surgeon: Marvel Brito MD;  Location: [facility] CATH INVASIVE LOCATION;  Service:    • CARDIAC CATHETERIZATION N/A 4/27/2017    Procedure: Stent GIN coronary;  Surgeon: Marvel Brito MD;  Location: [facility] CATH INVASIVE LOCATION;  Service:    • CARDIAC CATHETERIZATION N/A 6/30/2020    Procedure: Coronary angiography;  Surgeon: Jared Sellers MD;  Location: [facility] CATH INVASIVE LOCATION;  Service: Cardiovascular;  Laterality: N/A;   • CARDIAC CATHETERIZATION N/A 6/30/2020    Procedure: Left Heart Cath;  Surgeon: Jared Sellers MD;  Location: [facility] INVASIVE LOCATION;  Service: Cardiovascular;  Laterality: N/A;   • CARDIAC ELECTROPHYSIOLOGY PROCEDURE N/A 6/15/2017    Procedure: Pacemaker DC new   [employer];  Surgeon: Gustavo Valladares MD;  Location: [facility] INVASIVE LOCATION;  Service:    • COLONOSCOPY  2013   • COLONOSCOPY N/A 12/12/2018    Procedure: COLONOSCOPY with polypectomy;  Surgeon: Kwadwo Powers MD;  Location: [facility];  Service: Gastroenterology   • CORONARY ANGIOPLASTY WITH STENT PLACEMENT     • INGUINAL HERNIA REPAIR Right    • JOINT REPLACEMENT      left knee, left and right shoulder   • NECK SURGERY      SPURS   • AR RECONSTR TOTAL SHOULDER IMPLANT Right  "2/16/2017    Procedure: RIGHT TOTAL SHOULDER ARTHROPLASTY;  Surgeon: Marquez Galvan MD;  Location: [facility];  Service: Orthopedics   • PROSTATECTOMY     • REPLACEMENT TOTAL KNEE Left    • TONSILLECTOMY     • TOTAL KNEE ARTHROPLASTY Right 12/14/2021    Procedure: RIGHT TOTAL KNEE ARTHROPLASTY;  Surgeon: Jace Altamirano II, MD;  Location: [facility] OR;  Service: Orthopedics;  Laterality: Right;   • TOTAL SHOULDER REPLACEMENT Bilateral                         PT Assessment/Plan     Row Name 08/02/22 1235          PT Assessment    Assessment Comments Pt tolerated his exercise progression well.  -GC            PT Plan    PT Plan Comments Pt is to continue his HEP daily.  -GC           User Key  (r) = Recorded By, (t) = Taken By, (c) = Cosigned By    Initials Name Provider Type    Sudhir Day, PT Physical Therapist                   OP Exercises     Row Name 08/02/22 1235             Subjective Comments    Subjective Comments Pt states his knee is feeling pretty good.  -GC              Exercise 1    Exercise Name 1 Hamstring stretch  -GC      Cueing 1 Verbal;Tactile  -GC      Reps 1 10  -GC      Time 1 10 secs  -GC              Exercise 2    Exercise Name 2 QS with Russian Stim  -GC      Cueing 2 Verbal;Tactile  -GC      Time 2 10 min 10/10  -GC              Exercise 3    Exercise Name 3 SAQ  -GC      Cueing 3 Verbal;Tactile  -GC      Reps 3 50  -GC      Time 3 5#  -GC              Exercise 4    Exercise Name 4 LAQ  -GC      Cueing 4 Verbal;Tactile  -GC      Reps 4 50  -GC      Time 4 5#  -GC              Exercise 5    Exercise Name 5 TKE vs theraband  -GC      Cueing 5 Verbal;Demo  -GC      Reps 5 50  -GC      Time 5 gold  -GC              Exercise 6    Exercise Name 6 Partial squats/ball squeeze  -GC      Cueing 6 Verbal;Demo  -GC      Reps 6 30  -GC              Exercise 7    Exercise Name 7 Forward step up on 6\"step  -GC      Cueing 7 Verbal;Demo  -GC      Reps 7 20x ea  -GC              " "Exercise 8    Exercise Name 8 step downs from 4\" step-forward, backward, side  -GC      Cueing 8 Verbal;Demo  -GC      Reps 8 10x  -GC              Exercise 9    Exercise Name 9 lateral step overs on 6\" step  -GC      Cueing 9 Verbal;Tactile;Demo  -GC      Reps 9 10x  -GC            User Key  (r) = Recorded By, (t) = Taken By, (c) = Cosigned By    Initials Name Provider Type     Sudhir Sanderson, PT Physical Therapist                                                Time Calculation:   Start Time: 1235  Stop Time: 1318  Time Calculation (min): 43 min  Therapy Charges for Today     Code Description Service Date Service Provider Modifiers Qty    30384306548 HC PT THER PROC EA 15 MIN 8/2/2022 Sudhir Sanderson, PT GP 2                    Sudhir Sanderson, PT  8/2/2022     "

## 2022-08-04 ENCOUNTER — TRANSCRIBE ORDERS (OUTPATIENT)
Dept: ADMINISTRATIVE | Facility: HOSPITAL | Age: 87
End: 2022-08-04

## 2022-08-04 DIAGNOSIS — C61 PROSTATE CANCER: Primary | ICD-10-CM

## 2022-08-09 ENCOUNTER — HOSPITAL ENCOUNTER (OUTPATIENT)
Dept: PHYSICAL THERAPY | Facility: HOSPITAL | Age: 87
Setting detail: THERAPIES SERIES
Discharge: HOME | End: 2022-08-09

## 2022-08-09 DIAGNOSIS — Z96.651 S/P TOTAL KNEE ARTHROPLASTY, RIGHT: Primary | ICD-10-CM

## 2022-08-09 PROCEDURE — 97110 THERAPEUTIC EXERCISES: CPT | Performed by: PHYSICAL THERAPIST

## 2022-08-09 NOTE — THERAPY TREATMENT NOTE
Outpatient Physical Therapy Ortho Treatment Note  MITCHELL Zamora     Patient Name: Guero Garay  : 1935  MRN: [id number]  Today's Date: 2022      Visit Date: 2022    Visit Dx:    ICD-10-CM ICD-9-CM   1. S/P total knee arthroplasty, right  Z96.651 V43.65       Patient Active Problem List   Diagnosis   • Gout   • Hypertension   • Hyperlipidemia   • Osteoarthrosis   • History of prostate cancer   • Tinnitus   • Benign neoplasm of colon   • Osteoarthritis of right shoulder   • Coronary artery disease involving native coronary artery of native heart with unstable angina pectoris (HCC)   • Syncopal episodes   • Screening for colon cancer   • Pyelonephritis, acute   • Acute kidney injury superimposed on chronic kidney disease (HCC)   • Dyspnea on exertion   • Generalized weakness   • Elevated d-dimer   • Dehydration   • Mild persistent asthma without complication   • Heme positive stool   • Elevated LFTs   • Alcohol use   • Urine retention   • Chronic diastolic congestive heart failure (HCC)   • Encounter for screening for malignant neoplasm of colon   • Personal history of colonic polyps   • Family history of esophageal cancer   • RAQUEL (obstructive sleep apnea)   • Sleep-related hypoxia   • Hypersomnia due to medical condition   • Total knee replacement status        Past Medical History:   Diagnosis Date   • Acute kidney injury superimposed on chronic kidney disease (HCC)    • Aortic stenosis    • Arthralgia    • Asthma     mild persistent without complication   • CAD (coronary artery disease), native coronary artery    • Cancer (HCC)     Prostate   • Chest pain, unspecified    • Chronic diastolic congestive heart failure (HCC)    • Colon polyp    • Dyspnea on exertion    • Erectile dysfunction    • Essential hypertension    • Generalized weakness    • Gout    • History of prostate cancer    • Hyperkalemia    • Jaw pain     both sides   • Mixed hyperlipidemia    • RAQUEL (obstructive sleep apnea)  07/19/2021    Home sleep study.  Weight 216 pounds.  Moderate obstructive sleep apnea with AHI 26.5 events per hour.  Low oxygen saturation 79% and sleep-related hypoxia present for 157 minutes.  The patient snored 45.6% of total monitoring time.   • Osteoarthrosis    • PAF (paroxysmal atrial fibrillation) ([facility])    • Shoulder pain    • SOB (shortness of breath)    • SSS (sick sinus syndrome) ([facility])    • Tinnitus    • Urine retention         Past Surgical History:   Procedure Laterality Date   • CARDIAC CATHETERIZATION N/A 4/27/2017    Procedure: Coronary angiography;  Surgeon: Marvel Brito MD;  Location: [facility] CATH INVASIVE LOCATION;  Service:    • CARDIAC CATHETERIZATION N/A 4/27/2017    Procedure: Left Heart Cath;  Surgeon: Marvel Brito MD;  Location: [facility] CATH INVASIVE LOCATION;  Service:    • CARDIAC CATHETERIZATION N/A 4/27/2017    Procedure: Stent GIN coronary;  Surgeon: Marvel Brito MD;  Location: [facility] CATH INVASIVE LOCATION;  Service:    • CARDIAC CATHETERIZATION N/A 6/30/2020    Procedure: Coronary angiography;  Surgeon: Jared Sellers MD;  Location: [facility] CATH INVASIVE LOCATION;  Service: Cardiovascular;  Laterality: N/A;   • CARDIAC CATHETERIZATION N/A 6/30/2020    Procedure: Left Heart Cath;  Surgeon: Jared Sellers MD;  Location: [facility] INVASIVE LOCATION;  Service: Cardiovascular;  Laterality: N/A;   • CARDIAC ELECTROPHYSIOLOGY PROCEDURE N/A 6/15/2017    Procedure: Pacemaker DC new   [employer];  Surgeon: Gustavo Valladares MD;  Location: [facility] INVASIVE LOCATION;  Service:    • COLONOSCOPY  2013   • COLONOSCOPY N/A 12/12/2018    Procedure: COLONOSCOPY with polypectomy;  Surgeon: Kwadwo Powers MD;  Location: [facility];  Service: Gastroenterology   • CORONARY ANGIOPLASTY WITH STENT PLACEMENT     • INGUINAL HERNIA REPAIR Right    • JOINT REPLACEMENT      left knee, left and right shoulder   • NECK SURGERY      SPURS   • ND RECONSTR TOTAL SHOULDER IMPLANT Right  2/16/2017    Procedure: RIGHT TOTAL SHOULDER ARTHROPLASTY;  Surgeon: Marquez Galvan MD;  Location: [facility];  Service: Orthopedics   • PROSTATECTOMY     • REPLACEMENT TOTAL KNEE Left    • TONSILLECTOMY     • TOTAL KNEE ARTHROPLASTY Right 12/14/2021    Procedure: RIGHT TOTAL KNEE ARTHROPLASTY;  Surgeon: Jace Altamirano II, MD;  Location: [facility] OR;  Service: Orthopedics;  Laterality: Right;   • TOTAL SHOULDER REPLACEMENT Bilateral         PT Ortho     Row Name 08/09/22 1005       Gait/Stairs (Locomotion)    Comment, (Gait/Stairs) Pt ambulates normally on level surfaces  -GC          User Key  (r) = Recorded By, (t) = Taken By, (c) = Cosigned By    Initials Name Provider Type    Sudhir Day, PT Physical Therapist                             PT Assessment/Plan     Row Name 08/09/22 1005          PT Assessment    Assessment Comments Pt is doing well with decreased c/o pain and improving function.  -GC            PT Plan    PT Plan Comments Pt is to continue his HEP daily.  -GC           User Key  (r) = Recorded By, (t) = Taken By, (c) = Cosigned By    Initials Name Provider Type    Sudhir Day, PT Physical Therapist                   OP Exercises     Row Name 08/09/22 1005             Subjective Comments    Subjective Comments Pt states his knee is feeling pretty good.  -GC              Exercise 1    Exercise Name 1 Hamstring stretch  -GC      Cueing 1 Verbal;Tactile  -GC      Reps 1 10  -GC      Time 1 10 secs  -GC              Exercise 2    Exercise Name 2 QS with Russian Stim  -GC      Cueing 2 Verbal;Tactile  -GC      Time 2 10 min 10/10  -GC              Exercise 3    Exercise Name 3 SAQ  -GC      Cueing 3 Verbal;Tactile  -GC      Reps 3 50  -GC      Time 3 5#  -GC              Exercise 4    Exercise Name 4 LAQ  -GC      Cueing 4 Verbal;Tactile  -GC      Reps 4 50  -GC      Time 4 5#  -GC              Exercise 5    Exercise Name 5 TKE vs theraband  -GC      Cueing 5 Verbal;Demo  -GC   "    Reps 5 50  -GC      Time 5 gold  -GC              Exercise 6    Exercise Name 6 Partial squats/ball squeeze  -GC      Cueing 6 Verbal;Demo  -GC      Reps 6 30  -GC              Exercise 7    Exercise Name 7 Forward step up on 6\"step  -GC      Cueing 7 Verbal;Demo  -GC      Reps 7 20x ea  -GC              Exercise 8    Exercise Name 8 step downs from 4\" step-forward, backward, side  -GC      Cueing 8 Verbal;Demo  -GC      Reps 8 10x  -GC              Exercise 9    Exercise Name 9 lateral step overs on 6\" step  -GC      Cueing 9 Verbal;Tactile;Demo  -GC      Reps 9 10x  -GC            User Key  (r) = Recorded By, (t) = Taken By, (c) = Cosigned By    Initials Name Provider Type     Sudhir Sandreson, PT Physical Therapist                                                Time Calculation:   Start Time: 1005  Stop Time: 1049  Time Calculation (min): 44 min  Therapy Charges for Today     Code Description Service Date Service Provider Modifiers Qty    21999834700 HC PT THER PROC EA 15 MIN 8/9/2022 Sudhir Sanderson, PT GP 2                    Sudhir Sanderson, PT  8/9/2022     "

## 2022-08-16 ENCOUNTER — HOSPITAL ENCOUNTER (OUTPATIENT)
Dept: PHYSICAL THERAPY | Facility: HOSPITAL | Age: 87
Setting detail: THERAPIES SERIES
Discharge: HOME | End: 2022-08-16

## 2022-08-16 PROCEDURE — 97110 THERAPEUTIC EXERCISES: CPT

## 2022-08-16 NOTE — THERAPY TREATMENT NOTE
Outpatient Physical Therapy Ortho Treatment Note  MITCHELL Zamora     Patient Name: Guero Garay  : 1935  MRN: [id number]  Today's Date: 2022      Visit Date: 2022    Visit Dx:  No diagnosis found.    Patient Active Problem List   Diagnosis   • Gout   • Hypertension   • Hyperlipidemia   • Osteoarthrosis   • History of prostate cancer   • Tinnitus   • Benign neoplasm of colon   • Osteoarthritis of right shoulder   • Coronary artery disease involving native coronary artery of native heart with unstable angina pectoris (HCC)   • Syncopal episodes   • Screening for colon cancer   • Pyelonephritis, acute   • Acute kidney injury superimposed on chronic kidney disease (HCC)   • Dyspnea on exertion   • Generalized weakness   • Elevated d-dimer   • Dehydration   • Mild persistent asthma without complication   • Heme positive stool   • Elevated LFTs   • Alcohol use   • Urine retention   • Chronic diastolic congestive heart failure (HCC)   • Encounter for screening for malignant neoplasm of colon   • Personal history of colonic polyps   • Family history of esophageal cancer   • RAQUEL (obstructive sleep apnea)   • Sleep-related hypoxia   • Hypersomnia due to medical condition   • Total knee replacement status        Past Medical History:   Diagnosis Date   • Acute kidney injury superimposed on chronic kidney disease (HCC)    • Aortic stenosis    • Arthralgia    • Asthma     mild persistent without complication   • CAD (coronary artery disease), native coronary artery    • Cancer (HCC)     Prostate   • Chest pain, unspecified    • Chronic diastolic congestive heart failure (HCC)    • Colon polyp    • Dyspnea on exertion    • Erectile dysfunction    • Essential hypertension    • Generalized weakness    • Gout    • History of prostate cancer    • Hyperkalemia    • Jaw pain     both sides   • Mixed hyperlipidemia    • RAQUEL (obstructive sleep apnea) 2021    Home sleep study.  Weight 216 pounds.  Moderate  obstructive sleep apnea with AHI 26.5 events per hour.  Low oxygen saturation 79% and sleep-related hypoxia present for 157 minutes.  The patient snored 45.6% of total monitoring time.   • Osteoarthrosis    • PAF (paroxysmal atrial fibrillation) ([facility]    • Shoulder pain    • SOB (shortness of breath)    • SSS (sick sinus syndrome) ([facility])    • Tinnitus    • Urine retention         Past Surgical History:   Procedure Laterality Date   • CARDIAC CATHETERIZATION N/A 4/27/2017    Procedure: Coronary angiography;  Surgeon: Marvel Brito MD;  Location: [facility] CATH INVASIVE LOCATION;  Service:    • CARDIAC CATHETERIZATION N/A 4/27/2017    Procedure: Left Heart Cath;  Surgeon: Marvel Brito MD;  Location: [facility] CATH INVASIVE LOCATION;  Service:    • CARDIAC CATHETERIZATION N/A 4/27/2017    Procedure: Stent GIN coronary;  Surgeon: Marvel Brito MD;  Location: [facility] CATH INVASIVE LOCATION;  Service:    • CARDIAC CATHETERIZATION N/A 6/30/2020    Procedure: Coronary angiography;  Surgeon: Jared Sellers MD;  Location: [facility] INVASIVE LOCATION;  Service: Cardiovascular;  Laterality: N/A;   • CARDIAC CATHETERIZATION N/A 6/30/2020    Procedure: Left Heart Cath;  Surgeon: Jared Sellers MD;  Location: [facility] CATH INVASIVE LOCATION;  Service: Cardiovascular;  Laterality: N/A;   • CARDIAC ELECTROPHYSIOLOGY PROCEDURE N/A 6/15/2017    Procedure: Pacemaker DC new   [employer];  Surgeon: Gustavo Valladares MD;  Location: [facility] INVASIVE LOCATION;  Service:    • COLONOSCOPY  2013   • COLONOSCOPY N/A 12/12/2018    Procedure: COLONOSCOPY with polypectomy;  Surgeon: Kwadwo Powers MD;  Location: [facility];  Service: Gastroenterology   • CORONARY ANGIOPLASTY WITH STENT PLACEMENT     • INGUINAL HERNIA REPAIR Right    • JOINT REPLACEMENT      left knee, left and right shoulder   • NECK SURGERY      SPURS   • [place] RECONSTR TOTAL SHOULDER IMPLANT Right 2/16/2017    Procedure: RIGHT TOTAL SHOULDER ARTHROPLASTY;   "Surgeon: Marquez Galvan MD;  Location: [facility];  Service: Orthopedics   • PROSTATECTOMY     • REPLACEMENT TOTAL KNEE Left    • TONSILLECTOMY     • TOTAL KNEE ARTHROPLASTY Right 12/14/2021    Procedure: RIGHT TOTAL KNEE ARTHROPLASTY;  Surgeon: Jace Altamirano II, MD;  Location: [facility] OR;  Service: Orthopedics;  Laterality: Right;   • TOTAL SHOULDER REPLACEMENT Bilateral                         PT Assessment/Plan     Row Name 08/16/22 1000          PT Assessment    Assessment Comments Pt tolerated progression of strengthening well.  -KM            PT Plan    PT Plan Comments Continue per POC  -KM           User Key  (r) = Recorded By, (t) = Taken By, (c) = Cosigned By    Initials Name Provider Type    Fabienne Cordoba PTA Physical Therapist Assistant                   OP Exercises     Row Name 08/16/22 1000             Subjective Comments    Subjective Comments Pt states his knee is doing well. States he only has difficulty going down steps.  -KM              Exercise 1    Exercise Name 1 Hamstring stretch  -KM      Cueing 1 Verbal;Tactile  -KM      Reps 1 10  -KM      Time 1 10 secs  -KM              Exercise 2    Exercise Name 2 QS with Russian Stim  -KM      Cueing 2 Verbal;Tactile  -KM      Time 2 10 min 10/10  -KM              Exercise 3    Exercise Name 3 SAQ  -KM      Cueing 3 Verbal;Tactile  -KM      Reps 3 50  -KM      Time 3 6#  -KM              Exercise 4    Exercise Name 4 LAQ  -KM      Cueing 4 Verbal;Tactile  -KM      Reps 4 50  -KM      Time 4 6#  -KM              Exercise 5    Exercise Name 5 TKE vs theraband  -KM      Cueing 5 Verbal;Demo  -KM      Reps 5 50  -KM      Time 5 gold  -KM              Exercise 6    Exercise Name 6 Partial squats/ball squeeze  -KM      Cueing 6 Verbal;Demo  -KM      Reps 6 30  -KM              Exercise 7    Exercise Name 7 Forward step up on 6\"step  -KM      Cueing 7 Verbal;Demo  -KM      Reps 7 20x ea  -KM              Exercise 8    Exercise Name 8 " "step downs from 4\" step-forward, backward, side  -KM      Cueing 8 Verbal;Demo  -KM      Reps 8 10x  -KM              Exercise 9    Exercise Name 9 lateral step overs on 6\" step  -KM      Cueing 9 Verbal;Tactile;Demo  -KM      Reps 9 10x  -KM            User Key  (r) = Recorded By, (t) = Taken By, (c) = Cosigned By    Initials Name Provider Type    Fabienne Cordoba PTA Physical Therapist Assistant                                                Time Calculation:   Start Time: 1000  Stop Time: 1038  Time Calculation (min): 38 min  Therapy Charges for Today     Code Description Service Date Service Provider Modifiers Qty    13916056365 HC PT THER PROC EA 15 MIN 8/16/2022 Fabinene Saravia PTA GP 2                    Fabienne Saravia PTA  8/16/2022     "

## 2022-08-19 ENCOUNTER — HOSPITAL ENCOUNTER (OUTPATIENT)
Dept: PET IMAGING | Facility: HOSPITAL | Age: 87
End: 2022-08-19

## 2022-08-19 ENCOUNTER — APPOINTMENT (OUTPATIENT)
Dept: PET IMAGING | Facility: HOSPITAL | Age: 87
End: 2022-08-19

## 2022-08-23 ENCOUNTER — HOSPITAL ENCOUNTER (OUTPATIENT)
Dept: PHYSICAL THERAPY | Facility: HOSPITAL | Age: 87
Setting detail: THERAPIES SERIES
Discharge: HOME | End: 2022-08-23

## 2022-08-23 PROCEDURE — 97110 THERAPEUTIC EXERCISES: CPT

## 2022-08-23 NOTE — THERAPY TREATMENT NOTE
Outpatient Physical Therapy Ortho Treatment Note  MITCHELL Zamora     Patient Name: Guero Garay  : 1935  MRN: [id number]  Today's Date: 2022      Visit Date: 2022    Visit Dx:  No diagnosis found.    Patient Active Problem List   Diagnosis   • Gout   • Hypertension   • Hyperlipidemia   • Osteoarthrosis   • History of prostate cancer   • Tinnitus   • Benign neoplasm of colon   • Osteoarthritis of right shoulder   • Coronary artery disease involving native coronary artery of native heart with unstable angina pectoris (HCC)   • Syncopal episodes   • Screening for colon cancer   • Pyelonephritis, acute   • Acute kidney injury superimposed on chronic kidney disease (HCC)   • Dyspnea on exertion   • Generalized weakness   • Elevated d-dimer   • Dehydration   • Mild persistent asthma without complication   • Heme positive stool   • Elevated LFTs   • Alcohol use   • Urine retention   • Chronic diastolic congestive heart failure (HCC)   • Encounter for screening for malignant neoplasm of colon   • Personal history of colonic polyps   • Family history of esophageal cancer   • RAQUEL (obstructive sleep apnea)   • Sleep-related hypoxia   • Hypersomnia due to medical condition   • Total knee replacement status        Past Medical History:   Diagnosis Date   • Acute kidney injury superimposed on chronic kidney disease (HCC)    • Aortic stenosis    • Arthralgia    • Asthma     mild persistent without complication   • CAD (coronary artery disease), native coronary artery    • Cancer (HCC)     Prostate   • Chest pain, unspecified    • Chronic diastolic congestive heart failure (HCC)    • Colon polyp    • Dyspnea on exertion    • Erectile dysfunction    • Essential hypertension    • Generalized weakness    • Gout    • History of prostate cancer    • Hyperkalemia    • Jaw pain     both sides   • Mixed hyperlipidemia    • RAQUEL (obstructive sleep apnea) 2021    Home sleep study.  Weight 216 pounds.  Moderate  obstructive sleep apnea with AHI 26.5 events per hour.  Low oxygen saturation 79% and sleep-related hypoxia present for 157 minutes.  The patient snored 45.6% of total monitoring time.   • Osteoarthrosis    • PAF (paroxysmal atrial fibrillation) ([facility])    • Shoulder pain    • SOB (shortness of breath)    • SSS (sick sinus syndrome) ([facility])    • Tinnitus    • Urine retention         Past Surgical History:   Procedure Laterality Date   • CARDIAC CATHETERIZATION N/A 4/27/2017    Procedure: Coronary angiography;  Surgeon: Marvel Brito MD;  Location: [facility] CATH INVASIVE LOCATION;  Service:    • CARDIAC CATHETERIZATION N/A 4/27/2017    Procedure: Left Heart Cath;  Surgeon: Marvel Brito MD;  Location: [facility] CATH INVASIVE LOCATION;  Service:    • CARDIAC CATHETERIZATION N/A 4/27/2017    Procedure: Stent GIN coronary;  Surgeon: Marvel Brito MD;  Location: [facility] CATH INVASIVE LOCATION;  Service:    • CARDIAC CATHETERIZATION N/A 6/30/2020    Procedure: Coronary angiography;  Surgeon: Jared Sellers MD;  Location: [facility] INVASIVE LOCATION;  Service: Cardiovascular;  Laterality: N/A;   • CARDIAC CATHETERIZATION N/A 6/30/2020    Procedure: Left Heart Cath;  Surgeon: Jared Sellers MD;  Location: [facility] CATH INVASIVE LOCATION;  Service: Cardiovascular;  Laterality: N/A;   • CARDIAC ELECTROPHYSIOLOGY PROCEDURE N/A 6/15/2017    Procedure: Pacemaker DC new   [employer];  Surgeon: Gustavo Valladares MD;  Location: [facility] INVASIVE LOCATION;  Service:    • COLONOSCOPY  2013   • COLONOSCOPY N/A 12/12/2018    Procedure: COLONOSCOPY with polypectomy;  Surgeon: Kwadwo Poewrs MD;  Location: [facility];  Service: Gastroenterology   • CORONARY ANGIOPLASTY WITH STENT PLACEMENT     • INGUINAL HERNIA REPAIR Right    • JOINT REPLACEMENT      left knee, left and right shoulder   • NECK SURGERY      SPURS   • OR RECONSTR TOTAL SHOULDER IMPLANT Right 2/16/2017    Procedure: RIGHT TOTAL SHOULDER ARTHROPLASTY;   "Surgeon: Marquez Galvan MD;  Location: [facility];  Service: Orthopedics   • PROSTATECTOMY     • REPLACEMENT TOTAL KNEE Left    • TONSILLECTOMY     • TOTAL KNEE ARTHROPLASTY Right 12/14/2021    Procedure: RIGHT TOTAL KNEE ARTHROPLASTY;  Surgeon: Jace Altamirano II, MD;  Location: [facility] OR;  Service: Orthopedics;  Laterality: Right;   • TOTAL SHOULDER REPLACEMENT Bilateral                         PT Assessment/Plan     Row Name 08/23/22 1005          PT Assessment    Assessment Comments Pt has made good progress toward goals with overll improved strength and function  -KM            PT Plan    PT Plan Comments With pts improved status, plan to see pt in 2 weeks. Pt to continue with HEP  -KM           User Key  (r) = Recorded By, (t) = Taken By, (c) = Cosigned By    Initials Name Provider Type    Fabienne Cordoba PTA Physical Therapist Assistant                   OP Exercises     Row Name 08/23/22 1005             Subjective Comments    Subjective Comments Pt states his knee still bothers him some.  -KM              Exercise 1    Exercise Name 1 Hamstring stretch  -KM      Cueing 1 Verbal;Tactile  -KM      Reps 1 10  -KM      Time 1 10 secs  -KM              Exercise 2    Exercise Name 2 QS with Russian Stim  -KM      Cueing 2 Verbal;Tactile  -KM      Time 2 10 min 10/10  -KM              Exercise 3    Exercise Name 3 SAQ  -KM      Cueing 3 Verbal;Tactile  -KM      Reps 3 50  -KM      Time 3 6#  -KM              Exercise 4    Exercise Name 4 LAQ  -KM      Cueing 4 Verbal;Tactile  -KM      Reps 4 50  -KM      Time 4 6#  -KM              Exercise 5    Exercise Name 5 TKE vs theraband  -KM      Cueing 5 Verbal;Demo  -KM      Reps 5 50  -KM      Time 5 gold  -KM              Exercise 6    Exercise Name 6 Partial squats/ball squeeze  -KM      Cueing 6 Verbal;Demo  -KM      Reps 6 30  -KM              Exercise 7    Exercise Name 7 Forward step up on 6\"step  -KM      Cueing 7 Verbal;Demo  -KM      Reps 7 " "20x ea  -KM              Exercise 8    Exercise Name 8 step downs from 4\" step-forward, backward, side  -KM      Cueing 8 Verbal;Demo  -KM      Reps 8 10x  -KM              Exercise 9    Exercise Name 9 lateral step overs on 6\" step  -KM      Cueing 9 Verbal;Tactile;Demo  -KM      Reps 9 20x  -KM            User Key  (r) = Recorded By, (t) = Taken By, (c) = Cosigned By    Initials Name Provider Type    Fabienne Cordoba PTA Physical Therapist Assistant                                                Time Calculation:   Start Time: 1005  Stop Time: 1050  Time Calculation (min): 45 min  Therapy Charges for Today     Code Description Service Date Service Provider Modifiers Qty    61651157593 HC PT THER PROC EA 15 MIN 8/23/2022 Fabienne Saravia PTA GP 1                    Fabienne Saravia PTA  8/23/2022     "

## 2022-08-30 ENCOUNTER — HOSPITAL ENCOUNTER (OUTPATIENT)
Dept: PET IMAGING | Facility: HOSPITAL | Age: 87
Discharge: HOME | End: 2022-08-30

## 2022-08-30 DIAGNOSIS — C61 PROSTATE CANCER: ICD-10-CM

## 2022-08-30 PROCEDURE — 78815 PET IMAGE W/CT SKULL-THIGH: CPT

## 2022-08-30 PROCEDURE — A9595 PIFLUFOLASTAT F 18 9 MCI SOLUTION PREFILLED SYRINGE: HCPCS | Performed by: UROLOGY

## 2022-08-30 PROCEDURE — 0 PIFLUFOLASTAT F 18 9 MCI SOLUTION PREFILLED SYRINGE: Performed by: UROLOGY

## 2022-08-30 RX ADMIN — PIFLUFOLASTAT F-18 1 DOSE: 80 INJECTION INTRAVENOUS at 10:56

## 2022-09-01 DIAGNOSIS — I10 ESSENTIAL HYPERTENSION: ICD-10-CM

## 2022-09-01 RX ORDER — POTASSIUM CHLORIDE 750 MG/1
CAPSULE, EXTENDED RELEASE ORAL
Qty: 180 CAPSULE | Refills: 0 | Status: SHIPPED | OUTPATIENT
Start: 2022-09-01 | End: 2022-12-09

## 2022-09-06 ENCOUNTER — HOSPITAL ENCOUNTER (OUTPATIENT)
Dept: PHYSICAL THERAPY | Facility: HOSPITAL | Age: 87
Setting detail: THERAPIES SERIES
Discharge: HOME | End: 2022-09-06

## 2022-09-06 PROCEDURE — 97110 THERAPEUTIC EXERCISES: CPT

## 2022-09-06 NOTE — THERAPY TREATMENT NOTE
Outpatient Physical Therapy Ortho Treatment Note  MITCHELL Zamora     Patient Name: Guero Garay  : 1935  MRN: [id number]  Today's Date: 2022      Visit Date: 2022    Visit Dx:  No diagnosis found.    Patient Active Problem List   Diagnosis   • Gout   • Hypertension   • Hyperlipidemia   • Osteoarthrosis   • History of prostate cancer   • Tinnitus   • Benign neoplasm of colon   • Osteoarthritis of right shoulder   • Coronary artery disease involving native coronary artery of native heart with unstable angina pectoris (HCC)   • Syncopal episodes   • Screening for colon cancer   • Pyelonephritis, acute   • Acute kidney injury superimposed on chronic kidney disease (HCC)   • Dyspnea on exertion   • Generalized weakness   • Elevated d-dimer   • Dehydration   • Mild persistent asthma without complication   • Heme positive stool   • Elevated LFTs   • Alcohol use   • Urine retention   • Chronic diastolic congestive heart failure (HCC)   • Encounter for screening for malignant neoplasm of colon   • Personal history of colonic polyps   • Family history of esophageal cancer   • RAQUEL (obstructive sleep apnea)   • Sleep-related hypoxia   • Hypersomnia due to medical condition   • Total knee replacement status        Past Medical History:   Diagnosis Date   • Acute kidney injury superimposed on chronic kidney disease (HCC)    • Aortic stenosis    • Arthralgia    • Asthma     mild persistent without complication   • CAD (coronary artery disease), native coronary artery    • Cancer (HCC)     Prostate   • Chest pain, unspecified    • Chronic diastolic congestive heart failure (HCC)    • Colon polyp    • Dyspnea on exertion    • Erectile dysfunction    • Essential hypertension    • Generalized weakness    • Gout    • History of prostate cancer    • Hyperkalemia    • Jaw pain     both sides   • Mixed hyperlipidemia    • RAQUEL (obstructive sleep apnea) 2021    Home sleep study.  Weight 216 pounds.  Moderate  obstructive sleep apnea with AHI 26.5 events per hour.  Low oxygen saturation 79% and sleep-related hypoxia present for 157 minutes.  The patient snored 45.6% of total monitoring time.   • Osteoarthrosis    • PAF (paroxysmal atrial fibrillation) ([facility])    • Shoulder pain    • SOB (shortness of breath)    • SSS (sick sinus syndrome) ([facility])    • Tinnitus    • Urine retention         Past Surgical History:   Procedure Laterality Date   • CARDIAC CATHETERIZATION N/A 4/27/2017    Procedure: Coronary angiography;  Surgeon: Marvel Brito MD;  Location: [facility] CATH INVASIVE LOCATION;  Service:    • CARDIAC CATHETERIZATION N/A 4/27/2017    Procedure: Left Heart Cath;  Surgeon: Marvel Brito MD;  Location: [facility] CATH INVASIVE LOCATION;  Service:    • CARDIAC CATHETERIZATION N/A 4/27/2017    Procedure: Stent GIN coronary;  Surgeon: Marvel Brito MD;  Location: [facility] CATH INVASIVE LOCATION;  Service:    • CARDIAC CATHETERIZATION N/A 6/30/2020    Procedure: Coronary angiography;  Surgeon: Jared Sellers MD;  Location: [facility] INVASIVE LOCATION;  Service: Cardiovascular;  Laterality: N/A;   • CARDIAC CATHETERIZATION N/A 6/30/2020    Procedure: Left Heart Cath;  Surgeon: Jared Sellers MD;  Location: [facility] CATH INVASIVE LOCATION;  Service: Cardiovascular;  Laterality: N/A;   • CARDIAC ELECTROPHYSIOLOGY PROCEDURE N/A 6/15/2017    Procedure: Pacemaker DC new   [employer];  Surgeon: Gustavo Valladares MD;  Location: [facility] INVASIVE LOCATION;  Service:    • COLONOSCOPY  2013   • COLONOSCOPY N/A 12/12/2018    Procedure: COLONOSCOPY with polypectomy;  Surgeon: Kwadwo Powers MD;  Location: [facility];  Service: Gastroenterology   • CORONARY ANGIOPLASTY WITH STENT PLACEMENT     • INGUINAL HERNIA REPAIR Right    • JOINT REPLACEMENT      left knee, left and right shoulder   • NECK SURGERY      SPURS   • [place] RECONSTR TOTAL SHOULDER IMPLANT Right 2/16/2017    Procedure: RIGHT TOTAL SHOULDER ARTHROPLASTY;   "Surgeon: Marquez Galvan MD;  Location: [facility];  Service: Orthopedics   • PROSTATECTOMY     • REPLACEMENT TOTAL KNEE Left    • TONSILLECTOMY     • TOTAL KNEE ARTHROPLASTY Right 12/14/2021    Procedure: RIGHT TOTAL KNEE ARTHROPLASTY;  Surgeon: Jace Altamirano II, MD;  Location: [facility] OR;  Service: Orthopedics;  Laterality: Right;   • TOTAL SHOULDER REPLACEMENT Bilateral                         PT Assessment/Plan     Row Name 09/06/22 1000          PT Assessment    Assessment Comments Pt has made good progress toward goals with improved strength and function.  -KM            PT Plan    PT Plan Comments With pts improved status,  -KM           User Key  (r) = Recorded By, (t) = Taken By, (c) = Cosigned By    Initials Name Provider Type    Fabienne Cordoba, PTA Physical Therapist Assistant                   OP Exercises     Row Name 09/06/22 1000             Subjective Comments    Subjective Comments Pt states his knee is doing well and continues to complete HEP daily.  -KM              Exercise 1    Exercise Name 1 Hamstring stretch  -KM      Cueing 1 Verbal;Tactile  -KM      Reps 1 10  -KM      Time 1 10 secs  -KM              Exercise 2    Exercise Name 2 QS with Russian Stim  -KM      Cueing 2 Verbal;Tactile  -KM      Time 2 10 min 10/10  -KM              Exercise 3    Exercise Name 3 SAQ  -KM      Cueing 3 Verbal;Tactile  -KM      Reps 3 50  -KM      Time 3 6#  -KM              Exercise 4    Exercise Name 4 LAQ  -KM      Cueing 4 Verbal;Tactile  -KM      Reps 4 50  -KM      Time 4 6#  -KM              Exercise 5    Exercise Name 5 TKE vs theraband  -KM      Cueing 5 Verbal;Demo  -KM      Reps 5 50  -KM      Time 5 gold  -KM              Exercise 6    Exercise Name 6 Partial squats/ball squeeze  -KM      Cueing 6 Verbal;Demo  -KM      Reps 6 30  -KM              Exercise 7    Exercise Name 7 Forward step up on 6\"step  -KM      Cueing 7 Verbal;Demo  -KM      Reps 7 20x ea  -KM              " "Exercise 8    Exercise Name 8 step downs from 4\" step-forward, backward, side  -KM      Cueing 8 Verbal;Demo  -KM      Reps 8 10x  -KM              Exercise 9    Exercise Name 9 lateral step overs on 6\" step  -KM      Cueing 9 Verbal;Tactile;Demo  -KM      Reps 9 20x  -KM            User Key  (r) = Recorded By, (t) = Taken By, (c) = Cosigned By    Initials Name Provider Type    Fabienne Cordoba PTA Physical Therapist Assistant                                                Time Calculation:   Start Time: 1000  Stop Time: 1057  Time Calculation (min): 57 min  Therapy Charges for Today     Code Description Service Date Service Provider Modifiers Qty    90580782176 HC PT THER PROC EA 15 MIN 9/6/2022 Fabienne Saravia PTA GP 1                    Fabienne Saravia PTA  9/6/2022     "

## 2022-10-04 ENCOUNTER — HOSPITAL ENCOUNTER (OUTPATIENT)
Dept: PHYSICAL THERAPY | Facility: HOSPITAL | Age: 87
Setting detail: THERAPIES SERIES
Discharge: HOME | End: 2022-10-04

## 2022-10-04 NOTE — THERAPY TREATMENT NOTE
Outpatient Physical Therapy Ortho Treatment Note  MITCHELL Zamora     Patient Name: Guero Garay  : 1935  MRN: [id number]  Today's Date: 10/4/2022      Visit Date: 10/04/2022    Visit Dx:  No diagnosis found.    Patient Active Problem List   Diagnosis   • Gout   • Hypertension   • Hyperlipidemia   • Osteoarthrosis   • History of prostate cancer   • Tinnitus   • Benign neoplasm of colon   • Osteoarthritis of right shoulder   • Coronary artery disease involving native coronary artery of native heart with unstable angina pectoris (HCC)   • Syncopal episodes   • Screening for colon cancer   • Pyelonephritis, acute   • Acute kidney injury superimposed on chronic kidney disease (HCC)   • Dyspnea on exertion   • Generalized weakness   • Elevated d-dimer   • Dehydration   • Mild persistent asthma without complication   • Heme positive stool   • Elevated LFTs   • Alcohol use   • Urine retention   • Chronic diastolic congestive heart failure (HCC)   • Encounter for screening for malignant neoplasm of colon   • Personal history of colonic polyps   • Family history of esophageal cancer   • RAQUEL (obstructive sleep apnea)   • Sleep-related hypoxia   • Hypersomnia due to medical condition   • Total knee replacement status        Past Medical History:   Diagnosis Date   • Acute kidney injury superimposed on chronic kidney disease (HCC)    • Aortic stenosis    • Arthralgia    • Asthma     mild persistent without complication   • CAD (coronary artery disease), native coronary artery    • Cancer (HCC)     Prostate   • Chest pain, unspecified    • Chronic diastolic congestive heart failure (HCC)    • Colon polyp    • Dyspnea on exertion    • Erectile dysfunction    • Essential hypertension    • Generalized weakness    • Gout    • History of prostate cancer    • Hyperkalemia    • Jaw pain     both sides   • Mixed hyperlipidemia    • RAQUEL (obstructive sleep apnea) 2021    Home sleep study.  Weight 216 pounds.  Moderate  obstructive sleep apnea with AHI 26.5 events per hour.  Low oxygen saturation 79% and sleep-related hypoxia present for 157 minutes.  The patient snored 45.6% of total monitoring time.   • Osteoarthrosis    • PAF (paroxysmal atrial fibrillation) ([facility])    • Shoulder pain    • SOB (shortness of breath)    • SSS (sick sinus syndrome) ([facility])    • Tinnitus    • Urine retention         Past Surgical History:   Procedure Laterality Date   • CARDIAC CATHETERIZATION N/A 4/27/2017    Procedure: Coronary angiography;  Surgeon: Marvel Brito MD;  Location: [facility] CATH INVASIVE LOCATION;  Service:    • CARDIAC CATHETERIZATION N/A 4/27/2017    Procedure: Left Heart Cath;  Surgeon: Marvel Brito MD;  Location: [facility] CATH INVASIVE LOCATION;  Service:    • CARDIAC CATHETERIZATION N/A 4/27/2017    Procedure: Stent GIN coronary;  Surgeon: Marvel Brito MD;  Location: [facility] CATH INVASIVE LOCATION;  Service:    • CARDIAC CATHETERIZATION N/A 6/30/2020    Procedure: Coronary angiography;  Surgeon: Jared Sellers MD;  Location: [facility] INVASIVE LOCATION;  Service: Cardiovascular;  Laterality: N/A;   • CARDIAC CATHETERIZATION N/A 6/30/2020    Procedure: Left Heart Cath;  Surgeon: Jared Sellers MD;  Location: [facility] CATH INVASIVE LOCATION;  Service: Cardiovascular;  Laterality: N/A;   • CARDIAC ELECTROPHYSIOLOGY PROCEDURE N/A 6/15/2017    Procedure: Pacemaker DC new   [employer];  Surgeon: Gustavo Valladares MD;  Location: [facility] INVASIVE LOCATION;  Service:    • COLONOSCOPY  2013   • COLONOSCOPY N/A 12/12/2018    Procedure: COLONOSCOPY with polypectomy;  Surgeon: Kwadwo Powers MD;  Location: [facility];  Service: Gastroenterology   • CORONARY ANGIOPLASTY WITH STENT PLACEMENT     • INGUINAL HERNIA REPAIR Right    • JOINT REPLACEMENT      left knee, left and right shoulder   • NECK SURGERY      SPURS   • [place] RECONSTR TOTAL SHOULDER IMPLANT Right 2/16/2017    Procedure: RIGHT TOTAL SHOULDER ARTHROPLASTY;   "Surgeon: Marquez Galvan MD;  Location: [facility];  Service: Orthopedics   • PROSTATECTOMY     • REPLACEMENT TOTAL KNEE Left    • TONSILLECTOMY     • TOTAL KNEE ARTHROPLASTY Right 12/14/2021    Procedure: RIGHT TOTAL KNEE ARTHROPLASTY;  Surgeon: Jace Altamirano II, MD;  Location: [facility] OR;  Service: Orthopedics;  Laterality: Right;   • TOTAL SHOULDER REPLACEMENT Bilateral                         PT Assessment/Plan     Row Name 10/04/22 1000          PT Assessment    Assessment Comments Pt has made good progress toward goals with overall improved strength and function.  -KM            PT Plan    PT Plan Comments With pts improved status, plan to discharge from PT and transition to HEP.  -KM           User Key  (r) = Recorded By, (t) = Taken By, (c) = Cosigned By    Initials Name Provider Type    Fabienne Cordoba PTA Physical Therapist Assistant                   OP Exercises     Row Name 10/04/22 1000             Subjective Comments    Subjective Comments Pt states his knee has been doing well and feels the steps are getting easier.  -KM              Exercise 1    Exercise Name 1 Hamstring stretch  -KM      Cueing 1 Verbal;Tactile  -KM      Reps 1 10  -KM      Time 1 10 secs  -KM              Exercise 2    Exercise Name 2 QS with Russian Stim  -KM      Cueing 2 Verbal;Tactile  -KM      Time 2 10 min 10/10  -KM              Exercise 3    Exercise Name 3 SAQ  -KM      Cueing 3 Verbal;Tactile  -KM      Reps 3 50  -KM      Time 3 6#  -KM              Exercise 4    Exercise Name 4 LAQ  -KM      Cueing 4 Verbal;Tactile  -KM      Reps 4 50  -KM      Time 4 6#  -KM              Exercise 5    Exercise Name 5 TKE vs theraband  -KM      Cueing 5 Verbal;Demo  -KM      Reps 5 50  -KM      Time 5 gold  -KM              Exercise 6    Exercise Name 6 Partial squats/ball squeeze  -KM      Cueing 6 Verbal;Demo  -KM      Reps 6 30  -KM              Exercise 7    Exercise Name 7 Forward step up on 6\"step  -KM      " "Cueing 7 Verbal;Demo  -KM      Reps 7 20x ea  -KM              Exercise 8    Exercise Name 8 step downs from 4\" step-forward, backward, side  -KM      Cueing 8 Verbal;Demo  -KM      Reps 8 10x  -KM              Exercise 9    Exercise Name 9 lateral step overs on 6\" step  -KM      Cueing 9 Verbal;Tactile;Demo  -KM      Reps 9 20x  -KM            User Key  (r) = Recorded By, (t) = Taken By, (c) = Cosigned By    Initials Name Provider Type    Fabienne Cordoba PTA Physical Therapist Assistant                                                Time Calculation:   Start Time: 1000  Stop Time: 1045  Time Calculation (min): 45 min                Fabienne Saravia PTA  10/4/2022     "

## 2022-11-02 RX ORDER — AMLODIPINE BESYLATE 10 MG/1
10 TABLET ORAL DAILY
Qty: 30 TABLET | Refills: 3 | Status: SHIPPED | OUTPATIENT
Start: 2022-11-02 | End: 2023-03-07

## 2022-12-07 ENCOUNTER — HOSPITAL ENCOUNTER (OUTPATIENT)
Dept: CARDIOLOGY | Facility: HOSPITAL | Age: 87
Discharge: HOME | End: 2022-12-07
Admitting: NURSE PRACTITIONER

## 2022-12-07 VITALS
DIASTOLIC BLOOD PRESSURE: 78 MMHG | HEART RATE: 90 BPM | BODY MASS INDEX: 32.58 KG/M2 | HEIGHT: 68 IN | WEIGHT: 215 LBS | SYSTOLIC BLOOD PRESSURE: 124 MMHG | OXYGEN SATURATION: 94 %

## 2022-12-07 DIAGNOSIS — I35.0 AORTIC STENOSIS, MILD: ICD-10-CM

## 2022-12-07 LAB
AORTIC ARCH: 3.5 CM
AORTIC DIMENSIONLESS INDEX: 0.3 (DI)
ASCENDING AORTA: 3.3 CM
BH CV ECHO MEAS - ACS: 1.27 CM
BH CV ECHO MEAS - AI P1/2T: 723.2 MSEC
BH CV ECHO MEAS - AO MAX PG: 31.1 MMHG
BH CV ECHO MEAS - AO MEAN PG: 17.7 MMHG
BH CV ECHO MEAS - AO ROOT DIAM: 3.5 CM
BH CV ECHO MEAS - AO V2 MAX: 279 CM/SEC
BH CV ECHO MEAS - AO V2 VTI: 67.8 CM
BH CV ECHO MEAS - AVA(I,D): 1.4 CM2
BH CV ECHO MEAS - EDV(CUBED): 78.8 ML
BH CV ECHO MEAS - EDV(MOD-SP2): 86 ML
BH CV ECHO MEAS - EDV(MOD-SP4): 92 ML
BH CV ECHO MEAS - EF(MOD-BP): 58.1 %
BH CV ECHO MEAS - EF(MOD-SP2): 59.3 %
BH CV ECHO MEAS - EF(MOD-SP4): 62 %
BH CV ECHO MEAS - ESV(CUBED): 34 ML
BH CV ECHO MEAS - ESV(MOD-SP2): 35 ML
BH CV ECHO MEAS - ESV(MOD-SP4): 35 ML
BH CV ECHO MEAS - FS: 24.4 %
BH CV ECHO MEAS - IVS/LVPW: 0.99 CM
BH CV ECHO MEAS - IVSD: 1.26 CM
BH CV ECHO MEAS - LAT PEAK E' VEL: 5 CM/SEC
BH CV ECHO MEAS - LV DIASTOLIC VOL/BSA (35-75): 43.6 CM2
BH CV ECHO MEAS - LV MASS(C)D: 198.7 GRAMS
BH CV ECHO MEAS - LV MAX PG: 2.7 MMHG
BH CV ECHO MEAS - LV MEAN PG: 1.72 MMHG
BH CV ECHO MEAS - LV SYSTOLIC VOL/BSA (12-30): 16.6 CM2
BH CV ECHO MEAS - LV V1 MAX: 81.4 CM/SEC
BH CV ECHO MEAS - LV V1 VTI: 23.4 CM
BH CV ECHO MEAS - LVIDD: 4.3 CM
BH CV ECHO MEAS - LVIDS: 3.2 CM
BH CV ECHO MEAS - LVOT AREA: 4 CM2
BH CV ECHO MEAS - LVOT DIAM: 2.27 CM
BH CV ECHO MEAS - LVPWD: 1.27 CM
BH CV ECHO MEAS - MED PEAK E' VEL: 3.7 CM/SEC
BH CV ECHO MEAS - MV A DUR: 0.12 SEC
BH CV ECHO MEAS - MV A MAX VEL: 118.1 CM/SEC
BH CV ECHO MEAS - MV DEC SLOPE: 220.3 CM/SEC2
BH CV ECHO MEAS - MV DEC TIME: 0.21 MSEC
BH CV ECHO MEAS - MV E MAX VEL: 48.4 CM/SEC
BH CV ECHO MEAS - MV E/A: 0.41
BH CV ECHO MEAS - MV MAX PG: 5.6 MMHG
BH CV ECHO MEAS - MV MEAN PG: 1.59 MMHG
BH CV ECHO MEAS - MV P1/2T: 75.3 MSEC
BH CV ECHO MEAS - MV V2 VTI: 30.6 CM
BH CV ECHO MEAS - MVA(P1/2T): 2.9 CM2
BH CV ECHO MEAS - MVA(VTI): 3.1 CM2
BH CV ECHO MEAS - PA ACC TIME: 0.09 SEC
BH CV ECHO MEAS - PA PR(ACCEL): 37.8 MMHG
BH CV ECHO MEAS - PA V2 MAX: 83.4 CM/SEC
BH CV ECHO MEAS - PULM A REVS DUR: 0.11 SEC
BH CV ECHO MEAS - PULM A REVS VEL: 25.2 CM/SEC
BH CV ECHO MEAS - PULM DIAS VEL: 39.5 CM/SEC
BH CV ECHO MEAS - PULM S/D: 1.27
BH CV ECHO MEAS - PULM SYS VEL: 50 CM/SEC
BH CV ECHO MEAS - QP/QS: 0.62
BH CV ECHO MEAS - RAP SYSTOLE: 3 MMHG
BH CV ECHO MEAS - RV MAX PG: 1.57 MMHG
BH CV ECHO MEAS - RV V1 MAX: 62.6 CM/SEC
BH CV ECHO MEAS - RV V1 VTI: 12.8 CM
BH CV ECHO MEAS - RVOT DIAM: 2.42 CM
BH CV ECHO MEAS - RVSP: 26.1 MMHG
BH CV ECHO MEAS - SI(MOD-SP2): 24.2 ML/M2
BH CV ECHO MEAS - SI(MOD-SP4): 27 ML/M2
BH CV ECHO MEAS - SUP REN AO DIAM: 2 CM
BH CV ECHO MEAS - SV(LVOT): 94.6 ML
BH CV ECHO MEAS - SV(MOD-SP2): 51 ML
BH CV ECHO MEAS - SV(MOD-SP4): 57 ML
BH CV ECHO MEAS - SV(RVOT): 58.6 ML
BH CV ECHO MEAS - TAPSE (>1.6): 2.33 CM
BH CV ECHO MEAS - TR MAX PG: 23.1 MMHG
BH CV ECHO MEAS - TR MAX VEL: 240.3 CM/SEC
BH CV ECHO MEASUREMENTS AVERAGE E/E' RATIO: 11.13
BH CV XLRA - RV BASE: 2.9 CM
BH CV XLRA - RV LENGTH: 7.7 CM
BH CV XLRA - RV MID: 3.3 CM
BH CV XLRA - TDI S': 11 CM/SEC
LEFT ATRIUM VOLUME INDEX: 35.7 ML/M2
MAXIMAL PREDICTED HEART RATE: 134 BPM
SINUS: 3.7 CM
STJ: 3.4 CM
STRESS TARGET HR: 114 BPM

## 2022-12-07 PROCEDURE — 93306 TTE W/DOPPLER COMPLETE: CPT | Performed by: INTERNAL MEDICINE

## 2022-12-07 PROCEDURE — 25010000002 PERFLUTREN (DEFINITY) 8.476 MG IN SODIUM CHLORIDE (PF) 0.9 % 10 ML INJECTION: Performed by: NURSE PRACTITIONER

## 2022-12-07 PROCEDURE — 93306 TTE W/DOPPLER COMPLETE: CPT

## 2022-12-07 RX ADMIN — PERFLUTREN 1.5 ML: 6.52 INJECTION, SUSPENSION INTRAVENOUS at 08:59

## 2022-12-08 NOTE — PROGRESS NOTES
Reviewed results and recommendations with Guero Garay.  Patient verbalized understanding of results and recommendations.    Thank you,  Lizzy Grimm RN  Triage Nurse [facility]

## 2022-12-09 DIAGNOSIS — I10 ESSENTIAL HYPERTENSION: ICD-10-CM

## 2022-12-09 RX ORDER — FUROSEMIDE 20 MG/1
20 TABLET ORAL DAILY
Qty: 90 TABLET | Refills: 3 | Status: SHIPPED | OUTPATIENT
Start: 2022-12-09

## 2022-12-09 RX ORDER — POTASSIUM CHLORIDE 750 MG/1
CAPSULE, EXTENDED RELEASE ORAL
Qty: 180 CAPSULE | Refills: 0 | Status: SHIPPED | OUTPATIENT
Start: 2022-12-09

## 2022-12-14 ENCOUNTER — CLINICAL SUPPORT NO REQUIREMENTS (OUTPATIENT)
Dept: CARDIOLOGY | Facility: CLINIC | Age: 87
End: 2022-12-14

## 2022-12-14 ENCOUNTER — TELEPHONE (OUTPATIENT)
Dept: CARDIOLOGY | Facility: CLINIC | Age: 87
End: 2022-12-14

## 2022-12-14 ENCOUNTER — HOSPITAL ENCOUNTER (OUTPATIENT)
Dept: GENERAL RADIOLOGY | Facility: HOSPITAL | Age: 87
Discharge: HOME | End: 2022-12-14

## 2022-12-14 ENCOUNTER — OFFICE VISIT (OUTPATIENT)
Dept: CARDIOLOGY | Facility: CLINIC | Age: 87
End: 2022-12-14

## 2022-12-14 ENCOUNTER — LAB (OUTPATIENT)
Dept: LAB | Facility: HOSPITAL | Age: 87
End: 2022-12-14

## 2022-12-14 VITALS
WEIGHT: 222 LBS | HEART RATE: 66 BPM | BODY MASS INDEX: 33.65 KG/M2 | HEIGHT: 68 IN | OXYGEN SATURATION: 95 % | DIASTOLIC BLOOD PRESSURE: 56 MMHG | SYSTOLIC BLOOD PRESSURE: 100 MMHG

## 2022-12-14 DIAGNOSIS — R06.09 DOE (DYSPNEA ON EXERTION): ICD-10-CM

## 2022-12-14 DIAGNOSIS — R05.9 COUGH, UNSPECIFIED TYPE: ICD-10-CM

## 2022-12-14 DIAGNOSIS — I48.0 PAROXYSMAL ATRIAL FIBRILLATION: Primary | ICD-10-CM

## 2022-12-14 DIAGNOSIS — R06.09 DOE (DYSPNEA ON EXERTION): Primary | ICD-10-CM

## 2022-12-14 DIAGNOSIS — Z01.818 PREOP EXAMINATION: Primary | ICD-10-CM

## 2022-12-14 LAB — NT-PROBNP SERPL-MCNC: 263 PG/ML (ref 0–1800)

## 2022-12-14 PROCEDURE — 93280 PM DEVICE PROGR EVAL DUAL: CPT | Performed by: INTERNAL MEDICINE

## 2022-12-14 PROCEDURE — 71046 X-RAY EXAM CHEST 2 VIEWS: CPT

## 2022-12-14 PROCEDURE — 99214 OFFICE O/P EST MOD 30 MIN: CPT | Performed by: NURSE PRACTITIONER

## 2022-12-14 PROCEDURE — 36415 COLL VENOUS BLD VENIPUNCTURE: CPT | Performed by: NURSE PRACTITIONER

## 2022-12-14 PROCEDURE — 83880 ASSAY OF NATRIURETIC PEPTIDE: CPT | Performed by: NURSE PRACTITIONER

## 2022-12-14 NOTE — TELEPHONE ENCOUNTER
Please inform patient his fluid lab is completely normal so he is getting enough Lasix already.  Chest x-ray did not show any fluid or evidence of pneumonia. I do not have a clear reason why he has been coughing or feeling sob from this study. There is mention of compression deformity at the level of the  thoracolumbar junction within the spine. Please explain to him this would correlate with the podiatrist suspicion of nerve damage affecting his sensation in the feet.    I recommend 6 month follow up with Dr. Gutierrez .

## 2022-12-14 NOTE — PROGRESS NOTES
Date of Office Visit: 22  Encounter Provider: BUNNY Solitario  Place of Service: [facility]  Patient Name: Guero Garay  :1935    Chief Complaint   Patient presents with   • Coronary artery disease involving native coronary artery of   • Aortic stenosis, severe   • Paroxysmal atrial fibrillation (CMS/HCC)   • Chronic diastolic congestive heart failure (HCC)   • Hypertension   • Hyperlipidemia   • Sleep Apnea   • Follow-up   :     HPI: Guero Garay is a 87 y.o. male  with  coronary artery disease, atrial fibrillation, hypertension, aortic stenosis, chronic diastolic heart failure, asthma, hyperlipidemia, chronic kidney disease, and history of prostate cancer.   He was followed by Dr. Galan and is now followed by Dr. Preston Gutierrez. . I will visit with him in follow up today and have reviewed his medical record.      In 2017 he presented with progressive angina.  He had a perfusion stress test which showed a large area of anterior septal ischemia.  Echocardiogram showed mild aortic stenosis.  He underwent cardiac catheterization on 2017 and was found to have preserved left ventricular ejection fraction, severe disease of the mid LAD and right coronary artery.  He had a 4.0 x 15 mm drug-eluting stent placed in the LAD and a 4.0 x 28 mm drug-eluting stent dilated to 4.5 mm in the right coronary artery.  He was hypokalemic and was started on potassium.  He was also started on rosuvastatin.     He then presented in 2017 with dizziness and syncope as well as bradycardia.  He ended up having a permanent pacemaker implanted.    In 2019 he was found have episodes of atrial fibrillation on pacemaker interrogation.  He also had issues with urinary retention requiring Tran catheter was following with urology for urosepsis and pyelonephritis.  He later developed elevated liver enzymes which was felt to be from antibiotic therapy and had heme  "positive stool.  He had outpatient colonoscopy.      Repeat stress 12/2019 showed small and mildly severe area of ischemia in the apex. Follow up echo 06/2020 showed EF 50%, mild LVH, grade 1 diastolic dysfunction, moderate to severe aortic valve stenosis.  Cardiac catheterization showed Patent stent within the proximal LAD and proximal right coronary artery with no significant in-stent restenosis otherwise luminal irregularities throughout, Mild aortic stenosis with a peak to peak gradient of 4 mmHg across the aortic valve, and Normal left ventricular filling pressures of 50 mmHg. Repeat echo 06/2021 showed normal LVEF, mild aortic valve stenosis, and normal RVSP.    He presents today for 6-month reassessment.  He continues to complain of shortness of breath and intermittent wheezing which is not new.  He has had a cough which he states has been persistent for at least 8 months.  He denies dizziness or palpitations or edema.  He has no falls.  He reports seeing a podiatrist due to his feet \"feeling like walking on sponges\".  He was told that he likely has some nerve damage causing numbness and tingling in his feet.  He goes to [place] after [place] where he plans to walk more.  He has been pretty sedentary the last few months.  He is compliant with CPAP.  He denies blood in the urine or stool.  Allergies   Allergen Reactions   • Sulfa Antibiotics Other (See Comments)     UNKNOWN  Pat was shaky and could hardly walk  UNKNOWN  Pat was shaky and could hardly walk  Pat was shaky and could hardly walk  UNKNOWN             Family and social history reviewed.     ROS  All other systems were reviewed and are negative          Objective:     Vitals:    12/14/22 1120   BP: 100/56   BP Location: Left arm   Patient Position: Sitting   Pulse: 66   SpO2: 95%   Weight: 101 kg (222 lb)   Height: 172.7 cm (68\")     Body mass index is 33.75 kg/m².    PHYSICAL EXAM:  Pulmonary:      Effort: Pulmonary effort is normal.      Breath " sounds: Normal breath sounds.   Cardiovascular:      Normal rate.      Murmurs: There is a grade 2/6 systolic murmur at the URSB.         Procedures      Current Outpatient Medications   Medication Sig Dispense Refill   • acetaminophen (TYLENOL) 500 MG tablet Take 500 mg by mouth Every Night.     • allopurinol (ZYLOPRIM) 100 MG tablet TAKE 1 TABLET BY MOUTH DAILY (Patient taking differently: Take 100 mg by mouth Every Night.) 30 tablet 0   • amLODIPine (NORVASC) 10 MG tablet TAKE 1 TABLET BY MOUTH DAILY 30 tablet 3   • aspirin 81 MG EC tablet Take 81 mg by mouth Daily. PT TO STOP PER MD INSTRUCTION     • cetirizine (zyrTEC) 10 MG tablet Take 10 mg by mouth Daily As Needed.     • Chlorhexidine Gluconate (HIBICLENS EX) Apply 1 each topically Take As Directed. AS DIRECTED PREOP     • Cranberry 250 MG capsule Take 250 mg by mouth Daily.     • Eliquis 2.5 MG tablet tablet TAKE 1 TABLET BY MOUTH EVERY 12 HOURS 180 tablet 3   • Fluticasone Furoate-Vilanterol (BREO ELLIPTA) 100-25 MCG/INH inhaler Inhale 1 puff Every Morning.     • furosemide (LASIX) 20 MG tablet TAKE 1 TABLET BY MOUTH DAILY 90 tablet 3   • melatonin 5 MG tablet tablet Take 5 mg by mouth At Night As Needed.     • metoprolol tartrate (LOPRESSOR) 50 MG tablet TAKE 1 TABLET BY MOUTH TWICE DAILY 180 tablet 1   • Multiple Vitamins-Minerals (MULTIVITAMIN WITH MINERALS) tablet tablet Take 1 tablet by mouth Daily.     • potassium chloride (MICRO-K) 10 MEQ CR capsule TAKE 1 CAPSULE BY MOUTH TWICE DAILY 180 capsule 0   • rosuvastatin (CRESTOR) 20 MG tablet Take 1 tablet by mouth Daily. 90 tablet 3   • topiramate (TOPAMAX) 50 MG tablet Take 50 mg by mouth 2 (Two) Times a Day.       No current facility-administered medications for this visit.     Assessment:       Diagnosis Plan   1. VALENZUELA (dyspnea on exertion)  XR chest pa and lateral    proBNP      2. Cough, unspecified type  XR chest pa and lateral           Orders Placed This Encounter   Procedures   • XR chest pa and  lateral     Standing Status:   Future     Number of Occurrences:   1     Standing Expiration Date:   12/14/2023     Order Specific Question:   Reason for Exam:     Answer:   VALENZUELA, cough, wheezing     Order Specific Question:   Release to patient     Answer:   Routine Release   • proBNP     Order Specific Question:   Release to patient     Answer:   Routine Release         Plan:         1. 87 year old gentleman with paroxysmal atrial fibrillation. CHADS2-VASc score is 5 .  Anticoagulated with Eliquis continue the same  2.  Chronic diastolic congestive heart failure. Appears euvolemic on clinical examination.  His weight has not significantly changed since June.  He has no lower extremity edema and no rales on examination but given complaint of shortness of breath and cough I will arrange for two-view chest x-ray and proBNP today.  Recent renal function was stable with creatinine 1.31     3.  Coronary artery disease status post drug-eluting stent placement to the LAD and right coronary artery in April 2017.repeat cath 07/2020 showed patent stents, no significant restenosis and luminal irregularities throughout. No angina  4.   Hypertension his blood pressure appears well controlled  and if anything on the low side  5. History of prostate cancer-follows with urology  6.  Hyperlipidemia on target dose rosuvastatin  7.  History of symptomatic bradycardia and syncope status post permanent pacemaker interrogated today and functioning well   8. Aortic stenosis mild on cath 07/2020 and still mild on echo this month on 12/7/2022  9. CKD- cr 1.31 12/2022  10. RAQUEL on therapy  and reports compliance    Plan to follow-up in 6 months and we will discuss his x-ray and proBNP over the phone            It has been a pleasure to participate in this patient's care.      Thank you,  BUNNY Solitario      **I used Dragon to dictate this note:**

## 2022-12-14 NOTE — TELEPHONE ENCOUNTER
I spoke with Guero Garay and updated pt on results/recommendations from provider.  Pt verbalized understanding and has no further questions at this time.    I scheduled him an appt with Dr. Gutierrez on 6/14/23.    Thank you,    Stacey Tolbert, RN  Triage [facility]  12/14/22 16:18 EST

## 2022-12-15 LAB — HOLD SPECIMEN: NORMAL

## 2022-12-30 RX ORDER — METOPROLOL TARTRATE 50 MG/1
TABLET, FILM COATED ORAL
Qty: 180 TABLET | Refills: 1 | Status: SHIPPED | OUTPATIENT
Start: 2022-12-30

## 2023-03-01 RX ORDER — APIXABAN 2.5 MG/1
TABLET, FILM COATED ORAL
Qty: 180 TABLET | Refills: 3 | Status: SHIPPED | OUTPATIENT
Start: 2023-03-01

## 2023-03-07 RX ORDER — AMLODIPINE BESYLATE 10 MG/1
10 TABLET ORAL DAILY
Qty: 30 TABLET | Refills: 3 | Status: SHIPPED | OUTPATIENT
Start: 2023-03-07

## 2023-05-11 ENCOUNTER — HOSPITAL ENCOUNTER (EMERGENCY)
Facility: HOSPITAL | Age: 88
Discharge: HOME | End: 2023-05-11
Attending: EMERGENCY MEDICINE
Payer: MEDICARE

## 2023-05-11 VITALS
WEIGHT: 225 LBS | OXYGEN SATURATION: 96 % | SYSTOLIC BLOOD PRESSURE: 129 MMHG | HEIGHT: 68 IN | BODY MASS INDEX: 34.1 KG/M2 | HEART RATE: 86 BPM | RESPIRATION RATE: 20 BRPM | DIASTOLIC BLOOD PRESSURE: 94 MMHG | TEMPERATURE: 99.7 F

## 2023-05-11 DIAGNOSIS — R31.0 GROSS HEMATURIA: Primary | ICD-10-CM

## 2023-05-11 DIAGNOSIS — R82.81 PYURIA: ICD-10-CM

## 2023-05-11 DIAGNOSIS — R30.0 DYSURIA: ICD-10-CM

## 2023-05-11 LAB
ALBUMIN SERPL-MCNC: 4.5 G/DL (ref 3.5–5.2)
ALBUMIN/GLOB SERPL: 1.6 G/DL
ALP SERPL-CCNC: 87 U/L (ref 39–117)
ALT SERPL W P-5'-P-CCNC: 13 U/L (ref 1–41)
ANION GAP SERPL CALCULATED.3IONS-SCNC: 11.3 MMOL/L (ref 5–15)
AST SERPL-CCNC: 16 U/L (ref 1–40)
BACTERIA UR QL AUTO: ABNORMAL /HPF
BASOPHILS # BLD AUTO: 0.03 10*3/MM3 (ref 0–0.2)
BASOPHILS NFR BLD AUTO: 0.4 % (ref 0–1.5)
BILIRUB SERPL-MCNC: 0.7 MG/DL (ref 0–1.2)
BILIRUB UR QL STRIP: NEGATIVE
BUN SERPL-MCNC: 19 MG/DL (ref 8–23)
BUN/CREAT SERPL: 14.2 (ref 7–25)
CALCIUM SPEC-SCNC: 9.8 MG/DL (ref 8.6–10.5)
CHLORIDE SERPL-SCNC: 104 MMOL/L (ref 98–107)
CLARITY UR: ABNORMAL
CO2 SERPL-SCNC: 24.7 MMOL/L (ref 22–29)
COLOR UR: YELLOW
CREAT SERPL-MCNC: 1.34 MG/DL (ref 0.76–1.27)
DEPRECATED RDW RBC AUTO: 48.2 FL (ref 37–54)
EGFRCR SERPLBLD CKD-EPI 2021: 51.3 ML/MIN/1.73
EOSINOPHIL # BLD AUTO: 0.02 10*3/MM3 (ref 0–0.4)
EOSINOPHIL NFR BLD AUTO: 0.3 % (ref 0.3–6.2)
ERYTHROCYTE [DISTWIDTH] IN BLOOD BY AUTOMATED COUNT: 13.1 % (ref 12.3–15.4)
GLOBULIN UR ELPH-MCNC: 2.9 GM/DL
GLUCOSE SERPL-MCNC: 122 MG/DL (ref 65–99)
GLUCOSE UR STRIP-MCNC: NEGATIVE MG/DL
HCT VFR BLD AUTO: 44.3 % (ref 37.5–51)
HGB BLD-MCNC: 14.2 G/DL (ref 13–17.7)
HGB UR QL STRIP.AUTO: ABNORMAL
HOLD SPECIMEN: NORMAL
HOLD SPECIMEN: NORMAL
HYALINE CASTS UR QL AUTO: ABNORMAL /LPF
IMM GRANULOCYTES # BLD AUTO: 0.02 10*3/MM3 (ref 0–0.05)
IMM GRANULOCYTES NFR BLD AUTO: 0.3 % (ref 0–0.5)
KETONES UR QL STRIP: NEGATIVE
LEUKOCYTE ESTERASE UR QL STRIP.AUTO: ABNORMAL
LYMPHOCYTES # BLD AUTO: 0.77 10*3/MM3 (ref 0.7–3.1)
LYMPHOCYTES NFR BLD AUTO: 9.8 % (ref 19.6–45.3)
MCH RBC QN AUTO: 32 PG (ref 26.6–33)
MCHC RBC AUTO-ENTMCNC: 32.1 G/DL (ref 31.5–35.7)
MCV RBC AUTO: 99.8 FL (ref 79–97)
MONOCYTES # BLD AUTO: 0.33 10*3/MM3 (ref 0.1–0.9)
MONOCYTES NFR BLD AUTO: 4.2 % (ref 5–12)
NEUTROPHILS NFR BLD AUTO: 6.65 10*3/MM3 (ref 1.7–7)
NEUTROPHILS NFR BLD AUTO: 85 % (ref 42.7–76)
NITRITE UR QL STRIP: NEGATIVE
NRBC BLD AUTO-RTO: 0 /100 WBC (ref 0–0.2)
PH UR STRIP.AUTO: 8.5 [PH] (ref 4.5–8)
PLATELET # BLD AUTO: 150 10*3/MM3 (ref 140–450)
PMV BLD AUTO: 10.3 FL (ref 6–12)
POTASSIUM SERPL-SCNC: 3.9 MMOL/L (ref 3.5–5.2)
PROT SERPL-MCNC: 7.4 G/DL (ref 6–8.5)
PROT UR QL STRIP: ABNORMAL
RBC # BLD AUTO: 4.44 10*6/MM3 (ref 4.14–5.8)
RBC # UR STRIP: ABNORMAL /HPF
REF LAB TEST METHOD: ABNORMAL
SODIUM SERPL-SCNC: 140 MMOL/L (ref 136–145)
SP GR UR STRIP: 1.02 (ref 1–1.03)
SQUAMOUS #/AREA URNS HPF: ABNORMAL /HPF
UROBILINOGEN UR QL STRIP: ABNORMAL
WBC # UR STRIP: ABNORMAL /HPF
WBC NRBC COR # BLD: 7.82 10*3/MM3 (ref 3.4–10.8)
WHOLE BLOOD HOLD COAG: NORMAL
WHOLE BLOOD HOLD SPECIMEN: NORMAL

## 2023-05-11 PROCEDURE — 87086 URINE CULTURE/COLONY COUNT: CPT | Performed by: EMERGENCY MEDICINE

## 2023-05-11 PROCEDURE — 87186 SC STD MICRODIL/AGAR DIL: CPT | Performed by: EMERGENCY MEDICINE

## 2023-05-11 PROCEDURE — 80053 COMPREHEN METABOLIC PANEL: CPT | Performed by: EMERGENCY MEDICINE

## 2023-05-11 PROCEDURE — 85025 COMPLETE CBC W/AUTO DIFF WBC: CPT | Performed by: EMERGENCY MEDICINE

## 2023-05-11 PROCEDURE — 99283 EMERGENCY DEPT VISIT LOW MDM: CPT

## 2023-05-11 PROCEDURE — 81001 URINALYSIS AUTO W/SCOPE: CPT | Performed by: EMERGENCY MEDICINE

## 2023-05-11 PROCEDURE — 87077 CULTURE AEROBIC IDENTIFY: CPT | Performed by: EMERGENCY MEDICINE

## 2023-05-11 RX ORDER — CEFDINIR 300 MG/1
300 CAPSULE ORAL 2 TIMES DAILY
Qty: 20 CAPSULE | Refills: 0 | Status: SHIPPED | OUTPATIENT
Start: 2023-05-11 | End: 2023-05-21

## 2023-05-13 LAB — BACTERIA SPEC AEROBE CULT: ABNORMAL

## 2023-06-13 ENCOUNTER — TRANSCRIBE ORDERS (OUTPATIENT)
Dept: ADMINISTRATIVE | Facility: HOSPITAL | Age: 88
End: 2023-06-13
Payer: MEDICARE

## 2023-06-13 DIAGNOSIS — C61 PROSTATE CANCER: Primary | ICD-10-CM

## 2023-06-13 DIAGNOSIS — I10 ESSENTIAL HYPERTENSION: ICD-10-CM

## 2023-06-13 RX ORDER — POTASSIUM CHLORIDE 750 MG/1
CAPSULE, EXTENDED RELEASE ORAL
Qty: 180 CAPSULE | Refills: 0 | Status: SHIPPED | OUTPATIENT
Start: 2023-06-13

## 2023-06-14 ENCOUNTER — OFFICE VISIT (OUTPATIENT)
Dept: CARDIOLOGY | Facility: CLINIC | Age: 88
End: 2023-06-14
Payer: MEDICARE

## 2023-06-14 ENCOUNTER — OFFICE VISIT (OUTPATIENT)
Dept: SLEEP MEDICINE | Facility: HOSPITAL | Age: 88
End: 2023-06-14
Payer: MEDICARE

## 2023-06-14 VITALS
HEIGHT: 68 IN | SYSTOLIC BLOOD PRESSURE: 120 MMHG | WEIGHT: 217.2 LBS | BODY MASS INDEX: 32.92 KG/M2 | DIASTOLIC BLOOD PRESSURE: 80 MMHG | HEART RATE: 75 BPM

## 2023-06-14 VITALS
BODY MASS INDEX: 32.58 KG/M2 | SYSTOLIC BLOOD PRESSURE: 113 MMHG | DIASTOLIC BLOOD PRESSURE: 72 MMHG | HEART RATE: 66 BPM | WEIGHT: 215 LBS | OXYGEN SATURATION: 97 % | HEIGHT: 68 IN

## 2023-06-14 DIAGNOSIS — I48.0 PAROXYSMAL ATRIAL FIBRILLATION: ICD-10-CM

## 2023-06-14 DIAGNOSIS — Z78.9 DIFFICULTY WITH CPAP USE: ICD-10-CM

## 2023-06-14 DIAGNOSIS — G47.61 PERIODIC LIMB MOVEMENT DISORDER (PLMD): ICD-10-CM

## 2023-06-14 DIAGNOSIS — I35.0 AORTIC STENOSIS, MILD: ICD-10-CM

## 2023-06-14 DIAGNOSIS — G47.33 OBSTRUCTIVE SLEEP APNEA: Primary | ICD-10-CM

## 2023-06-14 DIAGNOSIS — J45.909 UNCOMPLICATED ASTHMA, UNSPECIFIED ASTHMA SEVERITY, UNSPECIFIED WHETHER PERSISTENT: ICD-10-CM

## 2023-06-14 DIAGNOSIS — I25.110 CORONARY ARTERY DISEASE INVOLVING NATIVE CORONARY ARTERY OF NATIVE HEART WITH UNSTABLE ANGINA PECTORIS: ICD-10-CM

## 2023-06-14 DIAGNOSIS — I10 ESSENTIAL HYPERTENSION: Primary | ICD-10-CM

## 2023-06-14 PROCEDURE — G0463 HOSPITAL OUTPT CLINIC VISIT: HCPCS

## 2023-06-14 PROCEDURE — 1159F MED LIST DOCD IN RCRD: CPT | Performed by: INTERNAL MEDICINE

## 2023-06-14 PROCEDURE — 99213 OFFICE O/P EST LOW 20 MIN: CPT | Performed by: INTERNAL MEDICINE

## 2023-06-14 PROCEDURE — 1160F RVW MEDS BY RX/DR IN RCRD: CPT | Performed by: INTERNAL MEDICINE

## 2023-06-14 RX ORDER — PRAMIPEXOLE DIHYDROCHLORIDE 0.25 MG/1
0.25 TABLET ORAL NIGHTLY
Qty: 30 TABLET | Refills: 2 | Status: SHIPPED | OUTPATIENT
Start: 2023-06-14

## 2023-06-14 NOTE — PROGRESS NOTES
[facility]  Telephone: [phone] / Fax: [phone] [place]  Telephone: [phone] / Fax: [phone] Elena Ritchie    PCP: Kwadwo Dunham MD    Reason for visit: RAQUEL f/u    Guero Garay is a 88 y.o.male  was last seen at [facility] sleep lab in  June 2022. He has been struggling with the mask. Plastic piece in the forehead causes soreness in the forehead. He feels that the machine controls the snoring and apneas. Sleep is more peaceful. Pramipexole seems to help the RLS and works well. He takes it 1 hour prior to going to bed at night. ESS is 12. Pt is seeing Dr Garcia at [facility] for asthma. He wants to see Dr Ivy at [facility] for both asthma and pulm issues so that he can have 1 visit for both problems.    SH- no tobacco, 10-15 alc per week, 7-10 decaf every few days    ROS- +nasal congestion, +SOA    DME - [facility]    Current Medications:    Current Outpatient Medications:   •  acetaminophen (TYLENOL) 500 MG tablet, Take 1 tablet by mouth Every Night., Disp: , Rfl:   •  allopurinol (ZYLOPRIM) 100 MG tablet, TAKE 1 TABLET BY MOUTH DAILY (Patient taking differently: Take 1 tablet by mouth Every Night.), Disp: 30 tablet, Rfl: 0  •  amLODIPine (NORVASC) 10 MG tablet, TAKE 1 TABLET BY MOUTH DAILY, Disp: 30 tablet, Rfl: 3  •  aspirin 81 MG EC tablet, Take 1 tablet by mouth Daily. PT TO STOP PER MD INSTRUCTION, Disp: , Rfl:   •  cetirizine (zyrTEC) 10 MG tablet, Take 1 tablet by mouth Daily As Needed., Disp: , Rfl:   •  Chlorhexidine Gluconate (HIBICLENS EX), Apply 1 each topically Take As Directed. AS DIRECTED PREOP, Disp: , Rfl:   •  Cranberry 250 MG capsule, Take 250 mg by mouth Daily., Disp: , Rfl:   •  Eliquis 2.5 MG tablet tablet, TAKE 1 TABLET BY MOUTH EVERY 12 HOURS, Disp: 180 tablet, Rfl: 3  •  Fluticasone Furoate-Vilanterol (BREO ELLIPTA) 100-25 MCG/INH inhaler, Inhale 1 puff Every Morning., Disp: , Rfl:   •  furosemide (LASIX) 20 MG tablet, TAKE 1 TABLET BY MOUTH DAILY, Disp: 90  "tablet, Rfl: 3  •  melatonin 5 MG tablet tablet, Take 1 tablet by mouth At Night As Needed., Disp: , Rfl:   •  metoprolol tartrate (LOPRESSOR) 50 MG tablet, TAKE 1 TABLET BY MOUTH TWICE DAILY, Disp: 180 tablet, Rfl: 1  •  Multiple Vitamins-Minerals (MULTIVITAMIN WITH MINERALS) tablet tablet, Take 1 tablet by mouth Daily., Disp: , Rfl:   •  potassium chloride (MICRO-K) 10 MEQ CR capsule, TAKE 1 CAPSULE BY MOUTH TWICE DAILY, Disp: 180 capsule, Rfl: 0  •  rosuvastatin (CRESTOR) 20 MG tablet, Take 1 tablet by mouth Daily., Disp: 90 tablet, Rfl: 3  •  topiramate (TOPAMAX) 50 MG tablet, Take 1 tablet by mouth 2 (Two) Times a Day., Disp: , Rfl:    also entered in Sleep Questionnaire    Patient  has a past medical history of Acute kidney injury superimposed on chronic kidney disease, Aortic stenosis, Arthralgia, Asthma, CAD (coronary artery disease), native coronary artery, Cancer, Chest pain, unspecified, Chronic diastolic congestive heart failure, Colon polyp, Dyspnea on exertion, Erectile dysfunction, Essential hypertension, Generalized weakness, Gout, History of prostate cancer, Hyperkalemia, Jaw pain, Mixed hyperlipidemia, RAQUEL (obstructive sleep apnea) (07/19/2021), Osteoarthrosis, PAF (paroxysmal atrial fibrillation), Shoulder pain, SOB (shortness of breath), SSS (sick sinus syndrome), Tinnitus, and Urine retention.    I have reviewed the Past Medical History, Past Surgical History, Social History and Family History.    Vital Signs /72   Pulse 66   Ht 172.7 cm (68\")   Wt 97.5 kg (215 lb)   SpO2 97%   BMI 32.69 kg/m²  Body mass index is 32.69 kg/m².    General Alert and oriented. No acute distress noted   Pharynx/Throat Class IV Mallampati airway, large tongue, no evidence of redundant lateral pharyngeal tissue. No oral lesions. No thrush. Moist mucous membranes.   Head Normocephalic. Symmetrical. Atraumatic.    Nose No septal deviation. No drainage   Chest Wall Normal shape. Symmetric expansion with " respiration. No tenderness.   Neck Trachea midline, no thyromegaly or adenopathy    Lungs Clear to auscultation bilaterally. No wheezes. No rhonchi. No rales. Respirations regular, even and unlabored.   Heart Regular rhythm and normal rate. Normal S1 and S2. No murmur   Abdomen Soft, non-tender and non-distended. Normal bowel sounds. No masses.   Extremities Moves all extremities well. No edema   Psychiatric Normal mood and affect.     Testing:  · Download 3/15/23-6/12/23 97% use with avg nightly use of 8 hours and 12 minutes on auto CPAP 10-16cm H2O, avg pr is 12.7cm H2O, AHI 5.9/hr, leak of 35.9 L.min    Study-Diagnostic data available to date is as below and was reviewed on current visit:  7/21/21: The patient had 86 obstructive events and 173 partial obstructive events. AHI for total monitoring time is moderately abnormal at 26.5 events per hour.     9/30/21  Overnight titration study from 10:44 PM to 4:57 AM.  Sleep efficiency 92% with 5.7 hours total sleep time.  Sleep distribution shows absence of slow-wave sleep.  AHI index 4.4.  There was 219 minutes of supine sleep and 59 minutes of REM sleep.  Oxygen saturation remained above 88%.  Arousal index 13.  PLM index extremely high at 145 with PLM arousal index 8.2.  CPAP increased from 7 to 19 cm of water pressure.  Control of apneas hypopneas was achieved at 19 cmH2O pressure with AHI of 2.3.  No significant desats with adequately controlled sleep apnea.  Patient did achieve some supine sleep as well as supine REM sleep.    Impression:  1. Obstructive sleep apnea    2. Periodic limb movement disorder (PLMD)    3. Difficulty with CPAP use    4. Uncomplicated asthma, unspecified asthma severity, unspecified whether persistent          Plan:  See [facility] for mask fit. Ask [facility] to fit pt with [facility]P [facility].  Wife to check with Dr Garcia if ok to switch providers. If ok with Dr Garcia, then see Dr Ivy at [facility] for both asthma and RAQUEL management to minimize  driving. Breo is too expensive. Pt needs to call [facility] and provide list of formulary alternatives so that inhalers could be switched. I refilled Pramipexole. He was asked to continue taking it and take 2 hours prior to bedtime.      Thank you for allowing me to participate in your patient's care.      BUNNY Castro  [facility]  Phone: [phone]      Part of this note may be an electronic transcription/translation of spoken language to printed text using the Dragon Dictation System.

## 2023-06-14 NOTE — PROGRESS NOTES
[facility]      Patient Name: Guero Garay  :1935  Age: 88 y.o.  Encounter Provider:  Preston Gutierrez Jr, MD      Chief Complaint:   Chief Complaint   Patient presents with   • VALENZUELA(dyspnea on exertion)         HPI  Guero Garay is a 88 y.o. male is a very pleasant gentleman who has a past medical history of coronary artery disease, atrial fibrillation, sick sinus syndrome status post pacemaker implantation, hypertension, aortic stenosis, chronic diastolic heart failure and chronic kidney disease who presents for follow-up evaluation with me.      Last clinic visit note: He is followed in the past with Dr. Galan.  He was last seen by BUNNY Ndiaye in office in April.  Coronary artery disease status post PCI in .  Last cardiac catheterization in 2020 showed patent stents and otherwise nonobstructive coronary artery disease.  Atrial fibrillation with history of high burden in 2018 on pacemaker check however today his AT/AF burden is less than 0.1%.  At last visit he was noting labile blood pressure and he was started on amlodipine 5 mg.  He still noting high blood pressure and was seen in the [facility] ER on .  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  He notes mild chronic dyspnea on exertion which is unchanged over the past few years.  He denies orthopnea, PND or edema.  No palpitations, dizziness or syncope.  He admits to snoring and hypersomnia.  No previous sleep test.  Social and family history reviewed and not pertinent to this clinic visit.    We made some changes to medical therapy but most importantly got him home sleep study which confirmed the suspicion of sleep apnea.  He is compliant with medical therapy and we have been able to reduce his blood pressure medications and have him under much better control now.  He still does a significant amount around the farm with no chest pain.  He has chronic stable mild  "dyspnea on exertion.  No orthopnea, PND or edema.  No palpitations, dizziness or syncope.      Doing well since last visit.  Mild chronic stable dyspnea on exertion.  No angina.  Using CPAP.  No bleeding complications on Eliquis.  Social and family history were reviewed and are not pertinent to this clinic visit.    The following portions of the patient's history were reviewed and updated as appropriate: allergies, current medications, past family history, past medical history, past social history, past surgical history and problem list.      Review of Systems   Constitutional: Positive for malaise/fatigue. Negative for chills and fever.   HENT:  Negative for hoarse voice and sore throat.    Eyes:  Negative for double vision and photophobia.   Cardiovascular:  Positive for dyspnea on exertion. Negative for chest pain, leg swelling, near-syncope, orthopnea, palpitations, paroxysmal nocturnal dyspnea and syncope.   Respiratory:  Positive for snoring. Negative for cough and wheezing.    Skin:  Negative for poor wound healing and rash.   Musculoskeletal:  Negative for arthritis and joint swelling.   Gastrointestinal:  Negative for bloating, abdominal pain, hematemesis and hematochezia.   Neurological:  Negative for dizziness and focal weakness.   Psychiatric/Behavioral:  Negative for depression and suicidal ideas.      ROS was reviewed, updated and amended when necessary.    OBJECTIVE:   Vital Signs  Vitals:    06/14/23 1128   BP: 120/80   Pulse: 75     Estimated body mass index is 33.03 kg/m² as calculated from the following:    Height as of this encounter: 172.7 cm (68\").    Weight as of this encounter: 98.5 kg (217 lb 3.2 oz).    Vitals reviewed.   Constitutional:       Appearance: Healthy appearance. Not in distress.   Neck:      Vascular: No JVR. JVD normal.   Pulmonary:      Effort: Pulmonary effort is normal.      Breath sounds: Normal breath sounds. No wheezing. No rhonchi. No rales.   Chest:      Chest wall: " Not tender to palpatation.   Cardiovascular:      PMI at left midclavicular line. Normal rate. Regular rhythm. Normal S1. Normal S2.       Murmurs: There is a systolic murmur.      No gallop.  No click. No rub.   Pulses:     Intact distal pulses.   Edema:     Peripheral edema absent.   Abdominal:      General: Bowel sounds are normal.      Palpations: Abdomen is soft.      Tenderness: There is no abdominal tenderness.   Musculoskeletal: Normal range of motion.         General: No tenderness. Skin:     General: Skin is warm and dry.   Neurological:      General: No focal deficit present.      Mental Status: Alert and oriented to person, place and time.   Physical exam was reviewed, updated and amended and necessary.    Procedures          ASSESSMENT:     Resistant hypertension  Paroxysmal atrial fibrillation  Sick sinus syndrome  Hypersomnia  Chronic diastolic heart failure  Coronary artery disease  Aortic stenosis      PLAN OF CARE:     1. Hypertension -much better controlled at this point time.  We had a long discussion about sodium restricted diet.  Continue current medical regimen.  Continue CPAP therapy.  He follows with sleep medicine in [place] with Dr. Ivy.  2. Hypersomnia -confirmed sleep apnea on CPAP  3. Paroxysmal atrial fibrillation -low AT AF burden.  Continue Eliquis and metoprolol.  4. Sick sinus syndrome -normal pacemaker function  5. Aortic stenosis -stable valvular heart disease.  No indication for further testing at this time.  6. Chronic diastolic heart failure -euvolemic today.  It is imperative that we are aggressive with afterload reduction.  Sodium restricted diet is counseled.  7. Coronary artery disease -continue aspirin and statin.  Follow lipid profile.    Return to clinic 12 months           Discharge Medications            Accurate as of June 14, 2023 11:29 AM. If you have any questions, ask your nurse or doctor.                Changes to Medications        Instructions Start Date    allopurinol 100 MG tablet  Commonly known as: ZYLOPRIM  What changed: when to take this   100 mg, Oral, Daily             Continue These Medications        Instructions Start Date   acetaminophen 500 MG tablet  Commonly known as: TYLENOL   500 mg, Oral, Nightly      amLODIPine 10 MG tablet  Commonly known as: NORVASC   10 mg, Oral, Daily      aspirin 81 MG EC tablet   81 mg, Oral, Daily, PT TO STOP PER MD INSTRUCTION       cetirizine 10 MG tablet  Commonly known as: zyrTEC   10 mg, Oral, Daily PRN      Cranberry 250 MG capsule   250 mg, Oral, Daily      Eliquis 2.5 MG tablet tablet  Generic drug: apixaban   TAKE 1 TABLET BY MOUTH EVERY 12 HOURS      Fluticasone Furoate-Vilanterol 100-25 MCG/INH inhaler  Commonly known as: BREO ELLIPTA   1 puff, Inhalation, Every Morning      furosemide 20 MG tablet  Commonly known as: LASIX   20 mg, Oral, Daily      HIBICLENS EX   1 each, Apply externally, Take As Directed, AS DIRECTED PREOP       melatonin 5 MG tablet tablet   5 mg, Oral, Nightly PRN      metoprolol tartrate 50 MG tablet  Commonly known as: LOPRESSOR   TAKE 1 TABLET BY MOUTH TWICE DAILY      multivitamin with minerals tablet tablet   1 tablet, Oral, Daily      potassium chloride 10 MEQ CR capsule  Commonly known as: MICRO-K   TAKE 1 CAPSULE BY MOUTH TWICE DAILY      rosuvastatin 20 MG tablet  Commonly known as: CRESTOR   20 mg, Oral, Daily      topiramate 50 MG tablet  Commonly known as: TOPAMAX   50 mg, Oral, 2 Times Daily               Thank you for allowing me to participate in the care of your patient,      Sincerely,   Preston Gutierrez MD  [facility]  06/14/23  11:29 EDT

## 2023-07-27 RX ORDER — METOPROLOL TARTRATE 50 MG/1
TABLET, FILM COATED ORAL
Qty: 180 TABLET | Refills: 1 | Status: SHIPPED | OUTPATIENT
Start: 2023-07-27

## 2023-08-09 ENCOUNTER — TELEPHONE (OUTPATIENT)
Dept: CARDIOLOGY | Facility: CLINIC | Age: 88
End: 2023-08-09
Payer: MEDICARE

## 2023-08-09 NOTE — TELEPHONE ENCOUNTER
"Pt called the office to report VALENZUELA and at rest for the past 3-4 days.  He endorses feeling fatigued as well, denies CP.  He does share that these symptoms are similar to those that he had about 5 years ago before he had PCI.    Pt also shares that he \"felt like I had AF the other day\".  Last BP reading was 98/70.  He shares that his BP normally runs about 120-130s/60-70s, HR in the 70s.  He reports that he will intermittently go in and out of AF.    I scheduled pt an appt to be seen tomorrow.  I encouraged him to bring in his home BP log to his appt so we can review this information.  He verbalized understanding.    Thank you,    Stacey Tolbert RN  Triage [facility]  08/09/23 15:46 EDT    "

## 2023-08-10 ENCOUNTER — OFFICE VISIT (OUTPATIENT)
Dept: CARDIOLOGY | Facility: CLINIC | Age: 88
End: 2023-08-10
Payer: MEDICARE

## 2023-08-10 ENCOUNTER — HOSPITAL ENCOUNTER (OUTPATIENT)
Dept: CARDIOLOGY | Facility: HOSPITAL | Age: 88
Discharge: HOME | End: 2023-08-10
Admitting: NURSE PRACTITIONER
Payer: MEDICARE

## 2023-08-10 ENCOUNTER — CLINICAL SUPPORT NO REQUIREMENTS (OUTPATIENT)
Dept: CARDIOLOGY | Facility: CLINIC | Age: 88
End: 2023-08-10
Payer: MEDICARE

## 2023-08-10 VITALS
HEART RATE: 63 BPM | BODY MASS INDEX: 32.89 KG/M2 | HEIGHT: 68 IN | DIASTOLIC BLOOD PRESSURE: 80 MMHG | OXYGEN SATURATION: 97 % | SYSTOLIC BLOOD PRESSURE: 112 MMHG | WEIGHT: 217 LBS

## 2023-08-10 DIAGNOSIS — I48.0 PAROXYSMAL ATRIAL FIBRILLATION: Primary | ICD-10-CM

## 2023-08-10 DIAGNOSIS — R06.09 DYSPNEA ON EXERTION: ICD-10-CM

## 2023-08-10 DIAGNOSIS — I48.0 PAROXYSMAL ATRIAL FIBRILLATION: ICD-10-CM

## 2023-08-10 LAB
ALBUMIN SERPL-MCNC: 4 G/DL (ref 3.5–5.2)
ALBUMIN/GLOB SERPL: 1.6 G/DL
ALP SERPL-CCNC: 86 U/L (ref 39–117)
ALT SERPL W P-5'-P-CCNC: 12 U/L (ref 1–41)
ANION GAP SERPL CALCULATED.3IONS-SCNC: 9 MMOL/L (ref 5–15)
AST SERPL-CCNC: 14 U/L (ref 1–40)
BILIRUB SERPL-MCNC: <0.2 MG/DL (ref 0–1.2)
BUN SERPL-MCNC: 18 MG/DL (ref 8–23)
BUN/CREAT SERPL: 15.3 (ref 7–25)
CALCIUM SPEC-SCNC: 9.3 MG/DL (ref 8.6–10.5)
CHLORIDE SERPL-SCNC: 109 MMOL/L (ref 98–107)
CO2 SERPL-SCNC: 24 MMOL/L (ref 22–29)
CREAT SERPL-MCNC: 1.18 MG/DL (ref 0.76–1.27)
DEPRECATED RDW RBC AUTO: 42.9 FL (ref 37–54)
EGFRCR SERPLBLD CKD-EPI 2021: 59.7 ML/MIN/1.73
ERYTHROCYTE [DISTWIDTH] IN BLOOD BY AUTOMATED COUNT: 12.4 % (ref 12.3–15.4)
GLOBULIN UR ELPH-MCNC: 2.5 GM/DL
GLUCOSE SERPL-MCNC: 84 MG/DL (ref 65–99)
HCT VFR BLD AUTO: 41.9 % (ref 37.5–51)
HGB BLD-MCNC: 13.7 G/DL (ref 13–17.7)
MCH RBC QN AUTO: 30.9 PG (ref 26.6–33)
MCHC RBC AUTO-ENTMCNC: 32.7 G/DL (ref 31.5–35.7)
MCV RBC AUTO: 94.6 FL (ref 79–97)
NT-PROBNP SERPL-MCNC: 573 PG/ML (ref 0–1800)
PLATELET # BLD AUTO: 177 10*3/MM3 (ref 140–450)
PMV BLD AUTO: 10 FL (ref 6–12)
POTASSIUM SERPL-SCNC: 4.6 MMOL/L (ref 3.5–5.2)
PROT SERPL-MCNC: 6.5 G/DL (ref 6–8.5)
RBC # BLD AUTO: 4.43 10*6/MM3 (ref 4.14–5.8)
SODIUM SERPL-SCNC: 142 MMOL/L (ref 136–145)
WBC NRBC COR # BLD: 5.83 10*3/MM3 (ref 3.4–10.8)

## 2023-08-10 PROCEDURE — 80053 COMPREHEN METABOLIC PANEL: CPT | Performed by: NURSE PRACTITIONER

## 2023-08-10 PROCEDURE — 36415 COLL VENOUS BLD VENIPUNCTURE: CPT

## 2023-08-10 PROCEDURE — 85027 COMPLETE CBC AUTOMATED: CPT | Performed by: NURSE PRACTITIONER

## 2023-08-10 PROCEDURE — 83880 ASSAY OF NATRIURETIC PEPTIDE: CPT | Performed by: NURSE PRACTITIONER

## 2023-08-10 NOTE — PROGRESS NOTES
Please let him know that his labs from today look stable.  I will follow-up with him after stress test results

## 2023-08-10 NOTE — PROGRESS NOTES
[facility] Follow Up Office Note     Encounter Date:08/10/23  Patient:Guero Garay  :1935  MRN:[id number]      Chief Complaint:   Chief Complaint   Patient presents with    Shortness of Breath     Follow up          History of Presenting Illness:        Guero Garay is a 88 y.o. male who is here for follow-up.  He is a patient of Dr Gutierrez.    Patient has past medical history significant for coronary artery disease, paroxysmal atrial fibrillation, sick sinus syndrome status post permanent pacemaker, essential hypertension, aortic stenosis, chronic diastolic heart failure and chronic kidney disease.    In  patient underwent PCI.  Subsequent left heart catheterization in 2020 showed patent stents and otherwise nonobstructive CAD.  In 2022 patient was noted to have high blood pressure and was started on 5 mg amlodipine daily.    Patient was most recently seen in the office in 2023.  It was noted that [place] without limiting symptoms.  He has chronic mild dyspnea was noted to be unchanged at this time.    Patient states he has been feeling significantly fatigued and dyspneic with activity since the weekend.  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  He also endorses some intermittent dizziness.  He denies chest pain but feels that this reminds him of how he felt prior to his previous stents.  He denies lower extremity edema, PND orthopnea.  He uses CPAP at night.  He has a blood pressure log with him today, most blood pressures are in normal range with exception of one reading of 90/70 mmHg.    Review of Systems:  Review of Systems   Constitutional: Positive for malaise/fatigue.   Cardiovascular:  Positive for dyspnea on exertion. Negative for chest pain, leg swelling, orthopnea and palpitations.   Respiratory:  Negative for shortness of breath.      Current Outpatient  Medications on File Prior to Visit   Medication Sig Dispense Refill    acetaminophen (TYLENOL) 500 MG tablet Take 1 tablet by mouth Every Night.      allopurinol (ZYLOPRIM) 100 MG tablet TAKE 1 TABLET BY MOUTH DAILY (Patient taking differently: Take 1 tablet by mouth Every Night.) 30 tablet 0    amLODIPine (NORVASC) 10 MG tablet Take 1 tablet by mouth Daily. 90 tablet 3    aspirin 81 MG EC tablet Take 1 tablet by mouth Daily. PT TO STOP PER MD INSTRUCTION      cetirizine (zyrTEC) 10 MG tablet Take 1 tablet by mouth Daily As Needed.      Chlorhexidine Gluconate (HIBICLENS EX) Apply 1 each topically Take As Directed. AS DIRECTED PREOP      Cranberry 250 MG capsule Take 250 mg by mouth Daily.      Eliquis 2.5 MG tablet tablet TAKE 1 TABLET BY MOUTH EVERY 12 HOURS 180 tablet 3    Fluticasone Furoate-Vilanterol (BREO ELLIPTA) 100-25 MCG/INH inhaler Inhale 1 puff Every Morning.      furosemide (LASIX) 20 MG tablet TAKE 1 TABLET BY MOUTH DAILY 90 tablet 3    melatonin 5 MG tablet tablet Take 1 tablet by mouth At Night As Needed.      metoprolol tartrate (LOPRESSOR) 50 MG tablet TAKE 1 TABLET BY MOUTH TWICE DAILY 180 tablet 1    Multiple Vitamins-Minerals (MULTIVITAMIN WITH MINERALS) tablet tablet Take 1 tablet by mouth Daily.      potassium chloride (MICRO-K) 10 MEQ CR capsule TAKE 1 CAPSULE BY MOUTH TWICE DAILY 180 capsule 0    pramipexole (Mirapex) 0.25 MG tablet Take 1 tablet by mouth Every Night. 30 tablet 2    rosuvastatin (CRESTOR) 20 MG tablet Take 1 tablet by mouth Daily. 90 tablet 3    topiramate (TOPAMAX) 50 MG tablet Take 1 tablet by mouth 2 (Two) Times a Day.       No current facility-administered medications on file prior to visit.       Allergies   Allergen Reactions    Sulfa Antibiotics Other (See Comments)     UNKNOWN  Pat was shaky and could hardly walk  UNKNOWN  Pat was shaky and could hardly walk  Pat was shaky and could hardly walk  UNKNOWN         Past Medical History:   Diagnosis Date    Acute kidney  injury superimposed on chronic kidney disease     Aortic stenosis     Arthralgia     Asthma     mild persistent without complication    CAD (coronary artery disease), native coronary artery     Cancer     Prostate    Chest pain, unspecified     Chronic diastolic congestive heart failure     Colon polyp     Dyspnea on exertion     Erectile dysfunction     Essential hypertension     Generalized weakness     Gout     History of prostate cancer     Hyperkalemia     Jaw pain     both sides    Mixed hyperlipidemia     RAQUEL (obstructive sleep apnea) 07/19/2021    Home sleep study.  Weight 216 pounds.  Moderate obstructive sleep apnea with AHI 26.5 events per hour.  Low oxygen saturation 79% and sleep-related hypoxia present for 157 minutes.  The patient snored 45.6% of total monitoring time.    Osteoarthrosis     PAF (paroxysmal atrial fibrillation)     Shoulder pain     SOB (shortness of breath)     SSS (sick sinus syndrome)     Tinnitus     Urine retention        Past Surgical History:   Procedure Laterality Date    CARDIAC CATHETERIZATION N/A 4/27/2017    Procedure: Coronary angiography;  Surgeon: Marvel Brito MD;  Location: [facility] CATH INVASIVE LOCATION;  Service:     CARDIAC CATHETERIZATION N/A 4/27/2017    Procedure: Left Heart Cath;  Surgeon: Marvel Brito MD;  Location: [facility] CATH INVASIVE LOCATION;  Service:     CARDIAC CATHETERIZATION N/A 4/27/2017    Procedure: Stent GIN coronary;  Surgeon: Marvel Brito MD;  Location: [facility] CATH INVASIVE LOCATION;  Service:     CARDIAC CATHETERIZATION N/A 6/30/2020    Procedure: Coronary angiography;  Surgeon: Jared Sellers MD;  Location: [facility] CATH INVASIVE LOCATION;  Service: Cardiovascular;  Laterality: N/A;    CARDIAC CATHETERIZATION N/A 6/30/2020    Procedure: Left Heart Cath;  Surgeon: Jared Sellers MD;  Location: [facility] CATH INVASIVE LOCATION;  Service: Cardiovascular;  Laterality: N/A;    CARDIAC ELECTROPHYSIOLOGY PROCEDURE N/A 6/15/2017    Procedure:  "Pacemaker DC new   [employer];  Surgeon: Gustavo Valladares MD;  Location: [facility] CATH INVASIVE LOCATION;  Service:     COLONOSCOPY  2013    COLONOSCOPY N/A 12/12/2018    Procedure: COLONOSCOPY with polypectomy;  Surgeon: Kwadwo Powers MD;  Location:  [facility];  Service: Gastroenterology    CORONARY ANGIOPLASTY WITH STENT PLACEMENT      INGUINAL HERNIA REPAIR Right     JOINT REPLACEMENT      left knee, left and right shoulder    NECK SURGERY      SPURS    CT ARTHROPLASTY GLENOHUMERAL JOINT TOTAL SHOULDER Right 2/16/2017    Procedure: RIGHT TOTAL SHOULDER ARTHROPLASTY;  Surgeon: Marquez Galvan MD;  Location:  [facility];  Service: Orthopedics    PROSTATECTOMY      REPLACEMENT TOTAL KNEE Left     TONSILLECTOMY      TOTAL KNEE ARTHROPLASTY Right 12/14/2021    Procedure: RIGHT TOTAL KNEE ARTHROPLASTY;  Surgeon: Jace Altamirano II, MD;  Location: [facility] MAIN OR;  Service: Orthopedics;  Laterality: Right;    TOTAL SHOULDER REPLACEMENT Bilateral        Social History     Socioeconomic History    Marital status:    Tobacco Use    Smoking status: Never    Smokeless tobacco: Never    Tobacco comments:     caffeine use- \"occ\" soda, decaf tea   Vaping Use    Vaping Use: Never used   Substance and Sexual Activity    Alcohol use: Yes     Alcohol/week: 21.0 standard drinks     Types: 21 Cans of beer per week     Comment: 2-3 beers daily    Drug use: No    Sexual activity: Defer       Family History   Problem Relation Age of Onset    Cancer Mother         lung    Cancer Father         lung    Diabetes Father     Colon cancer Neg Hx     Colon polyps Neg Hx     Malig Hyperthermia Neg Hx        The following portions of the patient's history were reviewed and updated as appropriate: allergies, current medications, past family history, past medical history, past social history, past surgical history and problem list.       Objective:       Vitals:    08/10/23 0937   BP: 112/80   Pulse: 63   SpO2: 97% " "  Weight: 98.4 kg (217 lb)   Height: 172.7 cm (68\")         Physical Exam:  Constitutional: Well appearing, well developed, no acute distress   HENT: Oropharynx clear and membrane moist  Eyes: Normal conjunctiva, no sclera icterus  Neck: Supple, no carotid bruit bilaterally  Cardiovascular: Regular rate and rhythm, Holosystolic murmur at the apex, No bilateral lower extremity edema  Pulmonary: Normal respiratory effort, normal lung sounds, no wheezing  Neurological: Alert and orient x 3  Skin: Warm, dry, no ecchymosis, no rash  Psych: Appropriate mood and affect. Normal judgment and insight         Lab Results   Component Value Date     05/11/2023     06/01/2022    K 3.9 05/11/2023    K 4.1 06/01/2022     05/11/2023     06/01/2022    CO2 24.7 05/11/2023    CO2 22 06/01/2022    BUN 19 05/11/2023    BUN 21 06/01/2022    CREATININE 1.34 (H) 05/11/2023    CREATININE 1.32 (H) 06/01/2022    EGFRIFNONA 45 (L) 12/14/2021    EGFRIFNONA 52 (L) 12/09/2021    EGFRIFAFRI 72 12/06/2019    EGFRIFAFRI 65 06/04/2019    GLUCOSE 122 (H) 05/11/2023    GLUCOSE 199 (H) 12/14/2021    CALCIUM 9.8 05/11/2023    CALCIUM 9.4 06/01/2022    PROTENTOTREF 6.9 12/06/2019    PROTENTOTREF 6.8 06/04/2019    ALBUMIN 4.5 05/11/2023    ALBUMIN 4.5 06/01/2022    BILITOT 0.7 05/11/2023    BILITOT 0.5 06/01/2022    AST 16 05/11/2023    AST 30 06/01/2022    ALT 13 05/11/2023    ALT 20 06/01/2022     Lab Results   Component Value Date    WBC 7.82 05/11/2023    WBC 5.35 09/09/2022    HGB 14.2 05/11/2023    HGB 15.5 09/09/2022    HCT 44.3 05/11/2023    HCT 49.5 09/09/2022    MCV 99.8 (H) 05/11/2023    .0 (H) 09/09/2022     05/11/2023     09/09/2022     Lab Results   Component Value Date    CHOL 98 12/06/2018    CHOL 165 04/28/2017    TRIG 135 09/02/2020    TRIG 98 12/06/2019    HDL 34 (L) 09/02/2020    HDL 40 12/06/2019    LDL 36 09/02/2020    LDL 44 12/06/2019     Lab Results   Component Value Date    PROBNP 263.0 " 12/14/2022    PROBNP 1,457.0 08/12/2018     Lab Results   Component Value Date    CKTOTAL 233 (H) 05/18/2014    TROPONINT <0.010 08/10/2018     Lab Results   Component Value Date    TSH 2.190 12/06/2019    TSH 2.100 06/04/2019           ECG 12 Lead    Date/Time: 8/10/2023 9:56 AM  Performed by: Edie Carney APRN  Authorized by: Edie Carney APRN   Comparison: compared with previous ECG from 6/14/2023  Similar to previous ECG  Rhythm comments: junctional  Rate: normal  BPM: 62  Q waves: II, III, aVF, V3, V4 and V5             Assessment:          Diagnosis Plan   1. Paroxysmal atrial fibrillation  ECG 12 Lead    CBC (No Diff)    Comprehensive Metabolic Panel      2. Dyspnea on exertion  Stress Test With Myocardial Perfusion One Day    proBNP    XR chest pa and lateral             Plan:       Dyspnea and fatigue -onset last 4 to 5 days.  Does not appear in overt heart failure.  I had his device interrogated in the office today, he has a less than 0.1% atrial fibrillation burden but it was noted his heart rate dips into the 30s for short periods of time due to the mode his device is set, this was modified today by [employer] rep.  Given his known CAD and similar symptoms in the past I have ordered stress test.  He will also get a full set of labs today including CBC, CMP, proBNP  Coronary artery disease -history PCI 2017 proximal LAD. Nonobstructive disease [facility] 2020. Continue aspirin and statin.  With dyspnea  Hypertension -his blood pressure is okay in the office today, he did have a low reading at home.  I have asked for 1 week blood pressure log after medications in case some of his symptoms are related to overmedication  Sick sinus syndrome -status post permanent pacemaker.  Device settings adjusted today as above  Paroxysmal atrial fibrillation -device interrogated today and he does not have a significant atrial fibrillation burden.  He remains on apixaban, he denies bleeding concerns  Chronic  diastolic heart failure -recent echocardiogram 12/2022 with normal LVEF.  Will check proBNP today, he does not appear overtly overloaded  Nonrheumatic aortic stenosis -mild on echo 12/2022    Patient is seen today for evaluation of dyspnea on exertion and fatigue.  He had changes made to his device settings due to short durations of bradycardia.  He does not have a significant atrial fibrillation burden.  Nuclear stress test and labs were ordered.  Further recommendations based on results of above testing    Orders Placed This Encounter   Procedures    XR chest pa and lateral     Standing Status:   Future     Standing Expiration Date:   8/10/2024     Order Specific Question:   Reason for Exam:     Answer:   Dyspnea on exertion    CBC (No Diff)     Standing Status:   Future     Number of Occurrences:   1     Standing Expiration Date:   8/10/2024    proBNP     Standing Status:   Future     Number of Occurrences:   1     Standing Expiration Date:   8/10/2024    Comprehensive Metabolic Panel     Standing Status:   Future     Number of Occurrences:   1     Standing Expiration Date:   8/10/2024    Stress Test With Myocardial Perfusion One Day     Standing Status:   Future     Standing Expiration Date:   8/10/2024     Order Specific Question:   What stress agent will be used?     Answer:   Regadenoson (Lexiscan)     Order Specific Question:   Difficulty walking criteria?     Answer:   Severe COPD O2 dependence, CHF, Dyspnea     Order Specific Question:   Reason for exam?     Answer:   Angina    ECG 12 Lead     This order was created via procedure documentation            BUNNY Omer  [facility]  08/10/23  11:00 EDT

## 2023-08-17 ENCOUNTER — HOSPITAL ENCOUNTER (OUTPATIENT)
Dept: CARDIOLOGY | Facility: HOSPITAL | Age: 88
Discharge: HOME | End: 2023-08-17
Admitting: NURSE PRACTITIONER
Payer: MEDICARE

## 2023-08-17 VITALS — WEIGHT: 216.93 LBS | BODY MASS INDEX: 32.88 KG/M2 | HEIGHT: 68 IN

## 2023-08-17 DIAGNOSIS — R06.09 DYSPNEA ON EXERTION: ICD-10-CM

## 2023-08-17 LAB
BH CV NUCLEAR PRIOR STUDY: 1
BH CV REST NUCLEAR ISOTOPE DOSE: 10.7 MCI
BH CV STRESS BP STAGE 1: NORMAL
BH CV STRESS COMMENTS STAGE 1: NORMAL
BH CV STRESS DOSE REGADENOSON STAGE 1: 0.4
BH CV STRESS DURATION MIN STAGE 1: 0
BH CV STRESS DURATION SEC STAGE 1: 10
BH CV STRESS HR STAGE 1: 72
BH CV STRESS NUCLEAR ISOTOPE DOSE: 36 MCI
BH CV STRESS PROTOCOL 1: NORMAL
BH CV STRESS RECOVERY BP: NORMAL MMHG
BH CV STRESS RECOVERY HR: 82 BPM
BH CV STRESS STAGE 1: 1
LV EF NUC BP: 51 %
MAXIMAL PREDICTED HEART RATE: 133 BPM
PERCENT MAX PREDICTED HR: 54.14 %
STRESS BASELINE BP: NORMAL MMHG
STRESS BASELINE HR: 67 BPM
STRESS PERCENT HR: 64 %
STRESS POST EXERCISE DUR SEC: 10 SEC
STRESS POST PEAK BP: NORMAL MMHG
STRESS POST PEAK HR: 72 BPM
STRESS TARGET HR: 113 BPM

## 2023-08-17 PROCEDURE — A9502 TC99M TETROFOSMIN: HCPCS | Performed by: NURSE PRACTITIONER

## 2023-08-17 PROCEDURE — 93017 CV STRESS TEST TRACING ONLY: CPT

## 2023-08-17 PROCEDURE — 0 TECHNETIUM TETROFOSMIN KIT: Performed by: NURSE PRACTITIONER

## 2023-08-17 PROCEDURE — 25010000002 REGADENOSON 0.4 MG/5ML SOLUTION: Performed by: NURSE PRACTITIONER

## 2023-08-17 PROCEDURE — 78452 HT MUSCLE IMAGE SPECT MULT: CPT

## 2023-08-17 RX ORDER — REGADENOSON 0.08 MG/ML
0.4 INJECTION, SOLUTION INTRAVENOUS
Status: COMPLETED | OUTPATIENT
Start: 2023-08-17 | End: 2023-08-17

## 2023-08-17 RX ADMIN — REGADENOSON 0.4 MG: 0.08 INJECTION, SOLUTION INTRAVENOUS at 14:05

## 2023-08-17 RX ADMIN — TETROFOSMIN 1 DOSE: 1.38 INJECTION, POWDER, LYOPHILIZED, FOR SOLUTION INTRAVENOUS at 14:05

## 2023-08-17 RX ADMIN — TETROFOSMIN 1 DOSE: 1.38 INJECTION, POWDER, LYOPHILIZED, FOR SOLUTION INTRAVENOUS at 13:15

## 2023-08-17 NOTE — PROGRESS NOTES
Called and let him know his stress test was low risk for ischemia, his heart function looked normal.    Reviewed blood pressure log that he brought to the office which shows good control.    In speaking to patient and his primary concern is dyspnea with exertion, he gets some relief with inhaler.  I recommended follow-up with pulmonary.

## 2023-09-10 DIAGNOSIS — I10 ESSENTIAL HYPERTENSION: ICD-10-CM

## 2023-09-11 DIAGNOSIS — I10 ESSENTIAL HYPERTENSION: ICD-10-CM

## 2023-09-11 RX ORDER — POTASSIUM CHLORIDE 750 MG/1
CAPSULE, EXTENDED RELEASE ORAL
Qty: 180 CAPSULE | Refills: 0 | OUTPATIENT
Start: 2023-09-11

## 2023-09-11 RX ORDER — POTASSIUM CHLORIDE 750 MG/1
10 CAPSULE, EXTENDED RELEASE ORAL 2 TIMES DAILY
Qty: 180 CAPSULE | Refills: 0 | Status: SHIPPED | OUTPATIENT
Start: 2023-09-11 | End: 2023-12-10

## 2023-10-03 NOTE — PROGRESS NOTES
[facility] SLEEP MEDICINE  [address]  3RD FLOOR  [facility] [address]  [phone]    PCP: Kwadwo Dunham MD    Reason for visit:  Sleep disorders: RAQUEL    Guero is a 86 y.o.male who was seen in the [facility] today. He had interval titration study. We ordered pramipexole but he never received, even though we resent to [place]; where he was traveling. He changed his mask style and finds it a better fit. His machine pressure was changed to fixed 19 cms but he could not tolerate and therefore back to auto 7-20.  Leggett Sleepiness Scale is 11. Caffeine 3-4 per day. Alcohol 14 per week.    Guero  reports that he has never smoked. He has never used smokeless tobacco.    Pertinent Positive Review of Systems of nasal congestion, dry mouth  Rest of Review of Systems was negative as recorded in Sleep Questionnaire.    Patient  has a past medical history of Acute kidney injury superimposed on chronic kidney disease (HCC), Arthralgia, Asthma, CAD (coronary artery disease), native coronary artery, Cancer ([facility]), Chest pain, unspecified, Chronic diastolic congestive heart failure (HCC), Colon polyp, Dyspnea on exertion, Erectile dysfunction, Essential hypertension, Generalized weakness, Gout, History of prostate cancer, Hyperkalemia, Jaw pain, Mixed hyperlipidemia, RAQUEL (obstructive sleep apnea) (07/19/2021), Osteoarthrosis, PAF (paroxysmal atrial fibrillation) ([facility]), Shoulder pain, SOB (shortness of breath), Tinnitus, and Urine retention.     Current Medications:    Current Outpatient Medications:   •  allopurinol (ZYLOPRIM) 100 MG tablet, TAKE 1 TABLET BY MOUTH DAILY, Disp: 30 tablet, Rfl: 0  •  amLODIPine (NORVASC) 10 MG tablet, Take 1 tablet by mouth Daily. (Patient taking differently: Take 5 mg by mouth Daily.), Disp: 30 tablet, Rfl: 11  •  apixaban (Eliquis) 2.5 MG tablet tablet, Take 1 tablet by mouth Every 12 (Twelve) Hours., Disp: 180 tablet, Rfl: 3  •  aspirin 81 MG chewable  "tablet, Chew 81 mg Daily., Disp: , Rfl:   •  cetirizine (zyrTEC) 10 MG tablet, Take 10 mg by mouth Daily., Disp: , Rfl:   •  Cranberry 250 MG capsule, Take 250 mg by mouth Daily., Disp: , Rfl:   •  Fluticasone Furoate-Vilanterol (BREO ELLIPTA) 100-25 MCG/INH aerosol powder , Inhale 1 puff Every Morning., Disp: , Rfl:   •  furosemide (LASIX) 20 MG tablet, Take 1 tablet by mouth Daily., Disp: 90 tablet, Rfl: 3  •  melatonin 5 MG tablet tablet, Take 5 mg by mouth At Night As Needed., Disp: , Rfl:   •  metoprolol tartrate (LOPRESSOR) 50 MG tablet, Take 1 tablet by mouth 2 (Two) Times a Day., Disp: 180 tablet, Rfl: 3  •  Multiple Vitamins-Minerals (MULTIVITAMIN WITH MINERALS) tablet tablet, Take 1 tablet by mouth Daily., Disp: , Rfl:   •  potassium chloride (MICRO-K) 10 MEQ CR capsule, Take 1 capsule by mouth 2 (Two) Times a Day., Disp: 180 capsule, Rfl: 3  •  pramipexole (Mirapex) 0.25 MG tablet, Take 1 tablet by mouth Every Night for 180 days. Take 1 hour prior to bed-time., Disp: 30 tablet, Rfl: 5  •  rosuvastatin (CRESTOR) 20 MG tablet, Take 1 tablet by mouth Daily., Disp: 90 tablet, Rfl: 3  •  Testosterone Cypionate (DEPOTESTOTERONE CYPIONATE) 200 MG/ML injection, 1 mL 1 (One) Time Per Week., Disp: , Rfl: 1  •  topiramate (TOPAMAX) 50 MG tablet, Take 50 mg by mouth 2 (Two) Times a Day., Disp: , Rfl:    also entered in Sleep Questionnaire         Vital Signs: /79   Pulse 63   Ht 171.5 cm (67.5\")   Wt 97.1 kg (214 lb)   BMI 33.02 kg/m²     Body mass index is 33.02 kg/m².       Tongue: Large       Dentition: good       Pharynx: Posterior pharyngeal pillars are wide   Mallampatti: III (soft and hard palate and base of uvula visible)        General: Alert. Cooperative. Well developed. No acute distress.             Head:  Normocephalic. Symmetrical. Atraumatic.              Nose: No septal deviation. No drainage.          Throat: No oral lesions. No thrush. Moist mucous membranes.    Chest Wall:  Normal shape. " Symmetric expansion with respiration. No tenderness.             Neck:  Trachea midline.           Lungs:  Clear to auscultation bilaterally. No wheezes. No rhonchi. No rales. Respirations regular, even and unlabored.            Heart:  Regular rhythm and normal rate. Normal S1 and S2. No murmur.     Abdomen:  Soft, non-tender and non-distended. Normal bowel sounds. No masses.  Extremities:  Moves all extremities well. No edema.    Psychiatric: Normal mood and affect.    Diagnostic data available to date is as below and was reviewed on current visit:  7/21/21:  The patient had 86 obstructive events and 173 partial obstructive events. AHI for total monitoring time is moderately abnormal at 26.5 events per hour.  9/30/21  Overnight titration study from 10:44 PM to 4:57 AM.  Sleep efficiency 92% with 5.7 hours total sleep time.  Sleep distribution shows absence of slow-wave sleep.  AHI index 4.4.  There was 219 minutes of supine sleep and 59 minutes of REM sleep.  Oxygen saturation remained above 88%.  Arousal index 13.  PLM index extremely high at 145 with PLM arousal index 8.2.  CPAP increased from 7 to 19 cm of water pressure.  Control of apneas hypopneas was achieved at 19 cmH2O pressure with AHI of 2.3.  No significant desats with adequately controlled sleep apnea.  Patient did achieve some supine sleep as well as supine REM sleep.    Most current available usage data reviewed on 12/07/2021:  · 100% compliance average 7 hours 50 minutes AHI 4.4 average pressure 11.6 auto CPAP set 7-20    DME Company: [employer]    Orders Placed This Encounter   Procedures   • Iron Profile     Standing Status:   Future     Standing Expiration Date:   12/7/2022     Order Specific Question:   Release to patient     Answer:   Immediate   • Ferritin Level     Standing Status:   Future     Standing Expiration Date:   12/7/2022     Order Specific Question:   Release to patient     Answer:   Immediate     New Medications Ordered This Visit    Medications   • pramipexole (Mirapex) 0.25 MG tablet     Sig: Take 1 tablet by mouth Every Night for 180 days. Take 1 hour prior to bed-time.     Dispense:  30 tablet     Refill:  5       Prescription to DME for replacement supplies as below:    full face mask      Description Replacement    Nasal PILLOWS      A 7034 Nasal Pillows  every 3 mth    A 7033 Repl Nasal Pillows  2 per mth    Nasal MASK/CUSHION      A 7034 Nasal Mask/Cushion  every 3 mth    A 7032 Repl Nasal Mask/Cushion  2 per mth    Full Face MASK     x A 7030 Full Face Mask  every 3 mth   x A 7031 Repl Face Mask  1 per mth      A 4604 Heated Tubing  every 3 mth    A 7037 Standard Tubing  every 3 mth   x A 7035 Headgear  every 3 mth   x A 7046 Repl Humidifier Chamber  every 6 yrs   x A 7038 Disposable Filters  2 per mth   x A 7039 Non-disposable Filter  every 6 mth   x A 7036 Chin Strap  every 6 mth           Impression:  1. Iron deficiency    2. Obstructive sleep apnea    3. Obesity (BMI 30-39.9)    4. Periodic limb movement disorder (PLMD)        Plan:  Guero  did not tolerate higher pr of 19. Now avg 11 cms. Review of study again shows only slightly elevated AHI at these pressures. As patient intolerant of higher, will keep same and adjust to auto 10-20. No desat on recent titration study at these pressures.    Very high PLMD, has not picked up pramipexole. Check iron studies and re-prescribe Mirapex.  Explains reason for use.  He is to get TKRs soon.    I reiterated the importance of effective treatment of obstructive sleep apnea with PAP machine.  Cardiovascular health risks of untreated sleep apnea were again reviewed.  Patient was asked to remain cautious if there is persistent hypersomnolence. The benefit of weight loss in reducing severity of obstructive sleep apnea was discussed.  Patient would benefit from adhering to a strict diet to achieve ideal BMI.     Change of PAP supplies regularly is important for effective use.  Change of cushion  on the mask or plugs on nasal pillows along with disposable filters once every month and change of mask frame, tubing, headgear and Velcro straps every 6 months at the minimum was reiterated.    This patient is compliant with PAP machine and benefits from its use.  Apnea hypopneas index is corrected/improved.  Daytime hypersomnolence has resolved.     Patient will follow up in this clinic in 6 months  APRN    Thank you for allowing me to participate in your patient's care.    Electronically signed by Kirt Ivy MD, 12/07/21, 1:29 PM EST.    Part of this note may be an electronic transcription/translation of spoken language to printed text using the Dragon Dictation System.   Wound Care: [place]

## 2023-11-14 ENCOUNTER — TELEPHONE (OUTPATIENT)
Age: 88
End: 2023-11-14
Payer: MEDICARE

## 2023-11-14 ENCOUNTER — PATIENT MESSAGE (OUTPATIENT)
Age: 88
End: 2023-11-14
Payer: MEDICARE

## 2023-11-14 NOTE — TELEPHONE ENCOUNTER
Defer to EP.     However, ventricular rate of 50s is better than 30-40s in my opinion so if he feels fine, perhaps we just keep checking device or get him in to EP/Dr. Marquez to establish care moving forward

## 2023-11-14 NOTE — TELEPHONE ENCOUNTER
This is a former patient of Dr. Galan and has been following with Dr. Gutierrez with Dr. Marquez following his remotes for his pacemaker (does not appear that pt has been seen by Dr. Marquez in the office)     Pt has a MDT dual chamber pacemaker that was implanted in 2017 by Dr. Valladares for symptomatic bradycardia and syncope.     Pt was seen in the device clinic on 8/10/23 with c/o SOB. It was noted that the pt's rate was falling into the 30s-40s when the device was trying to utilize MVP mode and per Rigo with MDT, mode was changed from AAIR<=>DDDR to DDDR.     After reviewing most recent remote, it is noted that pt's ventricular rate is still falling into the 50s about 20% of the time between 8/10/23-11/2/23 per histograms with the LRL set at 60 bpm.     Histograms included below as well as presenting rhythm and parameter settings:

## 2023-11-22 RX ORDER — FUROSEMIDE 20 MG/1
20 TABLET ORAL DAILY
Qty: 90 TABLET | Refills: 1 | Status: SHIPPED | OUTPATIENT
Start: 2023-11-22

## 2023-11-24 DIAGNOSIS — I10 ESSENTIAL HYPERTENSION: ICD-10-CM

## 2023-11-27 RX ORDER — POTASSIUM CHLORIDE 750 MG/1
10 CAPSULE, EXTENDED RELEASE ORAL 2 TIMES DAILY
Qty: 180 CAPSULE | Refills: 2 | Status: SHIPPED | OUTPATIENT
Start: 2023-11-27

## 2024-01-31 RX ORDER — METOPROLOL TARTRATE 50 MG/1
TABLET, FILM COATED ORAL
Qty: 180 TABLET | Refills: 1 | Status: SHIPPED | OUTPATIENT
Start: 2024-01-31

## 2024-03-08 DIAGNOSIS — I10 ESSENTIAL HYPERTENSION: ICD-10-CM

## 2024-03-08 RX ORDER — POTASSIUM CHLORIDE 750 MG/1
10 CAPSULE, EXTENDED RELEASE ORAL 2 TIMES DAILY
Qty: 180 CAPSULE | Refills: 2 | Status: SHIPPED | OUTPATIENT
Start: 2024-03-08

## 2024-03-11 RX ORDER — FUROSEMIDE 20 MG/1
20 TABLET ORAL DAILY
Qty: 90 TABLET | Refills: 0 | Status: SHIPPED | OUTPATIENT
Start: 2024-03-11

## 2024-05-03 DIAGNOSIS — I10 ESSENTIAL HYPERTENSION: ICD-10-CM

## 2024-05-03 RX ORDER — AMLODIPINE BESYLATE 10 MG/1
10 TABLET ORAL DAILY
Qty: 30 TABLET | Refills: 1 | Status: SHIPPED | OUTPATIENT
Start: 2024-05-03

## 2024-05-03 RX ORDER — POTASSIUM CHLORIDE 750 MG/1
10 CAPSULE, EXTENDED RELEASE ORAL 2 TIMES DAILY
Qty: 180 CAPSULE | Refills: 2 | Status: SHIPPED | OUTPATIENT
Start: 2024-05-03

## 2024-05-03 RX ORDER — METOPROLOL TARTRATE 50 MG/1
TABLET, FILM COATED ORAL
Qty: 180 TABLET | Refills: 1 | Status: SHIPPED | OUTPATIENT
Start: 2024-05-03

## 2024-05-15 ENCOUNTER — OFFICE VISIT (OUTPATIENT)
Dept: CARDIOLOGY | Facility: CLINIC | Age: 89
End: 2024-05-15
Payer: MEDICARE

## 2024-05-15 ENCOUNTER — CLINICAL SUPPORT NO REQUIREMENTS (OUTPATIENT)
Age: 89
End: 2024-05-15
Payer: MEDICARE

## 2024-05-15 VITALS
HEIGHT: 68 IN | HEART RATE: 61 BPM | OXYGEN SATURATION: 95 % | WEIGHT: 215.2 LBS | BODY MASS INDEX: 32.61 KG/M2 | DIASTOLIC BLOOD PRESSURE: 60 MMHG | SYSTOLIC BLOOD PRESSURE: 104 MMHG

## 2024-05-15 DIAGNOSIS — R20.2 PARESTHESIA OF LEFT UPPER EXTREMITY: Primary | ICD-10-CM

## 2024-05-15 DIAGNOSIS — I35.0 NONRHEUMATIC AORTIC (VALVE) STENOSIS: ICD-10-CM

## 2024-05-15 DIAGNOSIS — I77.1 STRICTURE OF ARTERY: ICD-10-CM

## 2024-05-15 DIAGNOSIS — R06.09 EXERTIONAL DYSPNEA: ICD-10-CM

## 2024-05-15 DIAGNOSIS — R55 SYNCOPE, UNSPECIFIED SYNCOPE TYPE: Primary | ICD-10-CM

## 2024-05-15 PROCEDURE — 99214 OFFICE O/P EST MOD 30 MIN: CPT | Performed by: INTERNAL MEDICINE

## 2024-05-15 PROCEDURE — 1159F MED LIST DOCD IN RCRD: CPT | Performed by: INTERNAL MEDICINE

## 2024-05-15 PROCEDURE — 1160F RVW MEDS BY RX/DR IN RCRD: CPT | Performed by: INTERNAL MEDICINE

## 2024-05-15 RX ORDER — CEPHALEXIN 500 MG/1
CAPSULE ORAL 3 TIMES DAILY
COMMUNITY
Start: 2024-05-14

## 2024-05-15 RX ORDER — BACLOFEN 10 MG/1
10 TABLET ORAL AS NEEDED
COMMUNITY
Start: 2024-05-08

## 2024-05-15 NOTE — PROGRESS NOTES
[facility]      Patient Name: Guero Garay  :1935  Age: 89 y.o.  Encounter Provider:  Preston Gutierrez Jr, MD      Chief Complaint:   Chief Complaint   Patient presents with    Paroxysmal atrial fibrillation    VALENZUELA (dyspnea on exertion         HPI  Guero Garay is a 89 y.o. male is a very pleasant gentleman who has a past medical history of coronary artery disease, atrial fibrillation, sick sinus syndrome status post pacemaker implantation, hypertension, aortic stenosis, chronic diastolic heart failure and chronic kidney disease who presents for follow-up evaluation with me.      Last clinic visit note: He is followed in the past with Dr. Galan.  He was last seen by BUNNY Ndiaye in office in April.  Coronary artery disease status post PCI in .  Last cardiac catheterization in 2020 showed patent stents and otherwise nonobstructive coronary artery disease.  Atrial fibrillation with history of high burden in 2018 on pacemaker check however today his AT/AF burden is less than 0.1%.  At last visit he was noting labile blood pressure and he was started on amlodipine 5 mg.  He still noting high blood pressure and was seen in the [facility] on .  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  He notes mild chronic dyspnea on exertion which is unchanged over the past few years.  He denies orthopnea, PND or edema.  No palpitations, dizziness or syncope.  He admits to snoring and hypersomnia.  No previous sleep test.  Social and family history reviewed and not pertinent to this clinic visit.    We made some changes to medical therapy but most importantly got him home sleep study which confirmed the suspicion of sleep apnea.  He is compliant with medical therapy and we have been able to reduce his blood pressure medications and have him under much better control now.  He still does a significant amount around the farm with no chest  "pain.  He has chronic stable mild dyspnea on exertion.  No orthopnea, PND or edema.  No palpitations, dizziness or syncope.      He feels that dyspnea on exertion is progressive.  He notes some mild postural dizziness.  He was in a hospital in [place] over the winter for UTI and they noted significant differential in upper extremity blood pressure.  He is having left arm pain and transient paresthesias.  No orthopnea or PND.  No palpitations, dizziness or syncope.  No angina.  Stress study for atypical chest pain in August 2023 showed no evidence of ischemia.  Social and family history were reviewed and are not pertinent to this clinic visit.    The following portions of the patient's history were reviewed and updated as appropriate: allergies, current medications, past family history, past medical history, past social history, past surgical history and problem list.      Review of Systems   Constitutional: Positive for malaise/fatigue. Negative for chills and fever.   HENT:  Negative for hoarse voice and sore throat.    Eyes:  Negative for double vision and photophobia.   Cardiovascular:  Positive for dyspnea on exertion. Negative for chest pain, leg swelling, near-syncope, orthopnea, palpitations, paroxysmal nocturnal dyspnea and syncope.   Respiratory:  Positive for snoring. Negative for cough and wheezing.    Skin:  Negative for poor wound healing and rash.   Musculoskeletal:  Negative for arthritis and joint swelling.   Gastrointestinal:  Negative for bloating, abdominal pain, hematemesis and hematochezia.   Neurological:  Negative for dizziness and focal weakness.   Psychiatric/Behavioral:  Negative for depression and suicidal ideas.      ROS was reviewed, updated and amended when necessary.    OBJECTIVE:   Vital Signs  Vitals:    05/15/24 0835   BP: 104/60   Pulse: 61   SpO2: 95%     Estimated body mass index is 32.72 kg/m² as calculated from the following:    Height as of this encounter: 172.7 cm (68\").    " Weight as of this encounter: 97.6 kg (215 lb 3.2 oz).    Vitals reviewed.   Constitutional:       Appearance: Healthy appearance. Not in distress.   Neck:      Vascular: No JVR. JVD normal.   Pulmonary:      Effort: Pulmonary effort is normal.      Breath sounds: Normal breath sounds. No wheezing. No rhonchi. No rales.   Chest:      Chest wall: Not tender to palpatation.   Cardiovascular:      PMI at left midclavicular line. Normal rate. Regular rhythm. Normal S1. Normal S2.       Murmurs: There is a systolic murmur.      No gallop.  No click. No rub.   Pulses:     Intact distal pulses.   Edema:     Peripheral edema absent.   Abdominal:      General: Bowel sounds are normal.      Palpations: Abdomen is soft.      Tenderness: There is no abdominal tenderness.   Musculoskeletal: Normal range of motion.         General: No tenderness. Skin:     General: Skin is warm and dry.   Neurological:      General: No focal deficit present.      Mental Status: Alert and oriented to person, place and time.   Physical exam was reviewed, updated and amended and necessary.    Procedures          ASSESSMENT:     Resistant hypertension  Paroxysmal atrial fibrillation  Sick sinus syndrome  Hypersomnia  Chronic diastolic heart failure  Coronary artery disease  Aortic stenosis      PLAN OF CARE:     Hypertension -much better controlled at this point time.  We had a long discussion about sodium restricted diet.  Continue current medical regimen.  Continue CPAP therapy.  He follows with sleep medicine in [place] with Dr. Ivy.  Hypersomnia -confirmed sleep apnea on CPAP  Paroxysmal atrial fibrillation -low AT AF burden.  Continue Eliquis and metoprolol.  Sick sinus syndrome -normal pacemaker function  Aortic stenosis -stable valvular heart disease on echocardiogram in 2022.  Progressive exertional dyspnea per patient report.  Repeat echocardiogram.  Upper extremity paresthesias -differential of bilateral upper extremities with left arm  higher than right arm systolic blood pressure by 16 mmHg.  Plan for upper extremity Doppler to exclude subclavian stenosis.  Chronic diastolic heart failure -euvolemic today.  It is imperative that we are aggressive with afterload reduction.  Sodium restricted diet is counseled.  Coronary artery disease -continue aspirin and statin.  Follow lipid profile.    Return to clinic 6 months.  Patient is due for MRI of thoracic and lumbar spine due to degenerative disc disease.  His device is MRI compatible.  Radiology will arrange with the pacemaker clinic to have device appropriately programmed for the procedure.             Discharge Medications            Accurate as of May 15, 2024  8:38 AM. If you have any questions, ask your nurse or doctor.                Changes to Medications        Instructions Start Date   allopurinol 100 MG tablet  Commonly known as: ZYLOPRIM  What changed: when to take this   100 mg, Oral, Daily      rosuvastatin 20 MG tablet  Commonly known as: CRESTOR  What changed: how much to take   20 mg, Oral, Daily             Continue These Medications        Instructions Start Date   acetaminophen 500 MG tablet  Commonly known as: TYLENOL   500 mg, Oral, Nightly      amLODIPine 10 MG tablet  Commonly known as: NORVASC   10 mg, Oral, Daily      aspirin 81 MG EC tablet   81 mg, Oral, Daily, PT TO STOP PER MD INSTRUCTION       baclofen 10 MG tablet  Commonly known as: LIORESAL   10 mg, Oral, As Needed      cephalexin 500 MG capsule  Commonly known as: KEFLEX   3 Times Daily      cetirizine 10 MG tablet  Commonly known as: zyrTEC   10 mg, Oral, Daily PRN      Cranberry 250 MG capsule   250 mg, Oral, Daily      Eliquis 2.5 MG tablet tablet  Generic drug: apixaban   TAKE 1 TABLET BY MOUTH EVERY 12 HOURS      Fluticasone Furoate-Vilanterol 100-25 MCG/INH inhaler  Commonly known as: BREO ELLIPTA   1 puff, Inhalation, Every Morning      furosemide 20 MG tablet  Commonly known as: LASIX   20 mg, Oral, Daily       HIBICLENS EX   1 each, Take As Directed      melatonin 5 MG tablet tablet   5 mg, Oral, Nightly PRN      metoprolol tartrate 50 MG tablet  Commonly known as: LOPRESSOR   TAKE 1 TABLET BY MOUTH TWICE DAILY      multivitamin with minerals tablet tablet   1 tablet, Oral, Daily      potassium chloride 10 MEQ CR capsule  Commonly known as: MICRO-K   10 mEq, Oral, 2 Times Daily      pramipexole 0.25 MG tablet  Commonly known as: Mirapex   0.25 mg, Oral, Nightly      topiramate 50 MG tablet  Commonly known as: TOPAMAX   50 mg, Oral, 2 Times Daily               Thank you for allowing me to participate in the care of your patient,      Sincerely,   Preston Gutierrez MD  [facility]  05/15/24  08:38 EDT

## 2024-05-17 ENCOUNTER — TELEPHONE (OUTPATIENT)
Dept: CARDIOLOGY | Facility: CLINIC | Age: 89
End: 2024-05-17
Payer: MEDICARE

## 2024-05-17 NOTE — TELEPHONE ENCOUNTER
Caller: Marla Garay    Relationship: Emergency Contact    Best call back number:  [phone]        PATIENTS WIFE CALLED IN REQUESTING THE CARDIAC CLEARANCE FOR THE MRI TO BE UPLOADED INTO THE PATIENTS CHART SO HE CAN HAVE THIS DONE,

## 2024-05-20 RX ORDER — APIXABAN 2.5 MG/1
TABLET, FILM COATED ORAL
Qty: 180 TABLET | Refills: 3 | Status: SHIPPED | OUTPATIENT
Start: 2024-05-20

## 2024-05-21 ENCOUNTER — TELEPHONE (OUTPATIENT)
Dept: CARDIOLOGY | Facility: CLINIC | Age: 89
End: 2024-05-21
Payer: MEDICARE

## 2024-05-21 NOTE — TELEPHONE ENCOUNTER
Caller: Marla Garay    Relationship to patient: Emergency Contact    Best call back number: [phone]    Patient is needing: PATIENTS WIFE CALLED REGARDING CARDIAC CLEARANCE IN [facility]. IT HAS NOT BEEN RECEIVED YET.

## 2024-05-21 NOTE — TELEPHONE ENCOUNTER
I called but had to leave a voicemail message.  Please refer to the telephone message from 5/17/254.-jonah

## 2024-05-29 ENCOUNTER — HOSPITAL ENCOUNTER (OUTPATIENT)
Dept: CARDIOLOGY | Facility: HOSPITAL | Age: 89
Discharge: HOME | End: 2024-05-29
Admitting: INTERNAL MEDICINE
Payer: MEDICARE

## 2024-05-29 VITALS
DIASTOLIC BLOOD PRESSURE: 75 MMHG | HEIGHT: 68 IN | HEART RATE: 84 BPM | BODY MASS INDEX: 32.41 KG/M2 | SYSTOLIC BLOOD PRESSURE: 150 MMHG | WEIGHT: 213.85 LBS

## 2024-05-29 DIAGNOSIS — I35.0 NONRHEUMATIC AORTIC (VALVE) STENOSIS: ICD-10-CM

## 2024-05-29 DIAGNOSIS — R06.09 EXERTIONAL DYSPNEA: ICD-10-CM

## 2024-05-29 LAB
AORTIC DIMENSIONLESS INDEX: 0.3 (DI)
BH CV ECHO MEAS - AO MAX PG: 43.8 MMHG
BH CV ECHO MEAS - AO MEAN PG: 25 MMHG
BH CV ECHO MEAS - AO V2 MAX: 331 CM/SEC
BH CV ECHO MEAS - AO V2 VTI: 76.9 CM
BH CV ECHO MEAS - AVA(I,D): 1.15 CM2
BH CV ECHO MEAS - EDV(CUBED): 216 ML
BH CV ECHO MEAS - EDV(MOD-SP2): 108 ML
BH CV ECHO MEAS - EDV(MOD-SP4): 147 ML
BH CV ECHO MEAS - EF(MOD-BP): 49.1 %
BH CV ECHO MEAS - EF(MOD-SP2): 46.3 %
BH CV ECHO MEAS - EF(MOD-SP4): 46.9 %
BH CV ECHO MEAS - ESV(CUBED): 69.3 ML
BH CV ECHO MEAS - ESV(MOD-SP2): 58 ML
BH CV ECHO MEAS - ESV(MOD-SP4): 78 ML
BH CV ECHO MEAS - FS: 31.6 %
BH CV ECHO MEAS - IVS/LVPW: 1.1 CM
BH CV ECHO MEAS - IVSD: 1.1 CM
BH CV ECHO MEAS - LAT PEAK E' VEL: 9.5 CM/SEC
BH CV ECHO MEAS - LV DIASTOLIC VOL/BSA (35-75): 70.1 CM2
BH CV ECHO MEAS - LV MASS(C)D: 263 GRAMS
BH CV ECHO MEAS - LV MAX PG: 3.4 MMHG
BH CV ECHO MEAS - LV MEAN PG: 2 MMHG
BH CV ECHO MEAS - LV SYSTOLIC VOL/BSA (12-30): 37.2 CM2
BH CV ECHO MEAS - LV V1 MAX: 92.1 CM/SEC
BH CV ECHO MEAS - LV V1 VTI: 22 CM
BH CV ECHO MEAS - LVIDD: 6 CM
BH CV ECHO MEAS - LVIDS: 4.1 CM
BH CV ECHO MEAS - LVOT AREA: 4 CM2
BH CV ECHO MEAS - LVOT DIAM: 2.26 CM
BH CV ECHO MEAS - LVPWD: 1 CM
BH CV ECHO MEAS - MED PEAK E' VEL: 6.5 CM/SEC
BH CV ECHO MEAS - MV A MAX VEL: 123 CM/SEC
BH CV ECHO MEAS - MV DEC SLOPE: 423.7 CM/SEC2
BH CV ECHO MEAS - MV DEC TIME: 0.23 SEC
BH CV ECHO MEAS - MV E MAX VEL: 105 CM/SEC
BH CV ECHO MEAS - MV E/A: 0.85
BH CV ECHO MEAS - MV MAX PG: 6 MMHG
BH CV ECHO MEAS - MV MEAN PG: 1.85 MMHG
BH CV ECHO MEAS - MV P1/2T: 77.9 MSEC
BH CV ECHO MEAS - MV V2 VTI: 41.7 CM
BH CV ECHO MEAS - MVA(P1/2T): 2.8 CM2
BH CV ECHO MEAS - MVA(VTI): 2.12 CM2
BH CV ECHO MEAS - PA ACC TIME: 0.09 SEC
BH CV ECHO MEAS - QP/QS: 0.53
BH CV ECHO MEAS - RAP SYSTOLE: 8 MMHG
BH CV ECHO MEAS - RV MAX PG: 0.95 MMHG
BH CV ECHO MEAS - RV V1 MAX: 48.8 CM/SEC
BH CV ECHO MEAS - RV V1 VTI: 9.8 CM
BH CV ECHO MEAS - RVOT DIAM: 2.47 CM
BH CV ECHO MEAS - RVSP: 34.7 MMHG
BH CV ECHO MEAS - SV(LVOT): 88.4 ML
BH CV ECHO MEAS - SV(MOD-SP2): 50 ML
BH CV ECHO MEAS - SV(MOD-SP4): 69 ML
BH CV ECHO MEAS - SV(RVOT): 46.9 ML
BH CV ECHO MEAS - SVI(LVOT): 42.2 ML/M2
BH CV ECHO MEAS - SVI(MOD-SP2): 23.8 ML/M2
BH CV ECHO MEAS - SVI(MOD-SP4): 32.9 ML/M2
BH CV ECHO MEAS - TR MAX PG: 26.7 MMHG
BH CV ECHO MEAS - TR MAX VEL: 258.3 CM/SEC
BH CV ECHO MEASUREMENTS AVERAGE E/E' RATIO: 13.13
BH CV XLRA - RV BASE: 3.8 CM
BH CV XLRA - RV LENGTH: 6.5 CM
BH CV XLRA - RV MID: 3.2 CM
BH CV XLRA - TDI S': 11.8 CM/SEC
LEFT ATRIUM VOLUME INDEX: 33.3 ML/M2
SINUS: 3.3 CM

## 2024-05-29 PROCEDURE — 93306 TTE W/DOPPLER COMPLETE: CPT

## 2024-05-29 PROCEDURE — 25510000001 PERFLUTREN (DEFINITY) 8.476 MG IN SODIUM CHLORIDE (PF) 0.9 % 10 ML INJECTION: Performed by: INTERNAL MEDICINE

## 2024-05-29 RX ADMIN — SODIUM CHLORIDE 2 ML: 9 INJECTION INTRAMUSCULAR; INTRAVENOUS; SUBCUTANEOUS at 17:49

## 2024-05-30 ENCOUNTER — TELEPHONE (OUTPATIENT)
Dept: CARDIOLOGY | Facility: CLINIC | Age: 89
End: 2024-05-30
Payer: MEDICARE

## 2024-05-30 NOTE — TELEPHONE ENCOUNTER
Spoke to patient about echo showing slightly decreased EF compared to previous.  EF is reported to 49% but I have reviewed the images and it is in the range of 50 to 55% which is low normal.  Aortic stenosis is progressing at moderate but does not meet criteria for surgical or percutaneous intervention.  We will monitor closely and repeat echo in 1 year.

## 2024-05-31 DIAGNOSIS — R20.2 PARESTHESIA OF LEFT UPPER EXTREMITY: Primary | ICD-10-CM

## 2024-05-31 DIAGNOSIS — I77.1 STRICTURE OF ARTERY: ICD-10-CM

## 2024-06-26 ENCOUNTER — HOSPITAL ENCOUNTER (EMERGENCY)
Facility: HOSPITAL | Age: 89
Discharge: HOME | End: 2024-06-26
Attending: EMERGENCY MEDICINE
Payer: MEDICARE

## 2024-06-26 VITALS
BODY MASS INDEX: 32.13 KG/M2 | SYSTOLIC BLOOD PRESSURE: 124 MMHG | HEIGHT: 68 IN | HEART RATE: 65 BPM | OXYGEN SATURATION: 100 % | TEMPERATURE: 98.2 F | DIASTOLIC BLOOD PRESSURE: 89 MMHG | WEIGHT: 212 LBS | RESPIRATION RATE: 16 BRPM
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
## 2024-06-26 DIAGNOSIS — L03.314 CELLULITIS OF LEFT GROIN: Primary | ICD-10-CM

## 2024-06-26 DIAGNOSIS — S80.861A TICK BITE OF RIGHT LOWER LEG, INITIAL ENCOUNTER: ICD-10-CM

## 2024-06-26 PROCEDURE — 99283 EMERGENCY DEPT VISIT LOW MDM: CPT

## 2024-06-26 RX ORDER — CEFDINIR 300 MG/1
300 CAPSULE ORAL 2 TIMES DAILY
Qty: 14 CAPSULE | Refills: 0 | Status: SHIPPED | OUTPATIENT
Start: 2024-06-26 | End: 2024-07-03

## 2024-06-26 RX ORDER — CHLORHEXIDINE GLUCONATE 40 MG/ML
1 SOLUTION TOPICAL DAILY
Qty: 118 ML | Refills: 0 | Status: SHIPPED | OUTPATIENT
Start: 2024-06-26

## 2024-06-26 RX ORDER — DOXYCYCLINE 100 MG/1
100 CAPSULE ORAL 2 TIMES DAILY
Qty: 20 CAPSULE | Refills: 0 | Status: SHIPPED | OUTPATIENT
Start: 2024-06-26 | End: 2024-07-06

## 2024-06-26 RX ORDER — DOXYCYCLINE 100 MG/1
100 CAPSULE ORAL ONCE
Status: COMPLETED | OUTPATIENT
Start: 2024-06-26 | End: 2024-06-26

## 2024-06-26 RX ADMIN — DOXYCYCLINE 100 MG: 100 CAPSULE ORAL at 12:28

## 2024-06-26 NOTE — DISCHARGE INSTRUCTIONS
Rest and increase fluids.  Keep areas clean and dry.  Follow-up with your primary care.  Return to the emergency department symptoms get worse or change.

## 2024-06-26 NOTE — ED PROVIDER NOTES
"Subjective   History of Present Illness  89-year-old  overweight male patient presented to the emergency department via private vehicle with a chief complaint of right leg tick bite and left groin cellulitis with possible abscess.  Patient states that he pulled a tick off of him a couple of days ago.  He states he went to urgent care today to be evaluated.  He states that the provider evaluated his right leg.  He states that he was telling the provider about the area in his left groin.  He states that the provider evaluated the left groin and told him that it was a significant area of infection.  He states he was told to come to the emergency department for evaluation.  Patient states he has been having issues with \"abscesses\".  He states that about 2 months ago he had an area on his scrotum that had to be incised and drained.  He states he was on antibiotics for at least a week.  He states he cannot remember what antibiotics he was on.  He states he has not been told that he has a history of MRSA.    History provided by:  Medical records and patient      Review of Systems   Constitutional: Negative.    HENT: Negative.     Respiratory: Negative.     Cardiovascular: Negative.    Gastrointestinal: Negative.    Musculoskeletal: Negative.    Skin:  Positive for wound. Negative for color change, pallor and rash.        Erythema   Neurological: Negative.    Hematological: Negative.    Psychiatric/Behavioral: Negative.     All other systems reviewed and are negative.      Past Medical History:   Diagnosis Date    Acute kidney injury superimposed on chronic kidney disease     Aortic stenosis     Arthralgia     Asthma     mild persistent without complication    CAD (coronary artery disease), native coronary artery     Cancer     Prostate    Chest pain, unspecified     Chronic diastolic congestive heart failure     Colon polyp     Dyspnea on exertion     Erectile dysfunction     Essential hypertension     Generalized " weakness     Gout     History of prostate cancer     Hyperkalemia     Jaw pain     both sides    Mixed hyperlipidemia     RAQUEL (obstructive sleep apnea) 07/19/2021    Home sleep study.  Weight 216 pounds.  Moderate obstructive sleep apnea with AHI 26.5 events per hour.  Low oxygen saturation 79% and sleep-related hypoxia present for 157 minutes.  The patient snored 45.6% of total monitoring time.    Osteoarthrosis     PAF (paroxysmal atrial fibrillation)     Shoulder pain     SOB (shortness of breath)     SSS (sick sinus syndrome)     Tinnitus     Urine retention        Allergies   Allergen Reactions    Sulfa Antibiotics Other (See Comments)     UNKNOWN  Pat was shaky and could hardly walk  UNKNOWN  Pat was shaky and could hardly walk  Pat was shaky and could hardly walk  UNKNOWN         Past Surgical History:   Procedure Laterality Date    CARDIAC CATHETERIZATION N/A 4/27/2017    Procedure: Coronary angiography;  Surgeon: Marvel Brito MD;  Location: [facility] CATH INVASIVE LOCATION;  Service:     CARDIAC CATHETERIZATION N/A 4/27/2017    Procedure: Left Heart Cath;  Surgeon: Marvel Brito MD;  Location: [facility] CATH INVASIVE LOCATION;  Service:     CARDIAC CATHETERIZATION N/A 4/27/2017    Procedure: Stent GIN coronary;  Surgeon: Marvel Brito MD;  Location: [facility] CATH INVASIVE LOCATION;  Service:     CARDIAC CATHETERIZATION N/A 6/30/2020    Procedure: Coronary angiography;  Surgeon: Jared Sellers MD;  Location: [facility] CATH INVASIVE LOCATION;  Service: Cardiovascular;  Laterality: N/A;    CARDIAC CATHETERIZATION N/A 6/30/2020    Procedure: Left Heart Cath;  Surgeon: Jared Sellers MD;  Location: [facility] CATH INVASIVE LOCATION;  Service: Cardiovascular;  Laterality: N/A;    CARDIAC ELECTROPHYSIOLOGY PROCEDURE N/A 6/15/2017    Procedure: Pacemaker DC new   [employer];  Surgeon: Gustavo Valladares MD;  Location: [facility] CATH INVASIVE LOCATION;  Service:     COLONOSCOPY  2013    COLONOSCOPY N/A 12/12/2018     "Procedure: COLONOSCOPY with polypectomy;  Surgeon: Kwadwo Powers MD;  Location:  [facility];  Service: Gastroenterology    CORONARY ANGIOPLASTY WITH STENT PLACEMENT      INGUINAL HERNIA REPAIR Right     JOINT REPLACEMENT      left knee, left and right shoulder    NECK SURGERY      SPURS    NE ARTHROPLASTY GLENOHUMERAL JOINT TOTAL SHOULDER Right 2/16/2017    Procedure: RIGHT TOTAL SHOULDER ARTHROPLASTY;  Surgeon: Marquez Galvan MD;  Location:  [facility];  Service: Orthopedics    PROSTATECTOMY      REPLACEMENT TOTAL KNEE Left     TONSILLECTOMY      TOTAL KNEE ARTHROPLASTY Right 12/14/2021    Procedure: RIGHT TOTAL KNEE ARTHROPLASTY;  Surgeon: Jace Altamirano II, MD;  Location: [facility] MAIN OR;  Service: Orthopedics;  Laterality: Right;    TOTAL SHOULDER REPLACEMENT Bilateral        Family History   Problem Relation Age of Onset    Cancer Mother         lung    Cancer Father         lung    Diabetes Father     Colon cancer Neg Hx     Colon polyps Neg Hx     Malig Hyperthermia Neg Hx        Social History     Socioeconomic History    Marital status:    Tobacco Use    Smoking status: Never    Smokeless tobacco: Never    Tobacco comments:     caffeine use- \"occ\" soda, decaf tea   Vaping Use    Vaping status: Never Used   Substance and Sexual Activity    Alcohol use: Not Currently     Alcohol/week: 21.0 standard drinks of alcohol     Types: 21 Cans of beer per week     Comment: 2-3 beers daily    Drug use: No    Sexual activity: Defer           Objective   Physical Exam  Vitals and nursing note reviewed.   Constitutional:       General: He is not in acute distress.     Appearance: He is normal weight. He is not ill-appearing, toxic-appearing or diaphoretic.   HENT:      Head: Normocephalic and atraumatic.      Right Ear: External ear normal.      Left Ear: External ear normal.      Nose: Nose normal.      Mouth/Throat:      Mouth: Mucous membranes are moist.      Pharynx: Oropharynx is clear. "   Eyes:      Extraocular Movements: Extraocular movements intact.      Conjunctiva/sclera: Conjunctivae normal.      Pupils: Pupils are equal, round, and reactive to light.   Cardiovascular:      Rate and Rhythm: Normal rate and regular rhythm.      Heart sounds: Normal heart sounds.   Pulmonary:      Effort: Pulmonary effort is normal.      Breath sounds: Normal breath sounds.   Abdominal:      General: Abdomen is scaphoid. Bowel sounds are normal. There is no distension or abdominal bruit. There are no signs of injury.      Palpations: Abdomen is soft.      Tenderness: There is no abdominal tenderness.   Skin:     General: Skin is warm and dry.      Capillary Refill: Capillary refill takes less than 2 seconds.      Findings: Erythema present.          Neurological:      General: No focal deficit present.      Mental Status: He is alert and oriented to person, place, and time.   Psychiatric:         Mood and Affect: Mood normal.         Behavior: Behavior normal.         Thought Content: Thought content normal.         Judgment: Judgment normal.         Procedures           ED Course                                             Medical Decision Making  Differential diagnosis includes tick bite, infected insect bite, spider bite, MRSA, MSSA, cellulitis, abscess and soft tissue injury.    Discussed assessment findings with patient.  Patient's tick bite on the right lower leg does not look as if it is significantly swollen, erythematous or changing.  Patient does have some cellulitis of the left groin with firm induration present in the center.  There is no fluctuance present to drain.  Patient does have a history of abscesses/boils.  We will cover him with doxycycline and Omnicef.  Patient was instructed to keep area clean with Hibiclens.  Patient should follow-up with his PCP.  Patient should return to the emergency department symptoms get worse or change.  Patient understands and agrees to discharge  plan.    Problems Addressed:  Cellulitis of left groin: complicated acute illness or injury  Tick bite of right lower leg, initial encounter: acute illness or injury    Amount and/or Complexity of Data Reviewed  External Data Reviewed: labs and notes.    Risk  OTC drugs.  Prescription drug management.        Final diagnoses:   Cellulitis of left groin   Tick bite of right lower leg, initial encounter       ED Disposition  ED Disposition       ED Disposition   Discharge    Condition   Stable    Comment   --               Kwadwo Dunham MD  [address]  [address]  [place] [address]  [phone]    Schedule an appointment as soon as possible for a visit in 1 week      [facility] EMERGENCY DEPARTMENT  [address] [place] [address] [phone]    If symptoms worsen         Medication List        New Prescriptions      cefdinir 300 MG capsule  Commonly known as: OMNICEF  Take 1 capsule by mouth 2 (Two) Times a Day for 7 days.     doxycycline 100 MG capsule  Commonly known as: MONODOX  Take 1 capsule by mouth 2 (Two) Times a Day for 10 days.            Changed      allopurinol 100 MG tablet  Commonly known as: ZYLOPRIM  TAKE 1 TABLET BY MOUTH DAILY  What changed: when to take this     Chlorhexidine Gluconate 4 % solution  Apply 1 Application topically Daily.  What changed:   medication strength  how much to take  when to take this  additional instructions     rosuvastatin 20 MG tablet  Commonly known as: CRESTOR  Take 1 tablet by mouth Daily.  What changed: how much to take               Where to Get Your Medications        These medications were sent [place] [id number] [place] [address] AT [address] - [phone]  - [phone] [address] [address]      Phone: [phone]   cefdinir 300 MG capsule  Chlorhexidine Gluconate 4 % solution  doxycycline 100 MG capsule            Ever Phelps,  APRN  06/26/24 1236

## 2024-07-11 NOTE — TELEPHONE ENCOUNTER
Intubation    Date/Time: 7/11/2024 7:56 AM    Performed by: Sabrina Givens CRNA  Authorized by: Erna Castaneda MD    Intubation:     Induction:  Intravenous    Intubated:  Postinduction    Mask Ventilation:  Easy mask (better with 2 hand, jaw thrust)    Attempts:  1    Attempted By:  CRNA    Method of Intubation:  Video laryngoscopy    Blade:  Zapata 3    Laryngeal View Grade: Grade IIA - cords partially seen      Difficult Airway Encountered?: No      Complications:  None    Airway Device:  Oral endotracheal tube    Airway Device Size:  6.5    Style/Cuff Inflation:  Cuffed (inflated to minimal occlusive pressure)    Tube secured:  21    Secured at:  The lips    Placement Verified By:  Capnometry    Complicating Factors:  Anterior larynx, small mouth, large/floppy epiglottis and poor neck/head extension    Findings Post-Intubation:  BS equal bilateral and atraumatic/condition of teeth unchanged       Need to have him see me or Sofie soon.MJI

## 2024-08-12 RX ORDER — APIXABAN 2.5 MG/1
TABLET, FILM COATED ORAL
Qty: 180 TABLET | Refills: 3 | Status: SHIPPED | OUTPATIENT
Start: 2024-08-12

## 2024-08-21 DIAGNOSIS — R20.2 PARESTHESIA OF LEFT UPPER EXTREMITY: Primary | ICD-10-CM

## 2024-08-21 DIAGNOSIS — I77.1 STRICTURE OF ARTERY: ICD-10-CM

## 2024-08-28 ENCOUNTER — TELEPHONE (OUTPATIENT)
Dept: CARDIOLOGY | Facility: CLINIC | Age: 89
End: 2024-08-28
Payer: MEDICARE

## 2024-08-28 NOTE — TELEPHONE ENCOUNTER
We received a fax from [facility] regarding Mr. Garay.  He is scheduled to have a lumbar epidural steroid injection.  I placed the fax in your inbox for your review and signature.  Thanks/jonah

## 2024-08-30 ENCOUNTER — HOSPITAL ENCOUNTER (OUTPATIENT)
Dept: ULTRASOUND IMAGING | Facility: HOSPITAL | Age: 89
Discharge: HOME | End: 2024-08-30
Payer: MEDICARE

## 2024-08-30 DIAGNOSIS — I77.1 STRICTURE OF ARTERY: ICD-10-CM

## 2024-08-30 PROCEDURE — 93930 UPPER EXTREMITY STUDY: CPT

## 2024-09-04 ENCOUNTER — TELEPHONE (OUTPATIENT)
Dept: CARDIOLOGY | Facility: CLINIC | Age: 89
End: 2024-09-04
Payer: MEDICARE

## 2024-09-04 DIAGNOSIS — I77.1 SUBCLAVIAN ARTERIAL STENOSIS: Primary | ICD-10-CM

## 2024-10-01 RX ORDER — AMLODIPINE BESYLATE 10 MG/1
10 TABLET ORAL DAILY
Qty: 30 TABLET | Refills: 1 | Status: SHIPPED | OUTPATIENT
Start: 2024-10-01

## 2024-10-28 RX ORDER — AMLODIPINE BESYLATE 10 MG/1
10 TABLET ORAL DAILY
Qty: 90 TABLET | Refills: 2 | Status: SHIPPED | OUTPATIENT
Start: 2024-10-28

## 2024-10-28 NOTE — TELEPHONE ENCOUNTER
Rx Refill Note  Requested Prescriptions     Pending Prescriptions Disp Refills    amLODIPine (NORVASC) 10 MG tablet [Pharmacy Med Name: AMLODIPINE BESYLATE 10MG TABLETS] 90 tablet      Sig: TAKE 1 TABLET BY MOUTH DAILY      Last office visit with prescribing clinician: 5/15/2024   Last telemedicine visit with prescribing clinician: Visit date not found   Next office visit with prescribing clinician: Visit date not found                         Would you like a call back once the refill request has been completed: [] Yes [] No    If the office needs to give you a call back, can they leave a voicemail: [] Yes [] No    Marya South CMA  10/28/24, 12:18 EDT

## 2024-12-06 ENCOUNTER — TELEPHONE (OUTPATIENT)
Dept: CARDIOLOGY | Facility: CLINIC | Age: 89
End: 2024-12-06

## 2024-12-06 ENCOUNTER — OFFICE VISIT (OUTPATIENT)
Dept: CARDIOLOGY | Facility: CLINIC | Age: 89
End: 2024-12-06
Payer: MEDICARE

## 2024-12-06 VITALS
HEIGHT: 68 IN | DIASTOLIC BLOOD PRESSURE: 78 MMHG | HEART RATE: 63 BPM | SYSTOLIC BLOOD PRESSURE: 122 MMHG | BODY MASS INDEX: 31.83 KG/M2 | WEIGHT: 210 LBS

## 2024-12-06 DIAGNOSIS — I10 ESSENTIAL HYPERTENSION: ICD-10-CM

## 2024-12-06 DIAGNOSIS — I48.0 PAROXYSMAL ATRIAL FIBRILLATION: ICD-10-CM

## 2024-12-06 DIAGNOSIS — I35.0 NONRHEUMATIC AORTIC (VALVE) STENOSIS: ICD-10-CM

## 2024-12-06 DIAGNOSIS — I77.1 SUBCLAVIAN ARTERIAL STENOSIS: Primary | ICD-10-CM

## 2024-12-06 DIAGNOSIS — E78.2 MIXED HYPERLIPIDEMIA: ICD-10-CM

## 2024-12-06 PROCEDURE — 1159F MED LIST DOCD IN RCRD: CPT | Performed by: NURSE PRACTITIONER

## 2024-12-06 PROCEDURE — 99214 OFFICE O/P EST MOD 30 MIN: CPT | Performed by: NURSE PRACTITIONER

## 2024-12-06 PROCEDURE — 1160F RVW MEDS BY RX/DR IN RCRD: CPT | Performed by: NURSE PRACTITIONER

## 2024-12-06 PROCEDURE — 93000 ELECTROCARDIOGRAM COMPLETE: CPT | Performed by: NURSE PRACTITIONER

## 2024-12-06 RX ORDER — PROPRANOLOL HYDROCHLORIDE 10 MG/1
10 TABLET ORAL 3 TIMES DAILY
COMMUNITY
Start: 2024-12-05

## 2024-12-06 RX ORDER — ROSUVASTATIN CALCIUM 40 MG/1
40 TABLET, COATED ORAL DAILY
Start: 2024-12-06

## 2024-12-06 NOTE — PROGRESS NOTES
Date of Office Visit: 24  Encounter Provider: BUNNY Solitario  Place of Service: [facility] CARDIOLOGY  Patient Name: Guero Garay  :1935    Chief Complaint   Patient presents with    Paresthesia of left upper extremity    Aortic stenosis, severe    Paroxysmal atrial fibrillation    Follow-up   :     HPI: Guero Garay is a 89 y.o. male  with coronary artery disease, atrial fibrillation, hypertension, aortic stenosis, chronic diastolic heart failure, asthma, hyperlipidemia, chronic kidney disease, and history of prostate cancer.     He was followed by Dr. Galan and is now followed by Dr. Preston Gutierrez. . I will visit with him in follow up today and have reviewed his medical record.      In 2017 he presented with progressive angina.  He had a perfusion stress test which showed a large area of anterior septal ischemia.  Echocardiogram showed mild aortic stenosis.  He underwent cardiac catheterization on 2017 and was found to have preserved left ventricular ejection fraction, severe disease of the mid LAD and right coronary artery.  He had a 4.0 x 15 mm drug-eluting stent placed in the LAD and a 4.0 x 28 mm drug-eluting stent dilated to 4.5 mm in the right coronary artery.  He was hypokalemic and was started on potassium.  He was also started on rosuvastatin.     He then presented in 2017 with dizziness and syncope as well as bradycardia.  He ended up having a permanent pacemaker implanted.    In  he had heme positive stool and a creatinine around 1.4.  At that time, Dr. Galan lowered his Eliquis from 5 mg to 2.5 mg twice daily.     In 2019 he was found have episodes of atrial fibrillation on pacemaker interrogation.  He also had issues with urinary retention requiring Tran catheter was following with urology for urosepsis and pyelonephritis.  He later developed elevated liver enzymes which was felt to be from antibiotic therapy and had  heme positive stool.  He had outpatient colonoscopy.      Repeat stress 12/2019 showed small and mildly severe area of ischemia in the apex. Follow up echo 06/2020 showed EF 50%, mild LVH, grade 1 diastolic dysfunction, moderate to severe aortic valve stenosis.  Cardiac catheterization showed Patent stent within the proximal LAD and proximal right coronary artery with no significant in-stent restenosis otherwise luminal irregularities throughout, Mild aortic stenosis with a peak to peak gradient of 4 mmHg across the aortic valve, and Normal left ventricular filling pressures of 50 mmHg. Repeat echo 06/2021 showed normal LVEF, mild aortic valve stenosis, and normal RVSP.       Follow-up perfusion stress test August 2023 showed no evidence of ischemia was low risk.    He had repeat echocardiogram May 2024 which showed low normal ejection fraction with an EF of 49.1%, mild apical hypokinesis, moderate aortic valve stenosis with mild insufficiency, mitral annular calcification with mild insufficiency and no stenosis.  RVSP was 34.7 mmHg.    He presents today for reassessment and is accompanied by his spouse.  He had an episode of lightheadedness on 12/3/2024.  His heart rate was 122 with a blood pressure of 145/54.  His symptoms resolved within 20 minutes of being out of his shower.  He has essential tremor and was prescribed propranolol 3 times daily from his neurologist.  He has not started it yet.  He denies chest pain or dizziness or palpitation.  He has some swelling in the legs but that stable.  He has occasional shortness of breath with exertion.  He reportedly had some vascular imaging while in [place] with his [place] cardiologist, Dr. Ordaz.      Allergies   Allergen Reactions    Sulfa Antibiotics Other (See Comments)     UNKNOWN  Pat was shaky and could hardly walk  UNKNOWN  Pat was shaky and could hardly walk  Pat was shaky and could hardly walk  UNKNOWN             Family and social history reviewed.  "    ROS  All other systems were reviewed and are negative          Objective:     Vitals:    12/06/24 1321   BP: 122/78   BP Location: Left arm   Patient Position: Sitting   Cuff Size: Adult   Pulse: 63   Weight: 95.3 kg (210 lb)   Height: 172.7 cm (68\")     Body mass index is 31.93 kg/m².    PHYSICAL EXAM:  Pulmonary:      Effort: Pulmonary effort is normal.      Breath sounds: Normal breath sounds.   Cardiovascular:      Normal rate. Regular rhythm.      Murmurs: There is a grade 3/6 high frequency systolic murmur.           ECG 12 Lead    Date/Time: 12/6/2024 1:43 PM  Performed by: Sofie Lee APRN    Authorized by: Sofie Lee APRN  Comparison: compared with previous ECG   Similar to previous ECG  Rhythm: sinus rhythm and paced  Rate: normal  Conduction: 1st degree AV block  QRS axis: left    Clinical impression: abnormal EKG          Current Outpatient Medications   Medication Sig Dispense Refill    acetaminophen (TYLENOL) 500 MG tablet Take 1 tablet by mouth Every Night.      allopurinol (ZYLOPRIM) 100 MG tablet TAKE 1 TABLET BY MOUTH DAILY (Patient taking differently: Take 1 tablet by mouth Every Night.) 30 tablet 0    amLODIPine (NORVASC) 10 MG tablet TAKE 1 TABLET BY MOUTH DAILY 90 tablet 2    cetirizine (zyrTEC) 10 MG tablet Take 1 tablet by mouth Daily As Needed.      Cranberry 250 MG capsule Take 250 mg by mouth Daily.      Eliquis 2.5 MG tablet tablet TAKE 1 TABLET BY MOUTH EVERY 12 HOURS 180 tablet 3    Fluticasone Furoate-Vilanterol (BREO ELLIPTA) 100-25 MCG/INH inhaler Inhale 1 puff Every Morning.      furosemide (LASIX) 20 MG tablet Take 1 tablet by mouth Daily. 90 tablet 0    melatonin 5 MG tablet tablet Take 1 tablet by mouth At Night As Needed.      metoprolol tartrate (LOPRESSOR) 50 MG tablet TAKE 1 TABLET BY MOUTH TWICE DAILY 180 tablet 1    Multiple Vitamins-Minerals (MULTIVITAMIN WITH MINERALS) tablet tablet Take 1 tablet by mouth Daily.      potassium chloride (MICRO-K) 10 MEQ CR " capsule TAKE 1 CAPSULE BY MOUTH TWICE DAILY (Patient taking differently: Take 1 capsule by mouth Every Other Day.) 180 capsule 2    pramipexole (Mirapex) 0.25 MG tablet Take 1 tablet by mouth Every Night. 30 tablet 2    propranolol (INDERAL) 10 MG tablet Take 1 tablet by mouth 3 (Three) Times a Day.      rosuvastatin (CRESTOR) 20 MG tablet Take 1 tablet by mouth Daily. (Patient taking differently: Take 2 tablets by mouth Daily.) 90 tablet 3    topiramate (TOPAMAX) 50 MG tablet Take 1 tablet by mouth 2 (Two) Times a Day.      aspirin 81 MG EC tablet Take 1 tablet by mouth Daily. PT TO STOP PER MD INSTRUCTION      baclofen (LIORESAL) 10 MG tablet Take 1 tablet by mouth As Needed.      Chlorhexidine Gluconate 4 % solution Apply 1 Application topically Daily. 118 mL 0     No current facility-administered medications for this visit.     Assessment:       Diagnosis Plan   1. Subclavian arterial stenosis        2. Nonrheumatic aortic (valve) stenosis        3. Essential hypertension        4. Paroxysmal atrial fibrillation             No orders of the defined types were placed in this encounter.        Plan:       1. 89 year old gentleman with paroxysmal atrial fibrillation. CHADS2-VASc score is 5   Anticoagulated with Eliquis 2.5 mg twice daily.  This dose is recommended in 2018 when he had heme positive stool and creatinine slightly above 1.4.  We had extensive discussion today on raising the dose versus keeping it the same.  We will await his repeat renal function next week however patient is comfortable to continue Eliquis 2.5 mg twice daily to help decrease bleeding risk.  2.  Heart failure mildly reduced ejection fraction with an EF of 49.1% May 2024 with mild apical hypokinesis.    -Appears euvolemic on clinical examination.   3.  Coronary artery disease status post drug-eluting stent placement to the LAD and right coronary artery in April 2017. Repeat cath 07/2020 showed patent stents, no significant restenosis and  luminal irregularities throughout. Negative perfusion stress 08/2023  4.   Hypertension his blood pressure appears well controlled  and I reviewed his home log.  5. History of prostate cancer-follows with urology  6.  Hyperlipidemia on rosuvastatin 40 mg daily  7.  History of symptomatic bradycardia and syncope status post permanent pacemaker and following our device clinic  8. Aortic stenosis, moderate on most recent echo May 2024 with mild insufficiency.    9. CKD- cr 1. 23 April 2024.  He will have repeat blood work next week with his PCP.  10. RAQUEL on therapy  and reports compliance  11.   Right subclavian artery stenosis-suspected on duplex.  He has a standing order for CTA of the right upper extremity to confirm.  Advised that he have this done when he returns from [place].  12.  Vascular studies-reportedly completed while in [place].  We will call Dr. Beyer's office and request copies to review/scan   13.  Essential tremor-he was prescribed propranolol 10 mg 3 times daily by Dr. Saúl Harman with neurology.  He has not started this yet.  I advised that he start this it twice daily for 1 week and ensure he does not have increased dizziness before increasing to 3 times daily.  Patient and his wife verbalized understanding.    I recommend 6-month follow-up.          It has been a pleasure to participate in this patient's care.      Thank you,  BUNNY Solitario      **I used Dragon to dictate this note:**

## 2024-12-06 NOTE — TELEPHONE ENCOUNTER
Please contact his cardiologist, Dr. Beyer from [facility] and request copy of his carotid artery duplex and lower extremity arterial testing to be faxed to us.

## 2024-12-10 RX ORDER — FUROSEMIDE 20 MG/1
20 TABLET ORAL DAILY
Qty: 90 TABLET | Refills: 2 | Status: SHIPPED | OUTPATIENT
Start: 2024-12-10

## 2025-01-21 RX ORDER — METOPROLOL TARTRATE 50 MG
TABLET ORAL
Qty: 180 TABLET | Refills: 1 | Status: SHIPPED | OUTPATIENT
Start: 2025-01-21

## 2025-03-27 DIAGNOSIS — I10 ESSENTIAL HYPERTENSION: ICD-10-CM

## 2025-03-27 RX ORDER — POTASSIUM CHLORIDE 750 MG/1
10 CAPSULE, EXTENDED RELEASE ORAL 2 TIMES DAILY
Qty: 180 CAPSULE | Refills: 1 | Status: SHIPPED | OUTPATIENT
Start: 2025-03-27

## 2025-04-28 RX ORDER — FUROSEMIDE 20 MG/1
20 TABLET ORAL DAILY
Qty: 90 TABLET | Refills: 2 | Status: SHIPPED | OUTPATIENT
Start: 2025-04-28

## 2025-05-22 RX ORDER — METOPROLOL TARTRATE 50 MG
50 TABLET ORAL EVERY 12 HOURS SCHEDULED
Qty: 180 TABLET | Refills: 1 | Status: SHIPPED | OUTPATIENT
Start: 2025-05-22

## 2025-06-04 ENCOUNTER — TRANSCRIBE ORDERS (OUTPATIENT)
Dept: ADMINISTRATIVE | Facility: HOSPITAL | Age: 89
End: 2025-06-04
Payer: MEDICARE

## 2025-06-04 DIAGNOSIS — J84.9 ILD (INTERSTITIAL LUNG DISEASE): Primary | ICD-10-CM

## 2025-06-11 RX ORDER — APIXABAN 2.5 MG/1
2.5 TABLET, FILM COATED ORAL EVERY 12 HOURS SCHEDULED
Qty: 180 TABLET | Refills: 3 | Status: SHIPPED | OUTPATIENT
Start: 2025-06-11

## 2025-06-12 ENCOUNTER — OFFICE VISIT (OUTPATIENT)
Dept: CARDIOLOGY | Age: 89
End: 2025-06-12
Payer: MEDICARE

## 2025-06-12 VITALS
HEART RATE: 69 BPM | BODY MASS INDEX: 32.55 KG/M2 | SYSTOLIC BLOOD PRESSURE: 112 MMHG | DIASTOLIC BLOOD PRESSURE: 64 MMHG | WEIGHT: 214.8 LBS | HEIGHT: 68 IN

## 2025-06-12 DIAGNOSIS — E78.2 MIXED HYPERLIPIDEMIA: ICD-10-CM

## 2025-06-12 DIAGNOSIS — I35.0 NONRHEUMATIC AORTIC (VALVE) STENOSIS: Primary | ICD-10-CM

## 2025-06-12 DIAGNOSIS — I10 ESSENTIAL HYPERTENSION: ICD-10-CM

## 2025-06-12 DIAGNOSIS — I25.110 CORONARY ARTERY DISEASE INVOLVING NATIVE CORONARY ARTERY OF NATIVE HEART WITH UNSTABLE ANGINA PECTORIS: ICD-10-CM

## 2025-06-12 DIAGNOSIS — I48.0 PAROXYSMAL ATRIAL FIBRILLATION: ICD-10-CM

## 2025-06-12 PROCEDURE — 1160F RVW MEDS BY RX/DR IN RCRD: CPT | Performed by: INTERNAL MEDICINE

## 2025-06-12 PROCEDURE — 99214 OFFICE O/P EST MOD 30 MIN: CPT | Performed by: INTERNAL MEDICINE

## 2025-06-12 PROCEDURE — 1159F MED LIST DOCD IN RCRD: CPT | Performed by: INTERNAL MEDICINE

## 2025-06-12 PROCEDURE — 93000 ELECTROCARDIOGRAM COMPLETE: CPT | Performed by: INTERNAL MEDICINE

## 2025-06-12 RX ORDER — ROSUVASTATIN CALCIUM 20 MG/1
20 TABLET, COATED ORAL DAILY
COMMUNITY

## 2025-06-12 RX ORDER — LOSARTAN POTASSIUM 25 MG/1
25 TABLET ORAL DAILY
COMMUNITY

## 2025-06-12 RX ORDER — VIBEGRON 75 MG/1
1 TABLET, FILM COATED ORAL DAILY
COMMUNITY
Start: 2025-05-27

## 2025-06-12 NOTE — PROGRESS NOTES
[facility]      Patient Name: Guero Garay  :1935  Age: 89 y.o.  Encounter Provider:  Preston Gutierrez Jr, MD      Chief Complaint:   Chief Complaint   Patient presents with    Congestive Heart Failure         HPI  Guero Garay is a 89 y.o. male is a very pleasant gentleman who has a past medical history of coronary artery disease, atrial fibrillation, sick sinus syndrome status post pacemaker implantation, hypertension, aortic stenosis, chronic diastolic heart failure and chronic kidney disease who presents for follow-up evaluation with me.      Last clinic visit note: He is followed in the past with Dr. Galan.  He was last seen by BUNNY Ndiaye in office in April.  Coronary artery disease status post PCI in .  Last cardiac catheterization in 2020 showed patent stents and otherwise nonobstructive coronary artery disease.  Atrial fibrillation with history of high burden in 2018 on pacemaker check however today his AT/AF burden is less than 0.1%.  At last visit he was noting labile blood pressure and he was started on amlodipine 5 mg.  He still noting high blood pressure and was seen in the [facility] ER on .  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  He notes mild chronic dyspnea on exertion which is unchanged over the past few years.  He denies orthopnea, PND or edema.  No palpitations, dizziness or syncope.  He admits to snoring and hypersomnia.  No previous sleep test.  Social and family history reviewed and not pertinent to this clinic visit.    We made some changes to medical therapy but most importantly got him home sleep study which confirmed the suspicion of sleep apnea.  He is compliant with medical therapy and we have been able to reduce his blood pressure medications and have him under much better control now.  He still does a significant amount around the farm with no chest pain.  He has chronic stable mild  "dyspnea on exertion.  No orthopnea, PND or edema.  No palpitations, dizziness or syncope.      He feels that dyspnea on exertion is chronic and stable.  No angina.  He notes occasional dizziness that was related to hypotension.  His [place] cardiologist discontinued his amlodipine.  Blood pressure has been better controlled.  112/64 mmHg today in clinic.  Off Lasix he had increased edema and had to be back on furosemide and potassium which was a decision he made.  He does note some mild increase in palpitations but these are fleeting and clinical story sounds benign.  Last LDL 57.  No cardiac complaints at time of interview.    The following portions of the patient's history were reviewed and updated as appropriate: allergies, current medications, past family history, past medical history, past social history, past surgical history and problem list.      Review of Systems   Constitutional: Positive for malaise/fatigue. Negative for chills and fever.   HENT:  Negative for hoarse voice and sore throat.    Eyes:  Negative for double vision and photophobia.   Cardiovascular:  Positive for dyspnea on exertion. Negative for chest pain, leg swelling, near-syncope, orthopnea, palpitations, paroxysmal nocturnal dyspnea and syncope.   Respiratory:  Positive for snoring. Negative for cough and wheezing.    Skin:  Negative for poor wound healing and rash.   Musculoskeletal:  Negative for arthritis and joint swelling.   Gastrointestinal:  Negative for bloating, abdominal pain, hematemesis and hematochezia.   Neurological:  Negative for dizziness and focal weakness.   Psychiatric/Behavioral:  Negative for depression and suicidal ideas.      ROS was reviewed, updated and amended when necessary.    OBJECTIVE:   Vital Signs  Vitals:    06/12/25 1024   BP: 112/64   Pulse: 69     Estimated body mass index is 32.66 kg/m² as calculated from the following:    Height as of this encounter: 172.7 cm (68\").    Weight as of this encounter: " 97.4 kg (214 lb 12.8 oz).    Vitals reviewed.   Constitutional:       Appearance: Healthy appearance. Not in distress.   Neck:      Vascular: No JVR. JVD normal.   Pulmonary:      Effort: Pulmonary effort is normal.      Breath sounds: Normal breath sounds. No wheezing. No rhonchi. No rales.   Chest:      Chest wall: Not tender to palpatation.   Cardiovascular:      PMI at left midclavicular line. Normal rate. Regular rhythm. Normal S1. Normal S2.       Murmurs: There is a systolic murmur.      No gallop.  No click. No rub.   Pulses:     Intact distal pulses.   Edema:     Peripheral edema absent.   Abdominal:      General: Bowel sounds are normal.      Palpations: Abdomen is soft.      Tenderness: There is no abdominal tenderness.   Musculoskeletal: Normal range of motion.         General: No tenderness. Skin:     General: Skin is warm and dry.   Neurological:      General: No focal deficit present.      Mental Status: Alert and oriented to person, place and time.     Physical exam was reviewed, updated and amended and necessary.      ECG 12 Lead    Date/Time: 6/12/2025 11:08 AM  Performed by: Preston Gutierrez Jr., MD    Authorized by: Preston Gutierrez Jr., MD  Comparison: compared with previous ECG from 12/6/2024  Similar to previous ECG  Rhythm: paced    Clinical impression: abnormal EKG                ASSESSMENT:     Resistant hypertension  Paroxysmal atrial fibrillation  Sick sinus syndrome  Hypersomnia  Chronic diastolic heart failure  Coronary artery disease  Aortic stenosis      PLAN OF CARE:     Hypertension -much better controlled at this point time.  We had a long discussion about sodium restricted diet.  Continue current medical regimen.  Continue CPAP therapy.  He follows with sleep medicine in [place] with Dr. Ivy.  Hypersomnia -confirmed sleep apnea on CPAP  Paroxysmal atrial fibrillation -low AT AF burden.  Continue Eliquis and metoprolol.  No bleeding complications on current anticoagulation.  Sick  sinus syndrome -normal pacemaker function  Aortic stenosis -stable valvular heart disease on echocardiogram in 2022.  Progressive exertional dyspnea per patient report.  Repeat echocardiogram.  Upper extremity paresthesias -differential of bilateral upper extremities with left arm higher than right arm systolic blood pressure by 16 mmHg.  Plan for upper extremity Doppler to exclude subclavian stenosis.  Chronic diastolic heart failure -euvolemic today.  It is imperative that we are aggressive with afterload reduction.  Sodium restricted diet is counseled.  Coronary artery disease -continue aspirin and statin.  Follow lipid profile.    Return to clinic 6 months.           Discharge Medications            Accurate as of June 12, 2025 10:37 AM. If you have any questions, ask your nurse or doctor.                Changes to Medications        Instructions Start Date   allopurinol 100 MG tablet  Commonly known as: ZYLOPRIM  What changed: when to take this   100 mg, Oral, Daily             Continue These Medications        Instructions Start Date   acetaminophen 500 MG tablet  Commonly known as: TYLENOL   500 mg, Nightly      amLODIPine 10 MG tablet  Commonly known as: NORVASC   10 mg, Oral, Daily      cetirizine 10 MG tablet  Commonly known as: zyrTEC   10 mg, Daily PRN      Cranberry 250 MG capsule   250 mg, Daily      Eliquis 2.5 MG tablet tablet  Generic drug: apixaban   2.5 mg, Oral, Every 12 Hours Scheduled      Fluticasone Furoate-Vilanterol 100-25 MCG/INH inhaler  Commonly known as: BREO ELLIPTA   1 puff, Every Morning      furosemide 20 MG tablet  Commonly known as: LASIX   20 mg, Oral, Daily      Gemtesa 75 MG tablet  Generic drug: Vibegron   1 tablet, Daily      losartan 25 MG tablet  Commonly known as: COZAAR   25 mg, Oral, Daily      melatonin 5 MG tablet tablet   5 mg, Nightly PRN      metoprolol tartrate 50 MG tablet  Commonly known as: LOPRESSOR   50 mg, Oral, Every 12 Hours Scheduled      multivitamin with  minerals tablet tablet   1 tablet, Daily      potassium chloride 10 MEQ CR capsule  Commonly known as: MICRO-K   10 mEq, Oral, 2 Times Daily      pramipexole 0.25 MG tablet  Commonly known as: Mirapex   0.25 mg, Oral, Nightly      propranolol 10 MG tablet  Commonly known as: INDERAL   10 mg, 3 Times Daily      rosuvastatin 20 MG tablet  Commonly known as: CRESTOR   20 mg, Oral, Daily      topiramate 50 MG tablet  Commonly known as: TOPAMAX   50 mg, 2 Times Daily               Thank you for allowing me to participate in the care of your patient,      Sincerely,   Preston Gutierrez MD  [facility]  06/12/25  10:37 EDT

## 2025-06-27 ENCOUNTER — HOSPITAL ENCOUNTER (OUTPATIENT)
Dept: CT IMAGING | Facility: HOSPITAL | Age: 89
Discharge: HOME | End: 2025-06-27
Payer: MEDICARE

## 2025-06-27 DIAGNOSIS — J84.9 ILD (INTERSTITIAL LUNG DISEASE): ICD-10-CM

## 2025-06-27 PROCEDURE — 71250 CT THORAX DX C-: CPT

## 2025-07-01 ENCOUNTER — HOSPITAL ENCOUNTER (OUTPATIENT)
Dept: CARDIOLOGY | Facility: HOSPITAL | Age: 89
Discharge: HOME | End: 2025-07-01
Admitting: INTERNAL MEDICINE
Payer: MEDICARE

## 2025-07-01 VITALS
SYSTOLIC BLOOD PRESSURE: 118 MMHG | HEART RATE: 78 BPM | DIASTOLIC BLOOD PRESSURE: 82 MMHG | HEIGHT: 68 IN | OXYGEN SATURATION: 95 % | WEIGHT: 214 LBS | BODY MASS INDEX: 32.43 KG/M2

## 2025-07-01 DIAGNOSIS — I35.0 NONRHEUMATIC AORTIC (VALVE) STENOSIS: ICD-10-CM

## 2025-07-01 LAB
AORTIC ARCH: 2.9 CM
AORTIC DIMENSIONLESS INDEX: 0.28 (DI)
ASCENDING AORTA: 4 CM
AV MEAN PRESS GRAD SYS DOP V1V2: 22 MMHG
AV VMAX SYS DOP: 303 CM/SEC
BH CV ECHO MEAS - ACS: 1.3 CM
BH CV ECHO MEAS - AI P1/2T: 815.6 MSEC
BH CV ECHO MEAS - AO MAX PG: 36.7 MMHG
BH CV ECHO MEAS - AO ROOT DIAM: 3.3 CM
BH CV ECHO MEAS - AO V2 VTI: 73.7 CM
BH CV ECHO MEAS - AVA(I,D): 0.88 CM2
BH CV ECHO MEAS - EDV(CUBED): 148.9 ML
BH CV ECHO MEAS - EDV(MOD-SP2): 108 ML
BH CV ECHO MEAS - EDV(MOD-SP4): 200 ML
BH CV ECHO MEAS - EF(MOD-SP2): 59.3 %
BH CV ECHO MEAS - EF(MOD-SP4): 57.5 %
BH CV ECHO MEAS - ESV(CUBED): 65.8 ML
BH CV ECHO MEAS - ESV(MOD-SP2): 44 ML
BH CV ECHO MEAS - ESV(MOD-SP4): 85 ML
BH CV ECHO MEAS - FS: 23.8 %
BH CV ECHO MEAS - IVS/LVPW: 1.08 CM
BH CV ECHO MEAS - IVSD: 1.3 CM
BH CV ECHO MEAS - LAT PEAK E' VEL: 3.9 CM/SEC
BH CV ECHO MEAS - LV DIASTOLIC VOL/BSA (35-75): 95.1 CM2
BH CV ECHO MEAS - LV MASS(C)D: 271.6 GRAMS
BH CV ECHO MEAS - LV MAX PG: 2.7 MMHG
BH CV ECHO MEAS - LV MEAN PG: 1 MMHG
BH CV ECHO MEAS - LV SYSTOLIC VOL/BSA (12-30): 40.4 CM2
BH CV ECHO MEAS - LV V1 MAX: 81.4 CM/SEC
BH CV ECHO MEAS - LV V1 VTI: 20.7 CM
BH CV ECHO MEAS - LVIDD: 5.3 CM
BH CV ECHO MEAS - LVIDS: 4 CM
BH CV ECHO MEAS - LVOT AREA: 3.1 CM2
BH CV ECHO MEAS - LVOT DIAM: 2 CM
BH CV ECHO MEAS - LVPWD: 1.2 CM
BH CV ECHO MEAS - MED PEAK E' VEL: 3.7 CM/SEC
BH CV ECHO MEAS - MR MAX PG: 88.2 MMHG
BH CV ECHO MEAS - MR MAX VEL: 469.5 CM/SEC
BH CV ECHO MEAS - MV A DUR: 0.09 SEC
BH CV ECHO MEAS - MV A MAX VEL: 108 CM/SEC
BH CV ECHO MEAS - MV DEC SLOPE: 240.2 CM/SEC2
BH CV ECHO MEAS - MV DEC TIME: 0.31 SEC
BH CV ECHO MEAS - MV E MAX VEL: 60.4 CM/SEC
BH CV ECHO MEAS - MV E/A: 0.56
BH CV ECHO MEAS - MV MAX PG: 5.3 MMHG
BH CV ECHO MEAS - MV MEAN PG: 1.48 MMHG
BH CV ECHO MEAS - MV P1/2T: 72 MSEC
BH CV ECHO MEAS - MV V2 VTI: 25.4 CM
BH CV ECHO MEAS - MVA(P1/2T): 3.1 CM2
BH CV ECHO MEAS - MVA(VTI): 2.6 CM2
BH CV ECHO MEAS - PA ACC TIME: 0.13 SEC
BH CV ECHO MEAS - PA V2 MAX: 53.9 CM/SEC
BH CV ECHO MEAS - PULM A REVS DUR: 0.11 SEC
BH CV ECHO MEAS - PULM A REVS VEL: 30.9 CM/SEC
BH CV ECHO MEAS - PULM DIAS VEL: 37.1 CM/SEC
BH CV ECHO MEAS - PULM S/D: 1.5
BH CV ECHO MEAS - PULM SYS VEL: 55.7 CM/SEC
BH CV ECHO MEAS - QP/QS: 0.55
BH CV ECHO MEAS - RAP SYSTOLE: 3 MMHG
BH CV ECHO MEAS - RV MAX PG: 1.08 MMHG
BH CV ECHO MEAS - RV V1 MAX: 51.8 CM/SEC
BH CV ECHO MEAS - RV V1 VTI: 11.1 CM
BH CV ECHO MEAS - RVOT DIAM: 2.03 CM
BH CV ECHO MEAS - RVSP: 39 MMHG
BH CV ECHO MEAS - SUP REN AO DIAM: 2.4 CM
BH CV ECHO MEAS - SV(LVOT): 64.8 ML
BH CV ECHO MEAS - SV(MOD-SP2): 64 ML
BH CV ECHO MEAS - SV(MOD-SP4): 115 ML
BH CV ECHO MEAS - SV(RVOT): 35.8 ML
BH CV ECHO MEAS - SVI(LVOT): 30.8 ML/M2
BH CV ECHO MEAS - SVI(MOD-SP2): 30.4 ML/M2
BH CV ECHO MEAS - SVI(MOD-SP4): 54.7 ML/M2
BH CV ECHO MEAS - TAPSE (>1.6): 2.7 CM
BH CV ECHO MEAS - TR MAX PG: 36.3 MMHG
BH CV ECHO MEAS - TR MAX VEL: 301.3 CM/SEC
BH CV ECHO MEASUREMENTS AVERAGE E/E' RATIO: 15.89
BH CV XLRA - RV BASE: 3.9 CM
BH CV XLRA - RV LENGTH: 7.2 CM
BH CV XLRA - RV MID: 3.2 CM
BH CV XLRA - TDI S': 12.5 CM/SEC
LEFT ATRIUM VOLUME INDEX: 28 ML/M2
LV EF BIPLANE MOD: 58.8 %
SINUS: 3.4 CM
STJ: 2.9 CM

## 2025-07-01 PROCEDURE — 93306 TTE W/DOPPLER COMPLETE: CPT

## 2025-07-01 PROCEDURE — 25510000001 PERFLUTREN 6.52 MG/ML SUSPENSION 2 ML VIAL: Performed by: INTERNAL MEDICINE

## 2025-07-01 RX ADMIN — PERFLUTREN 1.5 ML: 6.52 INJECTION, SUSPENSION INTRAVENOUS at 10:45

## 2025-07-02 ENCOUNTER — RESULTS FOLLOW-UP (OUTPATIENT)
Dept: CARDIOLOGY | Age: 89
End: 2025-07-02
Payer: MEDICARE

## 2025-07-02 NOTE — TELEPHONE ENCOUNTER
Please inform patient his overall pulm function remains strong and normal.  Aortic valve gradients are overall stable compared with last echo 2024.  His lung pressure is 38 was previously 34.5 which is slightly more elevated.  No other significant changes.  Okay to keep appointment in December to follow-up

## (undated) DEVICE — INTRO SHEATH FLX 6F45CM

## (undated) DEVICE — NC TREK CORONARY DILATATION CATHETER 4.5 MM X 12 MM / RAPID-EXCHANGE: Brand: NC TREK

## (undated) DEVICE — AIRLIFE™ NASAL OXYGEN CANNULA CURVED, NONFLARED TIP WITH 21 FOOT (6.4 M) CRUSH-RESISTANT TUBING, OVER-THE-EAR STYLE: Brand: AIRLIFE™

## (undated) DEVICE — DEV INDEFLATOR

## (undated) DEVICE — Device

## (undated) DEVICE — SPNG GZ WOVN 4X4IN 12PLY 10/BX STRL

## (undated) DEVICE — ENDO. PORT CONNECTOR W/VALVE FOR OLYMPUS® SCOPES: Brand: ERBE

## (undated) DEVICE — PK KN TOTL 40

## (undated) DEVICE — ZIP 24 SURGICAL SKIN CLOSURE DEVICE, PSA: Brand: ZIP 24 SURGICAL SKIN CLOSURE DEVICE

## (undated) DEVICE — SNAR POLYP CAPTIFLX WD OVL 27MM 240CM

## (undated) DEVICE — MASK,FACE,FLUID RESIST,SHLD,EARLOOP: Brand: MEDLINE

## (undated) DEVICE — CATH DIAG IMPULSE FL3.5 5F 100CM

## (undated) DEVICE — INTRO SHEATH ART/FEM ENGAGE .035 6F12CM

## (undated) DEVICE — VIAL FORMALIN CAP 10P 40ML

## (undated) DEVICE — INTRO SHEATH ART/FEM ENGAGE .035 5F12CM

## (undated) DEVICE — DECANTER BAG 9": Brand: MEDLINE INDUSTRIES, INC.

## (undated) DEVICE — ELECTRD ECG CARBON/SNP RL FM A/ 5PK

## (undated) DEVICE — CATH DIAG IMPULSE AR1 5F 100CM

## (undated) DEVICE — GLV SURG SENSICARE MICRO PF LF 7.5 STRL

## (undated) DEVICE — JACKT LAB F/R KNIT CUFF/COLR XLG BLU

## (undated) DEVICE — GLV SURG SENSICARE PI MIC PF SZ7 LF STRL

## (undated) DEVICE — 2108 SERIES SAGITTAL BLADE, NO OFFSET  (12.4 X 1.19 X 82.1MM)

## (undated) DEVICE — PENCL E/S ULTRAVAC TELESCP NOSE HOLSTR 10FT

## (undated) DEVICE — GW HITORQUE/BAL MID/WT J W/HCOAT .014 3X190CM

## (undated) DEVICE — APPL CHLORAPREP HI/LITE 26ML ORNG

## (undated) DEVICE — SUT VIC 1 CT1 36IN J947H

## (undated) DEVICE — GUIDE CATHETER: Brand: MACH1™

## (undated) DEVICE — MYNXGRIP 6F/7F: Brand: MYNXGRIP

## (undated) DEVICE — TR BAND RADIAL ARTERY COMPRESSION DEVICE: Brand: TR BAND

## (undated) DEVICE — CATH DIAG IMPULSE FR4 5F 100CM

## (undated) DEVICE — PK CATH CARD 40

## (undated) DEVICE — STERILE PATIENT PROTECTIVE PAD FOR IMP® KNEE POSITIONERS & COHESIVE WRAP (10 / CASE): Brand: DE MAYO KNEE POSITIONER®

## (undated) DEVICE — SOL NACL 0.9PCT 1000ML

## (undated) DEVICE — GLV SURG BIOGEL LTX PF 7

## (undated) DEVICE — GLV SURG PREMIERPRO ORTHO LTX PF SZ8.5 BRN

## (undated) DEVICE — TRAP FLD MINIVAC MEGADYNE 100ML

## (undated) DEVICE — TRAP POLYP 4CHAMBER

## (undated) DEVICE — 450 ML BOTTLE OF 0.05% CHLORHEXIDINE GLUCONATE IN 99.95% STERILE WATER FOR IRRIGATION, USP AND APPLICATOR.: Brand: IRRISEPT ANTIMICROBIAL WOUND LAVAGE

## (undated) DEVICE — SUCTION CANISTER, 3000CC,SAFELINER: Brand: DEROYAL

## (undated) DEVICE — SOL ISO/ALC RUB 70PCT 4OZ

## (undated) DEVICE — GW EMR FIX EXCHG J STD .035 3MM 260CM

## (undated) DEVICE — CATH DIAG IMPULSE FL4 5F 100CM

## (undated) DEVICE — SKIN PREP TRAY W/CHG: Brand: MEDLINE INDUSTRIES, INC.

## (undated) DEVICE — TREK CORONARY DILATATION CATHETER 3.50 MM X 12 MM / RAPID-EXCHANGE: Brand: TREK

## (undated) DEVICE — GW INQWIRE FC PTFE STR .035IN 150

## (undated) DEVICE — SYR LL 3CC

## (undated) DEVICE — Device: Brand: DEFENDO AIR/WATER/SUCTION AND BIOPSY VALVE

## (undated) DEVICE — NEEDLE, QUINCKE, 20GX3.5": Brand: MEDLINE

## (undated) DEVICE — FRCP BX RADJAW4 NDL 2.8 240CM LG OG BX40

## (undated) DEVICE — BW-412T DISP COMBO CLEANING BRUSH: Brand: SINGLE USE COMBINATION CLEANING BRUSH

## (undated) DEVICE — GLV SURG SIGNATURE ESSENTIAL PF LTX SZ8.5

## (undated) DEVICE — KT MANIFLD CARDIAC

## (undated) DEVICE — LOU PACE DEFIB: Brand: MEDLINE INDUSTRIES, INC.

## (undated) DEVICE — HI-TORQUE SUPRA CORE .035 PERIPHERAL GUIDE WIRE .035 X 190 CM: Brand: HI-TORQUE SUPRA CORE

## (undated) DEVICE — CATH VENT MIV RADL PIG ST TIP 4F 110CM

## (undated) DEVICE — DUAL CUT SAGITTAL BLADE

## (undated) DEVICE — GLIDESHEATH BASIC HYDROPHILIC COATED INTRODUCER SHEATH: Brand: GLIDESHEATH

## (undated) DEVICE — CATH DIAG IMPULSE AL1 5F 100CM

## (undated) DEVICE — GOWN ISOL W/THUMB UNIV BLU BX/15

## (undated) DEVICE — RADIFOCUS GLIDEWIRE: Brand: GLIDEWIRE

## (undated) DEVICE — LIMB HOLDER, WRIST/ANKLE: Brand: DEROYAL